# Patient Record
Sex: FEMALE | Race: WHITE | NOT HISPANIC OR LATINO | Employment: OTHER | URBAN - NONMETROPOLITAN AREA
[De-identification: names, ages, dates, MRNs, and addresses within clinical notes are randomized per-mention and may not be internally consistent; named-entity substitution may affect disease eponyms.]

---

## 2017-11-02 ENCOUNTER — HOSPITAL ENCOUNTER (EMERGENCY)
Facility: HOSPITAL | Age: 67
End: 2017-11-02
Attending: EMERGENCY MEDICINE | Admitting: EMERGENCY MEDICINE
Payer: COMMERCIAL

## 2017-11-02 ENCOUNTER — APPOINTMENT (EMERGENCY)
Dept: CT IMAGING | Facility: HOSPITAL | Age: 67
End: 2017-11-02
Payer: COMMERCIAL

## 2017-11-02 ENCOUNTER — APPOINTMENT (EMERGENCY)
Dept: RADIOLOGY | Facility: HOSPITAL | Age: 67
End: 2017-11-02
Payer: COMMERCIAL

## 2017-11-02 ENCOUNTER — HOSPITAL ENCOUNTER (INPATIENT)
Facility: HOSPITAL | Age: 67
LOS: 21 days | DRG: 224 | End: 2017-11-23
Attending: EMERGENCY MEDICINE | Admitting: EMERGENCY MEDICINE
Payer: COMMERCIAL

## 2017-11-02 VITALS
WEIGHT: 281.13 LBS | RESPIRATION RATE: 23 BRPM | HEIGHT: 66 IN | HEART RATE: 96 BPM | OXYGEN SATURATION: 100 % | BODY MASS INDEX: 45.18 KG/M2 | DIASTOLIC BLOOD PRESSURE: 64 MMHG | TEMPERATURE: 98.4 F | SYSTOLIC BLOOD PRESSURE: 113 MMHG

## 2017-11-02 DIAGNOSIS — I49.01 CARDIAC ARREST WITH VENTRICULAR FIBRILLATION (HCC): ICD-10-CM

## 2017-11-02 DIAGNOSIS — J96.90 RESPIRATORY FAILURE (HCC): ICD-10-CM

## 2017-11-02 DIAGNOSIS — J18.9 LEFT LOWER LOBE PNEUMONIA: Primary | ICD-10-CM

## 2017-11-02 DIAGNOSIS — I44.7 LBBB (LEFT BUNDLE BRANCH BLOCK): ICD-10-CM

## 2017-11-02 DIAGNOSIS — G93.40 ENCEPHALOPATHY: ICD-10-CM

## 2017-11-02 DIAGNOSIS — J96.02 ACUTE HYPERCAPNIC RESPIRATORY FAILURE (HCC): ICD-10-CM

## 2017-11-02 DIAGNOSIS — R73.9 HYPERGLYCEMIA: ICD-10-CM

## 2017-11-02 DIAGNOSIS — I46.9 CARDIAC ARREST WITH VENTRICULAR FIBRILLATION (HCC): ICD-10-CM

## 2017-11-02 DIAGNOSIS — I25.5 ISCHEMIC CARDIOMYOPATHY: ICD-10-CM

## 2017-11-02 DIAGNOSIS — I47.2 TORSADES DE POINTES (HCC): ICD-10-CM

## 2017-11-02 DIAGNOSIS — R94.31 PROLONGED QT INTERVAL: ICD-10-CM

## 2017-11-02 DIAGNOSIS — R06.03 RESPIRATORY DISTRESS: ICD-10-CM

## 2017-11-02 DIAGNOSIS — E87.2 LACTIC ACIDOSIS: ICD-10-CM

## 2017-11-02 DIAGNOSIS — I25.10 CAD (CORONARY ARTERY DISEASE): ICD-10-CM

## 2017-11-02 DIAGNOSIS — R41.82 ALTERED MENTAL STATUS, UNSPECIFIED ALTERED MENTAL STATUS TYPE: ICD-10-CM

## 2017-11-02 DIAGNOSIS — R40.4 TRANSIENT ALTERATION OF AWARENESS: ICD-10-CM

## 2017-11-02 DIAGNOSIS — I10 HYPERTENSION: ICD-10-CM

## 2017-11-02 DIAGNOSIS — R41.82 ALTERED MENTAL STATUS, UNSPECIFIED: Primary | ICD-10-CM

## 2017-11-02 DIAGNOSIS — R40.20 COMA, UNSPECIFIED COMA DEPTH, UNSPECIFIED COMA TIMING: ICD-10-CM

## 2017-11-02 DIAGNOSIS — I21.4 NSTEMI (NON-ST ELEVATED MYOCARDIAL INFARCTION) (HCC): ICD-10-CM

## 2017-11-02 DIAGNOSIS — R44.3 HALLUCINATIONS: ICD-10-CM

## 2017-11-02 DIAGNOSIS — R77.8 ELEVATED TROPONIN: ICD-10-CM

## 2017-11-02 DIAGNOSIS — J96.21 ACUTE ON CHRONIC RESPIRATORY FAILURE WITH HYPOXIA AND HYPERCAPNIA (HCC): ICD-10-CM

## 2017-11-02 DIAGNOSIS — J96.22 ACUTE ON CHRONIC RESPIRATORY FAILURE WITH HYPOXIA AND HYPERCAPNIA (HCC): ICD-10-CM

## 2017-11-02 LAB
ALBUMIN SERPL BCP-MCNC: 3.3 G/DL (ref 3.5–5)
ALBUMIN SERPL BCP-MCNC: 3.9 G/DL (ref 3.5–5)
ALP SERPL-CCNC: 37 U/L (ref 46–116)
ALP SERPL-CCNC: 44 U/L (ref 46–116)
ALT SERPL W P-5'-P-CCNC: 61 U/L (ref 12–78)
ALT SERPL W P-5'-P-CCNC: 69 U/L (ref 12–78)
ANION GAP BLD CALC-SCNC: 23 MMOL/L (ref 4–13)
ANION GAP SERPL CALCULATED.3IONS-SCNC: 10 MMOL/L (ref 4–13)
ANION GAP SERPL CALCULATED.3IONS-SCNC: 17 MMOL/L (ref 4–13)
APAP SERPL-MCNC: <2 UG/ML (ref 10–30)
APTT PPP: 35 SECONDS (ref 23–35)
APTT PPP: 36 SECONDS (ref 23–35)
AST SERPL W P-5'-P-CCNC: 140 U/L (ref 5–45)
AST SERPL W P-5'-P-CCNC: 148 U/L (ref 5–45)
BACTERIA UR QL AUTO: ABNORMAL /HPF
BASE EX.OXY STD BLDV CALC-SCNC: 28.2 % (ref 60–80)
BASE EXCESS BLDA CALC-SCNC: 1 MMOL/L (ref -2–3)
BASE EXCESS BLDV CALC-SCNC: 4.8 MMOL/L
BASOPHILS # BLD AUTO: 0.02 THOUSANDS/ΜL (ref 0–0.1)
BASOPHILS # BLD AUTO: 0.02 THOUSANDS/ΜL (ref 0–0.1)
BASOPHILS NFR BLD AUTO: 0 % (ref 0–1)
BASOPHILS NFR BLD AUTO: 0 % (ref 0–1)
BILIRUB SERPL-MCNC: 0.75 MG/DL (ref 0.2–1)
BILIRUB SERPL-MCNC: 0.9 MG/DL (ref 0.2–1)
BILIRUB UR QL STRIP: ABNORMAL
BUN BLD-MCNC: 14 MG/DL (ref 5–25)
BUN SERPL-MCNC: 14 MG/DL (ref 5–25)
BUN SERPL-MCNC: 16 MG/DL (ref 5–25)
CA-I BLD-SCNC: 1.17 MMOL/L (ref 1.12–1.32)
CA-I BLD-SCNC: 1.18 MMOL/L (ref 1.12–1.32)
CALCIUM SERPL-MCNC: 10.2 MG/DL (ref 8.3–10.1)
CALCIUM SERPL-MCNC: 9.6 MG/DL (ref 8.3–10.1)
CHLORIDE BLD-SCNC: 98 MMOL/L (ref 100–108)
CHLORIDE SERPL-SCNC: 100 MMOL/L (ref 100–108)
CHLORIDE SERPL-SCNC: 98 MMOL/L (ref 100–108)
CLARITY UR: CLEAR
CO2 SERPL-SCNC: 29 MMOL/L (ref 21–32)
CO2 SERPL-SCNC: 31 MMOL/L (ref 21–32)
COLOR UR: YELLOW
CREAT BLD-MCNC: 0.7 MG/DL (ref 0.6–1.3)
CREAT SERPL-MCNC: 0.96 MG/DL (ref 0.6–1.3)
CREAT SERPL-MCNC: 1.05 MG/DL (ref 0.6–1.3)
EOSINOPHIL # BLD AUTO: 0 THOUSAND/ΜL (ref 0–0.61)
EOSINOPHIL # BLD AUTO: 0 THOUSAND/ΜL (ref 0–0.61)
EOSINOPHIL NFR BLD AUTO: 0 % (ref 0–6)
EOSINOPHIL NFR BLD AUTO: 0 % (ref 0–6)
ERYTHROCYTE [DISTWIDTH] IN BLOOD BY AUTOMATED COUNT: 14.2 % (ref 11.6–15.1)
ERYTHROCYTE [DISTWIDTH] IN BLOOD BY AUTOMATED COUNT: 14.2 % (ref 11.6–15.1)
ETHANOL SERPL-MCNC: <3 MG/DL (ref 0–3)
GFR SERPL CREATININE-BSD FRML MDRD: 55 ML/MIN/1.73SQ M
GFR SERPL CREATININE-BSD FRML MDRD: 61 ML/MIN/1.73SQ M
GFR SERPL CREATININE-BSD FRML MDRD: 90 ML/MIN/1.73SQ M
GLUCOSE SERPL-MCNC: 359 MG/DL (ref 65–140)
GLUCOSE SERPL-MCNC: 372 MG/DL (ref 65–140)
GLUCOSE SERPL-MCNC: 382 MG/DL (ref 65–140)
GLUCOSE SERPL-MCNC: 383 MG/DL (ref 65–140)
GLUCOSE SERPL-MCNC: 385 MG/DL (ref 65–140)
GLUCOSE UR STRIP-MCNC: ABNORMAL MG/DL
HCO3 BLDA-SCNC: 28.7 MMOL/L (ref 24–30)
HCO3 BLDV-SCNC: 32.8 MMOL/L (ref 24–30)
HCT VFR BLD AUTO: 37.5 % (ref 34.8–46.1)
HCT VFR BLD AUTO: 41.9 % (ref 34.8–46.1)
HCT VFR BLD CALC: 42 % (ref 34.8–46.1)
HCT VFR BLD CALC: 54 % (ref 34.8–46.1)
HGB BLD-MCNC: 11.9 G/DL (ref 11.5–15.4)
HGB BLD-MCNC: 12.8 G/DL (ref 11.5–15.4)
HGB BLDA-MCNC: 14.3 G/DL (ref 11.5–15.4)
HGB BLDA-MCNC: 18.4 G/DL (ref 11.5–15.4)
HGB UR QL STRIP.AUTO: NEGATIVE
HYALINE CASTS #/AREA URNS LPF: ABNORMAL /LPF
INR PPP: 1.26 (ref 0.86–1.16)
INR PPP: 1.36 (ref 0.86–1.16)
KETONES UR STRIP-MCNC: ABNORMAL MG/DL
LACTATE SERPL-SCNC: 2.3 MMOL/L (ref 0.5–2)
LACTATE SERPL-SCNC: 2.3 MMOL/L (ref 0.5–2)
LACTATE SERPL-SCNC: 4.3 MMOL/L (ref 0.5–2)
LEUKOCYTE ESTERASE UR QL STRIP: ABNORMAL
LIPASE SERPL-CCNC: 43 U/L (ref 73–393)
LYMPHOCYTES # BLD AUTO: 1.5 THOUSANDS/ΜL (ref 0.6–4.47)
LYMPHOCYTES # BLD AUTO: 1.91 THOUSANDS/ΜL (ref 0.6–4.47)
LYMPHOCYTES NFR BLD AUTO: 13 % (ref 14–44)
LYMPHOCYTES NFR BLD AUTO: 9 % (ref 14–44)
MAGNESIUM SERPL-MCNC: 2 MG/DL (ref 1.6–2.6)
MCH RBC QN AUTO: 28.3 PG (ref 26.8–34.3)
MCH RBC QN AUTO: 29.1 PG (ref 26.8–34.3)
MCHC RBC AUTO-ENTMCNC: 30.5 G/DL (ref 31.4–37.4)
MCHC RBC AUTO-ENTMCNC: 31.7 G/DL (ref 31.4–37.4)
MCV RBC AUTO: 92 FL (ref 82–98)
MCV RBC AUTO: 93 FL (ref 82–98)
MONOCYTES # BLD AUTO: 1.39 THOUSAND/ΜL (ref 0.17–1.22)
MONOCYTES # BLD AUTO: 1.44 THOUSAND/ΜL (ref 0.17–1.22)
MONOCYTES NFR BLD AUTO: 10 % (ref 4–12)
MONOCYTES NFR BLD AUTO: 8 % (ref 4–12)
NEUTROPHILS # BLD AUTO: 11.7 THOUSANDS/ΜL (ref 1.85–7.62)
NEUTROPHILS # BLD AUTO: 14.34 THOUSANDS/ΜL (ref 1.85–7.62)
NEUTS SEG NFR BLD AUTO: 77 % (ref 43–75)
NEUTS SEG NFR BLD AUTO: 83 % (ref 43–75)
NITRITE UR QL STRIP: NEGATIVE
NON-SQ EPI CELLS URNS QL MICRO: ABNORMAL /HPF
NRBC BLD AUTO-RTO: 0 /100 WBCS
NT-PROBNP SERPL-MCNC: ABNORMAL PG/ML
O2 CT BLDV-SCNC: 4.6 ML/DL
PCO2 BLD: 26 MMOL/L (ref 21–32)
PCO2 BLD: 31 MMOL/L (ref 21–32)
PCO2 BLD: 59.9 MM HG (ref 42–50)
PCO2 BLDV: 67.2 MM HG (ref 42–50)
PH BLD: 7.29 [PH] (ref 7.3–7.4)
PH BLDV: 7.31 [PH] (ref 7.3–7.4)
PH UR STRIP.AUTO: 6 [PH] (ref 4.5–8)
PHOSPHATE SERPL-MCNC: 4 MG/DL (ref 2.3–4.1)
PLATELET # BLD AUTO: 324 THOUSANDS/UL (ref 149–390)
PLATELET # BLD AUTO: 368 THOUSANDS/UL (ref 149–390)
PMV BLD AUTO: 10.4 FL (ref 8.9–12.7)
PMV BLD AUTO: 10.7 FL (ref 8.9–12.7)
PO2 BLD: 24 MM HG (ref 35–45)
PO2 BLDV: 23.8 MM HG (ref 35–45)
POTASSIUM BLD-SCNC: 3.4 MMOL/L (ref 3.5–5.3)
POTASSIUM BLD-SCNC: 3.5 MMOL/L (ref 3.5–5.3)
POTASSIUM SERPL-SCNC: 3.5 MMOL/L (ref 3.5–5.3)
POTASSIUM SERPL-SCNC: 4 MMOL/L (ref 3.5–5.3)
PROT SERPL-MCNC: 7.2 G/DL (ref 6.4–8.2)
PROT SERPL-MCNC: 8.1 G/DL (ref 6.4–8.2)
PROT UR STRIP-MCNC: ABNORMAL MG/DL
PROTHROMBIN TIME: 15.7 SECONDS (ref 12.1–14.4)
PROTHROMBIN TIME: 16.8 SECONDS (ref 12.1–14.4)
RBC # BLD AUTO: 4.09 MILLION/UL (ref 3.81–5.12)
RBC # BLD AUTO: 4.52 MILLION/UL (ref 3.81–5.12)
RBC #/AREA URNS AUTO: ABNORMAL /HPF
SALICYLATES SERPL-MCNC: 3.5 MG/DL (ref 3–20)
SAO2 % BLD FROM PO2: 34 % (ref 95–98)
SODIUM BLD-SCNC: 143 MMOL/L (ref 136–145)
SODIUM BLD-SCNC: 143 MMOL/L (ref 136–145)
SODIUM SERPL-SCNC: 141 MMOL/L (ref 136–145)
SODIUM SERPL-SCNC: 144 MMOL/L (ref 136–145)
SP GR UR STRIP.AUTO: 1.04 (ref 1–1.03)
SPECIMEN SOURCE: ABNORMAL
TROPONIN I BLD-MCNC: 4.93 NG/ML (ref 0–0.08)
TROPONIN I SERPL-MCNC: 7.57 NG/ML
TROPONIN I SERPL-MCNC: 9.82 NG/ML
TSH SERPL DL<=0.05 MIU/L-ACNC: 0.92 UIU/ML (ref 0.36–3.74)
UROBILINOGEN UR QL STRIP.AUTO: 0.2 E.U./DL
WBC # BLD AUTO: 15.12 THOUSAND/UL (ref 4.31–10.16)
WBC # BLD AUTO: 17.25 THOUSAND/UL (ref 4.31–10.16)
WBC #/AREA URNS AUTO: ABNORMAL /HPF

## 2017-11-02 PROCEDURE — 83605 ASSAY OF LACTIC ACID: CPT | Performed by: EMERGENCY MEDICINE

## 2017-11-02 PROCEDURE — 82947 ASSAY GLUCOSE BLOOD QUANT: CPT

## 2017-11-02 PROCEDURE — 85014 HEMATOCRIT: CPT

## 2017-11-02 PROCEDURE — 85730 THROMBOPLASTIN TIME PARTIAL: CPT | Performed by: EMERGENCY MEDICINE

## 2017-11-02 PROCEDURE — 94660 CPAP INITIATION&MGMT: CPT

## 2017-11-02 PROCEDURE — 80320 DRUG SCREEN QUANTALCOHOLS: CPT | Performed by: EMERGENCY MEDICINE

## 2017-11-02 PROCEDURE — 84443 ASSAY THYROID STIM HORMONE: CPT | Performed by: EMERGENCY MEDICINE

## 2017-11-02 PROCEDURE — 84132 ASSAY OF SERUM POTASSIUM: CPT

## 2017-11-02 PROCEDURE — 93005 ELECTROCARDIOGRAM TRACING: CPT | Performed by: EMERGENCY MEDICINE

## 2017-11-02 PROCEDURE — 82805 BLOOD GASES W/O2 SATURATION: CPT | Performed by: EMERGENCY MEDICINE

## 2017-11-02 PROCEDURE — 84484 ASSAY OF TROPONIN QUANT: CPT

## 2017-11-02 PROCEDURE — 85610 PROTHROMBIN TIME: CPT | Performed by: EMERGENCY MEDICINE

## 2017-11-02 PROCEDURE — 84295 ASSAY OF SERUM SODIUM: CPT

## 2017-11-02 PROCEDURE — 71010 HB CHEST X-RAY 1 VIEW FRONTAL (PORTABLE): CPT

## 2017-11-02 PROCEDURE — 85025 COMPLETE CBC W/AUTO DIFF WBC: CPT | Performed by: EMERGENCY MEDICINE

## 2017-11-02 PROCEDURE — 81001 URINALYSIS AUTO W/SCOPE: CPT | Performed by: EMERGENCY MEDICINE

## 2017-11-02 PROCEDURE — 94760 N-INVAS EAR/PLS OXIMETRY 1: CPT

## 2017-11-02 PROCEDURE — 80053 COMPREHEN METABOLIC PANEL: CPT | Performed by: EMERGENCY MEDICINE

## 2017-11-02 PROCEDURE — 83880 ASSAY OF NATRIURETIC PEPTIDE: CPT | Performed by: EMERGENCY MEDICINE

## 2017-11-02 PROCEDURE — 36415 COLL VENOUS BLD VENIPUNCTURE: CPT | Performed by: EMERGENCY MEDICINE

## 2017-11-02 PROCEDURE — 87040 BLOOD CULTURE FOR BACTERIA: CPT | Performed by: EMERGENCY MEDICINE

## 2017-11-02 PROCEDURE — 93005 ELECTROCARDIOGRAM TRACING: CPT

## 2017-11-02 PROCEDURE — 70450 CT HEAD/BRAIN W/O DYE: CPT

## 2017-11-02 PROCEDURE — 72125 CT NECK SPINE W/O DYE: CPT

## 2017-11-02 PROCEDURE — 96361 HYDRATE IV INFUSION ADD-ON: CPT

## 2017-11-02 PROCEDURE — 99285 EMERGENCY DEPT VISIT HI MDM: CPT

## 2017-11-02 PROCEDURE — 82330 ASSAY OF CALCIUM: CPT

## 2017-11-02 PROCEDURE — 82803 BLOOD GASES ANY COMBINATION: CPT

## 2017-11-02 PROCEDURE — 80165 DIPROPYLACETIC ACID FREE: CPT | Performed by: EMERGENCY MEDICINE

## 2017-11-02 PROCEDURE — 96365 THER/PROPH/DIAG IV INF INIT: CPT

## 2017-11-02 PROCEDURE — 84100 ASSAY OF PHOSPHORUS: CPT | Performed by: EMERGENCY MEDICINE

## 2017-11-02 PROCEDURE — 82948 REAGENT STRIP/BLOOD GLUCOSE: CPT

## 2017-11-02 PROCEDURE — 83690 ASSAY OF LIPASE: CPT | Performed by: EMERGENCY MEDICINE

## 2017-11-02 PROCEDURE — 84484 ASSAY OF TROPONIN QUANT: CPT | Performed by: EMERGENCY MEDICINE

## 2017-11-02 PROCEDURE — 83735 ASSAY OF MAGNESIUM: CPT | Performed by: EMERGENCY MEDICINE

## 2017-11-02 PROCEDURE — 94640 AIRWAY INHALATION TREATMENT: CPT

## 2017-11-02 PROCEDURE — 80047 BASIC METABLC PNL IONIZED CA: CPT

## 2017-11-02 PROCEDURE — 80329 ANALGESICS NON-OPIOID 1 OR 2: CPT | Performed by: EMERGENCY MEDICINE

## 2017-11-02 RX ORDER — HEPARIN SODIUM 1000 [USP'U]/ML
2000 INJECTION, SOLUTION INTRAVENOUS; SUBCUTANEOUS AS NEEDED
Status: DISCONTINUED | OUTPATIENT
Start: 2017-11-02 | End: 2017-11-06

## 2017-11-02 RX ORDER — SODIUM CHLORIDE 9 MG/ML
125 INJECTION, SOLUTION INTRAVENOUS CONTINUOUS
Status: DISCONTINUED | OUTPATIENT
Start: 2017-11-02 | End: 2017-11-03

## 2017-11-02 RX ORDER — ALBUTEROL SULFATE 2.5 MG/3ML
10 SOLUTION RESPIRATORY (INHALATION) ONCE
Status: COMPLETED | OUTPATIENT
Start: 2017-11-02 | End: 2017-11-02

## 2017-11-02 RX ORDER — HEPARIN SODIUM 1000 [USP'U]/ML
4000 INJECTION, SOLUTION INTRAVENOUS; SUBCUTANEOUS AS NEEDED
Status: DISCONTINUED | OUTPATIENT
Start: 2017-11-02 | End: 2017-11-06

## 2017-11-02 RX ORDER — ASPIRIN 300 MG/1
300 SUPPOSITORY RECTAL ONCE
Status: COMPLETED | OUTPATIENT
Start: 2017-11-02 | End: 2017-11-02

## 2017-11-02 RX ORDER — ONDANSETRON 2 MG/ML
4 INJECTION INTRAMUSCULAR; INTRAVENOUS EVERY 6 HOURS PRN
Status: DISCONTINUED | OUTPATIENT
Start: 2017-11-02 | End: 2017-11-03

## 2017-11-02 RX ORDER — HEPARIN SODIUM 1000 [USP'U]/ML
4000 INJECTION, SOLUTION INTRAVENOUS; SUBCUTANEOUS ONCE
Status: COMPLETED | OUTPATIENT
Start: 2017-11-02 | End: 2017-11-02

## 2017-11-02 RX ORDER — ACETAMINOPHEN 325 MG/1
650 TABLET ORAL EVERY 6 HOURS PRN
Status: DISCONTINUED | OUTPATIENT
Start: 2017-11-02 | End: 2017-11-03

## 2017-11-02 RX ORDER — CHLORHEXIDINE GLUCONATE 0.12 MG/ML
15 RINSE ORAL EVERY 12 HOURS SCHEDULED
Status: DISCONTINUED | OUTPATIENT
Start: 2017-11-02 | End: 2017-11-03

## 2017-11-02 RX ORDER — SODIUM CHLORIDE 9 MG/ML
125 INJECTION, SOLUTION INTRAVENOUS CONTINUOUS
Status: DISCONTINUED | OUTPATIENT
Start: 2017-11-02 | End: 2017-11-02 | Stop reason: HOSPADM

## 2017-11-02 RX ORDER — HEPARIN SODIUM 10000 [USP'U]/100ML
3-20 INJECTION, SOLUTION INTRAVENOUS
Status: DISCONTINUED | OUTPATIENT
Start: 2017-11-02 | End: 2017-11-06

## 2017-11-02 RX ADMIN — IPRATROPIUM BROMIDE 1 MG: 0.5 SOLUTION RESPIRATORY (INHALATION) at 16:05

## 2017-11-02 RX ADMIN — SODIUM CHLORIDE 500 ML: 0.9 INJECTION, SOLUTION INTRAVENOUS at 16:32

## 2017-11-02 RX ADMIN — HEPARIN SODIUM AND DEXTROSE 11.1 UNITS/KG/HR: 10000; 5 INJECTION INTRAVENOUS at 21:52

## 2017-11-02 RX ADMIN — CHLORHEXIDINE GLUCONATE 15 ML: 1.2 RINSE ORAL at 21:55

## 2017-11-02 RX ADMIN — ASPIRIN 300 MG: 300 SUPPOSITORY RECTAL at 16:59

## 2017-11-02 RX ADMIN — SODIUM CHLORIDE 100 ML/HR: 0.9 INJECTION, SOLUTION INTRAVENOUS at 21:53

## 2017-11-02 RX ADMIN — ALBUTEROL SULFATE 10 MG: 2.5 SOLUTION RESPIRATORY (INHALATION) at 16:05

## 2017-11-02 RX ADMIN — HEPARIN SODIUM 4000 UNITS: 1000 INJECTION, SOLUTION INTRAVENOUS; SUBCUTANEOUS at 21:55

## 2017-11-02 RX ADMIN — SODIUM CHLORIDE 125 ML/HR: 0.9 INJECTION, SOLUTION INTRAVENOUS at 17:18

## 2017-11-02 RX ADMIN — PIPERACILLIN SODIUM,TAZOBACTAM SODIUM 4.5 G: 4; .5 INJECTION, POWDER, FOR SOLUTION INTRAVENOUS at 16:59

## 2017-11-02 NOTE — ED PROCEDURE NOTE
PROCEDURE  ECG 12 Lead Documentation  Date/Time: 11/2/2017 4:45 PM  Performed by: Kaila Major  Authorized by: Kaila Major     Indications / Diagnosis:  Short of breath   ECG reviewed by me, the ED Provider: yes    Patient location:  ED  Previous ECG:     Previous ECG:  Unavailable  Interpretation:     Interpretation: non-specific    Rate:     ECG rate:  100    ECG rate assessment: normal    Rhythm:     Rhythm: sinus tachycardia    ST segments:     ST segments: elevation in V1, V2 and V3   T waves:     T waves: non-specific    Comments:      LBBB

## 2017-11-02 NOTE — ED NOTES
Respiratory called again, made aware patient is flying   States she will be down when she can     BETHANY Almaguer  11/02/17 1708

## 2017-11-02 NOTE — ED PROCEDURE NOTE
PROCEDURE  ECG 12 Lead Documentation  Date/Time: 11/2/2017 3:57 PM  Performed by: Meli Bashir  Authorized by: Meli Bashir     Indications / Diagnosis:  Respiatory distress   ECG reviewed by me, the ED Provider: yes    Patient location:  ED  Previous ECG:     Previous ECG:  Unavailable  Interpretation:     Interpretation: non-specific    Rate:     ECG rate:  113  Rhythm:     Rhythm comment:  Left bundle branch block

## 2017-11-02 NOTE — EMTALA/ACUTE CARE TRANSFER
[address] [facility] [address]  [address] [facility] [address]  Dept: [phone]      TRBRZG TRANSFER CONSENT    NAME Yue Ortiz                                         1950                              MRN [id number]    I have been informed of my rights regarding examination, treatment, and transfer   by Dr Aida Bal DO    Benefits: Specialized equipment and/or services available at the receiving facility (Include comment)________________________    Risks:  Accident       Transfer Request   I acknowledge that my medical condition has been evaluated and explained to me by the emergency department physician or other qualified medical person and/or my attending physician who has recommended and offered to me further medical examination and treatment  I understand the Hospital's obligation with respect to the treatment and stabilization of my emergency medical condition  I nevertheless request to be transferred  I release the Hospital, the doctor, and any other persons caring for me from all responsibility or liability for any injury or ill effects that may result from my transfer and agree to accept all responsibility for the consequences of my choice to transfer, rather than receive stabilizing treatment at the Hospital  I understand that because the transfer is my request, my insurance may not provide reimbursement for the services  The Hospital will assist and direct me and my family in how to make arrangements for transfer, but the hospital is not liable for any fees charged by the transport service  In spite of this understanding, I refuse to consent to further medical examination and treatment which has been offered to me, and request transfer to  [address] Name, Timmy : TESSY   I authorize the performance of emergency medical procedures and treatments upon me in both transit and upon arrival at the receiving facility    Additionally, I authorize the release of any and all medical records to the receiving facility and request they be transported with me, if possible  I authorize the performance of emergency medical procedures and treatments upon me in both transit and upon arrival at the receiving facility  Additionally, I authorize the release of any and all medical records to the receiving facility and request they be transported with me, if possible  I understand that the safest mode of transportation during a medical emergency is an ambulance and that the Hospital advocates the use of this mode of transport  Risks of traveling to the receiving facility by car, including absence of medical control, life sustaining equipment, such as oxygen, and medical personnel has been explained to me and I fully understand them  (BE CORRECT BOX BELOW)  [  ]  I consent to the stated transfer and to be transported by ambulance/helicopter  [  ]  I consent to the stated transfer, but refuse transportation by ambulance and accept full responsibility for my transportation by car  I understand the risks of non-ambulance transfers and I exonerate the Hospital and its staff from any deterioration in my condition that results from this refusal     X___________________________________________    DATE  17  TIME________  Signature of patient or legally responsible individual signing on patient behalf           RELATIONSHIP TO PATIENT_________________________          Provider Certification    NAME Lady Berman                                         1950                              MRN [id number]    A medical screening exam was performed on the above named patient  Based on the examination:    Condition Necessitating Transfer The primary encounter diagnosis was Left lower lobe pneumonia ([address] )   Diagnoses of Respiratory distress, Hyperglycemia, Hypertension, Lactic acidosis, and NSTEMI (non-ST elevated myocardial infarction) [facility]) were also pertinent to this visit  Patient Condition: The patient has been stabilized such that within reasonable medical probability, no material deterioration of the patient condition or the condition of the unborn child(grant) is likely to result from the transfer    Reason for Transfer: Level of Care needed not available at this facility    Transfer Requirements: Facility \[facility] [place] [facility]\""    · Space available and qualified personnel available for treatment as acknowledged by    · Agreed to accept transfer and to provide appropriate medical treatment as acknowledged by       Dr Jose Craig   · Appropriate medical records of the examination and treatment of the patient are provided at the time of transfer   [address] [facility], [address] _______  · Transfer will be performed by qualified personnel from    and appropriate transfer equipment as required, including the use of necessary and appropriate life support measures  Provider Certification: I have examined the patient and explained the following risks and benefits of being transferred/refusing transfer to the patient/family:         Based on these reasonable risks and benefits to the patient and/or the unborn child(grant), and based upon the information available at the time of the patients examination, I certify that the medical benefits reasonably to be expected from the provision of appropriate medical treatments at another medical facility outweigh the increasing risks, if any, to the individuals medical condition, and in the case of labor to the unborn child, from effecting the transfer      X____________________________________________ DATE 11/02/17        TIME_______      ORIGINAL - SEND TO MEDICAL RECORDS   COPY - SEND WITH PATIENT DURING TRANSFER

## 2017-11-02 NOTE — ED PROVIDER NOTES
History  Chief Complaint   Patient presents with    Shortness of Breath     c/o SOB per EMS "called to home for well visit, found patient with altered status on floor in urine"  Unknown down time  Pt alert, confused conversation  Pt denies pain currently  15-year-old female with a history of hypertension and diabetes presents by EMS for change in mental status and respiratory distress  As per EMS they were called to the house 3 days ago for welfare check patient refused to be transported that time  There also called the house last night for another welfare check patient refused to be transferred at that time  Today patient's family members called the police because the patient did not answer her phone  The police arrived on scene and found the patient on the floor in a puddle of urine then EMS was activated  Patient's initial oxygen saturations were in the 70s she was placed on BiPAP and brought to the emerged part for evaluation  Upon presentation to the emerged department patient's oxygen saturation was 100% she was awake alert and following commands  Patient apparently just discharged from another facility after a prolonged stay for similar symptoms  Patient vehemently denies chest pain        Shortness of Breath   Severity:  Severe  Onset quality:  Unable to specify  Progression:  Worsening  Chronicity:  Recurrent  Context: activity    Relieved by:  Oxygen, rest and sitting up  Worsened by:  Exertion  Ineffective treatments:  Oxygen  Associated symptoms: diaphoresis    Associated symptoms: no abdominal pain, no chest pain, no claudication, no cough and no headaches    Risk factors: no recent alcohol use, no family hx of DVT and no hx of cancer        None       Past Medical History:   Diagnosis Date    Anxiety     Diabetes mellitus ([facility] [address] )     Hypertension        History reviewed  No pertinent surgical history  History reviewed  No pertinent family history    I have reviewed and agree with the history as documented  Social History   Substance Use Topics    Smoking status: Current Every Day Smoker     Types: Cigarettes    Smokeless tobacco: Never Used    Alcohol use No        Review of Systems   Constitutional: Positive for diaphoresis  HENT: Negative for congestion, dental problem, drooling and ear discharge  Eyes: Negative for pain, discharge and itching  Respiratory: Positive for shortness of breath  Negative for cough  Cardiovascular: Negative for chest pain and claudication  Gastrointestinal: Negative for abdominal pain  Endocrine: Negative for cold intolerance, heat intolerance and polydipsia  Genitourinary: Negative for difficulty urinating, dyspareunia and dysuria  Musculoskeletal: Negative for arthralgias, back pain and gait problem  Skin: Negative for color change and pallor  Allergic/Immunologic: Negative for environmental allergies and food allergies  Neurological: Negative for dizziness, facial asymmetry and headaches  Hematological: Negative for adenopathy  Psychiatric/Behavioral: Negative for agitation and confusion  Physical Exam  ED Triage Vitals   Temperature Pulse Respirations Blood Pressure SpO2   11/02/17 1548 11/02/17 1548 11/02/17 1548 11/02/17 1630 11/02/17 1548   98 4 °F (36 9 °C) (!) 112 (!) 26 100/58 100 %      Temp Source Heart Rate Source Patient Position - Orthostatic VS BP Location FiO2 (%)   11/02/17 1548 11/02/17 1548 11/02/17 1548 11/02/17 1630 --   Temporal Monitor Lying Left arm       Pain Score       11/02/17 1548       No Pain           Orthostatic Vital Signs  Vitals:    11/02/17 1703 11/02/17 1717 11/02/17 1745 11/02/17 1746   BP: 110/63 110/63 113/64 113/64   Pulse: 97 90 95 96   Patient Position - Orthostatic VS: Sitting Sitting         Physical Exam   Constitutional: She is oriented to person, place, and time  She appears well-developed and well-nourished  HENT:   Head: Normocephalic and atraumatic     Eyes: EOM are normal  Pupils are equal, round, and reactive to light  Neck: Normal range of motion  Neck supple  No tracheal deviation present  No thyromegaly present  Cardiovascular: Normal rate and regular rhythm  Pulmonary/Chest: Accessory muscle usage present  Tachypnea noted  She is in respiratory distress  She has decreased breath sounds  She has no wheezes  She has no rhonchi  Abdominal: She exhibits no mass  There is no rebound and no guarding  Musculoskeletal: She exhibits no edema or deformity  Neurological: She is alert and oriented to person, place, and time  She displays normal reflexes  No cranial nerve deficit  Coordination normal    Skin: Skin is warm  Capillary refill takes less than 2 seconds  No rash noted  No erythema  Psychiatric: She has a normal mood and affect  Her behavior is normal  Thought content normal    Vitals reviewed        ED Medications  Medications   sodium chloride 0 9 % infusion (125 mL/hr Intravenous New Bag 11/2/17 1718)   vancomycin (VANCOCIN) 2,000 mg in sodium chloride 0 9 % 500 mL IVPB (not administered)   albuterol inhalation solution 10 mg (10 mg Nebulization Given 11/2/17 1605)   ipratropium (ATROVENT) 0 02 % inhalation solution 1 mg (1 mg Nebulization Given 11/2/17 1605)   piperacillin-tazobactam (ZOSYN) 4 5 g in dextrose 5 % 100 mL IVPB (0 g Intravenous Stopped 11/2/17 1751)   sodium chloride 0 9 % bolus 500 mL (0 mL Intravenous Stopped 11/2/17 1718)   aspirin rectal suppository 300 mg (300 mg Rectal Given 11/2/17 1659)       Diagnostic Studies  Results Reviewed     Procedure Component Value Units Date/Time    Troponin I [[id number]]  (Abnormal) Collected:  11/02/17 1646    Lab Status:  Final result Specimen:  Blood from Arm, Right Updated:  11/02/17 1727     Troponin I 7 57 (HH) ng/mL     Narrative:         [employer] Chemistry analyzer 99% cutoff is > 0 04 ng/mL in network labs    o cTnI 99% cutoff is useful only when applied to patients in the clinical setting of myocardial ischemia  o cTnI 99% cutoff should be interpreted in the context of clinical history, ECG findings and possibly cardiac imaging to establish correct diagnosis  o cTnI 99% cutoff may be suggestive but clearly not indicative of a coronary event without the clinical setting of myocardial ischemia  Acetaminophen level [[id number]]  (Abnormal) Collected:  11/02/17 1618    Lab Status:  Final result Specimen:  Blood Updated:  11/02/17 1644     Acetaminophen Level <2 (L) ug/mL     Ethanol [[id number]]  (Normal) Collected:  11/02/17 1618    Lab Status:  Final result Specimen:  Blood Updated:  11/02/17 1643     Ethanol Lvl <3 mg/dL     Salicylate level [[id number]]  (Normal) Collected:  11/02/17 1618    Lab Status:  Final result Specimen:  Blood Updated:  02/71/74 7412     Salicylate Lvl 3 5 mg/dL     Toxicology screen, urine [[id number]]     Lab Status:  No result Specimen:  Urine     POCT troponin [[id number]]  (Abnormal) Collected:  11/02/17 1615    Lab Status:  Final result Updated:  11/02/17 1629     POC Troponin I 4 93 (H) ng/ml      Specimen Type VENOUS    Narrative:         [employer] i-Stat handheld analyzer 99% cutoff is > 0 08ng/mL in [facility] Emergency Departments    o cTnI 99% cutoff is useful only when applied to patients in the clinical setting of myocardial ischemia  o cTnI 99% cutoff should be interpreted in the context of clinical history, ECG findings and possibly cardiac imaging to establish correct diagnosis  o cTnI 99% cutoff may be suggestive but clearly not indicative of a coronary event without the clinical setting of myocardial ischemia      POCT Chem 8+ [[id number]]  (Abnormal) Collected:  11/02/17 1610    Lab Status:  Final result Updated:  11/02/17 1629     SODIUM, I-STAT 143 mmol/l      Potassium, i-STAT 3 4 (L) mmol/L      Chloride, istat 98 (L) mmol/L      CO2, i-STAT 26 mmol/L      Anion Gap, Istat 23 (H) mmol/L      Calcium, Ionized i-STAT 1 17 mmol/L      BUN, I-STAT 14 mg/dl      Creatinine, i-STAT 0 7 mg/dl      eGFR 90 ml/min/1 73sq m      Glucose, i-STAT 382 (H) mg/dl      Hct, i-STAT 54 (H) %      Hgb, i-STAT 18 4 (H) g/dl      Specimen Type VENOUS    Comprehensive metabolic panel [[id number]]  (Abnormal) Collected:  11/02/17 1554    Lab Status:  Final result Specimen:  Blood from Arm, Right Updated:  11/02/17 1623     Sodium 144 mmol/L      Potassium 3 5 mmol/L      Chloride 98 (L) mmol/L      CO2 29 mmol/L      Anion Gap 17 (H) mmol/L      BUN 14 mg/dL      Creatinine 1 05 mg/dL      Glucose 385 (H) mg/dL      Calcium 10 2 (H) mg/dL       (H) U/L      ALT 69 U/L      Alkaline Phosphatase 44 (L) U/L      Total Protein 8 1 g/dL      Albumin 3 9 g/dL      Total Bilirubin 0 90 mg/dL      eGFR 55 ml/min/1 73sq m     Narrative:         National Kidney Disease Education Program recommendations are as follows:  GFR calculation is accurate only with a steady state creatinine  Chronic Kidney disease less than 60 ml/min/1 73 sq  meters  Kidney failure less than 15 ml/min/1 73 sq  meters  Lipase [[id number]]  (Abnormal) Collected:  11/02/17 1554    Lab Status:  Final result Specimen:  Blood from Arm, Right Updated:  11/02/17 1623     Lipase 43 (L) u/L     B-type natriuretic peptide [[id number]]  (Abnormal) Collected:  11/02/17 1554    Lab Status:  Final result Specimen:  Blood from Arm, Right Updated:  11/02/17 1623     NT-proBNP 25,197 (H) pg/mL     Lactic acid, plasma [[id number]]  (Abnormal) Collected:  11/02/17 1554    Lab Status:  Final result Specimen:  Blood from Arm, Right Updated:  11/02/17 1621     LACTIC ACID 4 3 (HH) mmol/L     Narrative:         Result may be elevated if tourniquet was used during collection      Protime-INR [[id number]]  (Abnormal) Collected:  11/02/17 1554    Lab Status:  Final result Specimen:  Blood from Arm, Right Updated:  11/02/17 1615     Protime 15 7 (H) seconds      INR 1 26 (H)    APTT [[id number]]  (Normal) Collected:  11/02/17 1554    Lab Status:  Final result Specimen: Blood from Arm, Right Updated:  11/02/17 1615     PTT 35 seconds     Narrative: Therapeutic Heparin Range = 60-90 seconds    CBC and differential [[id number]]  (Abnormal) Collected:  11/02/17 1554    Lab Status:  Final result Specimen:  Blood from Arm, Right Updated:  11/02/17 1601     WBC 17 25 (H) Thousand/uL      RBC 4 52 Million/uL      Hemoglobin 12 8 g/dL      Hematocrit 41 9 %      MCV 93 fL      MCH 28 3 pg      MCHC 30 5 (L) g/dL      RDW 14 2 %      MPV 10 7 fL      Platelets 831 Thousands/uL      Neutrophils Relative 83 (H) %      Lymphocytes Relative 9 (L) %      Monocytes Relative 8 %      Eosinophils Relative 0 %      Basophils Relative 0 %      Neutrophils Absolute 14 34 (H) Thousands/µL      Lymphocytes Absolute 1 50 Thousands/µL      Monocytes Absolute 1 39 (H) Thousand/µL      Eosinophils Absolute 0 00 Thousand/µL      Basophils Absolute 0 02 Thousands/µL     Blood culture #2 [[id number]] Collected:  11/02/17 1554    Lab Status:   In process Specimen:  Blood from Arm, Right Updated:  11/02/17 1558    POCT Blood Gas (CG8+) [[id number]]  (Abnormal) Collected:  11/02/17 1552    Lab Status:  Final result Updated:  11/02/17 1557     ph, Ayaz ISTAT 7 289 (L)     pCO2, Ayaz i-STAT 59 9 (H) mm HG      pO2, Ayaz i-STAT 24 0 (L) mm HG      BE, i-STAT 1 mmol/L      HCO3, Ayaz i-STAT 28 7 mmol/L      CO2, i-STAT 31 mmol/L      O2 Sat, i-STAT 34 (L) %      SODIUM, I-STAT 143 mmol/l      Potassium, i-STAT 3 5 mmol/L      Calcium, Ionized i-STAT 1 18 mmol/L      Hct, i-STAT 42 %      Hgb, i-STAT 14 3 g/dl      Glucose, i-STAT 383 (H) mg/dl      Specimen Type VENOUS    Fingerstick Glucose (POCT) [[id number]]  (Abnormal) Collected:  11/02/17 1550    Lab Status:  Final result Updated:  11/02/17 1552     POC Glucose 359 (H) mg/dl     Blood gas, arterial [[id number]]     Lab Status:  No result Specimen:  Blood, Arterial     UA w Reflex to Microscopic w Reflex to Culture [[id number]]     Lab Status:  No result Specimen:  Urine Rapid drug screen, urine [[id number]]     Lab Status:  No result Specimen:  Urine     Blood culture #1 [[id number]]     Lab Status:  No result Specimen:  Blood                  CT head without contrast   Final Result by Jerrica Soares MD (11/02 1754)      No acute intracranial abnormality  Workstation performed: [id number]         XR chest 1 view portable   ED Interpretation by Chantel Main DO (11/02 1610)   LLL infiltrate       Final Result by CRYSTAL Chang MD (11/02 1613)      Bilateral pleural effusions, left greater than right  Underlying process of the left lung base not excludable            Workstation performed: [id number]         CT cervical spine without contrast    (Results Pending)              Procedures  Procedures       Phone Contacts  ED Phone Contact    ED Course  ED Course          HEART Risk Score    Flowsheet Row Most Recent Value   History  1 Filed at: 11/02/2017 1556   ECG  1 Filed at: 11/02/2017 1556   Age  2 Filed at: 11/02/2017 1556   Risk Factors  1 Filed at: 11/02/2017 1556   Troponin  0 Filed at: 11/02/2017 1556   Heart Score Risk Calculator   History  1 Filed at: 11/02/2017 1556   ECG  1 Filed at: 11/02/2017 1556   Age  2 Filed at: 11/02/2017 1556   Risk Factors  1 Filed at: 11/02/2017 1556   Troponin  0 Filed at: 11/02/2017 1556   HEART Score  5 Filed at: 11/02/2017 1556   HEART Score  5 Filed at: 11/02/2017 1556                            MDM  Number of Diagnoses or Management Options  Hyperglycemia:   Hypertension:   Lactic acidosis:   Left lower lobe pneumonia [facility]):   NSTEMI (non-ST elevated myocardial infarction) [facility]):   Respiratory distress:   Diagnosis management comments: Differential diagnosis 1  Acute coronary syndrome 2  Pneumonia 3  Bronchitis  We will continue the patient on BiPAP as her oxygen saturations 100% respiratory rate is down to the low 20s    She is in she is no longer using accessory muscles and she is able to speak in full sentences  A called respiratory therapist multiple times explain that the patient needed to have a a CT scan of the head and C-spine  Respiratory therapist was upstairs in the ICU taking care of another critical patient    Troponin is elevated repeat EKG is concerning for ST segment elevation I have called to speak to Dr Jason mathias of critical care at [facility] -  Would  No heparinize at this time  Patient denies chest pain     We will activate Jarrod Star              Amount and/or Complexity of Data Reviewed  Clinical lab tests: reviewed  Tests in the radiology section of CPT®: reviewed  Tests in the medicine section of CPT®: reviewed    Risk of Complications, Morbidity, and/or Mortality  Presenting problems: high  Diagnostic procedures: high  Management options: high      Total time providing critical care: BIPAP , repiratory distress         Disposition  Final diagnoses:   Left lower lobe pneumonia (HCC)   Respiratory distress   Hyperglycemia   Hypertension   Lactic acidosis   NSTEMI (non-ST elevated myocardial infarction) ([facility] [address] )     Time reflects when diagnosis was documented in both MDM as applicable and the Disposition within this note     Time User Action Codes Description Comment    11/2/2017  4:11 PM Bartholome Petrin Add [J18 1] Left lower lobe pneumonia ([facility] [address] )     11/2/2017  4:12 PM Bartholome Petrin Add [R06 00] Respiratory distress     11/2/2017  4:12 PM Bartholome Petrin Add [R73 9] Hyperglycemia     11/2/2017  4:12 PM Bartholome Petrin Add [I10] Hypertension     11/2/2017  4:22 PM Bartholome Petrin Add [E87 2] Lactic acidosis     11/2/2017  4:36 PM Bartholome Petrin Add [I21 4] NSTEMI (non-ST elevated myocardial infarction) [facility])       ED Disposition     ED Disposition Condition Comment    Transfer to Another Facility  Case was discussed with Dr Ernst Reddy MD Documentation    Akua Blazing Most Recent Value   Patient Condition  The patient has been stabilized such that within reasonable medical probability, no material deterioration of the patient condition or the condition of the unborn child(grant) is likely to result from the transfer   Reason for Transfer  Level of Care needed not available at this facility   Benefits of Transfer  Specialized equipment and/or services available at the receiving facility (Include comment)________________________   Accepting Physician  Dr Lizbet Murillo Name, Radha Go       RN Documentation    [address] Name, [address]   B    Medications Reviewed with Next Provider of Service  Yes   Transport Mode  Helicopter   Level of Care  Advanced life support      Follow-up Information    None       Patient's Medications    No medications on file     No discharge procedures on file      ED Provider  Electronically Signed by           Heather Castanon DO  11/02/17 224 SARAI Brown DO  11/02/17 7941

## 2017-11-02 NOTE — ED NOTES
Waiting for respiratory to take patient to CT  Dr Hussein Peraza and respiratory aware        Viji Dixon, BETHANY  11/02/17 6388

## 2017-11-03 ENCOUNTER — APPOINTMENT (INPATIENT)
Dept: RADIOLOGY | Facility: HOSPITAL | Age: 67
DRG: 224 | End: 2017-11-03
Payer: COMMERCIAL

## 2017-11-03 ENCOUNTER — APPOINTMENT (INPATIENT)
Dept: NON INVASIVE DIAGNOSTICS | Facility: HOSPITAL | Age: 67
DRG: 224 | End: 2017-11-03
Payer: COMMERCIAL

## 2017-11-03 ENCOUNTER — GENERIC CONVERSION - ENCOUNTER (OUTPATIENT)
Dept: OTHER | Facility: OTHER | Age: 67
End: 2017-11-03

## 2017-11-03 PROBLEM — E78.5 HYPERLIPEMIA: Status: ACTIVE | Noted: 2017-11-03

## 2017-11-03 PROBLEM — I47.2 TORSADES DE POINTES (HCC): Status: ACTIVE | Noted: 2017-11-03

## 2017-11-03 PROBLEM — I25.10 CAD (CORONARY ARTERY DISEASE): Status: ACTIVE | Noted: 2017-11-03

## 2017-11-03 PROBLEM — I10 HTN (HYPERTENSION): Status: ACTIVE | Noted: 2017-11-03

## 2017-11-03 PROBLEM — I46.9 CARDIAC ARREST WITH VENTRICULAR FIBRILLATION (HCC): Status: ACTIVE | Noted: 2017-11-03

## 2017-11-03 PROBLEM — R94.31 PROLONGED QT INTERVAL: Status: ACTIVE | Noted: 2017-11-03

## 2017-11-03 PROBLEM — J38.00 VOCAL CORD PARALYSIS: Status: ACTIVE | Noted: 2017-11-03

## 2017-11-03 PROBLEM — I21.4 NSTEMI (NON-ST ELEVATED MYOCARDIAL INFARCTION) (HCC): Status: ACTIVE | Noted: 2017-11-03

## 2017-11-03 PROBLEM — I25.5 ISCHEMIC CARDIOMYOPATHY: Status: ACTIVE | Noted: 2017-11-03

## 2017-11-03 PROBLEM — I49.01 CARDIAC ARREST WITH VENTRICULAR FIBRILLATION (HCC): Status: ACTIVE | Noted: 2017-11-03

## 2017-11-03 PROBLEM — J96.02 ACUTE HYPERCAPNIC RESPIRATORY FAILURE (HCC): Status: ACTIVE | Noted: 2017-11-03

## 2017-11-03 PROBLEM — I44.7 LBBB (LEFT BUNDLE BRANCH BLOCK): Status: ACTIVE | Noted: 2017-11-03

## 2017-11-03 LAB
ALBUMIN SERPL BCP-MCNC: 2.9 G/DL (ref 3.5–5)
ALP SERPL-CCNC: 31 U/L (ref 46–116)
ALT SERPL W P-5'-P-CCNC: 73 U/L (ref 12–78)
AMMONIA PLAS-SCNC: 31 UMOL/L (ref 11–35)
ANION GAP SERPL CALCULATED.3IONS-SCNC: 5 MMOL/L (ref 4–13)
ANION GAP SERPL CALCULATED.3IONS-SCNC: 6 MMOL/L (ref 4–13)
ANION GAP SERPL CALCULATED.3IONS-SCNC: 8 MMOL/L (ref 4–13)
APTT PPP: 46 SECONDS (ref 23–35)
APTT PPP: 60 SECONDS (ref 23–35)
APTT PPP: 61 SECONDS (ref 23–35)
ARTERIAL PATENCY WRIST A: YES
AST SERPL W P-5'-P-CCNC: 119 U/L (ref 5–45)
ATRIAL RATE: 100 BPM
ATRIAL RATE: 104 BPM
ATRIAL RATE: 105 BPM
ATRIAL RATE: 113 BPM
ATRIAL RATE: 114 BPM
ATRIAL RATE: 115 BPM
ATRIAL RATE: 122 BPM
ATRIAL RATE: 95 BPM
ATRIAL RATE: 98 BPM
BASE EX.OXY STD BLDV CALC-SCNC: 71 % (ref 60–80)
BASE EXCESS BLDA CALC-SCNC: 3.4 MMOL/L
BASE EXCESS BLDA CALC-SCNC: 5 MMOL/L
BASE EXCESS BLDA CALC-SCNC: 6.7 MMOL/L
BASE EXCESS BLDV CALC-SCNC: 9.7 MMOL/L
BASOPHILS # BLD AUTO: 0.01 THOUSANDS/ΜL (ref 0–0.1)
BASOPHILS NFR BLD AUTO: 0 % (ref 0–1)
BILIRUB SERPL-MCNC: 0.6 MG/DL (ref 0.2–1)
BUN SERPL-MCNC: 20 MG/DL (ref 5–25)
BUN SERPL-MCNC: 25 MG/DL (ref 5–25)
BUN SERPL-MCNC: 26 MG/DL (ref 5–25)
CA-I BLD-SCNC: 1.19 MMOL/L (ref 1.12–1.32)
CALCIUM SERPL-MCNC: 9.2 MG/DL (ref 8.3–10.1)
CALCIUM SERPL-MCNC: 9.3 MG/DL (ref 8.3–10.1)
CALCIUM SERPL-MCNC: 9.5 MG/DL (ref 8.3–10.1)
CHLORIDE SERPL-SCNC: 101 MMOL/L (ref 100–108)
CHLORIDE SERPL-SCNC: 104 MMOL/L (ref 100–108)
CHLORIDE SERPL-SCNC: 106 MMOL/L (ref 100–108)
CK MB SERPL-MCNC: 11.8 NG/ML (ref 0–5)
CK MB SERPL-MCNC: 4.3 % (ref 0–2.5)
CK SERPL-CCNC: 277 U/L (ref 26–192)
CO2 SERPL-SCNC: 33 MMOL/L (ref 21–32)
CO2 SERPL-SCNC: 33 MMOL/L (ref 21–32)
CO2 SERPL-SCNC: 34 MMOL/L (ref 21–32)
CREAT SERPL-MCNC: 0.76 MG/DL (ref 0.6–1.3)
CREAT SERPL-MCNC: 0.98 MG/DL (ref 0.6–1.3)
CREAT SERPL-MCNC: 1 MG/DL (ref 0.6–1.3)
EOSINOPHIL # BLD AUTO: 0 THOUSAND/ΜL (ref 0–0.61)
EOSINOPHIL NFR BLD AUTO: 0 % (ref 0–6)
ERYTHROCYTE [DISTWIDTH] IN BLOOD BY AUTOMATED COUNT: 14.5 % (ref 11.6–15.1)
ERYTHROCYTE [DISTWIDTH] IN BLOOD BY AUTOMATED COUNT: 14.5 % (ref 11.6–15.1)
GFR SERPL CREATININE-BSD FRML MDRD: 58 ML/MIN/1.73SQ M
GFR SERPL CREATININE-BSD FRML MDRD: 60 ML/MIN/1.73SQ M
GFR SERPL CREATININE-BSD FRML MDRD: 81 ML/MIN/1.73SQ M
GLUCOSE SERPL-MCNC: 114 MG/DL (ref 65–140)
GLUCOSE SERPL-MCNC: 150 MG/DL (ref 65–140)
GLUCOSE SERPL-MCNC: 158 MG/DL (ref 65–140)
GLUCOSE SERPL-MCNC: 268 MG/DL (ref 65–140)
GLUCOSE SERPL-MCNC: 270 MG/DL (ref 65–140)
GLUCOSE SERPL-MCNC: 272 MG/DL (ref 65–140)
GLUCOSE SERPL-MCNC: 280 MG/DL (ref 65–140)
GLUCOSE SERPL-MCNC: 313 MG/DL (ref 65–140)
GLUCOSE SERPL-MCNC: 315 MG/DL (ref 65–140)
GLUCOSE SERPL-MCNC: 343 MG/DL (ref 65–140)
GLUCOSE SERPL-MCNC: 352 MG/DL (ref 65–140)
GLUCOSE SERPL-MCNC: 354 MG/DL (ref 65–140)
GLUCOSE SERPL-MCNC: 360 MG/DL (ref 65–140)
GLUCOSE SERPL-MCNC: 374 MG/DL (ref 65–140)
HCO3 BLDA-SCNC: 29.1 MMOL/L (ref 22–28)
HCO3 BLDA-SCNC: 31.1 MMOL/L (ref 22–28)
HCO3 BLDA-SCNC: 32.5 MMOL/L (ref 22–28)
HCO3 BLDV-SCNC: 36.9 MMOL/L (ref 24–30)
HCT VFR BLD AUTO: 34.6 % (ref 34.8–46.1)
HCT VFR BLD AUTO: 36.1 % (ref 34.8–46.1)
HGB BLD-MCNC: 11 G/DL (ref 11.5–15.4)
HGB BLD-MCNC: 11.3 G/DL (ref 11.5–15.4)
HOROWITZ INDEX BLDA+IHG-RTO: 50 MM[HG]
HOROWITZ INDEX BLDA+IHG-RTO: 50 MM[HG]
INR PPP: 1.32 (ref 0.86–1.16)
IPAP: 18
IPAP: 20
L PNEUMO1 AG UR QL IA.RAPID: NEGATIVE
LACTATE SERPL-SCNC: 1.7 MMOL/L (ref 0.5–2)
LACTATE SERPL-SCNC: 2 MMOL/L (ref 0.5–2)
LACTATE SERPL-SCNC: 2.2 MMOL/L (ref 0.5–2)
LYMPHOCYTES # BLD AUTO: 1.76 THOUSANDS/ΜL (ref 0.6–4.47)
LYMPHOCYTES NFR BLD AUTO: 10 % (ref 14–44)
MAGNESIUM SERPL-MCNC: 2 MG/DL (ref 1.6–2.6)
MAGNESIUM SERPL-MCNC: 2.5 MG/DL (ref 1.6–2.6)
MAGNESIUM SERPL-MCNC: 2.7 MG/DL (ref 1.6–2.6)
MCH RBC QN AUTO: 28.6 PG (ref 26.8–34.3)
MCH RBC QN AUTO: 29.1 PG (ref 26.8–34.3)
MCHC RBC AUTO-ENTMCNC: 31.3 G/DL (ref 31.4–37.4)
MCHC RBC AUTO-ENTMCNC: 31.8 G/DL (ref 31.4–37.4)
MCV RBC AUTO: 91 FL (ref 82–98)
MCV RBC AUTO: 92 FL (ref 82–98)
MONOCYTES # BLD AUTO: 1.34 THOUSAND/ΜL (ref 0.17–1.22)
MONOCYTES NFR BLD AUTO: 8 % (ref 4–12)
NEUTROPHILS # BLD AUTO: 13.67 THOUSANDS/ΜL (ref 1.85–7.62)
NEUTS SEG NFR BLD AUTO: 82 % (ref 43–75)
NON VENT- BIPAP: ABNORMAL
NON VENT- BIPAP: ABNORMAL
NRBC BLD AUTO-RTO: 0 /100 WBCS
O2 CT BLDA-SCNC: 15.7 ML/DL (ref 16–23)
O2 CT BLDA-SCNC: 16.2 ML/DL (ref 16–23)
O2 CT BLDA-SCNC: 16.9 ML/DL (ref 16–23)
O2 CT BLDV-SCNC: 12.2 ML/DL
OXYHGB MFR BLDA: 94.9 % (ref 94–97)
OXYHGB MFR BLDA: 95.4 % (ref 94–97)
OXYHGB MFR BLDA: 97 % (ref 94–97)
P AXIS: 88 DEGREES
PCO2 BLDA: 48.9 MM HG (ref 36–44)
PCO2 BLDA: 52.4 MM HG (ref 36–44)
PCO2 BLDA: 52.5 MM HG (ref 36–44)
PCO2 BLDV: 63.1 MM HG (ref 42–50)
PEEP MAX SETTING VENT: 5 CM[H2O]
PEEP MAX SETTING VENT: 5 CM[H2O]
PEEP RESPIRATORY: 5 CM[H2O]
PEEP RESPIRATORY: 5 CM[H2O]
PH BLDA: 7.39 [PH] (ref 7.35–7.45)
PH BLDA: 7.39 [PH] (ref 7.35–7.45)
PH BLDA: 7.41 [PH] (ref 7.35–7.45)
PH BLDV: 7.38 [PH] (ref 7.3–7.4)
PHOSPHATE SERPL-MCNC: 2.7 MG/DL (ref 2.3–4.1)
PLATELET # BLD AUTO: 320 THOUSANDS/UL (ref 149–390)
PLATELET # BLD AUTO: 324 THOUSANDS/UL (ref 149–390)
PMV BLD AUTO: 10.3 FL (ref 8.9–12.7)
PMV BLD AUTO: 10.8 FL (ref 8.9–12.7)
PO2 BLDA: 115.4 MM HG (ref 75–129)
PO2 BLDA: 87.5 MM HG (ref 75–129)
PO2 BLDA: 95.4 MM HG (ref 75–129)
PO2 BLDV: 41 MM HG (ref 35–45)
POTASSIUM SERPL-SCNC: 2.9 MMOL/L (ref 3.5–5.3)
POTASSIUM SERPL-SCNC: 3.1 MMOL/L (ref 3.5–5.3)
POTASSIUM SERPL-SCNC: 3.2 MMOL/L (ref 3.5–5.3)
PR INTERVAL: 180 MS
PR INTERVAL: 32 MS
PR INTERVAL: 649 MS
PROT SERPL-MCNC: 6.1 G/DL (ref 6.4–8.2)
PROTHROMBIN TIME: 16.5 SECONDS (ref 12.1–14.4)
QRS AXIS: -12 DEGREES
QRS AXIS: -9 DEGREES
QRS AXIS: 20 DEGREES
QRS AXIS: 23 DEGREES
QRS AXIS: 30 DEGREES
QRS AXIS: 33 DEGREES
QRS AXIS: 34 DEGREES
QRS AXIS: 41 DEGREES
QRS AXIS: 5 DEGREES
QRSD INTERVAL: 150 MS
QRSD INTERVAL: 152 MS
QRSD INTERVAL: 154 MS
QRSD INTERVAL: 158 MS
QRSD INTERVAL: 158 MS
QRSD INTERVAL: 171 MS
QT INTERVAL: 313 MS
QT INTERVAL: 338 MS
QT INTERVAL: 358 MS
QT INTERVAL: 363 MS
QT INTERVAL: 364 MS
QT INTERVAL: 375 MS
QT INTERVAL: 390 MS
QTC INTERVAL: 446 MS
QTC INTERVAL: 465 MS
QTC INTERVAL: 471 MS
QTC INTERVAL: 473 MS
QTC INTERVAL: 477 MS
QTC INTERVAL: 479 MS
QTC INTERVAL: 499 MS
QTC INTERVAL: 503 MS
QTC INTERVAL: 519 MS
RBC # BLD AUTO: 3.78 MILLION/UL (ref 3.81–5.12)
RBC # BLD AUTO: 3.95 MILLION/UL (ref 3.81–5.12)
S PNEUM AG UR QL: NEGATIVE
SODIUM SERPL-SCNC: 142 MMOL/L (ref 136–145)
SODIUM SERPL-SCNC: 143 MMOL/L (ref 136–145)
SODIUM SERPL-SCNC: 145 MMOL/L (ref 136–145)
SPECIMEN SOURCE: ABNORMAL
T WAVE AXIS: 126 DEGREES
T WAVE AXIS: 148 DEGREES
T WAVE AXIS: 157 DEGREES
T WAVE AXIS: 183 DEGREES
T WAVE AXIS: 189 DEGREES
T WAVE AXIS: 192 DEGREES
T WAVE AXIS: 194 DEGREES
T WAVE AXIS: 206 DEGREES
T WAVE AXIS: 213 DEGREES
TROPONIN I SERPL-MCNC: 6.27 NG/ML
TROPONIN I SERPL-MCNC: 6.4 NG/ML
TROPONIN I SERPL-MCNC: 7.41 NG/ML
TROPONIN I SERPL-MCNC: 7.63 NG/ML
VENT AC: 16
VENT AC: 16
VENT BIPAP FIO2: 30 %
VENT BIPAP FIO2: 35 %
VENT- AC: AC
VENT- AC: AC
VENTRICULAR RATE: 100 BPM
VENTRICULAR RATE: 104 BPM
VENTRICULAR RATE: 105 BPM
VENTRICULAR RATE: 113 BPM
VENTRICULAR RATE: 114 BPM
VENTRICULAR RATE: 115 BPM
VENTRICULAR RATE: 122 BPM
VENTRICULAR RATE: 95 BPM
VENTRICULAR RATE: 98 BPM
VT SETTING VENT: 450 ML
VT SETTING VENT: 450 ML
WBC # BLD AUTO: 16.77 THOUSAND/UL (ref 4.31–10.16)
WBC # BLD AUTO: 16.85 THOUSAND/UL (ref 4.31–10.16)

## 2017-11-03 PROCEDURE — 82330 ASSAY OF CALCIUM: CPT | Performed by: PHYSICIAN ASSISTANT

## 2017-11-03 PROCEDURE — 87205 SMEAR GRAM STAIN: CPT | Performed by: PHYSICIAN ASSISTANT

## 2017-11-03 PROCEDURE — 80048 BASIC METABOLIC PNL TOTAL CA: CPT | Performed by: PHYSICIAN ASSISTANT

## 2017-11-03 PROCEDURE — 5A12012 PERFORMANCE OF CARDIAC OUTPUT, SINGLE, MANUAL: ICD-10-PCS | Performed by: INTERNAL MEDICINE

## 2017-11-03 PROCEDURE — 87147 CULTURE TYPE IMMUNOLOGIC: CPT | Performed by: PHYSICIAN ASSISTANT

## 2017-11-03 PROCEDURE — 83605 ASSAY OF LACTIC ACID: CPT | Performed by: EMERGENCY MEDICINE

## 2017-11-03 PROCEDURE — 0BH17EZ INSERTION OF ENDOTRACHEAL AIRWAY INTO TRACHEA, VIA NATURAL OR ARTIFICIAL OPENING: ICD-10-PCS | Performed by: INTERNAL MEDICINE

## 2017-11-03 PROCEDURE — 92950 HEART/LUNG RESUSCITATION CPR: CPT

## 2017-11-03 PROCEDURE — 85730 THROMBOPLASTIN TIME PARTIAL: CPT | Performed by: EMERGENCY MEDICINE

## 2017-11-03 PROCEDURE — 71010 HB CHEST X-RAY 1 VIEW FRONTAL (PORTABLE): CPT

## 2017-11-03 PROCEDURE — 93306 TTE W/DOPPLER COMPLETE: CPT

## 2017-11-03 PROCEDURE — 02HV33Z INSERTION OF INFUSION DEVICE INTO SUPERIOR VENA CAVA, PERCUTANEOUS APPROACH: ICD-10-PCS | Performed by: INTERNAL MEDICINE

## 2017-11-03 PROCEDURE — 82140 ASSAY OF AMMONIA: CPT | Performed by: PHYSICIAN ASSISTANT

## 2017-11-03 PROCEDURE — 94002 VENT MGMT INPAT INIT DAY: CPT

## 2017-11-03 PROCEDURE — 83735 ASSAY OF MAGNESIUM: CPT | Performed by: PHYSICIAN ASSISTANT

## 2017-11-03 PROCEDURE — 84484 ASSAY OF TROPONIN QUANT: CPT | Performed by: PHYSICIAN ASSISTANT

## 2017-11-03 PROCEDURE — 82553 CREATINE MB FRACTION: CPT | Performed by: PHYSICIAN ASSISTANT

## 2017-11-03 PROCEDURE — 84484 ASSAY OF TROPONIN QUANT: CPT | Performed by: EMERGENCY MEDICINE

## 2017-11-03 PROCEDURE — 84100 ASSAY OF PHOSPHORUS: CPT | Performed by: PHYSICIAN ASSISTANT

## 2017-11-03 PROCEDURE — 87186 SC STD MICRODIL/AGAR DIL: CPT | Performed by: PHYSICIAN ASSISTANT

## 2017-11-03 PROCEDURE — 80048 BASIC METABOLIC PNL TOTAL CA: CPT | Performed by: EMERGENCY MEDICINE

## 2017-11-03 PROCEDURE — 85730 THROMBOPLASTIN TIME PARTIAL: CPT | Performed by: INTERNAL MEDICINE

## 2017-11-03 PROCEDURE — 82805 BLOOD GASES W/O2 SATURATION: CPT | Performed by: EMERGENCY MEDICINE

## 2017-11-03 PROCEDURE — 94660 CPAP INITIATION&MGMT: CPT

## 2017-11-03 PROCEDURE — 36620 INSERTION CATHETER ARTERY: CPT

## 2017-11-03 PROCEDURE — 93005 ELECTROCARDIOGRAM TRACING: CPT

## 2017-11-03 PROCEDURE — 82805 BLOOD GASES W/O2 SATURATION: CPT | Performed by: PHYSICIAN ASSISTANT

## 2017-11-03 PROCEDURE — 36600 WITHDRAWAL OF ARTERIAL BLOOD: CPT

## 2017-11-03 PROCEDURE — 94760 N-INVAS EAR/PLS OXIMETRY 1: CPT

## 2017-11-03 PROCEDURE — 85610 PROTHROMBIN TIME: CPT | Performed by: EMERGENCY MEDICINE

## 2017-11-03 PROCEDURE — 85025 COMPLETE CBC W/AUTO DIFF WBC: CPT | Performed by: PHYSICIAN ASSISTANT

## 2017-11-03 PROCEDURE — 93005 ELECTROCARDIOGRAM TRACING: CPT | Performed by: PHYSICIAN ASSISTANT

## 2017-11-03 PROCEDURE — 5A1955Z RESPIRATORY VENTILATION, GREATER THAN 96 CONSECUTIVE HOURS: ICD-10-PCS | Performed by: INTERNAL MEDICINE

## 2017-11-03 PROCEDURE — 83605 ASSAY OF LACTIC ACID: CPT | Performed by: PHYSICIAN ASSISTANT

## 2017-11-03 PROCEDURE — 84484 ASSAY OF TROPONIN QUANT: CPT | Performed by: INTERNAL MEDICINE

## 2017-11-03 PROCEDURE — 87449 NOS EACH ORGANISM AG IA: CPT | Performed by: PHYSICIAN ASSISTANT

## 2017-11-03 PROCEDURE — 82948 REAGENT STRIP/BLOOD GLUCOSE: CPT

## 2017-11-03 PROCEDURE — 87070 CULTURE OTHR SPECIMN AEROBIC: CPT | Performed by: PHYSICIAN ASSISTANT

## 2017-11-03 PROCEDURE — 82550 ASSAY OF CK (CPK): CPT | Performed by: PHYSICIAN ASSISTANT

## 2017-11-03 PROCEDURE — 85027 COMPLETE CBC AUTOMATED: CPT | Performed by: EMERGENCY MEDICINE

## 2017-11-03 PROCEDURE — 80053 COMPREHEN METABOLIC PANEL: CPT | Performed by: PHYSICIAN ASSISTANT

## 2017-11-03 PROCEDURE — 83735 ASSAY OF MAGNESIUM: CPT | Performed by: EMERGENCY MEDICINE

## 2017-11-03 RX ORDER — LORAZEPAM 2 MG/ML
INJECTION INTRAMUSCULAR
Status: COMPLETED
Start: 2017-11-03 | End: 2017-11-03

## 2017-11-03 RX ORDER — ASPIRIN 81 MG/1
324 TABLET, CHEWABLE ORAL DAILY
Status: DISCONTINUED | OUTPATIENT
Start: 2017-11-03 | End: 2017-11-08

## 2017-11-03 RX ORDER — ASPIRIN 300 MG/1
300 SUPPOSITORY RECTAL ONCE
Status: COMPLETED | OUTPATIENT
Start: 2017-11-03 | End: 2017-11-03

## 2017-11-03 RX ORDER — ATORVASTATIN CALCIUM 40 MG/1
40 TABLET, FILM COATED ORAL DAILY
COMMUNITY

## 2017-11-03 RX ORDER — NOREPINEPHRINE BITARTRATE 1 MG/ML
INJECTION, SOLUTION INTRAVENOUS
Status: DISPENSED
Start: 2017-11-03 | End: 2017-11-03

## 2017-11-03 RX ORDER — POTASSIUM CHLORIDE 20MEQ/15ML
40 LIQUID (ML) ORAL ONCE
Status: COMPLETED | OUTPATIENT
Start: 2017-11-03 | End: 2017-11-03

## 2017-11-03 RX ORDER — METFORMIN HYDROCHLORIDE 1000 MG/1
1000 TABLET, FILM COATED, EXTENDED RELEASE ORAL 2 TIMES DAILY WITH MEALS
COMMUNITY

## 2017-11-03 RX ORDER — CHLORHEXIDINE GLUCONATE 0.12 MG/ML
15 RINSE ORAL EVERY 12 HOURS SCHEDULED
Status: DISCONTINUED | OUTPATIENT
Start: 2017-11-03 | End: 2017-11-08

## 2017-11-03 RX ORDER — CETIRIZINE HYDROCHLORIDE 10 MG/1
10 TABLET ORAL DAILY
COMMUNITY

## 2017-11-03 RX ORDER — POTASSIUM CHLORIDE 20 MEQ/1
40 TABLET, EXTENDED RELEASE ORAL ONCE
Status: DISCONTINUED | OUTPATIENT
Start: 2017-11-03 | End: 2017-11-03

## 2017-11-03 RX ORDER — INSULIN GLARGINE 100 [IU]/ML
42 INJECTION, SOLUTION SUBCUTANEOUS
COMMUNITY

## 2017-11-03 RX ORDER — POTASSIUM CHLORIDE 29.8 MG/ML
40 INJECTION INTRAVENOUS ONCE
Status: COMPLETED | OUTPATIENT
Start: 2017-11-03 | End: 2017-11-04

## 2017-11-03 RX ORDER — MIDAZOLAM HYDROCHLORIDE 1 MG/ML
INJECTION INTRAMUSCULAR; INTRAVENOUS
Status: COMPLETED
Start: 2017-11-03 | End: 2017-11-03

## 2017-11-03 RX ORDER — FENTANYL CITRATE 50 UG/ML
INJECTION, SOLUTION INTRAMUSCULAR; INTRAVENOUS
Status: COMPLETED
Start: 2017-11-03 | End: 2017-11-03

## 2017-11-03 RX ORDER — ALBUTEROL SULFATE 90 UG/1
2 AEROSOL, METERED RESPIRATORY (INHALATION) EVERY 4 HOURS PRN
COMMUNITY

## 2017-11-03 RX ORDER — EPINEPHRINE 0.1 MG/ML
SYRINGE (ML) INJECTION CODE/TRAUMA/SEDATION MEDICATION
Status: COMPLETED | OUTPATIENT
Start: 2017-11-03 | End: 2017-11-03

## 2017-11-03 RX ORDER — POTASSIUM CHLORIDE 14.9 MG/ML
20 INJECTION INTRAVENOUS ONCE
Status: COMPLETED | OUTPATIENT
Start: 2017-11-03 | End: 2017-11-03

## 2017-11-03 RX ORDER — DOPAMINE HYDROCHLORIDE 160 MG/100ML
1-20 INJECTION, SOLUTION INTRAVENOUS
Status: DISCONTINUED | OUTPATIENT
Start: 2017-11-03 | End: 2017-11-07

## 2017-11-03 RX ORDER — FENTANYL CITRATE 50 UG/ML
50 INJECTION, SOLUTION INTRAMUSCULAR; INTRAVENOUS ONCE
Status: COMPLETED | OUTPATIENT
Start: 2017-11-03 | End: 2017-11-03

## 2017-11-03 RX ORDER — GABAPENTIN 600 MG/1
600 TABLET ORAL 3 TIMES DAILY
Status: ON HOLD | COMMUNITY
End: 2017-11-23

## 2017-11-03 RX ORDER — ESCITALOPRAM OXALATE 10 MG/1
10 TABLET ORAL DAILY
COMMUNITY

## 2017-11-03 RX ORDER — NYSTATIN 100000 [USP'U]/G
POWDER TOPICAL 2 TIMES DAILY
Status: DISCONTINUED | OUTPATIENT
Start: 2017-11-03 | End: 2017-11-23 | Stop reason: HOSPADM

## 2017-11-03 RX ORDER — ACETAMINOPHEN 160 MG/5ML
650 SUSPENSION, ORAL (FINAL DOSE FORM) ORAL EVERY 4 HOURS PRN
Status: DISCONTINUED | OUTPATIENT
Start: 2017-11-03 | End: 2017-11-08

## 2017-11-03 RX ORDER — FENTANYL CITRATE 50 UG/ML
50 INJECTION, SOLUTION INTRAMUSCULAR; INTRAVENOUS EVERY 2 HOUR PRN
Status: DISCONTINUED | OUTPATIENT
Start: 2017-11-03 | End: 2017-11-09

## 2017-11-03 RX ORDER — NOREPINEPHRINE BITARTRATE 1 MG/ML
INJECTION, SOLUTION INTRAVENOUS
Status: DISPENSED
Start: 2017-11-03 | End: 2017-11-04

## 2017-11-03 RX ORDER — LORAZEPAM 2 MG/ML
2 INJECTION INTRAMUSCULAR ONCE
Status: COMPLETED | OUTPATIENT
Start: 2017-11-03 | End: 2017-11-03

## 2017-11-03 RX ORDER — MIDAZOLAM HYDROCHLORIDE 1 MG/ML
1 INJECTION INTRAMUSCULAR; INTRAVENOUS ONCE
Status: COMPLETED | OUTPATIENT
Start: 2017-11-03 | End: 2017-11-03

## 2017-11-03 RX ORDER — FUROSEMIDE 10 MG/ML
20 INJECTION INTRAMUSCULAR; INTRAVENOUS ONCE
Status: COMPLETED | OUTPATIENT
Start: 2017-11-03 | End: 2017-11-03

## 2017-11-03 RX ORDER — MAGNESIUM SULFATE HEPTAHYDRATE 40 MG/ML
2 INJECTION, SOLUTION INTRAVENOUS ONCE
Status: COMPLETED | OUTPATIENT
Start: 2017-11-03 | End: 2017-11-04

## 2017-11-03 RX ORDER — MELATONIN
1000 DAILY
COMMUNITY

## 2017-11-03 RX ORDER — OXYCODONE HYDROCHLORIDE AND ACETAMINOPHEN 5; 325 MG/1; MG/1
1 TABLET ORAL EVERY 6 HOURS PRN
COMMUNITY
End: 2017-11-23 | Stop reason: HOSPADM

## 2017-11-03 RX ORDER — LISINOPRIL 5 MG/1
5 TABLET ORAL DAILY
COMMUNITY

## 2017-11-03 RX ORDER — POTASSIUM CHLORIDE 14.9 MG/ML
20 INJECTION INTRAVENOUS
Status: COMPLETED | OUTPATIENT
Start: 2017-11-03 | End: 2017-11-03

## 2017-11-03 RX ORDER — SODIUM BICARBONATE 84 MG/ML
INJECTION, SOLUTION INTRAVENOUS CODE/TRAUMA/SEDATION MEDICATION
Status: COMPLETED | OUTPATIENT
Start: 2017-11-03 | End: 2017-11-03

## 2017-11-03 RX ORDER — LEVOTHYROXINE SODIUM 0.12 MG/1
125 TABLET ORAL DAILY
COMMUNITY

## 2017-11-03 RX ORDER — ALBUMIN, HUMAN INJ 5% 5 %
12.5 SOLUTION INTRAVENOUS ONCE
Status: COMPLETED | OUTPATIENT
Start: 2017-11-03 | End: 2017-11-05

## 2017-11-03 RX ORDER — POTASSIUM CHLORIDE 20MEQ/15ML
40 LIQUID (ML) ORAL ONCE
Status: COMPLETED | OUTPATIENT
Start: 2017-11-03 | End: 2017-11-04

## 2017-11-03 RX ORDER — FUROSEMIDE 40 MG/1
40 TABLET ORAL DAILY PRN
COMMUNITY

## 2017-11-03 RX ORDER — LIDOCAINE HYDROCHLORIDE 20 MG/ML
INJECTION, SOLUTION EPIDURAL; INFILTRATION; INTRACAUDAL; PERINEURAL CODE/TRAUMA/SEDATION MEDICATION
Status: COMPLETED | OUTPATIENT
Start: 2017-11-03 | End: 2017-11-03

## 2017-11-03 RX ORDER — LIDOCAINE HYDROCHLORIDE ANHYDROUS AND DEXTROSE MONOHYDRATE .8; 5 G/100ML; G/100ML
1 INJECTION, SOLUTION INTRAVENOUS CONTINUOUS
Status: DISCONTINUED | OUTPATIENT
Start: 2017-11-03 | End: 2017-11-07

## 2017-11-03 RX ADMIN — CHLORHEXIDINE GLUCONATE 15 ML: 1.2 RINSE ORAL at 10:31

## 2017-11-03 RX ADMIN — SODIUM CHLORIDE 10 UNITS/HR: 9 INJECTION, SOLUTION INTRAVENOUS at 19:28

## 2017-11-03 RX ADMIN — PIPERACILLIN SODIUM,TAZOBACTAM SODIUM 4.5 G: 4; .5 INJECTION, POWDER, FOR SOLUTION INTRAVENOUS at 22:26

## 2017-11-03 RX ADMIN — NOREPINEPHRINE BITARTRATE 10 MCG/MIN: 1 INJECTION, SOLUTION, CONCENTRATE INTRAVENOUS at 12:24

## 2017-11-03 RX ADMIN — ASPIRIN 81 MG 324 MG: 81 TABLET ORAL at 10:30

## 2017-11-03 RX ADMIN — INSULIN LISPRO 3 UNITS: 100 INJECTION, SOLUTION INTRAVENOUS; SUBCUTANEOUS at 00:59

## 2017-11-03 RX ADMIN — DEXTROSE 150 MG: 50 INJECTION, SOLUTION INTRAVENOUS at 00:46

## 2017-11-03 RX ADMIN — VANCOMYCIN HYDROCHLORIDE 2000 MG: 10 INJECTION, POWDER, LYOPHILIZED, FOR SOLUTION INTRAVENOUS at 00:30

## 2017-11-03 RX ADMIN — FENTANYL CITRATE 75 MCG/HR: 50 INJECTION, SOLUTION INTRAMUSCULAR; INTRAVENOUS at 08:50

## 2017-11-03 RX ADMIN — FENTANYL CITRATE 50 MCG: 50 INJECTION INTRAMUSCULAR; INTRAVENOUS at 14:29

## 2017-11-03 RX ADMIN — POTASSIUM CHLORIDE 20 MEQ: 200 INJECTION, SOLUTION INTRAVENOUS at 10:32

## 2017-11-03 RX ADMIN — MIDAZOLAM 1 MG: 1 INJECTION INTRAMUSCULAR; INTRAVENOUS at 14:29

## 2017-11-03 RX ADMIN — POTASSIUM CHLORIDE 20 MEQ: 200 INJECTION, SOLUTION INTRAVENOUS at 07:52

## 2017-11-03 RX ADMIN — HEPARIN SODIUM 2000 UNITS: 1000 INJECTION, SOLUTION INTRAVENOUS; SUBCUTANEOUS at 21:44

## 2017-11-03 RX ADMIN — AMIODARONE HYDROCHLORIDE 1 MG/MIN: 50 INJECTION, SOLUTION INTRAVENOUS at 00:54

## 2017-11-03 RX ADMIN — FENTANYL CITRATE 50 MCG: 50 INJECTION, SOLUTION INTRAMUSCULAR; INTRAVENOUS at 14:29

## 2017-11-03 RX ADMIN — ALBUMIN HUMAN 12.5 G: 0.05 INJECTION, SOLUTION INTRAVENOUS at 18:20

## 2017-11-03 RX ADMIN — FUROSEMIDE 20 MG: 10 INJECTION, SOLUTION INTRAMUSCULAR; INTRAVENOUS at 18:20

## 2017-11-03 RX ADMIN — POTASSIUM CHLORIDE 20 MEQ: 200 INJECTION, SOLUTION INTRAVENOUS at 16:43

## 2017-11-03 RX ADMIN — SODIUM CHLORIDE 5 UNITS/HR: 9 INJECTION, SOLUTION INTRAVENOUS at 09:34

## 2017-11-03 RX ADMIN — LIDOCAINE HYDROCHLORIDE 100 MG: 20 INJECTION, SOLUTION EPIDURAL; INFILTRATION; INTRACAUDAL; PERINEURAL at 12:18

## 2017-11-03 RX ADMIN — SODIUM CHLORIDE 125 ML/HR: 0.9 INJECTION, SOLUTION INTRAVENOUS at 06:39

## 2017-11-03 RX ADMIN — LIDOCAINE HYDROCHLORIDE 1 MG/MIN: 8 INJECTION, SOLUTION INTRAVENOUS at 15:04

## 2017-11-03 RX ADMIN — POTASSIUM CHLORIDE 20 MEQ: 200 INJECTION, SOLUTION INTRAVENOUS at 18:20

## 2017-11-03 RX ADMIN — POTASSIUM CHLORIDE 40 MEQ: 20 SOLUTION ORAL at 21:44

## 2017-11-03 RX ADMIN — DOPAMINE HYDROCHLORIDE IN DEXTROSE 5 MCG/KG/MIN: 1.6 INJECTION, SOLUTION INTRAVENOUS at 15:03

## 2017-11-03 RX ADMIN — EPINEPHRINE 1 MG: 0.1 INJECTION, SOLUTION ENDOTRACHEAL; INTRACARDIAC; INTRAVENOUS at 12:20

## 2017-11-03 RX ADMIN — POTASSIUM CHLORIDE 40 MEQ: 400 INJECTION, SOLUTION INTRAVENOUS at 21:44

## 2017-11-03 RX ADMIN — VANCOMYCIN HYDROCHLORIDE 2000 MG: 10 INJECTION, POWDER, LYOPHILIZED, FOR SOLUTION INTRAVENOUS at 11:19

## 2017-11-03 RX ADMIN — EPINEPHRINE 1 MG: 0.1 INJECTION, SOLUTION ENDOTRACHEAL; INTRACARDIAC; INTRAVENOUS at 12:17

## 2017-11-03 RX ADMIN — NYSTATIN: 100000 POWDER TOPICAL at 12:05

## 2017-11-03 RX ADMIN — SODIUM CHLORIDE 1000 ML: 0.9 INJECTION, SOLUTION INTRAVENOUS at 12:21

## 2017-11-03 RX ADMIN — FENTANYL CITRATE 50 MCG: 50 INJECTION INTRAMUSCULAR; INTRAVENOUS at 08:42

## 2017-11-03 RX ADMIN — ASPIRIN 300 MG: 300 SUPPOSITORY RECTAL at 01:33

## 2017-11-03 RX ADMIN — CHLORHEXIDINE GLUCONATE 15 ML: 1.2 RINSE ORAL at 22:26

## 2017-11-03 RX ADMIN — DOPAMINE HYDROCHLORIDE IN DEXTROSE 10 MCG/KG/MIN: 1.6 INJECTION, SOLUTION INTRAVENOUS at 19:19

## 2017-11-03 RX ADMIN — MAGNESIUM SULFATE HEPTAHYDRATE 2 G: 40 INJECTION, SOLUTION INTRAVENOUS at 07:52

## 2017-11-03 RX ADMIN — SODIUM BICARBONATE 100 MEQ: 84 INJECTION, SOLUTION INTRAVENOUS at 12:25

## 2017-11-03 RX ADMIN — LORAZEPAM 2 MG: 2 INJECTION INTRAMUSCULAR; INTRAVENOUS at 08:42

## 2017-11-03 RX ADMIN — POTASSIUM CHLORIDE 40 MEQ: 20 SOLUTION ORAL at 16:43

## 2017-11-03 RX ADMIN — PIPERACILLIN SODIUM,TAZOBACTAM SODIUM 4.5 G: 4; .5 INJECTION, POWDER, FOR SOLUTION INTRAVENOUS at 10:31

## 2017-11-03 RX ADMIN — DEXMEDETOMIDINE HYDROCHLORIDE 0.3 MCG/KG/HR: 100 INJECTION, SOLUTION INTRAVENOUS at 08:43

## 2017-11-03 RX ADMIN — LORAZEPAM 2 MG: 2 INJECTION INTRAMUSCULAR at 08:42

## 2017-11-03 RX ADMIN — MIDAZOLAM HYDROCHLORIDE 1 MG: 1 INJECTION INTRAMUSCULAR; INTRAVENOUS at 14:29

## 2017-11-03 RX ADMIN — PIPERACILLIN SODIUM,TAZOBACTAM SODIUM 4.5 G: 4; .5 INJECTION, POWDER, FOR SOLUTION INTRAVENOUS at 16:44

## 2017-11-03 NOTE — PLAN OF CARE

## 2017-11-03 NOTE — PROGRESS NOTES
Patient rhythm on monitor v-tach at approximately 12:15  RNs, Dr Pawel Hickman and medical team at bedside  CPR initiated  Sedation placed on hold  Patient with ROSC  Peripheral levophed initated under direction of Dr Pawel Hickman

## 2017-11-03 NOTE — H&P
History and Physical - Critical Care  Delaney Pressley 79 y o  female MRN: [id number]  Unit/Bed#: MICU 14 Encounter: [id number]     Reason for Admission / Chief Complaint: AMS     History of Present Illness:  Delaney Pressley is a 79 y o  female who presents as transfer from [facility] ED  Patient was being checked on for wellness check the past 2 days, however she answered her door and did not want help  Today, when a wellness check was performed she did not answer and was found to be lying on the ground  Patient was brought to the [facility] ED  In the ED, patient was confused and having respiratory difficulty with reports initial O2 saturation in the 70s, therefore pt was placed on BiPAP  She was noted to have an elevated i-STAT troponin of 4 93 and lab troponin of 7 57  Noted to have elevated lactate of 4 3  Chest x-ray showing bilateral pleural effusions, left greater than right  CT head and C-spine were negative  Patient was transported by air to the [place] ICU  Per review of records on Care everywhere, patient was hospitalized at [facility] from June through July for possible STEMI  She had elevated troponins and underwent catheterization, however no intervention was performed  Last echo ws 6/12 showing EF of 35%  On arrival to ICU, pt was on Bipap 20/5  Pt sleepy, but able to follow commands and answer simple questions  Pt offers no complaints and denies any areas of pain  History obtained from chart review and the patient  Past Medical History:  Past Medical History:   Diagnosis Date    Anxiety     Diabetes mellitus ([address] )     Hypertension         Past Surgical History:  No past surgical history on file  Past Family History:  No family history on file       Social History:  History   Smoking Status    Current Every Day Smoker    Types: Cigarettes   Smokeless Tobacco    Never Used     History   Alcohol Use No     History   Drug Use No     Marital Status: Unknown  Exercise History: Unknown     Medications:  Current Facility-Administered Medications   Medication Dose Route Frequency    acetaminophen (TYLENOL) tablet 650 mg  650 mg Oral Q6H PRN    chlorhexidine (PERIDEX) 0 12 % oral rinse 15 mL  15 mL Swish & Spit Q12H [address]    heparin (porcine) 25,000 units in 250 mL infusion (premix)  3-20 Units/kg/hr (Order-Specific) Intravenous Titrated    heparin (porcine) injection 2,000 Units  2,000 Units Intravenous PRN    heparin (porcine) injection 4,000 Units  4,000 Units Intravenous PRN    [START ON 11/3/2017] insulin lispro (HumaLOG) 100 units/mL subcutaneous injection 1-5 Units  1-5 Units Subcutaneous Q6H [address]    ondansetron (ZOFRAN) injection 4 mg  4 mg Intravenous Q6H PRN    [START ON 11/3/2017] piperacillin-tazobactam (ZOSYN) 4 5 g in dextrose 5 % 100 mL IVPB  4 5 g Intravenous Q6H    sodium chloride 0 9 % infusion  100 mL/hr Intravenous Continuous    [START ON 11/3/2017] vancomycin (VANCOCIN) 2,000 mg in sodium chloride 0 9 % 500 mL IVPB  15 mg/kg Intravenous Q12H     Home medications:  Prior to Admission medications    Not on File     Allergies:  Not on File     ROS:   Review of Systems   Unable to perform ROS: Mental status change   Constitutional: Negative for fever  Respiratory: Positive for shortness of breath  Cardiovascular: Negative for chest pain  Gastrointestinal: Negative for abdominal pain and nausea  Neurological: Negative for headaches  Vitals:  Vitals:    17 1900 17   BP: 147/68 (!) 111/46   Pulse: 80 90   Resp: (!) 51 13   Temp: 99 4 °F (37 4 °C)    TempSrc: Rectal    SpO2: 100% 100%   Weight: 126 kg (276 lb 10 8 oz)    Height: 5' 11" (1 803 m)      Temperature:   Temp (24hrs), Av 9 °F (37 2 °C), Min:98 4 °F (36 9 °C), Max:99 4 °F (37 4 °C)    Current: Temperature: 99 4 °F (37 4 °C)     Weights:   IBW: 70 8 kg  Body mass index is 38 59 kg/m²       Hemodynamic Monitoring:     Non-Invasive/Invasive Ventilation Settings:  Respiratory    Lab Data (Last 4 hours)    None         O2/Vent Data (Last 4 hours)      11/02 1900          Non-Invasive Ventilation Mode BiPAP                 No results found for: PHART, KWJ9TYN, PO2ART, IIK5ODP, Y9SZWMXZ, BEART, SOURCE    Physical Exam:  Physical Exam   Constitutional: She is oriented to person, place, and time  She appears well-developed  Obese   HENT:   Head: Normocephalic and atraumatic  Bipap mask on   Eyes: Conjunctivae are normal  Pupils are equal, round, and reactive to light  Neck: No tracheal deviation present  Cardiovascular: Normal rate and regular rhythm  Pulmonary/Chest: Effort normal  No stridor  No respiratory distress  She exhibits no tenderness  Abdominal: She exhibits no distension  There is no tenderness  Musculoskeletal: She exhibits no edema or tenderness  Neurological: She is alert and oriented to person, place, and time  Following commands, No focal neuro deficits  Skin: Skin is warm and dry  She is not diaphoretic  Psychiatric: She has a normal mood and affect   Her behavior is normal         Labs:    Results from last 7 days  Lab Units 11/02/17 2004 11/02/17  1610 11/02/17  1554   WBC Thousand/uL 15 12*  --  17 25*   HEMOGLOBIN g/dL 11 9  --  12 8   I STAT HEMOGLOBIN g/dl  --  18 4*  --    HEMATOCRIT % 37 5  --  41 9   PLATELETS Thousands/uL 324  --  368   NEUTROS PCT % 77*  --  83*   MONOS PCT % 10  --  8        Results from last 7 days  Lab Units 11/02/17 2004 11/02/17 1610 11/02/17  1554   SODIUM mmol/L 141  --  144   POTASSIUM mmol/L 4 0  --  3 5   CHLORIDE mmol/L 100  --  98*   CO2 mmol/L 31  --  29   BUN mg/dL 16  --  14   CREATININE mg/dL 0 96  --  1 05   CALCIUM mg/dL 9 6  --  10 2*   TOTAL PROTEIN g/dL 7 2  --  8 1   BILIRUBIN TOTAL mg/dL 0 75  --  0 90   ALK PHOS U/L 37*  --  44*   ALT U/L 61  --  69   AST U/L 148*  --  140*   GLUCOSE RANDOM mg/dL 372*  --  385*   GLUCOSE, ISTAT mg/dl  --  382*  --        Results from last 7 days  Lab Units 17   MAGNESIUM mg/dL 2 0       Results from last 7 days  Lab Units 17   PHOSPHORUS mg/dL 4 0        Results from last 7 days  Lab Units 17  1554   INR  1 36* 1 26*   PTT seconds 36* 35       Results from last 7 days  Lab Units 17   LACTIC ACID mmol/L 2 3*       0  Lab Value Date/Time   TROPONINI 9 82 (H) 2017   TROPONINI 7 57 ([place]) 2017 1646        Imagin/2 CTH: negative   CT C-spine: negative   CXR: Bilateral pleural effusions, left greater than right  Underlying process of the left lung base not excludable  EKG:   NSR @ 98, Normal axis, LBBB, T wave inversions I, II, III, avF, v5, v6    Micro:  No results found for: Dania Homes, SPUTUMCULTUR    Assessment: 80 yo F found on ground at home  Pt noted to be confused, respiratory distress and with elevated troponin     Plan:                  Neuro:    - continue to monitor mental status, currently awake and protecting  airway                 CV:    - NSTEMI: EKG with old LBBB  F/u ECHO  Cardiology consult  Continue to trend troponin  Heparin gtt                  Lung:    - Respiratory distress with b/l pleural effusions/possible PNA:  continue Bipap, follow up am CXR, continue abx                 GI: NPO while on Bipap, then can start diet                 FEN:    - Maintenance fluids while NPO                 :    - Continue lackey catheter for accurate I/O                  ID:    - Elevated lactic acid, trend until normalizes, possible PNA  Continue Vanc/Zosyn   Follow up blood cultures (1 sent at [facility], 1 sent at  [facility])                 Heme:    - Leukocytosis, possibly reactive/stress rxn vs  infectious                  Endo:    - Elevated glucose: SSI with q6h accuchecks while NPO                 Msk/Skin: N/A                 Disposition: Admit to ICU for management     VTE Pharmacologic Prophylaxis: heparin gtt for ACS  VTE Mechanical Prophylaxis: sequential compression device     Invasive lines and devices: Invasive Devices     Peripheral Intravenous Line            Peripheral IV 11/02/17 Left Antecubital less than 1 day    Peripheral IV 11/02/17 Left Hand less than 1 day    Peripheral IV 11/02/17 Right Antecubital less than 1 day          Drain            Urethral Catheter Temperature probe less than 1 day                 Code Status: Level 1 - Full Code  POA:    POLST:       Given critical illness, patient length of stay will require greater than two midnights  Counseling / Coordination of Care       Portions of the record may have been created with voice recognition software  Occasional wrong word or "sound a like" substitutions may have occurred due to the inherent limitations of voice recognition software  Read the chart carefully and recognize, using context, where substitutions have occurred          Najma Prasad DO

## 2017-11-03 NOTE — RESPIRATORY THERAPY NOTE
RT Ventilator Management Note  Jessie Jimenez 79 y o  female MRN: [id number]  Unit/Bed#: [facility] 14 Encounter: [id number]      Daily Screen       11/3/2017 0830             Patient safety screen outcome[de-identified] Failed    Not Ready for Weaning due to[de-identified] Poor inspiratory effort            Physical Exam:   Assessment Type: Assess only  General Appearance: Lethargic  Respiratory Pattern: Tachypneic, Spontaneous  Chest Assessment: Chest expansion symmetrical  Bilateral Breath Sounds: Coarse  Cough: None  O2 Device: vent      Resp Comments: Intubated pt and placed on vent

## 2017-11-03 NOTE — PROGRESS NOTES
Patient repeatedly pulling bipap mask off, mitts applied  States she feels like she cannot breathe  Dr Jovanni Jimenez at bedside, patient intubated

## 2017-11-03 NOTE — CONSULTS
Consultation - Cardiology   Sade Ruby 79 y o  female MRN: [id number]  Unit/Bed#: MICU 14 Encounter: [id number]  11/03/17  1:08 AM    Assessment:  1  Polymorphic VT/torsades   2  NSTEMI type 1 vs 2  3  Chronic left bundle branch block  4  Ischemic Cardiomyopathy with EF 35%-LV DAYANA 5 4 cm  5  Multi-Vessel CAD  6  Acute hypoxic respiratory failure  7  Insulin dependent diabetes  8  Hypertension  9  Hyperlipidemia  10  History of vocal cord paresis status post tracheostomy and PEG placement on July 5th 2017  11  Depression/anxiety    Plan:  · Yaritza Abrams has had a recent cath at [facility] in June 2017 with reported MVD  A decision was made to manage her CAD medically  No cath report is available and data is obtained from discharge summaries  · In the MICU, she had an episode of torsades with a troponin of 9 which makes arrhyhtmia a possible etiology of this presentation  · Would prefer to obtain cath report from [facility]  If unable to obtain may consider angiography to define coronary anatomy  Will need to discuss goals of care with family/POA prior to repeat catheterization  · She was also found to have ICM with EF: 35% with inferior akinesis  Will repeat echocardiography to assess LV function and new RWMA  · Cont IV heparin and Amiodarone  · Rectal ASA  · Currently being volume resuscitated for lactic acidosis  Once hemodynamically stable can begin to diurese as tolerated  · Possible eventual EP evaluation if family desires secondary prevention of SCD  History of Present Illness   Physician Requesting Consult: Salud Carter MD  Reason for Consult / Principal Problem:  Encephalopathy, shortness of breath  HPI: Sade Ruby is a 79y o  year old female with a history of cardiomyopathy with EF 35%, chronic left bundle, and diabetes who was transferred from [address] [place] [address] to the [facility] emergency department for altered mental status, hypoxia and elevated troponin    The patient was found down at her home and was last seen well 3 days prior  Family members called police as the patient was not reachable by phone  The police arrived on scene found the patient in of puddle of urine and EMS was activated  In the emergency department she was hypoxic with O2 sat in the 70s and was placed on BiPAP  Her lactic acid was 4 3, troponin was elevated to 4 93, an EKG revealed chronic left bundle-branch block  Troponins trended up to 9 82, proBNP was 05398  Lactic acid 2 with volume resuscitation  On the MICU patient had an episode of polymorphic VT that appears to have been PVC induced and resolved spontaneously  Electrolytes were reviewed with potassium 4 9 magnesium of 2 0  Amiodarone infusion bolus was initiated  Beta blocker has been held due to transient hypotension  The patient is alert however not verbally coherent and unable to provide any further history  Based on review of outpatient records she was admitted to [facility] in June 2017 with shortness of breath/ volume overload and was intubated for respiratory failure  EKG revealed possible new left bundle branch block and she underwent cardiac catheterization which revealed multivessel coronary disease  Reportedly no intervention was performed and she was treated medically  She has extubated on June 14th  She had dysphagia and hoarseness post extubation  ENT evaluated her on June 24th and she was noted to have vocal cord paresis requiring tracheostomy and PEG placement on July 5th  Inpatient consult to Cardiology  Performed by: Tata Martel  Authorized by: Bj BENZ           EKG: nsr/sinus arrhythma with LBBB; episode of Torsades      Review of Systems:    Review of Systems   Unable to perform ROS: Mental status change       Historical Information   Past Medical History:   Diagnosis Date    Anxiety     [address] mellitus [address] [address] )     Hypertension      No past surgical history on file    History   Alcohol Use No History   Drug Use No     History   Smoking Status    Current Every Day Smoker    Types: Cigarettes   Smokeless Tobacco    Never Used       Family History: No family history on file  Meds/Allergies   all current active meds have been reviewed  Not on File    Objective   Vitals: Blood pressure 114/60, pulse 92, temperature 98 6 °F (37 °C), temperature source Probe, resp  rate 12, height 5' 11" (1 803 m), weight 126 kg (276 lb 10 8 oz), SpO2 100 %  , Body mass index is 38 59 kg/m² , Orthostatic Blood Pressures    Flowsheet Row Most Recent Value   Blood Pressure  114/60 filed at 11/03/2017 0021   Patient Position - Orthostatic VS  Lying filed at 11/03/2017 6491          Systolic (85RTZ), NFQ:490 , Min:87 , CLA:937     Diastolic (79EMO), DFE:21, Min:40, Max:68        Intake/Output Summary (Last 24 hours) at 11/03/17 0108  Last data filed at 11/02/17 2200   Gross per 24 hour   Intake            11 67 ml   Output              250 ml   Net          -238 33 ml       Invasive Devices     Peripheral Intravenous Line            Peripheral IV 11/02/17 Left Antecubital less than 1 day    Peripheral IV 11/02/17 Left Hand less than 1 day    Peripheral IV 11/02/17 Right Antecubital less than 1 day          Drain            Urethral Catheter Temperature probe less than 1 day                    Physical Exam:  GEN: Alert and oriented x 0, in mild respiratory distress  HEENT: Sclera anicteric, conjunctivae pink, mucous membranes moist   NECK: Supple, no carotid bruits, no significant JVD  HEART: distant heart sounds, no audible murmurs, clicks, gallops or rubs  LUNGS: coarse bilateral breath sounds  ABDOMEN: Soft, nontender, nondistended, normoactive bowel sounds  EXTREMITIES: 1+ edema        Lab Results:     Troponins:   Results from last 7 days  Lab Units 11/02/17 2004 11/02/17  1646   TROPONIN I ng/mL 9 82* 7 57*       CBC with diff:   Results from last 7 days  Lab Units 11/02/17 2004 11/02/17  1610 11/02/17  1554 11/02/17  1552   WBC Thousand/uL 15 12*  --  17 25*  --    HEMOGLOBIN g/dL 11 9  --  12 8  --    I STAT HEMOGLOBIN g/dl  --  18 4*  --  14 3   HEMATOCRIT % 37 5  --  41 9  --    MCV fL 92  --  93  --    PLATELETS Thousands/uL 324  --  368  --    MCH pg 29 1  --  28 3  --    MCHC g/dL 31 7  --  30 5*  --    RDW % 14 2  --  14 2  --    MPV fL 10 4  --  10 7  --    NRBC AUTO /100 WBCs 0  --   --   --          CMP:   Results from last 7 days  Lab Units 11/02/17 2004 11/02/17  1610 11/02/17  1554 11/02/17  1552   SODIUM mmol/L 141  --  144  --    POTASSIUM mmol/L 4 0  --  3 5  --    CHLORIDE mmol/L 100  --  98*  --    CO2 mmol/L 31  --  29  --    ANION GAP mmol/L 10  --  17*  --    BUN mg/dL 16  --  14  --    CREATININE mg/dL 0 96  --  1 05  --    GLUCOSE RANDOM mg/dL 372*  --  385*  --    GLUCOSE, ISTAT mg/dl  --  382*  --  383*   CALCIUM mg/dL 9 6  --  10 2*  --    AST U/L 148*  --  140*  --    ALT U/L 61  --  69  --    ALK PHOS U/L 37*  --  44*  --    TOTAL PROTEIN g/dL 7 2  --  8 1  --    ALBUMIN g/dL 3 3*  --  3 9  --    BILIRUBIN TOTAL mg/dL 0 75  --  0 90  --    EGFR ml/min/1 73sq m 61 90 55  --            Counseling / Coordination of Care  Total floor / unit time spent today 60 minutes  Greater than 50% of total time was spent with the patient and / or family counseling and / or coordination of care  A description of the counseling / coordination of care: 30

## 2017-11-03 NOTE — NUTRITION
Rec start Jevity 1 0 @ 10 ml/hr and incr by 10 ml q 4 hrs as sydnee to goal rate of 80 ml/hr to provide qd: 1920 ml,2035 Kcal,85 gm PRO,297 gm CHO,67 gm Fat,1603 ml Free H2O,1 6 mg CHO/kg/min

## 2017-11-03 NOTE — PROCEDURES
Central Line Insertion  Date/Time: 11/3/2017 3:26 PM  Performed by: Belen Aguilera by: Justina Yepez     Patient location:  Bedside (MICU)  Other Assisting Provider: Yes (comment) Barb Landry MD)    Consent:     Consent obtained:  Verbal and emergent situation    Consent given by: son  Risks discussed:  Nerve damage, incorrect placement, arterial puncture, bleeding, infection and pneumothorax    Alternatives discussed:  Alternative treatment and delayed treatment  Universal protocol:     Procedure explained and questions answered to patient or proxy's satisfaction: yes      Relevant documents present and verified: yes      Test results available and properly labeled: yes      Imaging studies available: yes      Required blood products, implants, devices, and special equipment available: yes      Site/side marked: yes      Immediately prior to procedure, a time out was called: yes      Patient identity confirmed:  Arm band, provided demographic data and anonymous protocol, patient vented/unresponsive  Pre-procedure details:     Hand hygiene: Hand hygiene performed prior to insertion      Sterile barrier technique: All elements of maximal sterile technique followed      Skin preparation:  2% chlorhexidine    Skin preparation agent: Skin preparation agent completely dried prior to procedure    Indications:     Central line indications: medications requiring central line    Sedation:     Sedation type: Moderate (conscious) sedation (1g Versed and 50mcg Fentanyl )  Anesthesia (see MAR for exact dosages):      Anesthesia method:  Local infiltration    Local anesthetic:  Lidocaine 1% w/o epi  Procedure details:     Location:  Right internal jugular    Vessel type: vein      Laterality:  Right    Approach: percutaneous technique used      Patient position:  Flat    Catheter type:  Triple lumen 16cm    Catheter size:  7 Fr    Landmarks identified: yes      Ultrasound guidance: yes      Sterile ultrasound techniques: Sterile gel and sterile probe covers were used      Number of attempts:  1    Successful placement: yes      Vessel of catheter tip end:  Superior vena cava  Post-procedure details:     Post-procedure:  Dressing applied and line sutured    Assessment:  Blood return through all ports, no pneumothorax on x-ray, placement verified by x-ray and free fluid flow    Post-procedure complications: none      Patient tolerance of procedure:   Tolerated well, no immediate complications    Observer: Yes (medical student and nurse)

## 2017-11-03 NOTE — PROGRESS NOTES
Patient was switched to dopamine from norepinephrine per cardiology recommendation to maintain HR >100 due to suspected QTc prolongation eliciting the vfib arrest  The amiodarone infusion was switched to a lidocaine infusion at cardiology recommendation  Patient's dopamine dose is currently at 15mcg/kg/min wheras patient was previously on only 4mcg/min of norepinephrine  Will obtain a VBG for ScVO2 and restart norepinephrine in addition to dopamine

## 2017-11-03 NOTE — PROCEDURES
Intubation  Date/Time: 11/3/2017 8:28 AM  Performed by: Suraj Grullon  Authorized by: Suraj Grullon     Patient location:  Bedside  Consent:     Consent obtained:  Emergent situation  Pre-procedure details:     Mallampati score:  4    Pretreatment medications:  Etomidate    Paralytics:  None  Indications:     Indications for intubation: respiratory distress    Procedure details:     Preoxygenation:  BiPAP    Intubation method:  Oral    Laryngoscope blade: Mac 4    Tube size (mm):  8 0    Tube type:  Cuffed    Number of attempts:  1    Ventilation between attempts: no      Cricoid pressure: no      Tube visualized through cords: yes    Placement assessment:     ETT to lip:  21cm    Tube secured with:  ETT renee    Breath sounds:  Equal    Placement verification: chest rise, condensation, direct visualization, equal breath sounds and tube exhalation    Post-procedure details:     Patient tolerance of procedure:   Tolerated well, no immediate complications

## 2017-11-03 NOTE — PROGRESS NOTES
Progress Note - Critical Care   Nannette Lorenzana 79 y o  female MRN: [id number]  Unit/Bed#: MICU 14 Encounter: [id number]    Attending Physician: Neville Prado MD      ______________________________________________________________________  Assessment and Plan:   1  Acute hypercapnic/hypoxic respiratory failure   2  Polymorphic VT/torasades  3  NSTEMI type 1 vs type 2  4  ICM w/ EF 35% as per [facility] records  5  Known LBBB  6  Multi-vessel CAD  7  IDDM w/ current hyperglycemia  8  H/o vocal cord paralysis/p tracheostomy/PEG, with removal placement 7/5/2017  9  HTN  10  Hyperlipidemia  11  Depression/anxiety          Neuro: Continue neuro checks  Head CT negative  Continue fentanyl/precedex infusion  Ammonia nl      CV: Continue amiodarone gtts and replete lytes for K>4, Mg >2  D/w cardiology plan for possible cath? Continue to trend troponin  Continue heparin infusion  Pulm: Continue AC mechanical ventilation, VAP bundle  Diuresis if permitted  Continue zosyn/vanco  Check urine strep/legionella/sputum culture  Start ipratropium/xopenex tid  Review of records, She subsequently "underwent cardiac catheterization which showed multivessel disease with an ejection fraction of 35-40%  She was treated with medication  She was extubated on June 14  She had dysphagia and hoarseness postextubation  ENT saw her on 6/24 noted her to have a poor vocal cord mobility  During that time the PEG was recommended but was considered high risk to be done endoscopically due to vocal cord paresis  On repeat examination on 6/29 was noted she had continued vocal cord paresis  She underwent a tracheostomy and PEG placement on 7/5  "      GI: NPO for now  Start tube feeds after d/w cardiology for plan  Once extubated, will need a formal speech swallow study given prior h/o dysphagia  Nutrition consult for tube feed recommendations  : Monitor UO and monitor creatinine  F/E/N: Stop IVFs   Replete electrolytes for Mg >2, K>4, nutrition eval pending  ID: f/u cultures  Obtain strep/legionella/sputum culture  UDS (-) nitrate/wbc/bacterial  F/u blood cultures  Monitor WBC/temps/cultures  Heme: Monitor Hgb/counts  Endo: Start insulin infusion w/ q2h insulin checks  Msk/Skin: frequent repositioning  Assess daily for skin breakdown  Nystatin for groin candidiasis    Disposition:Continue ICU care  Code Status: Level 1 - Full Code    Counseling / Coordination of Care  Total Critical Care time spent 40 minutes excluding procedures, teaching and family updates  ______________________________________________________________________    Chief Complaint: currently unresponsive    24 Hour Events:   · Transferred from [facility] after being found down at home with a welfare check  Found encephalopathic, hypoxic  In ED, had trop elevation, 25,000 BNP, and a lactic acidosis  Placed on BIPAP overnight  BB/diuresis held 2/2 labile blood pressures  Cardiology awaiting cardiac cath records from [facility], considering a repeat cath  · This am, developed more difficulty breathing with some ectopy  Was emergently intubated this morning  I/O: 1 4/610 +803     Infusions:  Amiodarone 1mg/min  0 9 Saline 125ml  Heparin 11U/kg/hr  ______________________________________________________________________    Physical Exam:   Physical Exam   Constitutional: She appears well-developed  She has a sickly appearance  She is sedated and intubated  HENT:   Head: Normocephalic and atraumatic  Prior trach scar in place   Eyes: Conjunctivae, EOM and lids are normal  Pupils are equal, round, and reactive to light  Neck: Trachea normal and normal range of motion  No JVD present  Cardiovascular: Intact distal pulses  An irregular rhythm present  Tachycardia present  Pulmonary/Chest: Accessory muscle usage present  Tachypnea noted  She is intubated  She is in respiratory distress  She has decreased breath sounds  She has rales  Abdominal: Bowel sounds are decreased  There is no tenderness  Genitourinary:   Genitourinary Comments: Groin rash consistent with candidiasis   Musculoskeletal:   Limited exam 2/2 encephalopathy   Neurological: She is alert  GCS eye subscore is 4  GCS verbal subscore is 5  GCS motor subscore is 6  Skin: Skin is warm, dry and intact  Psychiatric:   Currently sedated and unable to assess  Nursing note and vitals reviewed  ______________________________________________________________________  Vitals:    17 0700 17 0718 17 0722 17 0731   BP:   92/53    Pulse: (!) 118   100   Resp: (!) 27   (!) 25   Temp: 98 2 °F (36 8 °C)   98 2 °F (36 8 °C)   TempSrc:    Probe   SpO2: 99% 97%     Weight:       Height:           Temperature:   Temp (24hrs), Av 6 °F (37 °C), Min:98 2 °F (36 8 °C), Max:99 4 °F (37 4 °C)    Current Temperature: 98 2 °F (36 8 °C)  Weights:   IBW: 70 8 kg    Body mass index is 38 99 kg/m²    Weight (last 2 days)     Date/Time   Weight    17 0600  127 (279 54)    17 1900  126 (276 68)            Hemodynamic Monitoring:  N/A     Non-Invasive/Invasive Ventilation Settings:  Respiratory    Lab Data (Last 4 hours)       0715            pH, Arterial       7 391           pCO2, Arterial       (!)52 5           pO2, Arterial       87 5           HCO3, Arterial       (!)31 1           Base Excess, Arterial       5 0                O2/Vent Data       718   Most Recent        Non-Invasive Ventilation Mode BiPAP  BiPAP                Lab Results   Component Value Date    PHART 7 391 2017    DPG1FAS 52 5 (H) 2017    PO2ART 87 5 2017    LUZ2YHH 31 1 (H) 2017    BEART 5 0 2017    SOURCE Radial, Right 2017     SpO2: SpO2: 100 %  Intake and Outputs:  I/O       07 -  07 07 -  07 07 -  07    I V  (mL/kg)  913 8 (7 2)     IV Piggyback  500     Total Intake(mL/kg)  1413 8 (11 1) Urine (mL/kg/hr)  610     Total Output   610      Net   +803 8                   Nutrition:        Diet Orders            Start     Ordered    11/02/17 1905  Diet NPO  Diet effective now     Question Answer Comment   Diet Type NPO    RD to adjust diet per protocol?  Yes        11/02/17 1906          Labs:     Results from last 7 days  Lab Units 11/03/17  0522 11/02/17 2004 11/02/17  1610 11/02/17  1554   WBC Thousand/uL 16 77* 15 12*  --  17 25*   HEMOGLOBIN g/dL 11 3* 11 9  --  12 8   I STAT HEMOGLOBIN g/dl  --   --  18 4*  --    HEMATOCRIT % 36 1 37 5  --  41 9   PLATELETS Thousands/uL 320 324  --  368   NEUTROS PCT %  --  77*  --  83*   MONOS PCT %  --  10  --  8       Results from last 7 days  Lab Units 11/03/17  0546 11/02/17 2004 11/02/17  1610 11/02/17  1554   SODIUM mmol/L 142 141  --  144   POTASSIUM mmol/L 3 2* 4 0  --  3 5   CHLORIDE mmol/L 101 100  --  98*   CO2 mmol/L 33* 31  --  29   BUN mg/dL 20 16  --  14   CREATININE mg/dL 0 76 0 96  --  1 05   CALCIUM mg/dL 9 3 9 6  --  10 2*   TOTAL PROTEIN g/dL  --  7 2  --  8 1   BILIRUBIN TOTAL mg/dL  --  0 75  --  0 90   ALK PHOS U/L  --  37*  --  44*   ALT U/L  --  61  --  69   AST U/L  --  148*  --  140*   GLUCOSE RANDOM mg/dL 343* 372*  --  385*   GLUCOSE, ISTAT mg/dl  --   --  382*  --        Results from last 7 days  Lab Units 11/03/17  0546 11/02/17 2004   MAGNESIUM mg/dL 2 0 2 0     Lab Results   Component Value Date    PHOS 4 0 11/02/2017        Results from last 7 days  Lab Units 11/03/17  0511 11/02/17 2004 11/02/17  1554   INR  1 32* 1 36* 1 26*   PTT seconds 61* 36* 35       0  Lab Value Date/Time   TROPONINI 6 40 (H) 11/03/2017 0730   TROPONINI 7 63 (H) 11/03/2017 0511   TROPONINI 7 41 (H) 11/03/2017 0132   TROPONINI 9 82 (H) 11/02/2017 2004   TROPONINI 7 57 ([place]) 11/02/2017 1646       Results from last 7 days  Lab Units 11/03/17  0548 11/03/17  0037 11/02/17  2235   LACTIC ACID mmol/L 1 7 2 0 2 3*     ABG:  Lab Results   Component Value Date PHART 7 391 11/03/2017    ARF1QGG 52 5 (H) 11/03/2017    PO2ART 87 5 11/03/2017    IET6PHN 31 1 (H) 11/03/2017    BEART 5 0 11/03/2017    SOURCE Radial, Right 11/03/2017     Imaging: CXR:ET tube projects above the diony    Pulmonary edema with bilateral pleural effusions and left basilar volume loss  CT Head: (-)   I have personally reviewed pertinent reports  EKG: Afib w/ RVR  Micro:  No results found for: Rolena Prudent, WOUNDCULT, SPUTUMCULTUR  Allergies: Not on File  Medications:   Scheduled Meds:  chlorhexidine 15 mL Swish [employer] Spit Q12H [address]   norepinephrine      piperacillin-tazobactam 4 5 g Intravenous Q6H   potassium chloride 20 mEq Intravenous Once   potassium chloride 20 mEq Intravenous Once   vancomycin 15 mg/kg Intravenous Q12H     Continuous Infusions:  amiodarone 0 5 mg/min Last Rate: 0 5 mg/min (11/03/17 0727)   dexmedetomidine 0 1-0 7 mcg/kg/hr Last Rate: 0 3 mcg/kg/hr (11/03/17 0843)   fentaNYL 75 mcg/hr    heparin (porcine) 3-20 Units/kg/hr (Order-Specific) Last Rate: 11 1 Units/kg/hr (11/02/17 2152)   insulin regular (HumuLIN R,NovoLIN R) infusion 0 3-21 Units/hr    sodium chloride 125 mL/hr Last Rate: 125 mL/hr (11/03/17 0639)     PRN Meds:    acetaminophen 650 mg Q6H PRN   heparin (porcine) 2,000 Units PRN   heparin (porcine) 4,000 Units PRN   ondansetron 4 mg Q6H PRN     VTE Pharmacologic Prophylaxis: Heparin Drip  VTE Mechanical Prophylaxis: sequential compression device  Invasive lines and devices: Invasive Devices     Peripheral Intravenous Line            Peripheral IV 11/02/17 Left Antecubital less than 1 day    Peripheral IV 11/02/17 Left Hand less than 1 day    Peripheral IV 11/02/17 Right Antecubital less than 1 day          Drain            Urethral Catheter Temperature probe less than 1 day          Airway            ETT  Cuffed 8 mm less than 1 day                     Portions of the record may have been created with voice recognition software    Occasional wrong word or "sound a like" substitutions may have occurred due to the inherent limitations of voice recognition software  Read the chart carefully and recognize, using context, where substitutions have occurred      [facility], [place]

## 2017-11-03 NOTE — RESPIRATORY THERAPY NOTE
RT Ventilator Management Note  Lorraine Caceres 79 y o  female MRN: [id number]  Unit/Bed#: [facility] 14 Encounter: [id number]      Daily Screen       11/3/2017 0830             Patient safety screen outcome[de-identified] Failed    Not Ready for Weaning due to[de-identified] Poor inspiratory effort            Physical Exam:   Assessment Type: Assess only  General Appearance: Sedated  Respiratory Pattern: Assisted  Chest Assessment: Chest expansion symmetrical  Bilateral Breath Sounds: Coarse  Cough: None  Suction: ET Tube  O2 Device: vent      Resp Comments: Pt remains on AC mode  No weaning performed today  Will continue to monitor pt's progress

## 2017-11-03 NOTE — CASE MANAGEMENT
[address] [place] [address] in the [facility] by Sukh Belcher for 2017  Network Utilization Review Department  Phone: [phone]; Fax [phone]  ATTENTION: The Network Utilization Review Department is now centralized for our 7 Facilities  Make a note that we have a new phone and fax numbers for our Department  Please call with any questions or concerns to [phone] and carefully follow the prompts so that you are directed to the right person  All voicemails are confidential  Fax any determinations, approvals, denials, and requests for initial or continue stay review clinical to [phone]  Due to HIGH CALL volume, it would be easier if you could please send faxed requests to expedite your requests and in part, help us provide discharge notifications faster  Initial Clinical Review    Admission: Date/Time/Statement: 11/2/17 @ 1847     Orders Placed This Encounter   Procedures    Inpatient Admission     Standing Status:   Standing     Number of Occurrences:   1     Order Specific Question:   Admitting Physician     Answer:   Bc Chaves     Order Specific Question:   Level of Care     Answer:   Critical Care [15]     Order Specific Question:   Estimated length of stay     Answer:   More than 2 Midnights     Order Specific Question:   Certification     Answer:   I certify that inpatient services are medically necessary for this patient for a duration of greater than two midnights  See H&P and MD Progress Notes for additional information about the patient's course of treatment  ED: Date/Time/Mode of Arrival:   ED Arrival Information     Patient not seen in ED                     History of Illness: Salas Brantley is a 79 y o  female who presents as transfer from [facility] ED  Patient was being checked on for wellness check the past 2 days, however she answered her door and did not want help    Today, when a wellness check was performed she did not answer and was found to be lying on the ground  Patient was brought to the [facility] ED  In the ED, patient was confused and having respiratory difficulty with reports initial O2 saturation in the 70s, therefore pt was placed on BiPAP  She was noted to have an elevated i-STAT troponin of 4 93 and lab troponin of 7 57  Noted to have elevated lactate of 4 3  Chest x-ray showing bilateral pleural effusions, left greater than right  CT head and C-spine were negative  Patient was transported by air to the [facility]      Per review of records on Care everywhere, patient was hospitalized at [facility] from June through July for possible STEMI  She had elevated troponins and underwent catheterization, however no intervention was performed  Last echo ws 6/12 showing EF of 35%  On arrival to ICU, pt was on Bipap 20/5  Pt sleepy, but able to follow commands and answer simple questions   Pt offers no complaints and denies any areas of pain       ED Vital Signs:   ED Triage Vitals   Temperature Pulse Respirations Blood Pressure SpO2   11/02/17 1900 11/02/17 1900 11/02/17 1900 11/02/17 1900 11/02/17 1900   99 4 °F (37 4 °C) 80 (!) 51 147/68 100 %      Temp Source Heart Rate Source Patient Position - Orthostatic VS BP Location FiO2 (%)   11/02/17 1900 11/02/17 1900 11/03/17 0021 11/02/17 1900 --   Rectal Monitor Lying Right arm       Pain Score       11/02/17 1900       No Pain        Wt Readings from Last 1 Encounters:   11/03/17 127 kg (279 lb 8 7 oz)     Vital Signs (abnormal):   11/03/17 1000  98 6 °F (37 °C)  80  16   106/48  68  100 %  --   11/03/17 0731  98 2 °F (36 8 °C)  100   25  --  --  --  --   11/03/17 0700  98 2 °F (36 8 °C)   118   27  --  --  99 %  --   11/03/17 0421  98 6 °F (37 °C)  88  13   93/44  60  100 %  Lying   11/03/17 0321  98 6 °F (37 °C)   106  18  104/65  77  99 %  --   11/03/17 0221  98 6 °F (37 °C)  82  12   84/43  54  99 %  --   11/03/17 0121  98 6 °F (37 °C)  88  12   99/45  61  100 %  -- 11/02/17 2321  98 6 °F (37 °C)  90  13   97/40  58  100 %  --   11/02/17 2221  98 6 °F (37 °C)  92   11   119/48  67  100 %  --   11/02/17 2121  99 °F (37 2 °C)  90  13   87/41  54  100 %  --   11/02/17 2007  --  90  13   111/46  61  100 %  --   11/02/17 1900  99 4 °F (37 4 °C)  80   51  147/68  85  100 %  --     Abnormal Labs:  pH, Arterial  7 411  7 391  7 392   pH, Arterial      pCO2, Arterial   52 4  52 5  48 9  pCO2, Arterial     pO2, Arterial   95 4  87 5  115 4  pO2, Arterial     HCO3, Arterial   32 5  31 1  29 1  HCO3, Arterial     Base Excess, Arterial   6 7  5 0  3 4  Base Excess, Arterial     O2 Content, Arterial   16 2  16 9  15 7  O2 Content, Arterial     O2 HGB,Arterial   95 4  94 9  97 0  O2 HGB,Arterial      Potassium 3 5 - 5 3 mmol/L 3 2     Comments: Slightly Hemolyzed; Results May be Affected   Chloride 100 - 108 mmol/L 101    CO2 21 - 32 mmol/L 33     Anion Gap 4 - 13 mmol/L 8    BUN 5 - 25 mg/dL 20    Creatinine 0 60 - 1 30 mg/dL 0 76    Comments: Standardized to IDMS reference method   Glucose 65 - 140 mg/dL 343       WBC 4 31 - 10 16 Thousand/uL 16 77     RBC 3 81 - 5 12 Million/uL 3 95    Hemoglobin 11 5 - 15 4 g/dL 11 3       Urine culture pending  Trop - 7 41, 7 63, 6 40  PT/INR - 16 5/1 32, PTT 61  POC glucose 360, 354, 272, 374,    Diagnostic Test Results:     11/2 CXR - Bilateral pleural effusions, left greater than right  Underlying process of the left lung base not excludable  11/2 CT cervical spine - Nonspecific foci of air are identified in the region of the right vertebral artery and right internal carotid artery which is nonspecific finding  CTA neck can be considered for further evaluation  No acute fracture or dislocation identified  If there is focal neurologic deficit, cervical spine MRI can be considered  Moderate partially visualized bilateral pleural effusions   Please proceed with CT chest with contrast   11/3 CXR - Mild progression of central pulmonary vascular congestion  Left basilar and lingular airspace consolidation and bilateral pleural effusions left greater than right unchanged  11/3 CXR - Pulmonary edema with bilateral pleural effusions and left basilar volume loss  11/3 EKG - Atrial fibrillation  Left bundle branch block  Abnormal ECG  11/3 Cardiac Echo pending    ED Treatment:   Medication Administration - No Administrations Displayed (No Start Event Found)     None  [facility]        Past Medical/Surgical History: Active Ambulatory Problems     Diagnosis Date Noted    No Active Ambulatory Problems     Resolved Ambulatory Problems     Diagnosis Date Noted    No Resolved Ambulatory Problems     Past Medical History:   Diagnosis Date    Anxiety     Diabetes mellitus ([facility] )     Hypertension      Admitting Diagnosis: NSTEMI (non-ST elevated myocardial infarction) ([facility] [address] ) [I21 4]    Age/Sex: 79 y o  female    Assessment/Plan:   78 yo F found on ground at home  Pt noted to be confused, respiratory distress and with elevated troponin     Plan:                  Neuro:               - continue to monitor mental status, currently awake and protecting            airway                 CV:               - NSTEMI: EKG with old LBBB  F/u ECHO  Cardiology consult  Continue to trend troponin  Heparin gtt                  Lung:               - Respiratory distress with b/l pleural effusions/possible PNA:         continue Bipap, follow up am CXR, continue abx                 GI: NPO while on Bipap, then can start diet                 FEN:               - Maintenance fluids while NPO                 :               - Continue lackey catheter for accurate I/O                  ID:               - Elevated lactic acid, trend until normalizes, possible PNA  Continue         Vanc/Zosyn   Follow up blood cultures (1 sent at [facility], 1 sent at  [facility])                 Heme:               - Leukocytosis, possibly reactive/stress rxn vs  infectious                Endo:               - Elevated glucose: SSI with q6h accuchecks while NPO                 Msk/Skin: N/A                 Disposition: Admit to ICU for management     VTE Pharmacologic Prophylaxis: heparin gtt for ACS  VTE Mechanical Prophylaxis: sequential compression device     Invasive lines and devices: Invasive Devices            Peripheral Intravenous Line                     Peripheral IV 11/02/17 Left Antecubital less than 1 day      Peripheral IV 11/02/17 Left Hand less than 1 day      Peripheral IV 11/02/17 Right Antecubital less than 1 day                    Drain                     Urethral Catheter Temperature probe less than 1 day                Code Status: Level 1 - Full Code  Given critical illness, patient length of stay will require greater than two midnights  Admission Orders:  Scheduled Meds:   aspirin 324 mg Per NG Tube Daily   chlorhexidine 15 mL Swish & Spit Q12H [facility]   norepinephrine      nystatin  Topical BID   piperacillin-tazobactam 4 5 g Intravenous Q6H   potassium chloride 20 mEq Intravenous Once   vancomycin 15 mg/kg Intravenous Q12H     Continuous Infusions:   amiodarone 0 5 mg/min Last Rate: 0 5 mg/min (11/03/17 0727)   dexmedetomidine 0 1-0 7 mcg/kg/hr Last Rate: 0 3 mcg/kg/hr (11/03/17 0843)   fentaNYL 75 mcg/hr Last Rate: 75 mcg/hr (11/03/17 0850)   heparin (porcine) 3-20 Units/kg/hr (Order-Specific) Last Rate: 11 1 Units/kg/hr (11/02/17 2152)   insulin regular (HumuLIN R,NovoLIN R) infusion 0 3-21 Units/hr Last Rate: 8 Units/hr (11/03/17 1000)     PRN Meds:     acetaminophen    heparin (porcine) IV x 1    ondansetron    Cons Cardiology  Cons Nutrition   Cons CM   Diet NPO   Mechanical Ventilation with respiratory, weaning and extubation protocols   POC glucose q 2 hr  Hepar ACS protocol drip  SCDs  Strict bedrest  Restraints nonviolent   ___________________________________________  11/3 Cardiology Consult   1  Polymorphic VT/torsades   2   NSTEMI type 1 vs 2  3  Chronic left bundle branch block  4  Ischemic Cardiomyopathy with EF 35%-LV DAYANA 5 4 cm  5  Multi-Vessel CAD  6  Acute hypoxic respiratory failure  7  Insulin dependent diabetes  8  Hypertension  9  Hyperlipidemia  10  History of vocal cord paresis status post tracheostomy and PEG placement on July 5th 2017  11  Depression/anxiety   Plan:  · Quang Jurado has had a recent cath at [facility] in June 2017 with reported MVD  A decision was made to manage her CAD medically  No cath report is available and data is obtained from discharge summaries  · In the MICU, she had an episode of torsades with a troponin of 9 which makes arrhyhtmia a possible etiology of this presentation  · Would prefer to obtain cath report from [facility]  If unable to obtain may consider angiography to define coronary anatomy  Will need to discuss goals of care with family/POA prior to repeat catheterization  · She was also found to have ICM with EF: 35% with inferior akinesis  Will repeat echocardiography to assess LV function and new RWMA  · Cont IV heparin and Amiodarone  · Rectal ASA  · Currently being volume resuscitated for lactic acidosis  Once hemodynamically stable can begin to diurese as tolerated  · Possible eventual EP evaluation if family desires secondary prevention of SCD    ______________________________________________  11/3 Critical Care Progress Note  12  Acute hypercapnic/hypoxic respiratory failure   13  Polymorphic VT/torasades  14  NSTEMI type 1 vs type 2  15  ICM w/ EF 35% as per [facility] records  16  Known LBBB  17  Multi-vessel CAD  18  IDDM w/ current hyperglycemia  19  H/o vocal cord paralysis/p tracheostomy/PEG, with removal placement 7/5/2017  20  HTN  21  Hyperlipidemia  22  Depression/anxiety   Neuro: Continue neuro checks  Head CT negative  Continue fentanyl/precedex infusion  Ammonia nl       CV: Continue amiodarone gtts and replete lytes for K>4, Mg >2  D/w cardiology plan for possible cath?  Continue to trend troponin  Continue heparin infusion       Pulm: Continue AC mechanical ventilation, VAP bundle  Diuresis if permitted  Continue zosyn/vanco  Check urine strep/legionella/sputum culture  Start ipratropium/xopenex tid       Review of records, She subsequently "underwent cardiac catheterization which showed multivessel disease with an ejection fraction of 35-40%  She was treated with medication  She was extubated on June 14  She had dysphagia and hoarseness postextubation  ENT saw her on 6/24 noted her to have a poor vocal cord mobility  During that time the PEG was recommended but was considered high risk to be done endoscopically due to vocal cord paresis  On repeat examination on 6/29 was noted she had continued vocal cord paresis  She underwent a tracheostomy and PEG placement on 7/5  "        GI: NPO for now  Start tube feeds after d/w cardiology for plan  Once extubated, will need a formal speech swallow study given prior h/o dysphagia  Nutrition consult for tube feed recommendations       : Monitor UO and monitor creatinine       F/E/N: Stop IVFs  Replete electrolytes for Mg >2, K>4, nutrition eval pending       ID: f/u cultures  Obtain strep/legionella/sputum culture  UDS (-) nitrate/wbc/bacterial  F/u blood cultures  Monitor WBC/temps/cultures       Heme: Monitor Hgb/counts       Endo: Start insulin infusion w/ q2h insulin checks  Msk/Skin: frequent repositioning  Assess daily for skin breakdown  Nystatin for groin candidiasis     Disposition:Continue ICU care       Code Status: Level 1 - Full Code     Counseling / Coordination of Care  Total Critical Care time spent 40 minutes excluding procedures, teaching and family updates  Chief Complaint: currently unresponsive  24 Hour Events:   · Transferred from [facility] after being found down at home with a welfare check  Found encephalopathic, hypoxic  In ED, had trop elevation, 25,000 BNP, and a lactic acidosis  Placed on BIPAP overnight  BB/diuresis held 2/2 labile blood pressures  Cardiology awaiting cardiac cath records from [facility], considering a repeat cath  · This am, developed more difficulty breathing with some ectopy   Was emergently intubated this morning

## 2017-11-03 NOTE — PROGRESS NOTES
The patient had a second episodes of Torsades  Her heart rate dropped to the 60s and had a PVC on T wave  At this point she is tachycardic, however if her heart rate drops below 100 bpm would start Isoprotenerol to keep heart rate above 100 bpm  She is on Lexapro as an outpatient contributing to her prolonged QT interval  Will also replace IV amiodarone with Lidocaine infusion  Discussed with primary service

## 2017-11-03 NOTE — SOCIAL WORK
CM spoke [facility] Rosanna Willson PA-C with CC team  Family contact needed for pt as she is intubated  CM reviewed chart - documentation noted that police were called to pt home for a well check  CM phone Mellisa Kim with [place] police department [phone]  As per Mellisa Kim pt son - Yenni An [phone] called police department for well check  Mellisa Kim believes other son is in long term and does not know of additional family members  CM phone Mr Carmen White to request additional family information  He confirmed that he called well check for pt  He reports he is the POA but documentation is at her home  CM requested he bring to the hospital  He reports pt has a daughter Ankur Latham who she has had minimal contact with and reports pt other son - Kanwal Pollard is in long term  CM provided Richardson's contact information to Rosanna White with [facility] team  Aretha Krabbe aware medical team will be contacting him shortly

## 2017-11-04 ENCOUNTER — APPOINTMENT (INPATIENT)
Dept: RADIOLOGY | Facility: HOSPITAL | Age: 67
DRG: 224 | End: 2017-11-04
Payer: COMMERCIAL

## 2017-11-04 LAB
ALBUMIN SERPL BCP-MCNC: 3.1 G/DL (ref 3.5–5)
ALP SERPL-CCNC: 28 U/L (ref 46–116)
ALT SERPL W P-5'-P-CCNC: 75 U/L (ref 12–78)
ANION GAP SERPL CALCULATED.3IONS-SCNC: 4 MMOL/L (ref 4–13)
ANION GAP SERPL CALCULATED.3IONS-SCNC: 5 MMOL/L (ref 4–13)
ANION GAP SERPL CALCULATED.3IONS-SCNC: 5 MMOL/L (ref 4–13)
APTT PPP: 68 SECONDS (ref 23–35)
APTT PPP: 76 SECONDS (ref 23–35)
AST SERPL W P-5'-P-CCNC: 97 U/L (ref 5–45)
BASE EXCESS BLDA CALC-SCNC: 6.8 MMOL/L
BASOPHILS # BLD AUTO: 0.02 THOUSANDS/ΜL (ref 0–0.1)
BASOPHILS NFR BLD AUTO: 0 % (ref 0–1)
BILIRUB SERPL-MCNC: 0.92 MG/DL (ref 0.2–1)
BUN SERPL-MCNC: 21 MG/DL (ref 5–25)
BUN SERPL-MCNC: 23 MG/DL (ref 5–25)
BUN SERPL-MCNC: 27 MG/DL (ref 5–25)
CALCIUM SERPL-MCNC: 8.5 MG/DL (ref 8.3–10.1)
CALCIUM SERPL-MCNC: 8.7 MG/DL (ref 8.3–10.1)
CALCIUM SERPL-MCNC: 9.1 MG/DL (ref 8.3–10.1)
CHLORIDE SERPL-SCNC: 105 MMOL/L (ref 100–108)
CHLORIDE SERPL-SCNC: 108 MMOL/L (ref 100–108)
CHLORIDE SERPL-SCNC: 108 MMOL/L (ref 100–108)
CO2 SERPL-SCNC: 32 MMOL/L (ref 21–32)
CO2 SERPL-SCNC: 33 MMOL/L (ref 21–32)
CO2 SERPL-SCNC: 33 MMOL/L (ref 21–32)
CREAT SERPL-MCNC: 0.92 MG/DL (ref 0.6–1.3)
CREAT SERPL-MCNC: 0.96 MG/DL (ref 0.6–1.3)
CREAT SERPL-MCNC: 1.01 MG/DL (ref 0.6–1.3)
EOSINOPHIL # BLD AUTO: 0.18 THOUSAND/ΜL (ref 0–0.61)
EOSINOPHIL NFR BLD AUTO: 1 % (ref 0–6)
ERYTHROCYTE [DISTWIDTH] IN BLOOD BY AUTOMATED COUNT: 14.4 % (ref 11.6–15.1)
GFR SERPL CREATININE-BSD FRML MDRD: 58 ML/MIN/1.73SQ M
GFR SERPL CREATININE-BSD FRML MDRD: 61 ML/MIN/1.73SQ M
GFR SERPL CREATININE-BSD FRML MDRD: 65 ML/MIN/1.73SQ M
GLUCOSE SERPL-MCNC: 138 MG/DL (ref 65–140)
GLUCOSE SERPL-MCNC: 161 MG/DL (ref 65–140)
GLUCOSE SERPL-MCNC: 164 MG/DL (ref 65–140)
GLUCOSE SERPL-MCNC: 167 MG/DL (ref 65–140)
GLUCOSE SERPL-MCNC: 168 MG/DL (ref 65–140)
GLUCOSE SERPL-MCNC: 168 MG/DL (ref 65–140)
GLUCOSE SERPL-MCNC: 169 MG/DL (ref 65–140)
GLUCOSE SERPL-MCNC: 169 MG/DL (ref 65–140)
GLUCOSE SERPL-MCNC: 174 MG/DL (ref 65–140)
GLUCOSE SERPL-MCNC: 175 MG/DL (ref 65–140)
GLUCOSE SERPL-MCNC: 175 MG/DL (ref 65–140)
GLUCOSE SERPL-MCNC: 176 MG/DL (ref 65–140)
GLUCOSE SERPL-MCNC: 181 MG/DL (ref 65–140)
GLUCOSE SERPL-MCNC: 186 MG/DL (ref 65–140)
GLUCOSE SERPL-MCNC: 204 MG/DL (ref 65–140)
GLUCOSE SERPL-MCNC: 230 MG/DL (ref 65–140)
HCO3 BLDA-SCNC: 31.1 MMOL/L (ref 22–28)
HCT VFR BLD AUTO: 32.3 % (ref 34.8–46.1)
HGB BLD-MCNC: 10.3 G/DL (ref 11.5–15.4)
HOROWITZ INDEX BLDA+IHG-RTO: 50 MM[HG]
INR PPP: 1.36 (ref 0.86–1.16)
LYMPHOCYTES # BLD AUTO: 3.86 THOUSANDS/ΜL (ref 0.6–4.47)
LYMPHOCYTES NFR BLD AUTO: 24 % (ref 14–44)
MAGNESIUM SERPL-MCNC: 2.1 MG/DL (ref 1.6–2.6)
MAGNESIUM SERPL-MCNC: 2.3 MG/DL (ref 1.6–2.6)
MAGNESIUM SERPL-MCNC: 2.4 MG/DL (ref 1.6–2.6)
MCH RBC QN AUTO: 28.9 PG (ref 26.8–34.3)
MCHC RBC AUTO-ENTMCNC: 31.9 G/DL (ref 31.4–37.4)
MCV RBC AUTO: 91 FL (ref 82–98)
MONOCYTES # BLD AUTO: 1.55 THOUSAND/ΜL (ref 0.17–1.22)
MONOCYTES NFR BLD AUTO: 10 % (ref 4–12)
NEUTROPHILS # BLD AUTO: 10.44 THOUSANDS/ΜL (ref 1.85–7.62)
NEUTS SEG NFR BLD AUTO: 65 % (ref 43–75)
NRBC BLD AUTO-RTO: 0 /100 WBCS
O2 CT BLDA-SCNC: 15 ML/DL (ref 16–23)
OXYHGB MFR BLDA: 95.6 % (ref 94–97)
PCO2 BLDA: 43 MM HG (ref 36–44)
PEEP RESPIRATORY: 5 CM[H2O]
PH BLDA: 7.48 [PH] (ref 7.35–7.45)
PHOSPHATE SERPL-MCNC: 1.3 MG/DL (ref 2.3–4.1)
PLATELET # BLD AUTO: 279 THOUSANDS/UL (ref 149–390)
PMV BLD AUTO: 10.4 FL (ref 8.9–12.7)
PO2 BLDA: 87.9 MM HG (ref 75–129)
POTASSIUM SERPL-SCNC: 3.6 MMOL/L (ref 3.5–5.3)
POTASSIUM SERPL-SCNC: 3.9 MMOL/L (ref 3.5–5.3)
POTASSIUM SERPL-SCNC: 3.9 MMOL/L (ref 3.5–5.3)
PROT SERPL-MCNC: 6.1 G/DL (ref 6.4–8.2)
PROTHROMBIN TIME: 16.8 SECONDS (ref 12.1–14.4)
RBC # BLD AUTO: 3.57 MILLION/UL (ref 3.81–5.12)
SODIUM SERPL-SCNC: 143 MMOL/L (ref 136–145)
SODIUM SERPL-SCNC: 145 MMOL/L (ref 136–145)
SODIUM SERPL-SCNC: 145 MMOL/L (ref 136–145)
SPECIMEN SOURCE: ABNORMAL
TROPONIN I SERPL-MCNC: 6.1 NG/ML
VANCOMYCIN TROUGH SERPL-MCNC: 31.1 UG/ML (ref 10–20)
VENT AC: 16
VENT- AC: AC
VT SETTING VENT: 450 ML
WBC # BLD AUTO: 16.1 THOUSAND/UL (ref 4.31–10.16)

## 2017-11-04 PROCEDURE — 83735 ASSAY OF MAGNESIUM: CPT | Performed by: PHYSICIAN ASSISTANT

## 2017-11-04 PROCEDURE — 85730 THROMBOPLASTIN TIME PARTIAL: CPT | Performed by: INTERNAL MEDICINE

## 2017-11-04 PROCEDURE — 82805 BLOOD GASES W/O2 SATURATION: CPT | Performed by: PHYSICIAN ASSISTANT

## 2017-11-04 PROCEDURE — 85610 PROTHROMBIN TIME: CPT | Performed by: PHYSICIAN ASSISTANT

## 2017-11-04 PROCEDURE — 94003 VENT MGMT INPAT SUBQ DAY: CPT

## 2017-11-04 PROCEDURE — 83735 ASSAY OF MAGNESIUM: CPT | Performed by: NURSE PRACTITIONER

## 2017-11-04 PROCEDURE — 80048 BASIC METABOLIC PNL TOTAL CA: CPT | Performed by: NURSE PRACTITIONER

## 2017-11-04 PROCEDURE — 85025 COMPLETE CBC W/AUTO DIFF WBC: CPT | Performed by: PHYSICIAN ASSISTANT

## 2017-11-04 PROCEDURE — 80053 COMPREHEN METABOLIC PANEL: CPT | Performed by: PHYSICIAN ASSISTANT

## 2017-11-04 PROCEDURE — 71010 HB CHEST X-RAY 1 VIEW FRONTAL (PORTABLE): CPT

## 2017-11-04 PROCEDURE — 84100 ASSAY OF PHOSPHORUS: CPT | Performed by: PHYSICIAN ASSISTANT

## 2017-11-04 PROCEDURE — 94760 N-INVAS EAR/PLS OXIMETRY 1: CPT

## 2017-11-04 PROCEDURE — 84484 ASSAY OF TROPONIN QUANT: CPT | Performed by: PHYSICIAN ASSISTANT

## 2017-11-04 PROCEDURE — 82948 REAGENT STRIP/BLOOD GLUCOSE: CPT

## 2017-11-04 PROCEDURE — 80202 ASSAY OF VANCOMYCIN: CPT | Performed by: NURSE PRACTITIONER

## 2017-11-04 RX ORDER — POTASSIUM CHLORIDE 14.9 MG/ML
20 INJECTION INTRAVENOUS
Status: COMPLETED | OUTPATIENT
Start: 2017-11-04 | End: 2017-11-04

## 2017-11-04 RX ORDER — MAGNESIUM SULFATE HEPTAHYDRATE 40 MG/ML
2 INJECTION, SOLUTION INTRAVENOUS ONCE
Status: COMPLETED | OUTPATIENT
Start: 2017-11-04 | End: 2017-11-04

## 2017-11-04 RX ORDER — FUROSEMIDE 10 MG/ML
40 INJECTION INTRAMUSCULAR; INTRAVENOUS ONCE
Status: COMPLETED | OUTPATIENT
Start: 2017-11-04 | End: 2017-11-04

## 2017-11-04 RX ORDER — POTASSIUM CHLORIDE 20MEQ/15ML
40 LIQUID (ML) ORAL ONCE
Status: COMPLETED | OUTPATIENT
Start: 2017-11-04 | End: 2017-11-04

## 2017-11-04 RX ORDER — LEVOTHYROXINE SODIUM 0.12 MG/1
125 TABLET ORAL
Status: DISCONTINUED | OUTPATIENT
Start: 2017-11-04 | End: 2017-11-04 | Stop reason: ALTCHOICE

## 2017-11-04 RX ORDER — FUROSEMIDE 10 MG/ML
10 INJECTION INTRAMUSCULAR; INTRAVENOUS ONCE
Status: COMPLETED | OUTPATIENT
Start: 2017-11-04 | End: 2017-11-04

## 2017-11-04 RX ORDER — ALBUMIN, HUMAN INJ 5% 5 %
12.5 SOLUTION INTRAVENOUS ONCE
Status: COMPLETED | OUTPATIENT
Start: 2017-11-04 | End: 2017-11-05

## 2017-11-04 RX ADMIN — SODIUM CHLORIDE 6 UNITS/HR: 9 INJECTION, SOLUTION INTRAVENOUS at 18:36

## 2017-11-04 RX ADMIN — NYSTATIN: 100000 POWDER TOPICAL at 10:07

## 2017-11-04 RX ADMIN — PIPERACILLIN SODIUM,TAZOBACTAM SODIUM 4.5 G: 4; .5 INJECTION, POWDER, FOR SOLUTION INTRAVENOUS at 04:32

## 2017-11-04 RX ADMIN — FENTANYL CITRATE 75 MCG/HR: 50 INJECTION, SOLUTION INTRAMUSCULAR; INTRAVENOUS at 02:34

## 2017-11-04 RX ADMIN — FUROSEMIDE 10 MG: 10 INJECTION, SOLUTION INTRAMUSCULAR; INTRAVENOUS at 04:57

## 2017-11-04 RX ADMIN — FAMOTIDINE 20 MG: 10 INJECTION, SOLUTION INTRAVENOUS at 18:00

## 2017-11-04 RX ADMIN — DOPAMINE HYDROCHLORIDE IN DEXTROSE 16.5 MCG/KG/MIN: 1.6 INJECTION, SOLUTION INTRAVENOUS at 18:41

## 2017-11-04 RX ADMIN — PIPERACILLIN SODIUM,TAZOBACTAM SODIUM 4.5 G: 4; .5 INJECTION, POWDER, FOR SOLUTION INTRAVENOUS at 14:59

## 2017-11-04 RX ADMIN — ACETAMINOPHEN 650 MG: 160 SUSPENSION ORAL at 19:57

## 2017-11-04 RX ADMIN — DOPAMINE HYDROCHLORIDE IN DEXTROSE 10 MCG/KG/MIN: 1.6 INJECTION, SOLUTION INTRAVENOUS at 00:54

## 2017-11-04 RX ADMIN — CHLORHEXIDINE GLUCONATE 15 ML: 1.2 RINSE ORAL at 21:04

## 2017-11-04 RX ADMIN — DOPAMINE HYDROCHLORIDE IN DEXTROSE 14 MCG/KG/MIN: 1.6 INJECTION, SOLUTION INTRAVENOUS at 11:37

## 2017-11-04 RX ADMIN — DOPAMINE HYDROCHLORIDE IN DEXTROSE 10 MCG/KG/MIN: 1.6 INJECTION, SOLUTION INTRAVENOUS at 06:37

## 2017-11-04 RX ADMIN — CHLORHEXIDINE GLUCONATE 15 ML: 1.2 RINSE ORAL at 09:09

## 2017-11-04 RX ADMIN — FENTANYL CITRATE 75 MCG/HR: 50 INJECTION, SOLUTION INTRAMUSCULAR; INTRAVENOUS at 20:00

## 2017-11-04 RX ADMIN — FUROSEMIDE 40 MG: 10 INJECTION, SOLUTION INTRAMUSCULAR; INTRAVENOUS at 09:47

## 2017-11-04 RX ADMIN — DOPAMINE HYDROCHLORIDE IN DEXTROSE 16.5 MCG/KG/MIN: 1.6 INJECTION, SOLUTION INTRAVENOUS at 15:22

## 2017-11-04 RX ADMIN — ALBUMIN HUMAN 12.5 G: 0.05 INJECTION, SOLUTION INTRAVENOUS at 02:30

## 2017-11-04 RX ADMIN — VANCOMYCIN HYDROCHLORIDE 2000 MG: 10 INJECTION, POWDER, LYOPHILIZED, FOR SOLUTION INTRAVENOUS at 00:14

## 2017-11-04 RX ADMIN — PIPERACILLIN SODIUM,TAZOBACTAM SODIUM 4.5 G: 4; .5 INJECTION, POWDER, FOR SOLUTION INTRAVENOUS at 09:48

## 2017-11-04 RX ADMIN — POTASSIUM PHOSPHATE, MONOBASIC AND POTASSIUM PHOSPHATE, DIBASIC 21 MMOL: 224; 236 INJECTION, SOLUTION INTRAVENOUS at 08:29

## 2017-11-04 RX ADMIN — POTASSIUM CHLORIDE 20 MEQ: 200 INJECTION, SOLUTION INTRAVENOUS at 14:53

## 2017-11-04 RX ADMIN — Medication 125 MCG: at 11:50

## 2017-11-04 RX ADMIN — POTASSIUM CHLORIDE 40 MEQ: 20 SOLUTION ORAL at 14:53

## 2017-11-04 RX ADMIN — DOPAMINE HYDROCHLORIDE IN DEXTROSE 19 MCG/KG/MIN: 1.6 INJECTION, SOLUTION INTRAVENOUS at 22:00

## 2017-11-04 RX ADMIN — MAGNESIUM SULFATE HEPTAHYDRATE 2 G: 40 INJECTION, SOLUTION INTRAVENOUS at 14:54

## 2017-11-04 RX ADMIN — LIDOCAINE HYDROCHLORIDE 1 MG/MIN: 8 INJECTION, SOLUTION INTRAVENOUS at 18:45

## 2017-11-04 RX ADMIN — ASPIRIN 81 MG 324 MG: 81 TABLET ORAL at 09:08

## 2017-11-04 RX ADMIN — VANCOMYCIN HYDROCHLORIDE 2000 MG: 10 INJECTION, POWDER, LYOPHILIZED, FOR SOLUTION INTRAVENOUS at 11:50

## 2017-11-04 RX ADMIN — PIPERACILLIN SODIUM,TAZOBACTAM SODIUM 4.5 G: 4; .5 INJECTION, POWDER, FOR SOLUTION INTRAVENOUS at 21:04

## 2017-11-04 RX ADMIN — FUROSEMIDE 40 MG: 10 INJECTION, SOLUTION INTRAMUSCULAR; INTRAVENOUS at 18:00

## 2017-11-04 RX ADMIN — HEPARIN SODIUM AND DEXTROSE 13.1 UNITS/KG/HR: 10000; 5 INJECTION INTRAVENOUS at 20:00

## 2017-11-04 RX ADMIN — POTASSIUM CHLORIDE 40 MEQ: 20 SOLUTION ORAL at 00:14

## 2017-11-04 RX ADMIN — NYSTATIN: 100000 POWDER TOPICAL at 21:06

## 2017-11-04 NOTE — PROGRESS NOTES
Progress Note - Critical Care   Watson Estes 79 y o  female MRN: [id number]  Unit/Bed#: MICU 14 Encounter: [id number]    Attending Physician: Hetal Hinkle MD  ______________________________________________________________________  Assessment and Plan:   Principal Problem:    Respiratory failure [facility])  Active Problems:    Elevated troponin    Respiratory distress    Cardiac arrest ([facility] [address] )    Acute hypercapnic/hypoxic respiratory failure (HCC)    Torsades de pointes ([facility] )    Prolonged QT interval    NSTEMI (non-ST elevated myocardial infarction) ([facility] )    Ischemic cardiomyopathy    LBBB (left bundle branch block)    CAD (coronary artery disease)    h/o Vocal cord paralysis    HTN (hypertension)    Hyperlipemia  Resolved Problems:    * No resolved hospital problems  *        Neuro: Continue Fentanyl gtt for pain/sedation with Fentanyl PRN  Continue Neuro exams and delirium precautions  CV: S/P Vt ARREST yesterday and episodes of Torsades de pointes  She continues on lidocaine and dopamine infusions  Appreciate cardiology input  Continue to hold QT prolonging meds  Pulm: No plan to extubate due to cardiac instability but will try weaning parameters today  Continue VAP precautions  Wean FiO2 for SpO2> 93%  GI: Increase TFs to goal as tolerated  Will start pepcid for pud prophylaxis    : Replacing potassium and phos today  Decreased U/O and pt is on 40 mg lasix daily at home  Will give OT dose of 40 mg Lasix IV now and recheck electrolytes this afternoon  ID: Low grade Tmax of 100 8F, stable leukocytosis at 16 1  Continue on zosyn and vanco, BCs from 11/2 pending negative x 24hrs, Strep negative, sputum cx pending Rare GPC in pairs  Obtain vanco trough prior to next dose  Heme: Continue on Heparin gtt ACS low protocol  Endo: Continue insulin gtt for glycemic control as we are advancing TFs to goal  Restarting home levothyroxine        Msk/Skin: Frequent reposition to off load pressure points, will need PT/OT whence extubated    Disposition: Keep in MICU statu    Code Status: Level 1 - Full Code    ______________________________________________________________________    Chief Complaint: Unable to assess    24 Hour Events: Decreased u/o overnight and given albumin with 10 mg of lasix, no improvement in U/O    ______________________________________________________________________    Physical Exam:   Physical Exam   Constitutional: She appears well-developed and well-nourished  No distress  Chronically ill appearing female, intubated and sedated on exam   HENT:   Head: Normocephalic and atraumatic  Eyes: Conjunctivae are normal  Pupils are equal, round, and reactive to light  No scleral icterus  Neck: Normal range of motion  Neck supple  No tracheal deviation present  Cardiovascular: Normal rate, regular rhythm, normal heart sounds and intact distal pulses  Exam reveals no gallop and no friction rub  No murmur heard  Pulmonary/Chest: No respiratory distress  Abdominal: Soft  Bowel sounds are normal  She exhibits no distension  There is no tenderness  Genitourinary:   Genitourinary Comments: (+) lackey   Musculoskeletal: She exhibits edema  BLLE edema   Neurological:   Sedated, not following commands or responding to questions, withdraws in all extremities to physical stimuli     ______________________________________________________________________  Vitals:    17 0400 17 0500 17 0557 17 0600   BP:       Pulse: 94 98  96   Resp: 16 16  16   Temp: (!) 100 8 °F (38 2 °C) (!) 100 8 °F (38 2 °C)  100 4 °F (38 °C)   TempSrc:       SpO2: 100% 100%  100%   Weight:   116 kg (256 lb 2 8 oz)    Height:           Temperature:   Temp (24hrs), Av 4 °F (37 4 °C), Min:98 2 °F (36 8 °C), Max:100 8 °F (38 2 °C)    Current Temperature: 100 4 °F (38 °C)  Weights:   IBW: 70 8 kg    Body mass index is 35 73 kg/m²    Weight (last 2 days)     Date/Time   Weight    17 0557  116 (256 17)    11/03/17 0600  127 (279 54)    11/02/17 1900  126 (276 68)               Non-Invasive/Invasive Ventilation Settings:  Respiratory    Lab Data (Last 4 hours)      11/04 0438            pH, Arterial       (!)7 477           pCO2, Arterial       43 0           pO2, Arterial       87 9           HCO3, Arterial       (!)31 1           Base Excess, Arterial       6 8                O2/Vent Data       11/04 0335   Most Recent         Vent Mode AC/VC  AC/VC      Resp Rate (BPM) (BPM) 16  16      Vt (mL) (mL) 450  450      FIO2 (%) (%) 50  50      PEEP (cmH2O) (cmH2O) 5  5      MV 8 72  8 72                Lab Results   Component Value Date    PHART 7 477 (H) 11/04/2017    HCB0VZE 43 0 11/04/2017    PO2ART 87 9 11/04/2017    NTB2XFI 31 1 (H) 11/04/2017    BEART 6 8 11/04/2017    SOURCE Line, Arterial 11/04/2017     SpO2: SpO2: 100 %  Intake and Outputs:  I/O       11/02 0701 - 11/03 0700 11/03 0701 - 11/04 0700    P  O   3    I V  (mL/kg) 913 8 (7 2) 2513 4 (21 7)    IV Piggyback 500 2700    Total Intake(mL/kg) 1413 8 (11 1) 5216 4 (45)    Urine (mL/kg/hr) 610 755 (0 3)    Total Output 610 755    Net +803 8 +4461 4              UOP: 15-30 ml/hour   Nutrition:        Diet Orders            Start     Ordered    11/03/17 2202  Diet Enteral/Parenteral; Tube Feeding No Oral Diet; Jevity 1 2 Mateo  Diet effective now     Question Answer Comment   Diet Type Enteral/Parenteral    Enteral/Parenteral Tube Feeding No Oral Diet    Tube Feeding Formula: Jevity 1 2 Mateo    RD to adjust diet per protocol? Yes        11/03/17 2201        TF currently running at 10 ml/hour with a goal of 80    Formula:Jevity 1 0   Labs:     Results from last 7 days  Lab Units 11/04/17  0438 11/03/17  1430 11/03/17  0522 11/02/17 2004   WBC Thousand/uL 16 10* 16 85* 16 77* 15 12*   HEMOGLOBIN g/dL 10 3* 11 0* 11 3* 11 9   HEMATOCRIT % 32 3* 34 6* 36 1 37 5   PLATELETS Thousands/uL 279 324 320 324   NEUTROS PCT % 65 82*  --  77*   MONOS PCT % 10 8 --  10       Results from last 7 days  Lab Units 11/04/17  0438 11/03/17  2032 11/03/17  1430  11/02/17 2004   SODIUM mmol/L 145 145 143  < > 141   POTASSIUM mmol/L 3 9 3 1* 2 9*  < > 4 0   CHLORIDE mmol/L 108 106 104  < > 100   CO2 mmol/L 33* 33* 34*  < > 31   BUN mg/dL 27* 25 26*  < > 16   CREATININE mg/dL 1 01 0 98 1 00  < > 0 96   CALCIUM mg/dL 9 1 9 5 9 2  < > 9 6   TOTAL PROTEIN g/dL 6 1*  --  6 1*  --  7 2   BILIRUBIN TOTAL mg/dL 0 92  --  0 60  --  0 75   ALK PHOS U/L 28*  --  31*  --  37*   ALT U/L 75  --  73  --  61   AST U/L 97*  --  119*  --  148*   GLUCOSE RANDOM mg/dL 168* 150* 270*  < > 372*   < > = values in this interval not displayed  Results from last 7 days  Lab Units 11/04/17 0438 11/03/17 2032 11/03/17  1430   MAGNESIUM mg/dL 2 4 2 5 2 7*     Lab Results   Component Value Date    PHOS 1 3 (L) 11/04/2017    PHOS 2 7 11/03/2017    PHOS 4 0 11/02/2017        Results from last 7 days  Lab Units 11/04/17  0438 11/03/17 2032 11/03/17  1111 11/03/17  0511 11/02/17 2004   INR  1 36*  --   --  1 32* 1 36*   PTT seconds 76* 46* 60* 61* 36*       0  Lab Value Date/Time   TROPONINI 6 10 (H) 11/04/2017 0557   TROPONINI 6 27 (H) 11/03/2017 1430   TROPONINI 6 40 (H) 11/03/2017 0730   TROPONINI 7 63 (H) 11/03/2017 0511   TROPONINI 7 41 (H) 11/03/2017 0132   TROPONINI 9 82 (H) 11/02/2017 2004   TROPONINI 7 57 (HH) 11/02/2017 1646       Results from last 7 days  Lab Units 11/03/17  1430 11/03/17  0548 11/03/17  0037   LACTIC ACID mmol/L 2 2* 1 7 2 0     ABG:  Lab Results   Component Value Date    PHART 7 477 (H) 11/04/2017    IKI0VQB 43 0 11/04/2017    PO2ART 87 9 11/04/2017    PFO7PGO 31 1 (H) 11/04/2017    BEART 6 8 11/04/2017    SOURCE Line, Arterial 11/04/2017     Imaging:  I have personally reviewed pertinent reports        Micro:  Lab Results   Component Value Date    BLOODCX No Growth at 24 hrs  11/02/2017    BLOODCX No Growth at 24 hrs  11/02/2017     Allergies: No Known Allergies  Medications: Scheduled Meds:  aspirin 324 mg Per NG Tube Daily   chlorhexidine 15 mL Swish & Spit Q12H [facility]   nystatin  Topical BID   piperacillin-tazobactam 4 5 g Intravenous Q6H   vancomycin 15 mg/kg Intravenous Q12H     Continuous Infusions:  DOPamine in dextrose 5% 1-20 mcg/kg/min Last Rate: 10 mcg/kg/min (11/04/17 5389)   fentaNYL 75 mcg/hr Last Rate: 75 mcg/hr (11/04/17 0234)   heparin (porcine) 3-20 Units/kg/hr (Order-Specific) Last Rate: 13 1 Units/kg/hr (11/04/17 0216)   insulin regular (HumuLIN R,NovoLIN R) infusion 0 3-21 Units/hr Last Rate: 4 Units/hr (11/04/17 0014)   lidocaine in dextrose 5% 1 mg/min Last Rate: 1 mg/min (11/03/17 1504)   norepinephrine 1-30 mcg/min Last Rate: Stopped (11/03/17 2110)     PRN Meds:    acetaminophen 650 mg Q4H PRN   fentanyl citrate (PF) 50 mcg Q2H PRN   heparin (porcine) 2,000 Units PRN   heparin (porcine) 4,000 Units PRN   lidocaine (PF)  Code/Trauma/Sedation Med   sodium bicarbonate  Code/Trauma/Sedation Med   sodium chloride  Code/Trauma/Sedation Med     VTE Pharmacologic Prophylaxis: Heparin Drip ACS low  VTE Mechanical Prophylaxis: sequential compression device  Invasive lines and devices: Invasive Devices     Central Venous Catheter Line            CVC Central Lines 11/03/17 Triple 16cm less than 1 day          Peripheral Intravenous Line            Peripheral IV 11/02/17 Left Antecubital 1 day    Peripheral IV 11/02/17 Right Antecubital 1 day          Arterial Line            Arterial Line 11/03/17 Right Radial less than 1 day          Drain            Urethral Catheter Temperature probe 1 day    NG/OG/Enteral Tube Orogastric 18 Fr Center mouth less than 1 day          Airway            ETT  Cuffed 8 mm less than 1 day                     Portions of the record may have been created with voice recognition software  Occasional wrong word or "sound a like" substitutions may have occurred due to the inherent limitations of voice recognition software    Read the chart carefully and recognize, using context, where substitutions have occurred      Deward Oris

## 2017-11-04 NOTE — PROGRESS NOTES
Cardiology Progress Note - Elias Cordero 79 y o  female MRN: [id number]    [facility]: [id number]        Subjective:    No further torsades  Remains intubated  Review of Systems   Unable to perform ROS: intubated       Objective:   Vitals: Blood pressure 106/57, pulse 98, temperature (!) 100 8 °F (38 2 °C), resp  rate 16, height 5' 11" (1 803 m), weight 116 kg (256 lb 2 8 oz), SpO2 100 %  , Body mass index is 35 73 kg/m² , Orthostatic Blood Pressures    Flowsheet Row Most Recent Value   Blood Pressure  106/57 filed at 11/03/2017 1615   Patient Position - Orthostatic VS  Lying filed at 11/03/2017 7453         Systolic (98PQA), KLO:83 , Min:68 , GNR:482     Diastolic (26EJI), SMC:51, Min:37, Max:83      Intake/Output Summary (Last 24 hours) at 11/04/17 0916  Last data filed at 11/04/17 0601   Gross per 24 hour   Intake          5238 16 ml   Output              755 ml   Net          4483 16 ml     Weight (last 2 days)     Date/Time   Weight    11/04/17 0557  116 (256 17)    11/03/17 0600  127 (279 54)    11/02/17 1900  126 (276 68)            Telemetry Review: Brief NSVT    Physical Exam   Constitutional:   Intubated   Cardiovascular: Regular rhythm and normal heart sounds  Tachycardia present  Exam reveals no gallop and no friction rub  No murmur heard  Pulmonary/Chest: Breath sounds normal  She has no wheezes  She has no rales  Musculoskeletal: She exhibits no edema           Laboratory Results:    Results from last 7 days  Lab Units 11/04/17  0557 11/03/17  1430 11/03/17  0730   CK TOTAL U/L  --  277*  --    TROPONIN I ng/mL 6 10* 6 27* 6 40*   CK MB INDEX %  --  4 3*  --        CBC with diff:   Results from last 7 days  Lab Units 11/04/17  0438 11/03/17  1430 11/03/17  0522 11/02/17  2004 11/02/17  1610 11/02/17  1554 11/02/17  1552   WBC Thousand/uL 16 10* 16 85* 16 77* 15 12*  --  17 25*  --    HEMOGLOBIN g/dL 10 3* 11 0* 11 3* 11 9  --  12 8  --    I STAT HEMOGLOBIN g/dl  --   --   -- --  18 4*  --  14 3   HEMATOCRIT % 32 3* 34 6* 36 1 37 5  --  41 9  --    MCV fL 91 92 91 92  --  93  --    PLATELETS Thousands/uL 279 324 320 324  --  368  --    MCH pg 28 9 29 1 28 6 29 1  --  28 3  --    MCHC g/dL 31 9 31 8 31 3* 31 7  --  30 5*  --    RDW % 14 4 14 5 14 5 14 2  --  14 2  --    MPV fL 10 4 10 3 10 8 10 4  --  10 7  --    NRBC AUTO /100 WBCs 0 0  --  0  --   --   --          CMP:  Results from last 7 days  Lab Units 11/04/17  0438 11/03/17  2032 11/03/17  1430 11/03/17  0546 11/02/17  2004 11/02/17  1610 11/02/17  1554   SODIUM mmol/L 145 145 143 142 141  --  144   POTASSIUM mmol/L 3 9 3 1* 2 9* 3 2* 4 0  --  3 5   CHLORIDE mmol/L 108 106 104 101 100  --  98*   CO2 mmol/L 33* 33* 34* 33* 31  --  29   ANION GAP mmol/L 4 6 5 8 10  --  17*   BUN mg/dL 27* 25 26* 20 16  --  14   CREATININE mg/dL 1 01 0 98 1 00 0 76 0 96  --  1 05   GLUCOSE RANDOM mg/dL 168* 150* 270* 343* 372*  --  385*   GLUCOSE, ISTAT mg/dl  --   --   --   --   --  382*  --    CALCIUM mg/dL 9 1 9 5 9 2 9 3 9 6  --  10 2*   AST U/L 97*  --  119*  --  148*  --  140*   ALT U/L 75  --  73  --  61  --  69   ALK PHOS U/L 28*  --  31*  --  37*  --  44*   TOTAL PROTEIN g/dL 6 1*  --  6 1*  --  7 2  --  8 1   ALBUMIN g/dL 3 1*  --  2 9*  --  3 3*  --  3 9   BILIRUBIN TOTAL mg/dL 0 92  --  0 60  --  0 75  --  0 90   EGFR ml/min/1 73sq m 58 60 58 81 61 90 55         BMP:  Results from last 7 days  Lab Units 11/04/17  0438 11/03/17 2032 11/03/17  1430 11/03/17  0546 11/02/17  2004  11/02/17  1554   SODIUM mmol/L 145 145 143 142 141  --  144   POTASSIUM mmol/L 3 9 3 1* 2 9* 3 2* 4 0  --  3 5   CHLORIDE mmol/L 108 106 104 101 100  --  98*   CO2 mmol/L 33* 33* 34* 33* 31  --  29   BUN mg/dL 27* 25 26* 20 16  --  14   CREATININE mg/dL 1 01 0 98 1 00 0 76 0 96  --  1 05   GLUCOSE RANDOM mg/dL 168* 150* 270* 343* 372*  --  385*   GLUCOSE, ISTAT   --   --   --   --   --   < >  --    CALCIUM mg/dL 9 1 9 5 9 2 9 3 9 6  --  10 2*   < > = values in this interval not displayed  Magnesium:   Results from last 7 days  Lab Units 11/04/17  0438 11/03/17 2032 11/03/17  1430 11/03/17  0546 11/02/17 2004   MAGNESIUM mg/dL 2 4 2 5 2 7* 2 0 2 0       Coags:   Results from last 7 days  Lab Units 11/04/17  0438 11/03/17 2032 11/03/17  1111 11/03/17  0511 11/02/17 2004 11/02/17  1554   PTT seconds 76* 46* 60* 61* 36* 35   INR  1 36*  --   --  1 32* 1 36* 1 26*     Cardiac testing:   Results for orders placed during the hospital encounter of 11/02/17   Echo complete with contrast if indicated       Study date:  03-Nov-2017    SUMMARY    LEFT VENTRICLE:  Systolic function was at the lower limits of normal  Ejection fraction was estimated to be 50 %  There was hypokinesis of the basal inferior wall(s)  There was mild concentric hypertrophy  Features were consistent with a pseudonormal left ventricular filling pattern, with concomitant abnormal relaxation and increased filling pressure (grade 2 diastolic dysfunction)  MITRAL VALVE:  There was mild to moderate regurgitation  AORTIC VALVE:  There was mild regurgitation  PERICARDIUM:  A small pericardial effusion was identified posterior to the heart  The fluid had no internal echoes  There was no evidence of hemodynamic compromise  There was a large left pleural effusion         Meds/Allergies     aspirin 324 mg Per NG Tube Daily   chlorhexidine 15 mL Swish & Spit Q12H [address]   furosemide 40 mg Intravenous Once   levothyroxine 125 mcg Per G Tube Early Morning   nystatin  Topical BID   piperacillin-tazobactam 4 5 g Intravenous Q6H   potassium phosphate 21 mmol Intravenous Once   vancomycin 15 mg/kg Intravenous Q12H       DOPamine in dextrose 5% 1-20 mcg/kg/min Last Rate: 11 5 mcg/kg/min (11/04/17 0835)   fentaNYL 75 mcg/hr Last Rate: 75 mcg/hr (11/04/17 0234)   heparin (porcine) 3-20 Units/kg/hr (Order-Specific) Last Rate: 13 1 Units/kg/hr (11/04/17 0216)   insulin regular (HumuLIN R,NovoLIN R) infusion 0 3-21 Units/hr Last Rate: 4 Units/hr (11/04/17 0014)   lidocaine in dextrose 5% 1 mg/min Last Rate: 1 mg/min (11/03/17 7754)     Prescriptions Prior to Admission   Medication    albuterol (PROVENTIL HFA,VENTOLIN HFA) 90 mcg/act inhaler    atorvastatin (LIPITOR) 40 mg tablet    cetirizine (ZyrTEC) 10 mg tablet    cholecalciferol (VITAMIN D3) 1,000 units tablet    Cyanocobalamin 1000 MCG/ML KIT    escitalopram (LEXAPRO) 10 mg tablet    furosemide (LASIX) 40 mg tablet    gabapentin (NEURONTIN) 600 MG tablet    insulin glargine (LANTUS) 100 units/mL subcutaneous injection    levothyroxine 125 mcg tablet    lisinopril (ZESTRIL) 5 mg tablet    magnesium oxide (MAG-OX) 400 mg    metFORMIN (GLUMETZA) 1000 MG (MOD) 24 hr tablet    metoprolol tartrate (LOPRESSOR) 25 mg tablet    oxyCODONE-acetaminophen (PERCOCET) 5-325 mg per tablet       Assessment:  Principal Problem:    Respiratory failure (HCC)  Active Problems:    Elevated troponin    Respiratory distress    Cardiac arrest (HCC)    Acute hypercapnic/hypoxic respiratory failure (HCC)    Torsades de pointes (HCC)    Prolonged QT interval    NSTEMI (non-ST elevated myocardial infarction) (HCC)    Ischemic cardiomyopathy    LBBB (left bundle branch block)    CAD (coronary artery disease)    h/o Vocal cord paralysis    HTN (hypertension)    Hyperlipemia      Impression:  1  VT arrest - multiple episodes of Torsades  No further episodes since switching to Lidocaine gtt and keeping heart rate elevated with dopamine  2  Respiratory failure - multifactorial from pulmonary edema and pneumonia  3  CAD - patient with reportedly multivessel CAD from cath at [facility] in 6/17, however records not immediately available in EPIC  LV function appears to have improved  Plan:  1  Continue to hold QT prolonging medications  2  Continue lidocaine gtt  3  Recommend weaning down dopamine as tolerated to determine if further episodes of Torsades on lidocaine    4  Continue remainder of supportive care

## 2017-11-04 NOTE — RESPIRATORY THERAPY NOTE
RT Ventilator Management Note  Karena Richardson 79 y o  female MRN: [id number]  Unit/Bed#: [facility] 14 Encounter: [id number]      Daily Screen       11/4/2017 0854 11/4/2017 1551          Patient safety screen outcome[de-identified] Failed Failed      Not Ready for Weaning due to[de-identified] Poor inspiratory effort Poor inspiratory effort              Physical Exam:   Assessment Type: Assess only  General Appearance: Sedated  Respiratory Pattern: Assisted  Chest Assessment: Chest expansion symmetrical  Bilateral Breath Sounds: Coarse  R Breath Sounds: Other (Comment)  L Breath Sounds: Other (Comment)  Cough: None  Suction: ET Tube  O2 Device: vent      Resp Comments: Pt remains syncronous on AC settings, tolerating well  No plan to make changes or wean at this time

## 2017-11-05 LAB
ANION GAP SERPL CALCULATED.3IONS-SCNC: 3 MMOL/L (ref 4–13)
ANION GAP SERPL CALCULATED.3IONS-SCNC: 3 MMOL/L (ref 4–13)
ANION GAP SERPL CALCULATED.3IONS-SCNC: 4 MMOL/L (ref 4–13)
APTT PPP: 77 SECONDS (ref 23–35)
BACTERIA SPT RESP CULT: ABNORMAL
BUN SERPL-MCNC: 19 MG/DL (ref 5–25)
BUN SERPL-MCNC: 19 MG/DL (ref 5–25)
BUN SERPL-MCNC: 20 MG/DL (ref 5–25)
CA-I BLD-SCNC: 1.13 MMOL/L (ref 1.12–1.32)
CALCIUM SERPL-MCNC: 8.5 MG/DL (ref 8.3–10.1)
CALCIUM SERPL-MCNC: 8.6 MG/DL (ref 8.3–10.1)
CALCIUM SERPL-MCNC: 8.7 MG/DL (ref 8.3–10.1)
CHLORIDE SERPL-SCNC: 100 MMOL/L (ref 100–108)
CHLORIDE SERPL-SCNC: 103 MMOL/L (ref 100–108)
CHLORIDE SERPL-SCNC: 104 MMOL/L (ref 100–108)
CO2 SERPL-SCNC: 32 MMOL/L (ref 21–32)
CO2 SERPL-SCNC: 32 MMOL/L (ref 21–32)
CO2 SERPL-SCNC: 36 MMOL/L (ref 21–32)
CREAT SERPL-MCNC: 0.8 MG/DL (ref 0.6–1.3)
CREAT SERPL-MCNC: 0.82 MG/DL (ref 0.6–1.3)
CREAT SERPL-MCNC: 0.85 MG/DL (ref 0.6–1.3)
ERYTHROCYTE [DISTWIDTH] IN BLOOD BY AUTOMATED COUNT: 14.6 % (ref 11.6–15.1)
GFR SERPL CREATININE-BSD FRML MDRD: 71 ML/MIN/1.73SQ M
GFR SERPL CREATININE-BSD FRML MDRD: 74 ML/MIN/1.73SQ M
GFR SERPL CREATININE-BSD FRML MDRD: 77 ML/MIN/1.73SQ M
GLUCOSE SERPL-MCNC: 129 MG/DL (ref 65–140)
GLUCOSE SERPL-MCNC: 133 MG/DL (ref 65–140)
GLUCOSE SERPL-MCNC: 136 MG/DL (ref 65–140)
GLUCOSE SERPL-MCNC: 158 MG/DL (ref 65–140)
GLUCOSE SERPL-MCNC: 161 MG/DL (ref 65–140)
GLUCOSE SERPL-MCNC: 185 MG/DL (ref 65–140)
GLUCOSE SERPL-MCNC: 194 MG/DL (ref 65–140)
GLUCOSE SERPL-MCNC: 199 MG/DL (ref 65–140)
GLUCOSE SERPL-MCNC: 199 MG/DL (ref 65–140)
GLUCOSE SERPL-MCNC: 212 MG/DL (ref 65–140)
GLUCOSE SERPL-MCNC: 233 MG/DL (ref 65–140)
GLUCOSE SERPL-MCNC: 250 MG/DL (ref 65–140)
GRAM STN SPEC: ABNORMAL
GRAM STN SPEC: ABNORMAL
HCT VFR BLD AUTO: 33.8 % (ref 34.8–46.1)
HGB BLD-MCNC: 10.7 G/DL (ref 11.5–15.4)
MAGNESIUM SERPL-MCNC: 2.1 MG/DL (ref 1.6–2.6)
MAGNESIUM SERPL-MCNC: 2.4 MG/DL (ref 1.6–2.6)
MAGNESIUM SERPL-MCNC: 2.4 MG/DL (ref 1.6–2.6)
MCH RBC QN AUTO: 28.8 PG (ref 26.8–34.3)
MCHC RBC AUTO-ENTMCNC: 31.7 G/DL (ref 31.4–37.4)
MCV RBC AUTO: 91 FL (ref 82–98)
PHOSPHATE SERPL-MCNC: 3.2 MG/DL (ref 2.3–4.1)
PLATELET # BLD AUTO: 288 THOUSANDS/UL (ref 149–390)
PMV BLD AUTO: 10.8 FL (ref 8.9–12.7)
POTASSIUM SERPL-SCNC: 3.9 MMOL/L (ref 3.5–5.3)
POTASSIUM SERPL-SCNC: 4 MMOL/L (ref 3.5–5.3)
POTASSIUM SERPL-SCNC: 4.3 MMOL/L (ref 3.5–5.3)
RBC # BLD AUTO: 3.72 MILLION/UL (ref 3.81–5.12)
SODIUM SERPL-SCNC: 138 MMOL/L (ref 136–145)
SODIUM SERPL-SCNC: 139 MMOL/L (ref 136–145)
SODIUM SERPL-SCNC: 140 MMOL/L (ref 136–145)
VANCOMYCIN SERPL-MCNC: 13.1 UG/ML
WBC # BLD AUTO: 14.86 THOUSAND/UL (ref 4.31–10.16)

## 2017-11-05 PROCEDURE — 94003 VENT MGMT INPAT SUBQ DAY: CPT

## 2017-11-05 PROCEDURE — 84100 ASSAY OF PHOSPHORUS: CPT | Performed by: NURSE PRACTITIONER

## 2017-11-05 PROCEDURE — 85027 COMPLETE CBC AUTOMATED: CPT | Performed by: NURSE PRACTITIONER

## 2017-11-05 PROCEDURE — 94760 N-INVAS EAR/PLS OXIMETRY 1: CPT

## 2017-11-05 PROCEDURE — 80048 BASIC METABOLIC PNL TOTAL CA: CPT | Performed by: NURSE PRACTITIONER

## 2017-11-05 PROCEDURE — 83735 ASSAY OF MAGNESIUM: CPT | Performed by: NURSE PRACTITIONER

## 2017-11-05 PROCEDURE — 80202 ASSAY OF VANCOMYCIN: CPT | Performed by: NURSE PRACTITIONER

## 2017-11-05 PROCEDURE — 85730 THROMBOPLASTIN TIME PARTIAL: CPT | Performed by: INTERNAL MEDICINE

## 2017-11-05 PROCEDURE — 82948 REAGENT STRIP/BLOOD GLUCOSE: CPT

## 2017-11-05 PROCEDURE — 82330 ASSAY OF CALCIUM: CPT | Performed by: NURSE PRACTITIONER

## 2017-11-05 RX ORDER — INSULIN GLARGINE 100 [IU]/ML
42 INJECTION, SOLUTION SUBCUTANEOUS EVERY 12 HOURS SCHEDULED
Status: DISCONTINUED | OUTPATIENT
Start: 2017-11-05 | End: 2017-11-06

## 2017-11-05 RX ORDER — POTASSIUM CHLORIDE 20MEQ/15ML
40 LIQUID (ML) ORAL ONCE
Status: COMPLETED | OUTPATIENT
Start: 2017-11-05 | End: 2017-11-05

## 2017-11-05 RX ORDER — VANCOMYCIN HYDROCHLORIDE 1 G/200ML
1000 INJECTION, SOLUTION INTRAVENOUS EVERY 12 HOURS
Status: DISCONTINUED | OUTPATIENT
Start: 2017-11-05 | End: 2017-11-06

## 2017-11-05 RX ORDER — FUROSEMIDE 10 MG/ML
40 INJECTION INTRAMUSCULAR; INTRAVENOUS ONCE
Status: COMPLETED | OUTPATIENT
Start: 2017-11-05 | End: 2017-11-05

## 2017-11-05 RX ORDER — FUROSEMIDE 10 MG/ML
40 INJECTION INTRAMUSCULAR; INTRAVENOUS
Status: COMPLETED | OUTPATIENT
Start: 2017-11-05 | End: 2017-11-05

## 2017-11-05 RX ORDER — SENNOSIDES 8.6 MG
2 TABLET ORAL
Status: DISCONTINUED | OUTPATIENT
Start: 2017-11-05 | End: 2017-11-23 | Stop reason: HOSPADM

## 2017-11-05 RX ORDER — POTASSIUM CHLORIDE 14.9 MG/ML
20 INJECTION INTRAVENOUS ONCE
Status: COMPLETED | OUTPATIENT
Start: 2017-11-05 | End: 2017-11-05

## 2017-11-05 RX ORDER — ATORVASTATIN CALCIUM 40 MG/1
40 TABLET, FILM COATED ORAL DAILY
Status: DISCONTINUED | OUTPATIENT
Start: 2017-11-05 | End: 2017-11-23 | Stop reason: HOSPADM

## 2017-11-05 RX ORDER — POLYETHYLENE GLYCOL 3350 17 G/17G
17 POWDER, FOR SOLUTION ORAL DAILY
Status: DISCONTINUED | OUTPATIENT
Start: 2017-11-05 | End: 2017-11-23 | Stop reason: HOSPADM

## 2017-11-05 RX ORDER — MAGNESIUM SULFATE HEPTAHYDRATE 40 MG/ML
2 INJECTION, SOLUTION INTRAVENOUS ONCE
Status: COMPLETED | OUTPATIENT
Start: 2017-11-05 | End: 2017-11-05

## 2017-11-05 RX ADMIN — NYSTATIN: 100000 POWDER TOPICAL at 21:00

## 2017-11-05 RX ADMIN — CHLORHEXIDINE GLUCONATE 15 ML: 1.2 RINSE ORAL at 20:10

## 2017-11-05 RX ADMIN — SODIUM CHLORIDE 15 UNITS/HR: 9 INJECTION, SOLUTION INTRAVENOUS at 11:20

## 2017-11-05 RX ADMIN — SODIUM CHLORIDE 9 UNITS/HR: 9 INJECTION, SOLUTION INTRAVENOUS at 01:21

## 2017-11-05 RX ADMIN — DOPAMINE HYDROCHLORIDE IN DEXTROSE 20 MCG/KG/MIN: 1.6 INJECTION, SOLUTION INTRAVENOUS at 01:58

## 2017-11-05 RX ADMIN — FENTANYL CITRATE 50 MCG: 50 INJECTION INTRAMUSCULAR; INTRAVENOUS at 00:21

## 2017-11-05 RX ADMIN — POLYETHYLENE GLYCOL 3350 17 G: 17 POWDER, FOR SOLUTION ORAL at 09:55

## 2017-11-05 RX ADMIN — SENNOSIDES 17.2 MG: 8.6 TABLET, FILM COATED ORAL at 10:12

## 2017-11-05 RX ADMIN — FUROSEMIDE 40 MG: 10 INJECTION, SOLUTION INTRAMUSCULAR; INTRAVENOUS at 09:56

## 2017-11-05 RX ADMIN — DOPAMINE HYDROCHLORIDE IN DEXTROSE 12 MCG/KG/MIN: 1.6 INJECTION, SOLUTION INTRAVENOUS at 12:05

## 2017-11-05 RX ADMIN — FAMOTIDINE 20 MG: 10 INJECTION, SOLUTION INTRAVENOUS at 08:58

## 2017-11-05 RX ADMIN — POTASSIUM CHLORIDE 20 MEQ: 200 INJECTION, SOLUTION INTRAVENOUS at 00:29

## 2017-11-05 RX ADMIN — DOPAMINE HYDROCHLORIDE IN DEXTROSE 12.01 MCG/KG/MIN: 1.6 INJECTION, SOLUTION INTRAVENOUS at 16:29

## 2017-11-05 RX ADMIN — DOPAMINE HYDROCHLORIDE IN DEXTROSE 6 MCG/KG/MIN: 1.6 INJECTION, SOLUTION INTRAVENOUS at 22:30

## 2017-11-05 RX ADMIN — HEPARIN SODIUM AND DEXTROSE 13.1 UNITS/KG/HR: 10000; 5 INJECTION INTRAVENOUS at 01:33

## 2017-11-05 RX ADMIN — NYSTATIN: 100000 POWDER TOPICAL at 10:12

## 2017-11-05 RX ADMIN — INSULIN GLARGINE 42 UNITS: 100 INJECTION, SOLUTION SUBCUTANEOUS at 22:00

## 2017-11-05 RX ADMIN — HEPARIN SODIUM AND DEXTROSE 13.1 UNITS/KG/HR: 10000; 5 INJECTION INTRAVENOUS at 18:11

## 2017-11-05 RX ADMIN — PIPERACILLIN SODIUM,TAZOBACTAM SODIUM 4.5 G: 4; .5 INJECTION, POWDER, FOR SOLUTION INTRAVENOUS at 15:44

## 2017-11-05 RX ADMIN — CHLORHEXIDINE GLUCONATE 15 ML: 1.2 RINSE ORAL at 08:58

## 2017-11-05 RX ADMIN — PIPERACILLIN SODIUM,TAZOBACTAM SODIUM 4.5 G: 4; .5 INJECTION, POWDER, FOR SOLUTION INTRAVENOUS at 21:28

## 2017-11-05 RX ADMIN — FAMOTIDINE 20 MG: 10 INJECTION, SOLUTION INTRAVENOUS at 18:10

## 2017-11-05 RX ADMIN — ATORVASTATIN CALCIUM 40 MG: 40 TABLET, FILM COATED ORAL at 10:24

## 2017-11-05 RX ADMIN — PIPERACILLIN SODIUM,TAZOBACTAM SODIUM 4.5 G: 4; .5 INJECTION, POWDER, FOR SOLUTION INTRAVENOUS at 03:04

## 2017-11-05 RX ADMIN — MAGNESIUM SULFATE HEPTAHYDRATE 2 G: 40 INJECTION, SOLUTION INTRAVENOUS at 16:50

## 2017-11-05 RX ADMIN — DOPAMINE HYDROCHLORIDE IN DEXTROSE 20 MCG/KG/MIN: 1.6 INJECTION, SOLUTION INTRAVENOUS at 03:03

## 2017-11-05 RX ADMIN — ACETAMINOPHEN 650 MG: 160 SUSPENSION ORAL at 04:19

## 2017-11-05 RX ADMIN — ASPIRIN 81 MG 324 MG: 81 TABLET ORAL at 08:58

## 2017-11-05 RX ADMIN — FUROSEMIDE 40 MG: 10 INJECTION, SOLUTION INTRAMUSCULAR; INTRAVENOUS at 15:44

## 2017-11-05 RX ADMIN — POTASSIUM CHLORIDE 40 MEQ: 20 SOLUTION ORAL at 16:50

## 2017-11-05 RX ADMIN — DOPAMINE HYDROCHLORIDE IN DEXTROSE 14.5 MCG/KG/MIN: 1.6 INJECTION, SOLUTION INTRAVENOUS at 07:58

## 2017-11-05 RX ADMIN — Medication 125 MCG: at 04:40

## 2017-11-05 RX ADMIN — PIPERACILLIN SODIUM,TAZOBACTAM SODIUM 4.5 G: 4; .5 INJECTION, POWDER, FOR SOLUTION INTRAVENOUS at 08:58

## 2017-11-05 RX ADMIN — SODIUM CHLORIDE 8 UNITS/HR: 9 INJECTION, SOLUTION INTRAVENOUS at 22:05

## 2017-11-05 RX ADMIN — FUROSEMIDE 40 MG: 10 INJECTION, SOLUTION INTRAMUSCULAR; INTRAVENOUS at 20:04

## 2017-11-05 RX ADMIN — SENNOSIDES 17.2 MG: 8.6 TABLET, FILM COATED ORAL at 22:15

## 2017-11-05 RX ADMIN — POTASSIUM CHLORIDE 40 MEQ: 20 SOLUTION ORAL at 00:31

## 2017-11-05 RX ADMIN — FENTANYL CITRATE 50 MCG: 50 INJECTION INTRAMUSCULAR; INTRAVENOUS at 20:45

## 2017-11-05 RX ADMIN — DOPAMINE HYDROCHLORIDE IN DEXTROSE 20 MCG/KG/MIN: 1.6 INJECTION, SOLUTION INTRAVENOUS at 05:12

## 2017-11-05 NOTE — RESPIRATORY THERAPY NOTE
RT Ventilator Management Note  Yue Ortiz 79 y o  female MRN: [id number]  Unit/Bed#: [facility] 14 Encounter: [id number]      Daily Screen       11/4/2017 0854 11/4/2017 3637          Patient safety screen outcome[de-identified] Failed Failed      Not Ready for Weaning due to[de-identified] Poor inspiratory effort Poor inspiratory effort              Physical Exam:   Assessment Type: Assess only  General Appearance: Sedated  Respiratory Pattern: Assisted  Chest Assessment: Chest expansion symmetrical  Bilateral Breath Sounds: Coarse  R Breath Sounds: Other (Comment)  L Breath Sounds: Other (Comment)      Resp Comments: (P) After 30m of 100% TC trial, pt appeared comfortable, RR19, Vt 450's, however still not following directions appropriately   Pt intiated on PSV 10/5

## 2017-11-05 NOTE — PLAN OF CARE
Nutrition/Hydration-ADULT     Nutrient/Hydration intake appropriate for improving, restoring or maintaining nutritional needs Progressing        Potential for Falls     Patient will remain free of falls Progressing        Prexisting or High Potential for Compromised Skin Integrity     Skin integrity is maintained or improved Progressing

## 2017-11-05 NOTE — PROGRESS NOTES
Progress Note - Critical Care   Aniket Porras 79 y o  female MRN: [id number]  Unit/Bed#: MICU 14 Encounter: [id number]    Attending Physician: Mic Rubio MD  ______________________________________________________________________  Assessment and Plan:   Principal Problem:    Respiratory failure [facility])  Active Problems:    Elevated troponin    Respiratory distress    Cardiac arrest ([facility] [address] )    Acute hypercapnic/hypoxic respiratory failure (HCC)    Torsades de pointes ([facility] )    Prolonged QT interval    NSTEMI (non-ST elevated myocardial infarction) ([facility] )    Ischemic cardiomyopathy    LBBB (left bundle branch block)    CAD (coronary artery disease)    h/o Vocal cord paralysis    HTN (hypertension)    Hyperlipemia  Resolved Problems:    * No resolved hospital problems  *      Neuro: Continue neuro checks and delirium precautions  Continues on fentanyl infusion with PRN for sedation and pain control  CV: S/P Vfib arrest and episodes of torsades de pointes  continues on lidocaine and dopamine infusion  Appreciate cardiology input, plan to wean dopamine infusion to assess if she goes back into torsades now that she is on lidocaine  NSTEM type II: continue heparin gtt  Combined s/d HF: plan to continue to diurese today goal net neg 1 liter  Pulm: Assess SBT today with goal to extubate  Continuing to diurese today and continue VAP precautions  GI: Continue to advance TFs to goal  Pepcid for PUD prophylaxis  Will start a bowel regiment with senna and miralax     : Diuresed yesterday but is still net (+) 2 3 L in 24 hrs, did have 2 8 L in u/o yesterday and Cr improved to 0 85  Will continue to diurese today, plan for 40 mg BID lasix and will assess electrolytes this afternoon  Goal to be Net negative 1 liter in the next 24 hrs  ID: Fevers overnight with Tmax of 101 8F  Leukocytosis improved slightly   Vanco trough overnight was 31 1, two doses held post trough and dose adjusted to 1 gram BID which will start at [address] on 11/6/17  BC x 2 pending negative x 48 hrs, sputum cx pending Rare GPC in pairs, strep negative  Continue on zosyn and vanco  Will check a random vanco prior to next dose  Heme: Continue on ACS heparin drip  Hgb stable    Endo: Continue insulin gtt for glycemic control, currently on alg 4  Continue home levothyroxine for hypothyroidism     Msk/Skin: Frequent repositioning to offload pressure points  Will need PT/OT whence extubated  Disposition: Keep in MICU status    Code Status: Level 2 - DNAR: but accepts endotracheal intubation    ______________________________________________________________________    Chief Complaint: Denies pain on exam    24 Hour Events: No issues overnight  Continues on dopamine and lidocaine infusion today    ______________________________________________________________________    Physical Exam:   Physical Exam   Constitutional: She appears well-developed and well-nourished  No distress  HENT:   Head: Normocephalic and atraumatic  Eyes: Conjunctivae are normal  Pupils are equal, round, and reactive to light  No scleral icterus  Neck: Normal range of motion  Neck supple  No tracheal deviation present  Cardiovascular: Normal rate, regular rhythm and intact distal pulses  Exam reveals no gallop and no friction rub  Murmur heard  Pulmonary/Chest: No respiratory distress  She has no wheezes  On AC 16/450/50/5 with SpO2 100%  Ppeak 24 with MV of 6 8L  LS CTA b/l and decreased at bases  Abdominal: Soft  Bowel sounds are normal  She exhibits no distension  There is no tenderness  Genitourinary:   Genitourinary Comments: (+)Beckford   Musculoskeletal: Normal range of motion  She exhibits edema  +2-3 dependent edema   Neurological:   Sedated on exam but nodding appropriately to simple questions and following commands very weakly with extremities  Pupils equal and reactive to b/l   Skin: She is not diaphoretic  ______________________________________________________________________  Vitals:    17 0413 17 0500 17 0600 17 0638   BP:       Pulse:  96 94 96   Resp:  16 16 16   Temp:  (!) 101 5 °F (38 6 °C) (!) 101 5 °F (38 6 °C) (!) 101 5 °F (38 6 °C)   TempSrc:  Probe Probe Probe   SpO2:  100% 100% 100%   Weight: 133 kg (292 lb 8 8 oz)      Height:           Temperature:   Temp (24hrs), Av 3 °F (38 5 °C), Min:100 8 °F (38 2 °C), Max:101 8 °F (38 8 °C)    Current Temperature: (!) 101 5 °F (38 6 °C)  Weights:   IBW: 70 8 kg    Body mass index is 40 8 kg/m²  Weight (last 2 days)     Date/Time   Weight    17 0413  133 (292 55)    17 0557  116 (256 17)    17 0600  127 (279 54)                 Non-Invasive/Invasive Ventilation Settings:  Respiratory    Lab Data (Last 4 hours)    None         O2/Vent Data (Last 4 hours)    None              SpO2: SpO2: 100 %  Intake and Outputs:  I/O       701 -  07 -  07 07 -  07    P  O  3      I V  (mL/kg) 2535 2 (21 9) 2541 1 (19 1)     NG/GT  160     IV Piggyback 2700 1430     Feedings  984     Total Intake(mL/kg) 5238 2 (45 2) 5115 1 (38 5)     Urine (mL/kg/hr) 755 (0 3) 2775 (0 9)     Emesis/NG output  0 (0)     Total Output 755 2775      Net +4483 2 +2340 1                 UOP:  ml/hour   Nutrition:        Diet Orders            Start     Ordered    17 0740  Diet Enteral/Parenteral; Tube Feeding No Oral Diet; Jevity 1 0; 80  Diet effective now     Question Answer Comment   Diet Type Enteral/Parenteral    Enteral/Parenteral Tube Feeding No Oral Diet    Tube Feeding Formula: Jevity 1 0    Tube Feeding Continuous rate (mL/hr): 80    RD to adjust diet per protocol? Yes        17 0739        TF currently running at 70 ml/hour with a goal of 80 ml   Formula:Jevity 1 0  Labs:     Results from last 7 days  Lab Units 17  0439 17  0438 17  1430  17  2004   WBC Thousand/uL 14 86* 16 10* 16 85*  < > 15 12*   HEMOGLOBIN g/dL 10 7* 10 3* 11 0*  < > 11 9   HEMATOCRIT % 33 8* 32 3* 34 6*  < > 37 5   PLATELETS Thousands/uL 288 279 324  < > 324   NEUTROS PCT %  --  65 82*  --  77*   MONOS PCT %  --  10 8  --  10   < > = values in this interval not displayed  Results from last 7 days  Lab Units 11/05/17  0439 11/04/17  2304 11/04/17  1343 11/04/17  0438  11/03/17  1430  11/02/17 2004   SODIUM mmol/L 140 143 145 145  < > 143  < > 141   POTASSIUM mmol/L 4 3 3 9 3 6 3 9  < > 2 9*  < > 4 0   CHLORIDE mmol/L 104 105 108 108  < > 104  < > 100   CO2 mmol/L 32 33* 32 33*  < > 34*  < > 31   BUN mg/dL 20 21 23 27*  < > 26*  < > 16   CREATININE mg/dL 0 85 0 92 0 96 1 01  < > 1 00  < > 0 96   CALCIUM mg/dL 8 6 8 5 8 7 9 1  < > 9 2  < > 9 6   TOTAL PROTEIN g/dL  --   --   --  6 1*  --  6 1*  --  7 2   BILIRUBIN TOTAL mg/dL  --   --   --  0 92  --  0 60  --  0 75   ALK PHOS U/L  --   --   --  28*  --  31*  --  37*   ALT U/L  --   --   --  75  --  73  --  61   AST U/L  --   --   --  97*  --  119*  --  148*   GLUCOSE RANDOM mg/dL 158* 164* 138 168*  < > 270*  < > 372*   < > = values in this interval not displayed  Results from last 7 days  Lab Units 11/05/17 0439 11/04/17  2304 11/04/17  1343   MAGNESIUM mg/dL 2 4 2 3 2 1     Lab Results   Component Value Date    PHOS 3 2 11/05/2017    PHOS 1 3 (L) 11/04/2017    PHOS 2 7 11/03/2017        Results from last 7 days  Lab Units 11/05/17 0439 11/04/17  1026 11/04/17 0438  11/03/17  0511 11/02/17 2004   INR   --   --  1 36*  --  1 32* 1 36*   PTT seconds 77* 68* 76*  < > 61* 36*   < > = values in this interval not displayed      0  Lab Value Date/Time   TROPONINI 6 10 (H) 11/04/2017 0557   TROPONINI 6 27 (H) 11/03/2017 1430   TROPONINI 6 40 (H) 11/03/2017 0730   TROPONINI 7 63 (H) 11/03/2017 0511   TROPONINI 7 41 (H) 11/03/2017 0132   TROPONINI 9 82 (H) 11/02/2017 2004   TROPONINI 7 57 (HH) 11/02/2017 1646       Results from last 7 days  Lab Units 11/03/17  1430 11/03/17  0548 11/03/17  0037   LACTIC ACID mmol/L 2 2* 1 7 2 0     ABG:  Lab Results   Component Value Date    PHART 7 477 (H) 11/04/2017    ZHB4ESY 43 0 11/04/2017    PO2ART 87 9 11/04/2017    IHD0GBA 31 1 (H) 11/04/2017    BEART 6 8 11/04/2017    SOURCE Line, Arterial 11/04/2017     Imaging:  I have personally reviewed pertinent reports  Micro:  Lab Results   Component Value Date    BLOODCX No Growth at 48 hrs  11/02/2017    BLOODCX No Growth at 48 hrs  11/02/2017    SPUTUMCULTUR Culture too young- will reincubate 11/03/2017     Allergies: No Known Allergies  Medications:   Scheduled Meds:  aspirin 324 mg Per NG Tube Daily   chlorhexidine 15 mL Swish & Spit Q12H [facility] & long term   famotidine 20 mg Intravenous BID   levothyroxine 125 mcg Per G Tube Early Morning   nystatin  Topical BID   piperacillin-tazobactam 4 5 g Intravenous Q6H   vancomycin 15 mg/kg Intravenous Q12H     Continuous Infusions:  DOPamine in dextrose 5% 1-20 mcg/kg/min Last Rate: 17 mcg/kg/min (11/05/17 1742)   fentaNYL 75 mcg/hr Last Rate: 75 mcg/hr (11/04/17 2000)   heparin (porcine) 3-20 Units/kg/hr (Order-Specific) Last Rate: 13 1 Units/kg/hr (11/05/17 0133)   insulin regular (HumuLIN R,NovoLIN R) infusion 0 3-21 Units/hr Last Rate: 3 Units/hr (11/05/17 0358)   lidocaine in dextrose 5% 1 mg/min Last Rate: 1 mg/min (11/04/17 2000)     PRN Meds:    acetaminophen 650 mg Q4H PRN   fentanyl citrate (PF) 50 mcg Q2H PRN   heparin (porcine) 2,000 Units PRN   heparin (porcine) 4,000 Units PRN   lidocaine (PF)  Code/Trauma/Sedation Med   sodium bicarbonate  Code/Trauma/Sedation Med   sodium chloride  Code/Trauma/Sedation Med     VTE Pharmacologic Prophylaxis: Heparin Drip ACS protocol  VTE Mechanical Prophylaxis: sequential compression device  Invasive lines and devices:   Invasive Devices     Central Venous Catheter Line            CVC Central Lines 11/03/17 Triple 16cm 1 day          Peripheral Intravenous Line            Peripheral IV 11/02/17 Right Antecubital 2 days          Arterial Line            Arterial Line 11/03/17 Right Radial 1 day          Drain            Urethral Catheter Temperature probe 2 days    NG/OG/Enteral Tube Orogastric 18 Fr Center mouth 1 day          Airway            ETT  Cuffed 8 mm 2 days                     Portions of the record may have been created with voice recognition software  Occasional wrong word or "sound a like" substitutions may have occurred due to the inherent limitations of voice recognition software  Read the chart carefully and recognize, using context, where substitutions have occurred      Zaire Bread

## 2017-11-05 NOTE — RESPIRATORY THERAPY NOTE
RT Ventilator Management Note  Watson Estes 79 y o  female MRN: [id number]  Unit/Bed#: [facility] 14 Encounter: [id number]      Daily Screen       11/5/2017 1202 11/5/2017 1604          Patient safety screen outcome[de-identified] Passed Passed              Physical Exam:   Assessment Type: (P) Assess only  General Appearance: Sedated  Respiratory Pattern: Assisted  Chest Assessment: Chest expansion symmetrical  Bilateral Breath Sounds: Coarse  R Breath Sounds: Other (Comment)  L Breath Sounds: Other (Comment)  Cough: (P) None  Suction: (P) ET Tube      Resp Comments: (P) Pt has been tolerating PSV all day  due to known history of requiring a trach, in the past, and not currently following directions well, plan is to continue PSV until pt presents as a better cndidate for extubation

## 2017-11-05 NOTE — RESPIRATORY THERAPY NOTE
RT Ventilator Management Note  Sonia Bauman 79 y o  female MRN: [id number]  Unit/Bed#: [facility] 14 Encounter: [id number]      Daily Screen       11/4/2017 0854 11/4/2017 1551          Patient safety screen outcome[de-identified] Failed Failed      Not Ready for Weaning due to[de-identified] Poor inspiratory effort Poor inspiratory effort              Physical Exam:   Assessment Type: Assess only  General Appearance: Sedated  Respiratory Pattern: Assisted  Chest Assessment: Chest expansion symmetrical  Bilateral Breath Sounds: Diminished, Clear  R Breath Sounds: Other (Comment)  L Breath Sounds: Other (Comment)  Cough: None  Suction: ET Tube  O2 Device: vent      Resp Comments: (P) no changes made during shift at this time pt is tollerating vent settings, will cont to monitor pt overnight

## 2017-11-05 NOTE — PROGRESS NOTES
Cardiology Progress Note - Carlos Alberto Hem 79 y o  female MRN: [id number]    Unit/Bed#: MICU 14 Encounter: [id number]        Subjective:    No further torsades  Remains intubated  Review of Systems   Unable to perform ROS: intubated       Objective:   Vitals: Blood pressure 106/57, pulse 100, temperature (!) 101 1 °F (38 4 °C), resp  rate 22, height 5' 11" (1 803 m), weight 133 kg (292 lb 8 8 oz), SpO2 100 %  , Body mass index is 40 8 kg/m² ,   Orthostatic Blood Pressures    Flowsheet Row Most Recent Value   Blood Pressure  106/57 filed at 11/03/2017 1615   Patient Position - Orthostatic VS  Lying filed at 11/03/2017 0421           Intake/Output Summary (Last 24 hours) at 11/05/17 1016  Last data filed at 11/05/17 0800   Gross per 24 hour   Intake          4895 82 ml   Output             2765 ml   Net          2130 82 ml     Weight (last 2 days)     Date/Time   Weight    11/05/17 0413  133 (292 55)    11/04/17 0557  116 (256 17)    11/03/17 0600  127 (279 54)            Telemetry Review: NSR    Physical Exam   Constitutional:   Intubated   Cardiovascular: Regular rhythm and normal heart sounds  Tachycardia present  Exam reveals no gallop and no friction rub  No murmur heard  Pulmonary/Chest: Breath sounds normal  She has no wheezes  She has no rales  Musculoskeletal: She exhibits no edema           Laboratory Results:    Results from last 7 days  Lab Units 11/04/17  0557 11/03/17  1430 11/03/17  0730   CK TOTAL U/L  --  277*  --    TROPONIN I ng/mL 6 10* 6 27* 6 40*   CK MB INDEX %  --  4 3*  --        CBC with diff:     Results from last 7 days  Lab Units 11/05/17  0439 11/04/17  0438 11/03/17  1430 11/03/17  0522 11/02/17  2004 11/02/17  1610 11/02/17  1554   WBC Thousand/uL 14 86* 16 10* 16 85* 16 77* 15 12*  --  17 25*   HEMOGLOBIN g/dL 10 7* 10 3* 11 0* 11 3* 11 9  --  12 8   I STAT HEMOGLOBIN g/dl  --   --   --   --   --  18 4*  --    HEMATOCRIT % 33 8* 32 3* 34 6* 36 1 37 5  --  41 9   MCV fL 91 91 92 91 92  --  93   PLATELETS Thousands/uL 288 279 324 320 324  --  368   MCH pg 28 8 28 9 29 1 28 6 29 1  --  28 3   MCHC g/dL 31 7 31 9 31 8 31 3* 31 7  --  30 5*   RDW % 14 6 14 4 14 5 14 5 14 2  --  14 2   MPV fL 10 8 10 4 10 3 10 8 10 4  --  10 7   NRBC AUTO /100 WBCs  --  0 0  --  0  --   --          CMP:  Results from last 7 days  Lab Units 11/05/17  0439 11/04/17  2304 11/04/17  1343 11/04/17  0438 11/03/17  2032 11/03/17  1430 11/03/17  0546 11/02/17 2004 11/02/17  1554   SODIUM mmol/L 140 143 145 145 145 143 142 141  --  144   POTASSIUM mmol/L 4 3 3 9 3 6 3 9 3 1* 2 9* 3 2* 4 0  --  3 5   CHLORIDE mmol/L 104 105 108 108 106 104 101 100  --  98*   CO2 mmol/L 32 33* 32 33* 33* 34* 33* 31  --  29   ANION GAP mmol/L 4 5 5 4 6 5 8 10  --  17*   BUN mg/dL 20 21 23 27* 25 26* 20 16  --  14   CREATININE mg/dL 0 85 0 92 0 96 1 01 0 98 1 00 0 76 0 96  --  1 05   GLUCOSE RANDOM mg/dL 158* 164* 138 168* 150* 270* 343* 372*  --  385*   GLUCOSE, ISTAT   --   --   --   --   --   --   --   --   < >  --    CALCIUM mg/dL 8 6 8 5 8 7 9 1 9 5 9 2 9 3 9 6  --  10 2*   AST U/L  --   --   --  97*  --  119*  --  148*  --  140*   ALT U/L  --   --   --  75  --  73  --  61  --  69   ALK PHOS U/L  --   --   --  28*  --  31*  --  37*  --  44*   TOTAL PROTEIN g/dL  --   --   --  6 1*  --  6 1*  --  7 2  --  8 1   ALBUMIN g/dL  --   --   --  3 1*  --  2 9*  --  3 3*  --  3 9   BILIRUBIN TOTAL mg/dL  --   --   --  0 92  --  0 60  --  0 75  --  0 90   EGFR ml/min/1 73sq m 71 65 61 58 60 58 81 61  < > 55   < > = values in this interval not displayed        BMP:    Results from last 7 days  Lab Units 11/05/17  0439 11/04/17  2304 11/04/17  1343 11/04/17  0438 11/03/17  2032 11/03/17  1430 11/03/17  0546   SODIUM mmol/L 140 143 145 145 145 143 142   POTASSIUM mmol/L 4 3 3 9 3 6 3 9 3 1* 2 9* 3 2*   CHLORIDE mmol/L 104 105 108 108 106 104 101   CO2 mmol/L 32 33* 32 33* 33* 34* 33*   BUN mg/dL 20 21 23 27* 25 26* 20   CREATININE mg/dL 0 85 0 92 0  96 1 01 0 98 1 00 0 76   GLUCOSE RANDOM mg/dL 158* 164* 138 168* 150* 270* 343*   CALCIUM mg/dL 8 6 8 5 8 7 9 1 9 5 9 2 9 3       Magnesium:     Results from last 7 days  Lab Units 11/05/17  0439 11/04/17  2304 11/04/17  1343 11/04/17  0438 11/03/17  2032 11/03/17  1430 11/03/17  0546   MAGNESIUM mg/dL 2 4 2 3 2 1 2 4 2 5 2 7* 2 0       Coags:     Results from last 7 days  Lab Units 11/05/17  0439 11/04/17  1026 11/04/17  0438 11/03/17  2032 11/03/17  1111 11/03/17  0511 11/02/17  2004 11/02/17  1554   PTT seconds 77* 68* 76* 46* 60* 61* 36* 35   INR   --   --  1 36*  --   --  1 32* 1 36* 1 26*     Cardiac testing:   Results for orders placed during the hospital encounter of 11/02/17   Echo complete with contrast if indicated       Study date:  03-Nov-2017    SUMMARY    LEFT VENTRICLE:  Systolic function was at the lower limits of normal  Ejection fraction was estimated to be 50 %  There was hypokinesis of the basal inferior wall(s)  There was mild concentric hypertrophy  Features were consistent with a pseudonormal left ventricular filling pattern, with concomitant abnormal relaxation and increased filling pressure (grade 2 diastolic dysfunction)  MITRAL VALVE:  There was mild to moderate regurgitation  AORTIC VALVE:  There was mild regurgitation  PERICARDIUM:  A small pericardial effusion was identified posterior to the heart  The fluid had no internal echoes  There was no evidence of hemodynamic compromise  There was a large left pleural effusion         Meds/Allergies     aspirin 324 mg Per NG Tube Daily   atorvastatin 40 mg Oral Daily   chlorhexidine 15 mL Swish & Spit Q12H [address]   famotidine 20 mg Intravenous BID   furosemide 40 mg Intravenous BID (diuretic)   levothyroxine 125 mcg Per G Tube Early Morning   nystatin  Topical BID   piperacillin-tazobactam 4 5 g Intravenous Q6H   polyethylene glycol 17 g Oral Daily   senna 2 tablet Oral HS   vancomycin 1,000 mg Intravenous Q12H       DOPamine in dextrose 5% 1-20 mcg/kg/min Last Rate: 12 mcg/kg/min (11/05/17 0954)   fentaNYL 75 mcg/hr Last Rate: 75 mcg/hr (11/04/17 2000)   heparin (porcine) 3-20 Units/kg/hr (Order-Specific) Last Rate: 13 1 Units/kg/hr (11/05/17 0133)   insulin regular (HumuLIN R,NovoLIN R) infusion 0 3-21 Units/hr Last Rate: 9 Units/hr (11/05/17 0800)   lidocaine in dextrose 5% 1 mg/min Last Rate: 1 mg/min (11/04/17 2000)     Prescriptions Prior to Admission   Medication    albuterol (PROVENTIL HFA,VENTOLIN HFA) 90 mcg/act inhaler    atorvastatin (LIPITOR) 40 mg tablet    cetirizine (ZyrTEC) 10 mg tablet    cholecalciferol (VITAMIN D3) 1,000 units tablet    Cyanocobalamin 1000 MCG/ML KIT    escitalopram (LEXAPRO) 10 mg tablet    furosemide (LASIX) 40 mg tablet    gabapentin (NEURONTIN) 600 MG tablet    insulin glargine (LANTUS) 100 units/mL subcutaneous injection    levothyroxine 125 mcg tablet    lisinopril (ZESTRIL) 5 mg tablet    magnesium oxide (MAG-OX) 400 mg    metFORMIN (GLUMETZA) 1000 MG (MOD) 24 hr tablet    metoprolol tartrate (LOPRESSOR) 25 mg tablet    oxyCODONE-acetaminophen (PERCOCET) 5-325 mg per tablet       Assessment:  Principal Problem:    Respiratory failure (HCC)  Active Problems:    Elevated troponin    Respiratory distress    Cardiac arrest (HCC)    Acute hypercapnic/hypoxic respiratory failure (HCC)    Torsades de pointes (HCC)    Prolonged QT interval    NSTEMI (non-ST elevated myocardial infarction) (HCC)    Ischemic cardiomyopathy    LBBB (left bundle branch block)    CAD (coronary artery disease)    h/o Vocal cord paralysis    HTN (hypertension)    Hyperlipemia      Impression:  1  VT arrest - multiple episodes of Torsades  No further episodes since switching to Lidocaine gtt and keeping heart rate elevated with dopamine  2  Respiratory failure - multifactorial from pulmonary edema and pneumonia    3  CAD - patient with reportedly multivessel CAD from cath at [address] [place] [address] [address] in 6/17, however records not immediately available in EPIC  LV function appears to have improved  Plan:  1  Continue to hold QT prolonging medications  2  Continue lidocaine gtt  3  Recommend weaning down dopamine as tolerated to determine if further episodes of Torsades on lidocaine  4  Continue remainder of supportive care  5  Will have EP service see tomorrow

## 2017-11-06 ENCOUNTER — APPOINTMENT (INPATIENT)
Dept: RADIOLOGY | Facility: HOSPITAL | Age: 67
DRG: 224 | End: 2017-11-06
Payer: COMMERCIAL

## 2017-11-06 PROBLEM — G93.40 ENCEPHALOPATHY: Status: ACTIVE | Noted: 2017-11-06

## 2017-11-06 PROBLEM — E66.01 OBESITY, CLASS III, BMI 40-49.9 (MORBID OBESITY) (HCC): Status: ACTIVE | Noted: 2017-11-06

## 2017-11-06 LAB
ANION GAP SERPL CALCULATED.3IONS-SCNC: 4 MMOL/L (ref 4–13)
APTT PPP: 75 SECONDS (ref 23–35)
BUN SERPL-MCNC: 19 MG/DL (ref 5–25)
CA-I BLD-SCNC: 1.15 MMOL/L (ref 1.12–1.32)
CALCIUM SERPL-MCNC: 9 MG/DL (ref 8.3–10.1)
CHLORIDE SERPL-SCNC: 100 MMOL/L (ref 100–108)
CO2 SERPL-SCNC: 34 MMOL/L (ref 21–32)
CREAT SERPL-MCNC: 0.82 MG/DL (ref 0.6–1.3)
ERYTHROCYTE [DISTWIDTH] IN BLOOD BY AUTOMATED COUNT: 14.3 % (ref 11.6–15.1)
GFR SERPL CREATININE-BSD FRML MDRD: 74 ML/MIN/1.73SQ M
GLUCOSE SERPL-MCNC: 104 MG/DL (ref 65–140)
GLUCOSE SERPL-MCNC: 129 MG/DL (ref 65–140)
GLUCOSE SERPL-MCNC: 131 MG/DL (ref 65–140)
GLUCOSE SERPL-MCNC: 134 MG/DL (ref 65–140)
GLUCOSE SERPL-MCNC: 144 MG/DL (ref 65–140)
GLUCOSE SERPL-MCNC: 149 MG/DL (ref 65–140)
GLUCOSE SERPL-MCNC: 162 MG/DL (ref 65–140)
GLUCOSE SERPL-MCNC: 172 MG/DL (ref 65–140)
GLUCOSE SERPL-MCNC: 172 MG/DL (ref 65–140)
GLUCOSE SERPL-MCNC: 173 MG/DL (ref 65–140)
GLUCOSE SERPL-MCNC: 177 MG/DL (ref 65–140)
GLUCOSE SERPL-MCNC: 182 MG/DL (ref 65–140)
GLUCOSE SERPL-MCNC: 183 MG/DL (ref 65–140)
GLUCOSE SERPL-MCNC: 190 MG/DL (ref 65–140)
GLUCOSE SERPL-MCNC: 193 MG/DL (ref 65–140)
GLUCOSE SERPL-MCNC: 201 MG/DL (ref 65–140)
HCT VFR BLD AUTO: 34.1 % (ref 34.8–46.1)
HGB BLD-MCNC: 10.5 G/DL (ref 11.5–15.4)
MAGNESIUM SERPL-MCNC: 2.4 MG/DL (ref 1.6–2.6)
MCH RBC QN AUTO: 28.4 PG (ref 26.8–34.3)
MCHC RBC AUTO-ENTMCNC: 30.8 G/DL (ref 31.4–37.4)
MCV RBC AUTO: 92 FL (ref 82–98)
PHOSPHATE SERPL-MCNC: 3.8 MG/DL (ref 2.3–4.1)
PLATELET # BLD AUTO: 239 THOUSANDS/UL (ref 149–390)
PMV BLD AUTO: 10.5 FL (ref 8.9–12.7)
POTASSIUM SERPL-SCNC: 4.8 MMOL/L (ref 3.5–5.3)
RBC # BLD AUTO: 3.7 MILLION/UL (ref 3.81–5.12)
SODIUM SERPL-SCNC: 138 MMOL/L (ref 136–145)
WBC # BLD AUTO: 14.55 THOUSAND/UL (ref 4.31–10.16)

## 2017-11-06 PROCEDURE — 83735 ASSAY OF MAGNESIUM: CPT | Performed by: NURSE PRACTITIONER

## 2017-11-06 PROCEDURE — 94003 VENT MGMT INPAT SUBQ DAY: CPT

## 2017-11-06 PROCEDURE — 94760 N-INVAS EAR/PLS OXIMETRY 1: CPT

## 2017-11-06 PROCEDURE — 70450 CT HEAD/BRAIN W/O DYE: CPT

## 2017-11-06 PROCEDURE — 85027 COMPLETE CBC AUTOMATED: CPT | Performed by: NURSE PRACTITIONER

## 2017-11-06 PROCEDURE — 84100 ASSAY OF PHOSPHORUS: CPT | Performed by: NURSE PRACTITIONER

## 2017-11-06 PROCEDURE — 93005 ELECTROCARDIOGRAM TRACING: CPT | Performed by: NURSE PRACTITIONER

## 2017-11-06 PROCEDURE — 71010 HB CHEST X-RAY 1 VIEW FRONTAL (PORTABLE): CPT

## 2017-11-06 PROCEDURE — 82948 REAGENT STRIP/BLOOD GLUCOSE: CPT

## 2017-11-06 PROCEDURE — 80176 ASSAY OF LIDOCAINE: CPT | Performed by: INTERNAL MEDICINE

## 2017-11-06 PROCEDURE — 85730 THROMBOPLASTIN TIME PARTIAL: CPT | Performed by: EMERGENCY MEDICINE

## 2017-11-06 PROCEDURE — 80048 BASIC METABOLIC PNL TOTAL CA: CPT | Performed by: NURSE PRACTITIONER

## 2017-11-06 PROCEDURE — 82330 ASSAY OF CALCIUM: CPT | Performed by: NURSE PRACTITIONER

## 2017-11-06 RX ORDER — FUROSEMIDE 10 MG/ML
40 INJECTION INTRAMUSCULAR; INTRAVENOUS EVERY 8 HOURS
Status: DISCONTINUED | OUTPATIENT
Start: 2017-11-06 | End: 2017-11-06

## 2017-11-06 RX ORDER — POTASSIUM CHLORIDE 20MEQ/15ML
40 LIQUID (ML) ORAL ONCE
Status: COMPLETED | OUTPATIENT
Start: 2017-11-06 | End: 2017-11-06

## 2017-11-06 RX ORDER — LANOLIN ALCOHOL/MO/W.PET/CERES
3 CREAM (GRAM) TOPICAL
Status: DISCONTINUED | OUTPATIENT
Start: 2017-11-06 | End: 2017-11-11

## 2017-11-06 RX ORDER — FUROSEMIDE 10 MG/ML
40 INJECTION INTRAMUSCULAR; INTRAVENOUS EVERY 6 HOURS
Status: DISCONTINUED | OUTPATIENT
Start: 2017-11-06 | End: 2017-11-08

## 2017-11-06 RX ORDER — FENTANYL CITRATE 50 UG/ML
50 INJECTION, SOLUTION INTRAMUSCULAR; INTRAVENOUS ONCE
Status: COMPLETED | OUTPATIENT
Start: 2017-11-06 | End: 2017-11-06

## 2017-11-06 RX ORDER — POTASSIUM CHLORIDE 29.8 MG/ML
40 INJECTION INTRAVENOUS ONCE
Status: COMPLETED | OUTPATIENT
Start: 2017-11-06 | End: 2017-11-06

## 2017-11-06 RX ORDER — MIDAZOLAM HYDROCHLORIDE 1 MG/ML
INJECTION INTRAMUSCULAR; INTRAVENOUS
Status: DISPENSED
Start: 2017-11-06 | End: 2017-11-07

## 2017-11-06 RX ORDER — INSULIN GLARGINE 100 [IU]/ML
50 INJECTION, SOLUTION SUBCUTANEOUS EVERY 12 HOURS SCHEDULED
Status: DISCONTINUED | OUTPATIENT
Start: 2017-11-06 | End: 2017-11-08

## 2017-11-06 RX ORDER — HEPARIN SODIUM 5000 [USP'U]/ML
5000 INJECTION, SOLUTION INTRAVENOUS; SUBCUTANEOUS EVERY 8 HOURS SCHEDULED
Status: DISCONTINUED | OUTPATIENT
Start: 2017-11-06 | End: 2017-11-09

## 2017-11-06 RX ADMIN — LIDOCAINE HYDROCHLORIDE 1 MG/MIN: 8 INJECTION, SOLUTION INTRAVENOUS at 06:29

## 2017-11-06 RX ADMIN — NYSTATIN: 100000 POWDER TOPICAL at 17:03

## 2017-11-06 RX ADMIN — NYSTATIN: 100000 POWDER TOPICAL at 08:05

## 2017-11-06 RX ADMIN — HEPARIN SODIUM 5000 UNITS: 5000 INJECTION, SOLUTION INTRAVENOUS; SUBCUTANEOUS at 21:58

## 2017-11-06 RX ADMIN — PIPERACILLIN SODIUM,TAZOBACTAM SODIUM 4.5 G: 4; .5 INJECTION, POWDER, FOR SOLUTION INTRAVENOUS at 03:30

## 2017-11-06 RX ADMIN — MELATONIN TAB 3 MG 3 MG: 3 TAB at 20:17

## 2017-11-06 RX ADMIN — FUROSEMIDE 40 MG: 10 INJECTION, SOLUTION INTRAMUSCULAR; INTRAVENOUS at 17:02

## 2017-11-06 RX ADMIN — NAFCILLIN SODIUM 2000 MG: 2 INJECTION, POWDER, LYOPHILIZED, FOR SOLUTION INTRAMUSCULAR; INTRAVENOUS at 17:57

## 2017-11-06 RX ADMIN — NAFCILLIN SODIUM 2000 MG: 2 INJECTION, POWDER, LYOPHILIZED, FOR SOLUTION INTRAMUSCULAR; INTRAVENOUS at 23:47

## 2017-11-06 RX ADMIN — CHLORHEXIDINE GLUCONATE 15 ML: 1.2 RINSE ORAL at 08:03

## 2017-11-06 RX ADMIN — ATORVASTATIN CALCIUM 40 MG: 40 TABLET, FILM COATED ORAL at 08:03

## 2017-11-06 RX ADMIN — NOREPINEPHRINE BITARTRATE 2 MCG/MIN: 1 INJECTION INTRAVENOUS at 12:10

## 2017-11-06 RX ADMIN — POTASSIUM CHLORIDE 40 MEQ: 400 INJECTION, SOLUTION INTRAVENOUS at 02:00

## 2017-11-06 RX ADMIN — ASPIRIN 81 MG 324 MG: 81 TABLET ORAL at 08:03

## 2017-11-06 RX ADMIN — FAMOTIDINE 20 MG: 10 INJECTION, SOLUTION INTRAVENOUS at 08:12

## 2017-11-06 RX ADMIN — SODIUM CHLORIDE 1.5 UNITS/HR: 9 INJECTION, SOLUTION INTRAVENOUS at 10:32

## 2017-11-06 RX ADMIN — VANCOMYCIN HYDROCHLORIDE 1000 MG: 1 INJECTION, SOLUTION INTRAVENOUS at 00:16

## 2017-11-06 RX ADMIN — POTASSIUM CHLORIDE 40 MEQ: 20 SOLUTION ORAL at 02:09

## 2017-11-06 RX ADMIN — INSULIN GLARGINE 42 UNITS: 100 INJECTION, SOLUTION SUBCUTANEOUS at 08:12

## 2017-11-06 RX ADMIN — DOPAMINE HYDROCHLORIDE IN DEXTROSE 5 MCG/KG/MIN: 1.6 INJECTION, SOLUTION INTRAVENOUS at 08:45

## 2017-11-06 RX ADMIN — FENTANYL CITRATE 50 MCG: 50 INJECTION INTRAMUSCULAR; INTRAVENOUS at 04:25

## 2017-11-06 RX ADMIN — FENTANYL CITRATE 50 MCG: 50 INJECTION INTRAMUSCULAR; INTRAVENOUS at 18:00

## 2017-11-06 RX ADMIN — FENTANYL CITRATE 50 MCG: 50 INJECTION INTRAMUSCULAR; INTRAVENOUS at 16:44

## 2017-11-06 RX ADMIN — NAFCILLIN SODIUM 2000 MG: 2 INJECTION, POWDER, LYOPHILIZED, FOR SOLUTION INTRAMUSCULAR; INTRAVENOUS at 10:30

## 2017-11-06 RX ADMIN — FAMOTIDINE 20 MG: 10 INJECTION, SOLUTION INTRAVENOUS at 17:08

## 2017-11-06 RX ADMIN — CHLORHEXIDINE GLUCONATE 15 ML: 1.2 RINSE ORAL at 20:17

## 2017-11-06 RX ADMIN — HEPARIN SODIUM 5000 UNITS: 5000 INJECTION, SOLUTION INTRAVENOUS; SUBCUTANEOUS at 14:13

## 2017-11-06 RX ADMIN — HEPARIN SODIUM AND DEXTROSE 13.1 UNITS/KG/HR: 10000; 5 INJECTION INTRAVENOUS at 06:35

## 2017-11-06 RX ADMIN — FUROSEMIDE 40 MG: 10 INJECTION, SOLUTION INTRAMUSCULAR; INTRAVENOUS at 10:37

## 2017-11-06 RX ADMIN — POLYETHYLENE GLYCOL 3350 17 G: 17 POWDER, FOR SOLUTION ORAL at 08:03

## 2017-11-06 RX ADMIN — INSULIN GLARGINE 50 UNITS: 100 INJECTION, SOLUTION SUBCUTANEOUS at 21:58

## 2017-11-06 RX ADMIN — PIPERACILLIN SODIUM,TAZOBACTAM SODIUM 4.5 G: 4; .5 INJECTION, POWDER, FOR SOLUTION INTRAVENOUS at 17:06

## 2017-11-06 RX ADMIN — PIPERACILLIN SODIUM,TAZOBACTAM SODIUM 4.5 G: 4; .5 INJECTION, POWDER, FOR SOLUTION INTRAVENOUS at 12:16

## 2017-11-06 RX ADMIN — SENNOSIDES 17.2 MG: 8.6 TABLET, FILM COATED ORAL at 21:58

## 2017-11-06 RX ADMIN — Medication 125 MCG: at 06:10

## 2017-11-06 RX ADMIN — NAFCILLIN SODIUM 2000 MG: 2 INJECTION, POWDER, LYOPHILIZED, FOR SOLUTION INTRAMUSCULAR; INTRAVENOUS at 20:17

## 2017-11-06 RX ADMIN — FUROSEMIDE 40 MG: 10 INJECTION, SOLUTION INTRAMUSCULAR; INTRAVENOUS at 23:11

## 2017-11-06 RX ADMIN — PIPERACILLIN SODIUM,TAZOBACTAM SODIUM 4.5 G: 4; .5 INJECTION, POWDER, FOR SOLUTION INTRAVENOUS at 23:10

## 2017-11-06 NOTE — CONSULTS
Consultation - Electrophysiology-Cardiology (EP)   Watson Estes 79 y o  female MRN: [id number]  [facility]: [id number]    Assessment/Plan     Assessment:  1  Polymorphic VT  2  NSTEMI type 2  · Echo - 11/3/17 - LVEF 50%, hypokinesis of basal inferior, mild LVH, Grade 2 DD, small pericardial effusion  Mild-moderate MR, mild AI  · Echo - 6/12/17 - @[facility] - LVEF 35%, global hypokinesis with inferior akinesis, mild AI, mild-moderate MR, LVIDd 5 4  3  Chronic MV CAD, Ischemic CM  · Cath - 6/2017 at [facility]- chronic multi-vessel disease with LAD and RCA involvement --> no intervention  4  Chronic LBBB  5  Hypertension  6  Hyperlipidemia  7  Diabetes Mellitus Type 2  8  Pneumonia  9  Vocal cord paresis s/p Trach/PEG (7/2017)  10  H/o panic attack  · Was on lexapro at admission, although did not get any doses here  11  Morbid Obesity, Body mass index is 40 8 kg/m²  Plan:  Polymorphic VT  · Had 2 reported episodes on 11/3, only 1 available on telemetry monitor at this time  · Had baseline QTc of 460-500 with a chronic LBBB  · K during the 2nd episode was 2 9  · Originally was placed on amiodarone drip after 1st episode, but had a 2nd episode on that, and as a result, was switched to lidocaine drip  · This 2nd episode was thought to be related to bradycardia associated prolongation of QT    As a result patient was placed on dopamine drip in order to try and prevent bradycardia  · She has not had further episodes on lidocaine drip + dopamine drip  · Okay to titrate off dopamine drip  · Keep K>4, Mg >2  · Avoid all QT prolonging agents  · Given the polymorphic VT in the setting of elevated troponin, she will need a cardiac catheterization to confirm absence of ACS, prior to decision on ICD placement  · If cath negative for new stenosis --> patient will need ICD    History of Present Illness   Physician Requesting Consult: Hetal Hinkle MD  Reason for Consult / Principal Problem: Polymorphic VT  HPI: Ely Gaitan is a 79y o  year old female with history of ischemic cardiomyopathy and chronic left bundle-branch block, originally presented to [address] [place] [address], after being brought by the police/EMS  Patient's family originally contacted police as patient was not reachable by phone  On arrival, police found the patient lying down on the floor in a pool of urine  EMS was activated, and on their arrival, she was noted to be hypoxic in the 70s  She was placed on BiPAP, and transferred to [address] [place] [address] ED  At [address] [place] [address], she was found to have an elevated troponin of 4 93, [place] [address]+  Patient was stabilized and started on and then transferred to [facility] critical care for further management  On arrival to [facility], patient was being treated for respiratory failure and pneumonia with Vancomycin and Zosyn  In the meantime, overnight the patient had an episode of polymorphic VT  She was started on amiodarone drip  Her K few hours later was 3 2  Next morning, despite being on amiodarone drip, she had another episode of polymorphic VT  Her potassium during this episode was 2 9  This was thought to be related to bradycardia associated due to prolongation, and as a result she was switched to lidocaine drip and started on dopamine drip prevent bradycardia  Since then, she has not had any further episodes of PMVT over the weekend  Her troponin peaked at 9, and trended down after that  She had not had any evidence of acute coronary syndrome, and as a result, has not had any repeat cath as yet  She does she presented to to [facility] in June 2017 for shortness of breath  She was noted to have a left bundle branch block, and as a result she was transferred to [facility] for as a STEMI for emergent cardiac catheterization    This cardiac catheterization had apparently show on multivessel coronary artery disease, without any evidence of acute stenosis, and has associated not get any intervention and was medically optimized  This admission was complicated by prolonged intubation and eventually needing tracheostomy  Inpatient consult to Electrophysiology  Date/Time: 2017 8:50 AM  Performed by: Alicia Major  Authorized by: [employer]           Review of Systems   unable to perform, as patient is intubated      Historical Information   Past Medical History:   Diagnosis Date    Anxiety     Diabetes mellitus ([address] )     Hypertension     Pancreatitis      Past Surgical History:   Procedure Laterality Date     SECTION      3    PEG (HISTORICAL)      TRACHEOSTOMY       History   Alcohol Use No     History   Drug Use No     History   Smoking Status    Current Every Day Smoker    Packs/day: 1 00    Years: 40 00    Types: Cigarettes   Smokeless Tobacco    Never Used     Family History: History reviewed  No pertinent family history      Meds/Allergies   current meds:   Current Facility-Administered Medications   Medication Dose Route Frequency    acetaminophen (TYLENOL) oral suspension 650 mg  650 mg Oral Q4H PRN    aspirin chewable tablet 324 mg  324 mg Per NG Tube Daily    atorvastatin (LIPITOR) tablet 40 mg  40 mg Oral Daily    chlorhexidine (PERIDEX) 0 12 % oral rinse 15 mL  15 mL Swish & Spit Q12H [address]    DOPamine (INTROPIN) 400 mg in 250 mL infusion (premix)  1-20 mcg/kg/min Intravenous Titrated    famotidine (PEPCID) injection 20 mg  20 mg Intravenous BID    fentaNYL 1250 mcg in sodium chloride 0 9% 125mL drip  75 mcg/hr Intravenous Continuous    fentanyl citrate (PF) 100 MCG/2ML 50 mcg  50 mcg Intravenous Q2H PRN    heparin (porcine) 25,000 units in 250 mL infusion (premix)  3-20 Units/kg/hr (Order-Specific) Intravenous Titrated    heparin (porcine) injection 2,000 Units  2,000 Units Intravenous PRN    heparin (porcine) injection 4,000 Units  4,000 Units Intravenous PRN    insulin glargine (LANTUS) subcutaneous injection 42 Units  42 Units Subcutaneous Q12H [address]    insulin regular (HumuLIN R,NovoLIN R) 1 Units/mL in sodium chloride 0 9 % 100 mL infusion  0 3-21 Units/hr Intravenous Titrated    levothyroxine 25 mcg/mL suspension 125 mcg 5 mL  125 mcg Per G Tube Early Morning    lidocaine (PF) (XYLOCAINE-MPF) 100 mg/5 mL injection    Code/Trauma/Sedation Med    lidocaine 2000 mg in 250 mL infusion (cardiac premix)- NOT FOR TREATMENT OF PAIN  1 mg/min Intravenous Continuous    nystatin (MYCOSTATIN) powder   Topical BID    piperacillin-tazobactam (ZOSYN) 4 5 g in dextrose 5 % 100 mL IVPB  4 5 g Intravenous Q6H    polyethylene glycol (MIRALAX) packet 17 g  17 g Oral Daily    senna (SENOKOT) tablet 17 2 mg  2 tablet Oral HS    sodium bicarbonate 50 mEq/50 mL injection    Code/Trauma/Sedation Med    sodium chloride 0 9 % bolus    Code/Trauma/Sedation Med    vancomycin (VANCOCIN) IVPB (premix) 1,000 mg  1,000 mg Intravenous Q12H     No Known Allergies    Objective   Vitals: Blood pressure 106/57, pulse 86, temperature 99 °F (37 2 °C), resp  rate 20, height 5' 11" (1 803 m), weight 133 kg (292 lb 8 8 oz), SpO2 100 %    Orthostatic Blood Pressures    Flowsheet Row Most Recent Value   Blood Pressure  106/57 filed at 11/03/2017 1615   Patient Position - Orthostatic VS  Lying filed at 11/03/2017 0421            Intake/Output Summary (Last 24 hours) at 11/06/17 0850  Last data filed at 11/06/17 0800   Gross per 24 hour   Intake          4604 88 ml   Output             5175 ml   Net          -570 12 ml       Invasive Devices     Central Venous Catheter Line            CVC Central Lines 11/03/17 Triple 16cm 2 days          Arterial Line            Arterial Line 11/03/17 Right Radial 2 days          Drain            NG/OG/Enteral Tube Orogastric 18 Fr Center mouth 3 days    Urethral Catheter Temperature probe 3 days          Airway            ETT  Cuffed 8 mm 3 days                Physical Exam   Constitutional:  Sedated and intubated, opens eyes intermittently   HEENT:    Normocephalic, atraumatic   Neck:  Right IJ in place   Lungs:  Clear to auscultation bilaterally, respirations unlabored    Chest Wall:  No tenderness or deformity    Heart::  Regular rate, Regular rhythm, S1 and S2 normal, no murmur, rub or gallop    Abdomen:  Soft, non-tender, non-distended   Neurological:  Awake, alert, oriented x3   Extremities:  B/L 1-2+ pedal edema, No calf tenderness         Lab Results:   I have personally reviewed pertinent lab results  CBC with diff:   Results from last 7 days  Lab Units 11/06/17 0437   WBC Thousand/uL 14 55*   RBC Million/uL 3 70*   HEMOGLOBIN g/dL 10 5*   HEMATOCRIT % 34 1*   MCV fL 92   MCH pg 28 4   MCHC g/dL 30 8*   RDW % 14 3   MPV fL 10 5   PLATELETS Thousands/uL 239     CMP:   Results from last 7 days  Lab Units 11/06/17 0437 11/04/17  0438   SODIUM mmol/L 138  < > 145   POTASSIUM mmol/L 4 8  < > 3 9   CHLORIDE mmol/L 100  < > 108   CO2 mmol/L 34*  < > 33*   ANION GAP mmol/L 4  < > 4   BUN mg/dL 19  < > 27*   CREATININE mg/dL 0 82  < > 1 01   GLUCOSE RANDOM mg/dL 162*  < > 168*   CALCIUM mg/dL 9 0  < > 9 1   AST U/L  --   --  97*   ALT U/L  --   --  75   ALK PHOS U/L  --   --  28*   TOTAL PROTEIN g/dL  --   --  6 1*   ALBUMIN g/dL  --   --  3 1*   BILIRUBIN TOTAL mg/dL  --   --  0 92   EGFR ml/min/1 73sq m 74  < > 58   < > = values in this interval not displayed    Troponin:   0  Lab Value Date/Time   TROPONINI 6 10 (H) 11/04/2017 0557   TROPONINI 6 27 (H) 11/03/2017 1430   TROPONINI 6 40 (H) 11/03/2017 0730   TROPONINI 7 63 (H) 11/03/2017 0511   TROPONINI 7 41 (H) 11/03/2017 0132   TROPONINI 9 82 (H) 11/02/2017 2004   TROPONINI 7 57 (HH) 11/02/2017 1646     BNP:   Results from last 7 days  Lab Units 11/06/17  0437   SODIUM mmol/L 138   POTASSIUM mmol/L 4 8   CHLORIDE mmol/L 100   CO2 mmol/L 34*   ANION GAP mmol/L 4   BUN mg/dL 19   CREATININE mg/dL 0 82   GLUCOSE RANDOM mg/dL 162*   CALCIUM mg/dL 9 0   EGFR ml/min/1 73sq m 74     Coags:   Results from last 7 days  Lab Units 11/06/17 0437 11/04/17 0438   PTT seconds 75*  < > 76*   INR   --   --  1 36*   < > = values in this interval not displayed  TSH:   Results from last 7 days  Lab Units 11/02/17 2004   TSH 3RD GENERATON uIU/mL 0 922     Magnesium:   Results from last 7 days  Lab Units 11/06/17 0437   MAGNESIUM mg/dL 2 4     Lipid Profile:     Imaging: I have personally reviewed pertinent films in PACS  Telmetry: NSR with LBBB, episode of polymorphic VT noted on 11/3 at 12:14 pm  No further episodes of PMVT since then  Odes have occasional PVCs  Initiation of PMVT - episode #2      PMVT:        VTE Prophylaxis: Heparin       Code Status: Level 2 - DNAR: but accepts endotracheal intubation  Advance Directive and Living Will:      Power of :    POLST:      Counseling / Coordination of Care  Total floor / unit time spent today 35 minutes

## 2017-11-06 NOTE — PROGRESS NOTES
Progress Note - Critical Care   Lee Anton 79 y o  female MRN: [id number]  [facility]: [id number]    Assessment:   Principal Problem:    Respiratory failure ([facility] [address] ), acute hypercapnic/hypoxemic  Active Problems:    Cardiac arrest ([facility] [address] ) Polymorphic VR/Torsades with ROSC    Encephalopathy    Prolonged QT interval    NSTEMI (non-ST elevated myocardial infarction) ([facility] [address] )    Ischemic cardiomyopathy    LBBB (left bundle branch block)    HFpEF (EF 50%/ grade II DD)    CAD (coronary artery disease)    Morbid Obesity, BMI 40    HCAP, at risk for gram negative bacteria and MDR    HTN (hypertension)    Hyperlipemia    h/o Vocal cord paralysis with history of prior trach in June 2017        Plan:   Neuro:   · Repeat Head CT now  · Serial neuro exams  · T/c EEG  · Consider checking lidocaine level  · Concern for hypoactive delirium, however most agents risk QTc prolongation  · PRN fentanyl  CV:   · Cardiology following  · Continue ASA/statin/Heparin gtt- can likely d/c today  · Avoid medications that prolong QT  · Continue to wean down Dopamine  · Remains on Lidocaine at 1mg/min  · EP to evaluate  · Lasix 40mg IV BID (got additional 40mg yesterday), dose Q8 x 24 hours  Lung:   · Tolerating PSV  · Concern for extubation secondary to encephalopathy  · Continue VAP prevention  GI:   · Continue bowel Reg: Senna and miralax  · Add Dulcolox suppository  FEN:   · Goal K>4 5 and Mag >2  :   · Goal net negative 1 liter in next 24 hours  · Increase lasix to 40mg IV Q8 hours  ID:   · Sputum cx: MSSA  · Vancomycin D#5 (dose reduced overnight)  · Zosyn D#5  · De-escalate abx today  Repeat blood cultures if febrile, trend wbc and fever curve  Heme:   · Heparin gtt-> when d/c'd changed to SQ heparin  Endo:   · Lanuts 42 units BID and total of 209 units of IV insulin used in past 24 hours; goal -180 mg/dL  · Levothyroxine  Msk/Skin:   · Turn and position Q 2 hours, off load pressure points  Disposition:   · Continue in critical care unit    ______________________________________________________________________  Chief Complaint:   · Pt unable to provide secondary to intubation and encephalopathy    HPI/24hr events:   Extra dose of lasix yesterday  Febrile 0300 101 5  ______________________________________________________________________  Temperature:   Temp (24hrs), Av °F (37 8 °C), Min:98 6 °F (37 °C), Max:101 5 °F (38 6 °C)    Current Temperature: 99 °F (37 2 °C)    Vitals:    17 0400 17 0430 17 0500 17 0600   BP:       Pulse: 82 96 86 86   Resp: 12 (!) 27 (!) 11 12   Temp: 98 6 °F (37 °C) 98 6 °F (37 °C) 99 °F (37 2 °C) 99 °F (37 2 °C)   TempSrc: Probe      SpO2: 100% 100% 100% 100%   Weight:       Height:         Arterial Line BP: 104/46  Arterial Line MAP (mmHg): 62 mmHg     Weights:   IBW: 70 8 kg    Body mass index is 40 8 kg/m²  Weight (last 2 days)     Date/Time   Weight    17 0413  133 (292 55)    17 0557  116 (256 17)            Height: 5' 11" (180 3 cm)    Ventilator Settings:  Respiratory    Lab Data (Last 4 hours)    None         O2/Vent Data (Last 4 hours)       0323           Vent Mode CPAP/PS Spont       FIO2 (%) (%) 50       PEEP (cmH2O) (cmH2O) 5       Pressure Support (cmH2O) (cmH20) 10       MV (Obs) 7 24       RSBI 23               RR 19, 's    SpO2: SpO2: 100 %    ______________________________________________________________________  Physical Exam:  Chow Agitation Sedation Scale (RASS): Drowsy  Physical Exam   Constitutional: She appears lethargic  No distress  She is intubated and restrained  HENT:   Head: Atraumatic  Mouth/Throat: Mucous membranes are normal    Eyes: Pupils are equal, round, and reactive to light  Does not track examiner   Neck: Neck supple  No JVD present  Cardiovascular: Regular rhythm and normal heart sounds  Pulses:       Radial pulses are 1+ on the right side, and 1+ on the left side          Dorsalis pedis pulses are 1+ on the right side, and 1+ on the left side  Pulmonary/Chest: No accessory muscle usage  She is intubated  No respiratory distress  She has no wheezes  She has rhonchi  Abdominal: Soft  Bowel sounds are normal  There is no tenderness  Genitourinary:   Genitourinary Comments: Beckford to gravity   Neurological: She appears lethargic  GCS eye subscore is 4  GCS verbal subscore is 1  GCS motor subscore is 6  LE 1/5, UE 2/5 R>L  Would not follow for [facility] ICU   Skin: Skin is warm and dry  No cyanosis  Nails show no clubbing  Feet erhythematous     ______________________________________________________________________  Intake and Outputs:  I/O       11/04 0701 - 11/05 0700 11/05 0701 - 11/06 0700    I V  (mL/kg) 2541 1 (19 1) 1696 5 (12 8)    NG/ 310    IV Piggyback 1430 770    Feedings 984 1698    Total Intake(mL/kg) 5115 1 (38 5) 4474 5 (33 6)    Urine (mL/kg/hr) 2775 (0 9) 5125 (1 6)    Emesis/NG output 0 (0)     Total Output 2775 5125    Net +2340 1 -650 5              UOP: 100-500/hour   Nutrition:        Diet Orders            Start     Ordered    11/04/17 0740  Diet Enteral/Parenteral; Tube Feeding No Oral Diet; Jevity 1 0; 80  Diet effective now     Question Answer Comment   Diet Type Enteral/Parenteral    Enteral/Parenteral Tube Feeding No Oral Diet    Tube Feeding Formula: Jevity 1 0    Tube Feeding Continuous rate (mL/hr): 80    RD to adjust diet per protocol? Yes        11/04/17 0739          Labs:     Results from last 7 days  Lab Units 11/06/17  0437 11/05/17  0439 11/04/17  0438 11/03/17  1430  11/02/17 2004   WBC Thousand/uL 14 55* 14 86* 16 10* 16 85*  < > 15 12*   HEMOGLOBIN g/dL 10 5* 10 7* 10 3* 11 0*  < > 11 9   HEMATOCRIT % 34 1* 33 8* 32 3* 34 6*  < > 37 5   PLATELETS Thousands/uL 239 288 279 324  < > 324   NEUTROS PCT %  --   --  65 82*  --  77*   MONOS PCT %  --   --  10 8  --  10   < > = values in this interval not displayed     Results from last 7 days  Lab Units 11/06/17  0437 11/05/17 2207 11/05/17  1549  11/04/17  0438  11/03/17  1430  11/02/17 2004   SODIUM mmol/L 138 139 138  < > 145  < > 143  < > 141   POTASSIUM mmol/L 4 8 3 9 4 0  < > 3 9  < > 2 9*  < > 4 0   CHLORIDE mmol/L 100 100 103  < > 108  < > 104  < > 100   CO2 mmol/L 34* 36* 32  < > 33*  < > 34*  < > 31   BUN mg/dL 19 19 19  < > 27*  < > 26*  < > 16   CREATININE mg/dL 0 82 0 80 0 82  < > 1 01  < > 1 00  < > 0 96   CALCIUM mg/dL 9 0 8 7 8 5  < > 9 1  < > 9 2  < > 9 6   ALBUMIN g/dL  --   --   --   --  3 1*  --  2 9*  --  3 3*   TOTAL PROTEIN g/dL  --   --   --   --  6 1*  --  6 1*  --  7 2   BILIRUBIN TOTAL mg/dL  --   --   --   --  0 92  --  0 60  --  0 75   ALK PHOS U/L  --   --   --   --  28*  --  31*  --  37*   ALT U/L  --   --   --   --  75  --  73  --  61   AST U/L  --   --   --   --  97*  --  119*  --  148*   GLUCOSE RANDOM mg/dL 162* 136 212*  < > 168*  < > 270*  < > 372*   < > = values in this interval not displayed  Results from last 7 days  Lab Units 11/06/17 0437 11/05/17 2207 11/05/17  1549   MAGNESIUM mg/dL 2 4 2 4 2 1       Results from last 7 days  Lab Units 11/06/17 0437 11/05/17  0439 11/04/17 0438   PHOSPHORUS mg/dL 3 8 3 2 1 3*        Results from last 7 days  Lab Units 11/06/17 0437 11/05/17 0439 11/04/17  1026 11/04/17 0438  11/03/17  0511 11/02/17 2004   INR   --   --   --  1 36*  --  1 32* 1 36*   PTT seconds 75* 77* 68* 76*  < > 61* 36*   < > = values in this interval not displayed        0  Lab Value Date/Time   TROPONINI 6 10 (H) 11/04/2017 0557   TROPONINI 6 27 (H) 11/03/2017 1430   TROPONINI 6 40 (H) 11/03/2017 0730   TROPONINI 7 63 (H) 11/03/2017 0511   TROPONINI 7 41 (H) 11/03/2017 0132   TROPONINI 9 82 (H) 11/02/2017 2004   TROPONINI 7 57 ([facility]) 11/02/2017 1646     Imaging: pending  EKG:   Micro:  Procedure Component Value - Date/Time   Sputum culture and Gram stain [[id number]] (Abnormal)  Collected: 11/03/17 1443   Lab Status: Final result Specimen: Sputum from Tracheal Aspirate Updated: 11/05/17 1229    Sputum Culture 1+ Growth of Staphylococcus aureus (A)    Gram Stain Result Rare Polys     Rare Gram positive cocci in pairs   Susceptibility      Staphylococcus aureus     BOWEN    Amoxicillin + Clavulanate <=4/2 ug/ml"><=4/2 ug/ml Susceptible    Ampicillin ($$) >8 00 ug/ml Resistant    Ampicillin + Sulbactam ($) <=8/4 ug/ml"><=8/4 ug/ml Susceptible    Cefazolin ($) <=8 00 ug/ml"><=8 00 ug/ml Susceptible    Clindamycin ($) <=0 50 ug/ml"><=0 50 ug/ml Susceptible 1    Erythromycin ($$$$) >4 00 ug/ml Resistant    Gentamicin ($$) <=4 ug/ml"><=4 ug/ml Susceptible    Oxacillin 0 50 ug/ml Susceptible    Tetracycline <=4 ug/ml"><=4 ug/ml Susceptible    Trimethoprim + Sulfamethoxazole ($$$) <=0 5/9 5 ug/ml"><=0 5/9 5 u  Susceptible    Vancomycin ($) 2 00 ug/ml Susceptible          1 The [employer] test is Negative  This isolate is sensitive to Clindamycin  Strep Pneumoniae, Urine [[id number]] (Normal) Collected: 11/03/17 1117   Lab Status: Final result Specimen: Urine from Urine, Catheter Updated: 11/03/17 1459    Strep pneumoniae antigen, urine Negative   Blood culture [[id number]] Collected: 11/02/17 2005   Lab Status: Preliminary result Specimen: Blood from Arm, Left Updated: 11/05/17 2101    Blood Culture No Growth at 72 hrs  Blood culture #2 [[id number]] Collected: 11/02/17 1554   Lab Status: Preliminary result Specimen: Blood from Arm, Right Updated: 11/05/17 2201    Blood Culture No Growth at 72 hrs           Component      Latest Ref Rng & Units 11/5/2017   VANCOMYCIN RANDOM      ug/mL 13 1     Allergies: No Known Allergies  Medications:   Scheduled Meds:  aspirin 324 mg Per NG Tube Daily   atorvastatin 40 mg Oral Daily   chlorhexidine 15 mL Swish & Spit Q12H [address]   famotidine 20 mg Intravenous BID   insulin glargine 42 Units Subcutaneous Q12H [address]   levothyroxine 125 mcg Per G Tube Early Morning   nystatin  Topical BID   piperacillin-tazobactam 4 5 g Intravenous Q6H   polyethylene glycol 17 g Oral Daily   senna 2 tablet Oral HS   vancomycin 1,000 mg Intravenous Q12H     Continuous Infusions:  DOPamine in dextrose 5% 1-20 mcg/kg/min Last Rate: 5 mcg/kg/min (11/06/17 0210)   fentaNYL 75 mcg/hr Last Rate: Stopped (11/05/17 0800)   heparin (porcine) 3-20 Units/kg/hr (Order-Specific) Last Rate: 13 1 Units/kg/hr (11/05/17 1811)   insulin regular (HumuLIN R,NovoLIN R) infusion 0 3-21 Units/hr Last Rate: 8 Units/hr (11/05/17 2205)   lidocaine in dextrose 5% 1 mg/min Last Rate: 1 mg/min (11/04/17 2000)     PRN Meds:    acetaminophen 650 mg Q4H PRN   fentanyl citrate (PF) 50 mcg Q2H PRN   heparin (porcine) 2,000 Units PRN   heparin (porcine) 4,000 Units PRN   lidocaine (PF)  Code/Trauma/Sedation Med   sodium bicarbonate  Code/Trauma/Sedation Med   sodium chloride  Code/Trauma/Sedation Med     VTE Pharmacologic Prophylaxis:   Pharmacologic: Heparin Drip  Mechanical VTE Prophylaxis in Place: Yes    Invasive lines and devices: Invasive Devices     Central Venous Catheter Line            CVC Central Lines 11/03/17 Triple 16cm 2 days          Arterial Line            Arterial Line 11/03/17 Right Radial 2 days          Drain            Urethral Catheter Temperature probe 3 days    NG/OG/Enteral Tube Orogastric 18 Fr Center mouth 2 days          Airway            ETT  Cuffed 8 mm 2 days                   Counseling / Coordination of Care      Code Status: Level 2 - DNAR: but accepts endotracheal intubation    Portions of the record may have been created with voice recognition software  Occasional wrong word or "sound a like" substitutions may have occurred due to the inherent limitations of voice recognition software  Read the chart carefully and recognize, using context, where substitutions have occurred      ARLENE Lopez

## 2017-11-06 NOTE — RESPIRATORY THERAPY NOTE
RT Ventilator Management Note  Delaney Pressley 79 y o  female MRN: [id number]  [id number]      Daily Screen       11/5/2017 1202 11/5/2017 1604          Patient safety screen outcome[de-identified] Passed Passed              Physical Exam:   Assessment Type: Assess only  General Appearance: Sedated  Respiratory Pattern: Assisted, Spontaneous  Chest Assessment: Chest expansion symmetrical  Bilateral Breath Sounds: Clear, Diminished  R Breath Sounds: Diminished, Clear  Cough: None  Suction: ET Tube      Resp Comments: pt has tollerated PS settings all night with no issues and is tollerating vent settings will cont to monitore pt overnight, sats have maintains 100% through the night

## 2017-11-06 NOTE — PROGRESS NOTES
Pt's Son; Judsonia Ynes was called and updated by myself today  He was concerned with patient's living will and that her being on life support was "against her wishes"  I reviewed her living will and we discussed that she currently does not meet the clinical conditions as stated in the will  The patient has 3 children Gary Ribeiro, a son who is currently incarcerated, and a daughter who has remote contact  We discussed that the children would need to make a decision together (since the appointed POA in her living will has passed away); if they would want the patient re-intubated in the event of failed extubation      ARLENE Silva

## 2017-11-06 NOTE — PROGRESS NOTES
Progress Note - Cardiology   Nannette Lorenzana 79 y o  female MRN: [id number]  Unit/Bed#: MICU 14 Encounter: [id number]  11/06/17  8:22 AM        Assessment/Plan:    1  NSTEMI type II  On aspirin, statin, no beta blocker currently due to hypotension  OK to stop IV  heparin at this time as it has been greater than 48 hours  2  Chronic multivessel CAD  On aspirin, statin, no beta blocker currently due to hypotension  Has been medically managed per report, cath done at [facility]  Unable to see actual report of cath from 6/17  3  Chronic LBBB  Still present on telemetry  4  ICM with EF 35%  By echo here, EF improved to 50%  Currently on Lasix 40 IV bid to help with iatrogenic volume overload, still 7 L positive overall during hospitalization  5  HTN  Currently on inotropes  6  HL  On statin  7  DM  On insulin  8  PMVT  On Lidocaine drip  No further episodes since 11/13      9  PNA  On Vanco/Zosyn  Still febrile 11/5  Subjective/Objective   Chief Complaint: No chief complaint on file  Subjective:79 year old woman with a history of multivessel disease managed medically, ICM with EF 35%, chronic LBBB, DM, 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, admitted after being found to at home, found on telemetry here to have an episode of PVC induced PMVT with baseline prolonged QT on 11/3 that resolved spontaneously per report  She was started on IV amiodarone, but had a second episode of torsades on 11/3, and was switched from Amiodarone to Lidocaine drip at that time, and was kept on dopamine to keep HR elevated as well as for inotropic effect  She currently remains on dopamine and lidocaine drips  She remains intubated  Febrile to 101 1 on 11/5         Patient Active Problem List   Diagnosis    [facility] failure [facility]    Cardiac arrest ([facility] )    Acute hypercapnic/hypoxic respiratory failure (HCC)    Torsades de pointes (HCC)    Prolonged QT interval    NSTEMI (non-ST elevated myocardial infarction) ([place] [address] )    Ischemic cardiomyopathy    LBBB (left bundle branch block)    CAD (coronary artery disease)    h/o Vocal cord paralysis    HTN (hypertension)    Hyperlipemia    Encephalopathy    Obesity, Class III, BMI 40-49 9 (morbid obesity) ([place] [address] )     Past Medical History:   Diagnosis Date    Anxiety     Diabetes mellitus ([place] [address] )     Hypertension     Pancreatitis        No Known Allergies    Current Facility-Administered Medications   Medication Dose Route Frequency Provider Last Rate Last Dose    acetaminophen (TYLENOL) oral suspension 650 mg  650 mg Oral Q4H PRN Valerie Caballero PA-C   650 mg at 11/05/17 0419    aspirin chewable tablet 324 mg  324 mg Per NG Tube Daily [facility] PAMEGAN   324 mg at 11/06/17 0803    atorvastatin (LIPITOR) tablet 40 mg  40 mg Oral Daily Lodema Bame, CRNP   40 mg at 11/06/17 0803    chlorhexidine (PERIDEX) 0 12 % oral rinse 15 mL  15 mL [facility] Q12H [facility] [address] [place], [place]   15 mL at 11/06/17 0803    DOPamine (INTROPIN) 400 mg in 250 mL infusion (premix)  1-20 mcg/kg/min Intravenous Titrated Lodema Bame, CRNP 11 9 mL/hr at 11/06/17 0812 2 5 mcg/kg/min at 11/06/17 0812    famotidine (PEPCID) injection 20 mg  20 mg Intravenous BID Lodema Bame, CRNP   20 mg at 11/06/17 6154    fentaNYL 1250 mcg in sodium chloride 0 9% 125mL drip  75 mcg/hr Intravenous Continuous Valerie Caballero PA-C   Stopped at 11/05/17 0800    fentanyl citrate (PF) 100 MCG/2ML 50 mcg  50 mcg Intravenous Q2H PRN Valerie Caballero PA-C   50 mcg at 11/06/17 0425    heparin (porcine) 25,000 units in 250 mL infusion (premix)  3-20 Units/kg/hr (Order-Specific) Intravenous Titrated Jessica Wei, DO 11 8 mL/hr at 11/06/17 0635 13 1 Units/kg/hr at 11/06/17 0635    heparin (porcine) injection 2,000 Units  2,000 Units Intravenous PRN Monica Daily, DO   2,000 Units at 11/03/17 2144    heparin (porcine) injection 4,000 Units  4,000 Units Intravenous PRN Monica Daily, DO        insulin glargine (LANTUS) subcutaneous injection 42 Units  42 Units Subcutaneous Q12H [address] Marcelo Fus, CRNP   42 Units at 11/06/17 7700    insulin regular (HumuLIN R,NovoLIN R) 1 Units/mL in sodium chloride 0 9 % 100 mL infusion  0 3-21 Units/hr Intravenous Titrated Gabriella Ferreira PA-C 4 mL/hr at 11/06/17 0791 4 Units/hr at 11/06/17 2151    levothyroxine 25 mcg/mL suspension 125 mcg 5 mL  125 mcg Per G Tube Early Morning Thierry Neff MD   125 mcg at 11/06/17 0610    lidocaine (PF) (XYLOCAINE-MPF) 100 mg/5 mL injection    Code/Trauma/Sedation Med Chirag [place], DO   100 mg at 11/03/17 1218    lidocaine 2000 mg in 250 mL infusion (cardiac premix)- NOT FOR TREATMENT OF PAIN  1 mg/min Intravenous Continuous Cesar Baez MD 7 5 mL/hr at 11/06/17 0629 1 mg/min at 11/06/17 0629    nystatin (MYCOSTATIN) powder   Topical BID Gabriella Ferreira PA-C        piperacillin-tazobactam (ZOSYN) 4 5 g in dextrose 5 % 100 mL IVPB  4 5 g Intravenous Q6H Jessica Wei DO   Stopped at 11/06/17 0400    polyethylene glycol (MIRALAX) packet 17 g  17 g Oral Daily Marcelo Fus, CRNP   17 g at 11/06/17 0803    senna (SENOKOT) tablet 17 2 mg  2 tablet Oral HS Marcelo Fus, CRNP   17 2 mg at 11/05/17 2215    sodium bicarbonate 50 mEq/50 mL injection    Code/Trauma/Sedation Med Chirag [place], DO   100 mEq at 11/03/17 1225    sodium chloride 0 9 % bolus    Code/Trauma/Sedation Med Chirag [place], DO   1,000 mL at 11/03/17 1221    vancomycin (VANCOCIN) IVPB (premix) 1,000 mg  1,000 mg Intravenous Q12H Marcelo Fus, CRNP   Stopped at 11/06/17 0122       Vitals: /57   Pulse 84   Temp 99 3 °F (37 4 °C)   Resp 12   Ht 5' 11" (1 803 m)   Wt 133 kg (292 lb 8 8 oz)   SpO2 100%   BMI 40 80 kg/m²     No data recorded  No data recorded        Vitals:    11/04/17 0557 11/05/17 0413   Weight: 116 kg (256 lb 2 8 oz) 133 kg (292 lb 8 8 oz)     Orthostatic Blood Pressures    Flowsheet Row Most Recent Value   Blood Pressure  106/57 filed at 11/03/2017 1615   Patient Position - Orthostatic VS  Lying filed at 11/03/2017 0421            Intake/Output Summary (Last 24 hours) at 11/06/17 2582  Last data filed at 11/06/17 0800   Gross per 24 hour   Intake          4414 59 ml   Output             5175 ml   Net          -760 41 ml       Invasive Devices     Central Venous Catheter Line            CVC Central Lines 11/03/17 Triple 16cm 2 days          Arterial Line            Arterial Line 11/03/17 Right Radial 2 days          Drain            NG/OG/Enteral Tube Orogastric 18 Fr Center mouth 3 days    Urethral Catheter Temperature probe 3 days          Airway            ETT  Cuffed 8 mm 3 days                  Physical Exam:     GEN: Eyes open, but intubated  HEENT: Sclera anicteric, conjunctivae pink, mucous membranes moist   NECK: Supple, no carotid bruits, no significant JVD  HEART: Regular rhythm, normal S1 and S2, no murmurs, clicks, gallops or rubs  LUNGS: Roncherous breath sounds bilaterally; no wheezes, rales   ABDOMEN: Obese, soft, nontender, nondistended, normoactive bowel sounds  EXTREMITIES: Skin warm and well perfused, no clubbing, cyanosis, 1+ pedal edema  Skin erythematous over feet  NEURO: No focal findings  SKIN: Normal without suspicious lesions on exposed skin        Lab Results:     Troponins:   Results from last 7 days  Lab Units 11/04/17  0557 11/03/17  1430 11/03/17  0730   CK TOTAL U/L  --  277*  --    TROPONIN I ng/mL 6 10* 6 27* 6 40*   CK MB INDEX %  --  4 3*  --        CBC with diff:   Results from last 7 days  Lab Units 11/06/17  0437 11/05/17  0439 11/04/17  0438 11/03/17  1430 11/03/17  0522 11/02/17  2004 11/02/17  1610 11/02/17  1554   WBC Thousand/uL 14 55* 14 86* 16 10* 16 85* 16 77* 15 12*  --  17 25*   HEMOGLOBIN g/dL 10 5* 10 7* 10 3* 11 0* 11 3* 11 9  --  12 8   I STAT HEMOGLOBIN g/dl  --   --   --   --   --   --  18 4*  --    HEMATOCRIT % 34 1* 33 8* 32 3* 34 6* 36 1 37 5  --  41 9   MCV fL 92 91 91 92 91 92  --  93 PLATELETS Thousands/uL 239 288 279 324 320 324  --  368   MCH pg 28 4 28 8 28 9 29 1 28 6 29 1  --  28 3   MCHC g/dL 30 8* 31 7 31 9 31 8 31 3* 31 7  --  30 5*   RDW % 14 3 14 6 14 4 14 5 14 5 14 2  --  14 2   MPV fL 10 5 10 8 10 4 10 3 10 8 10 4  --  10 7   NRBC AUTO /100 WBCs  --   --  0 0  --  0  --   --          CMP:  Results from last 7 days  Lab Units 11/06/17  0437 11/05/17  2207 11/05/17  1549 11/05/17  0439 11/04/17  2304 11/04/17  1343 11/04/17  0438  11/03/17  1430  11/02/17  2004  11/02/17  1554   SODIUM mmol/L 138 139 138 140 143 145 145  < > 143  < > 141  --  144   POTASSIUM mmol/L 4 8 3 9 4 0 4 3 3 9 3 6 3 9  < > 2 9*  < > 4 0  --  3 5   CHLORIDE mmol/L 100 100 103 104 105 108 108  < > 104  < > 100  --  98*   CO2 mmol/L 34* 36* 32 32 33* 32 33*  < > 34*  < > 31  --  29   ANION GAP mmol/L 4 3* 3* 4 5 5 4  < > 5  < > 10  --  17*   BUN mg/dL 19 19 19 20 21 23 27*  < > 26*  < > 16  --  14   CREATININE mg/dL 0 82 0 80 0 82 0 85 0 92 0 96 1 01  < > 1 00  < > 0 96  --  1 05   GLUCOSE RANDOM mg/dL 162* 136 212* 158* 164* 138 168*  < > 270*  < > 372*  --  385*   GLUCOSE, ISTAT   --   --   --   --   --   --   --   --   --   --   --   < >  --    CALCIUM mg/dL 9 0 8 7 8 5 8 6 8 5 8 7 9 1  < > 9 2  < > 9 6  --  10 2*   AST U/L  --   --   --   --   --   --  97*  --  119*  --  148*  --  140*   ALT U/L  --   --   --   --   --   --  75  --  73  --  61  --  69   ALK PHOS U/L  --   --   --   --   --   --  28*  --  31*  --  37*  --  44*   TOTAL PROTEIN g/dL  --   --   --   --   --   --  6 1*  --  6 1*  --  7 2  --  8 1   ALBUMIN g/dL  --   --   --   --   --   --  3 1*  --  2 9*  --  3 3*  --  3 9   BILIRUBIN TOTAL mg/dL  --   --   --   --   --   --  0 92  --  0 60  --  0 75  --  0 90   EGFR ml/min/1 73sq m 74 77 74 71 65 61 58  < > 58  < > 61  < > 55   < > = values in this interval not displayed      Magnesium:   Results from last 7 days  Lab Units 11/06/17  0437 11/05/17  2207 11/05/17  1549 11/05/17  0439 17  2304 17  1343 17  0438   MAGNESIUM mg/dL 2 4 2 4 2 1 2 4 2 3 2 1 2 4       Coags:   Results from last 7 days  Lab Units 17  0437 17  0439 17  1026 17  0438 17  2032 17  1111 17  0511 17  2004 17  1554   PTT seconds 75* 77* 68* 76* 46* 60* 61* 36* 35   INR   --   --   --  1 36*  --   --  1 32* 1 36* 1 26*       Cardiac testing:   Results for orders placed during the hospital encounter of 17   Echo complete with contrast if indicated    Narrative [address]  [facility] [address] [place] [address]  [phone]    Transthoracic Echocardiogram  2D, M-mode, Doppler, and Color Doppler    Study date:  2017    Patient: Savanna Littlejohn  MR number: [id number]  Account number: [de-identified]  : 1950  Age: 79 years  Gender: Female  Status: Inpatient  Location: Bedside  Height: 71 in  Weight: 279 lb  BP: 92/ 53 mmHg    Indications: Heart failure  Diagnoses: I50 9 - Heart failure, unspecified    Sonographer:  Flor Cardona RDCS  Referring Physician:  [employer], PA-C  Group:  [id number]  Interpreting Physician:  Emilia Fothergill, MD    SUMMARY    LEFT VENTRICLE:  Systolic function was at the lower limits of normal  Ejection fraction was estimated to be 50 %  There was hypokinesis of the basal inferior wall(s)  There was mild concentric hypertrophy  Features were consistent with a pseudonormal left ventricular filling pattern, with concomitant abnormal relaxation and increased filling pressure (grade 2 diastolic dysfunction)  MITRAL VALVE:  There was mild to moderate regurgitation  AORTIC VALVE:  There was mild regurgitation  PERICARDIUM:  A small pericardial effusion was identified posterior to the heart  The fluid had no internal echoes  There was no evidence of hemodynamic compromise  There was a large left pleural effusion      HISTORY: PRIOR HISTORY: NSTEMI, LBBB, Ischemic cardiomyopathy, Coronary artery disease, Diabetes, Hypertension, Hyperlipidemia, Tracheostomy, Smoker  PROCEDURE: The procedure was performed at the bedside  This was a routine study  The transthoracic approach was used  The study included complete 2D imaging, M-mode, complete spectral Doppler, and color Doppler  The heart rate was 73 bpm,  at the start of the study  Images were obtained from the parasternal, apical, subcostal, and suprasternal notch acoustic windows  Echocardiographic views were limited due to decreased penetration and lung interference  This was a  technically difficult study  LEFT VENTRICLE: Size was normal  Systolic function was at the lower limits of normal  Ejection fraction was estimated to be 50 %  There was hypokinesis of the basal inferior wall(s)  Wall thickness was mildly increased  There was mild  concentric hypertrophy  DOPPLER: The transmitral flow pattern was normal  Features were consistent with a pseudonormal left ventricular filling pattern, with concomitant abnormal relaxation and increased filling pressure (grade 2 diastolic  dysfunction)  RIGHT VENTRICLE: The size was normal  Systolic function was normal  Wall thickness was normal     LEFT ATRIUM: Size was normal     RIGHT ATRIUM: Size was normal     MITRAL VALVE: Valve structure was normal  There was normal leaflet separation  DOPPLER: The transmitral velocity was within the normal range  There was no evidence for stenosis  There was mild to moderate regurgitation  AORTIC VALVE: The valve was trileaflet  Leaflets exhibited mild calcification, normal cuspal separation, and sclerosis  DOPPLER: Transaortic velocity was within the normal range  There was no evidence for stenosis  There was mild  regurgitation  TRICUSPID VALVE: The valve structure was normal  There was normal leaflet separation  DOPPLER: The transtricuspid velocity was within the normal range  There was no evidence for stenosis   There was trace regurgitation  The tricuspid jet  envelope definition was inadequate for estimation of RV systolic pressure  PULMONIC VALVE: Leaflets exhibited normal thickness, no calcification, and normal cuspal separation  DOPPLER: The transpulmonic velocity was within the normal range  There was trace regurgitation  PERICARDIUM: A small pericardial effusion was identified posterior to the heart  The fluid had no internal echoes  There was no evidence of hemodynamic compromise  There was a large left pleural effusion  The pericardium was normal in  appearance  AORTA: The root exhibited normal size  SYSTEMIC VEINS: IVC: The inferior vena cava was dilated  Respirophasic changes in dimension were absent  SYSTEM MEASUREMENT TABLES    2D  %FS: 17 89 %  Ao Diam: 2 84 cm  EDV(Teich): 142 7 ml  EF Biplane: 35 65 %  EF(Teich): 36 81 %  ESV(Teich): 90 18 ml  IVSd: 1 3 cm  LA Area: 20 16 cm2  LA Diam: 4 05 cm  LVEDV MOD A2C: 129 88 ml  LVEDV MOD A4C: 112 39 ml  LVEDV MOD BP: 125 34 ml  LVEF MOD A2C: 34 61 %  LVEF MOD A4C: 32 37 %  LVESV MOD A2C: 84 92 ml  LVESV MOD A4C: 76 01 ml  LVESV MOD BP: 80 65 ml  LVIDd: 5 42 cm  LVIDs: 4 45 cm  LVLd A2C: 7 55 cm  LVLd A4C: 8 17 cm  LVLs A2C: 6 77 cm  LVLs A4C: 6 52 cm  LVPWd: 1 28 cm  RA Area: 17 87 cm2  RVIDd: 4 23 cm  SV MOD A2C: 44 96 ml  SV MOD A4C: 36 38 ml  SV(Teich): 52 53 ml    CW  AR Dec [place]: 1 64 m/s2  AR Dec Time: 1614 77 ms  AR PHT: 468 28 ms  AR Vmax: 2 65 m/s  AR maxP 04 mmHg    MM  TAPSE: 2 18 cm    PW  E': 0 03 m/s  E/E': 35 79  MV A Arnel: 0 75 m/s  MV Dec [place]: 4 83 m/s2  MV DecT: 211 74 ms  MV E Arnel: 1 02 m/s  MV E/A Ratio: 1 37  MV PHT: 61 4 ms  MVA By PHT: 3 58 cm2    [employer] Accredited Echocardiography Laboratory    Prepared and electronically signed by    Gloria Rushing MD  Signed [id number]-EEU-1934 12:14:05         Imaging: I have personally reviewed pertinent reports  Telemetry: personally reviewed, SR with LBBB

## 2017-11-06 NOTE — RESPIRATORY THERAPY NOTE
RT Ventilator Management Note  Ann Laurent 79 y o  female MRN: [id number]  Unit/Bed#: [facility] 14 Encounter: [id number]      Daily Screen       11/5/2017 1604 11/6/2017 0808          Patient safety screen outcome[de-identified] Passed Passed      Spont breathing trial % for 30 min: -               Physical Exam:   Assessment Type: Assess only  General Appearance: Awake  Respiratory Pattern: Assisted  Chest Assessment: Chest expansion symmetrical  Bilateral Breath Sounds: Coarse    Suction: ET Tube      Resp Comments: (P) maribel PS wean well; has been on PSV x 24 hours; decreaed to 8 cms  PS; suctioning large amt , thick  secreations from ET tube

## 2017-11-07 ENCOUNTER — APPOINTMENT (INPATIENT)
Dept: RADIOLOGY | Facility: HOSPITAL | Age: 67
DRG: 224 | End: 2017-11-07
Payer: COMMERCIAL

## 2017-11-07 PROBLEM — R41.0 DELIRIUM: Status: ACTIVE | Noted: 2017-11-07

## 2017-11-07 LAB
ALBUMIN SERPL BCP-MCNC: 2.5 G/DL (ref 3.5–5)
ALP SERPL-CCNC: 30 U/L (ref 46–116)
ALT SERPL W P-5'-P-CCNC: 51 U/L (ref 12–78)
ANION GAP SERPL CALCULATED.3IONS-SCNC: 4 MMOL/L (ref 4–13)
ANION GAP SERPL CALCULATED.3IONS-SCNC: 4 MMOL/L (ref 4–13)
ANION GAP SERPL CALCULATED.3IONS-SCNC: 6 MMOL/L (ref 4–13)
APTT PPP: 38 SECONDS (ref 23–35)
AST SERPL W P-5'-P-CCNC: 38 U/L (ref 5–45)
ATRIAL RATE: 122 BPM
BACTERIA BLD CULT: NORMAL
BACTERIA BLD CULT: NORMAL
BILIRUB SERPL-MCNC: 0.83 MG/DL (ref 0.2–1)
BUN SERPL-MCNC: 16 MG/DL (ref 5–25)
BUN SERPL-MCNC: 18 MG/DL (ref 5–25)
BUN SERPL-MCNC: 19 MG/DL (ref 5–25)
CALCIUM SERPL-MCNC: 8.7 MG/DL (ref 8.3–10.1)
CALCIUM SERPL-MCNC: 8.9 MG/DL (ref 8.3–10.1)
CALCIUM SERPL-MCNC: 9 MG/DL (ref 8.3–10.1)
CHLORIDE SERPL-SCNC: 95 MMOL/L (ref 100–108)
CHLORIDE SERPL-SCNC: 96 MMOL/L (ref 100–108)
CHLORIDE SERPL-SCNC: 97 MMOL/L (ref 100–108)
CO2 SERPL-SCNC: 35 MMOL/L (ref 21–32)
CO2 SERPL-SCNC: 35 MMOL/L (ref 21–32)
CO2 SERPL-SCNC: 37 MMOL/L (ref 21–32)
CREAT SERPL-MCNC: 0.73 MG/DL (ref 0.6–1.3)
CREAT SERPL-MCNC: 0.77 MG/DL (ref 0.6–1.3)
CREAT SERPL-MCNC: 0.81 MG/DL (ref 0.6–1.3)
ERYTHROCYTE [DISTWIDTH] IN BLOOD BY AUTOMATED COUNT: 14.6 % (ref 11.6–15.1)
GFR SERPL CREATININE-BSD FRML MDRD: 75 ML/MIN/1.73SQ M
GFR SERPL CREATININE-BSD FRML MDRD: 80 ML/MIN/1.73SQ M
GFR SERPL CREATININE-BSD FRML MDRD: 85 ML/MIN/1.73SQ M
GLUCOSE SERPL-MCNC: 100 MG/DL (ref 65–140)
GLUCOSE SERPL-MCNC: 103 MG/DL (ref 65–140)
GLUCOSE SERPL-MCNC: 114 MG/DL (ref 65–140)
GLUCOSE SERPL-MCNC: 128 MG/DL (ref 65–140)
GLUCOSE SERPL-MCNC: 136 MG/DL (ref 65–140)
GLUCOSE SERPL-MCNC: 137 MG/DL (ref 65–140)
GLUCOSE SERPL-MCNC: 145 MG/DL (ref 65–140)
GLUCOSE SERPL-MCNC: 145 MG/DL (ref 65–140)
GLUCOSE SERPL-MCNC: 148 MG/DL (ref 65–140)
GLUCOSE SERPL-MCNC: 162 MG/DL (ref 65–140)
GLUCOSE SERPL-MCNC: 169 MG/DL (ref 65–140)
GLUCOSE SERPL-MCNC: 81 MG/DL (ref 65–140)
GLUCOSE SERPL-MCNC: 85 MG/DL (ref 65–140)
GLUCOSE SERPL-MCNC: 97 MG/DL (ref 65–140)
GLUCOSE SERPL-MCNC: 97 MG/DL (ref 65–140)
HCT VFR BLD AUTO: 31.4 % (ref 34.8–46.1)
HGB BLD-MCNC: 9.8 G/DL (ref 11.5–15.4)
LIDOCAIN SERPL-MCNC: 2.6 UG/ML (ref 1.5–5)
MAGNESIUM SERPL-MCNC: 2.1 MG/DL (ref 1.6–2.6)
MAGNESIUM SERPL-MCNC: 2.1 MG/DL (ref 1.6–2.6)
MAGNESIUM SERPL-MCNC: 2.2 MG/DL (ref 1.6–2.6)
MCH RBC QN AUTO: 28.4 PG (ref 26.8–34.3)
MCHC RBC AUTO-ENTMCNC: 31.2 G/DL (ref 31.4–37.4)
MCV RBC AUTO: 91 FL (ref 82–98)
PHOSPHATE SERPL-MCNC: 3.5 MG/DL (ref 2.3–4.1)
PHOSPHATE SERPL-MCNC: 3.9 MG/DL (ref 2.3–4.1)
PLATELET # BLD AUTO: 211 THOUSANDS/UL (ref 149–390)
PMV BLD AUTO: 10.4 FL (ref 8.9–12.7)
POTASSIUM SERPL-SCNC: 3.6 MMOL/L (ref 3.5–5.3)
POTASSIUM SERPL-SCNC: 3.7 MMOL/L (ref 3.5–5.3)
POTASSIUM SERPL-SCNC: 4.5 MMOL/L (ref 3.5–5.3)
PR INTERVAL: 524 MS
PROT SERPL-MCNC: 6.1 G/DL (ref 6.4–8.2)
QRS AXIS: -2 DEGREES
QRSD INTERVAL: 146 MS
QT INTERVAL: 321 MS
QTC INTERVAL: 457 MS
RBC # BLD AUTO: 3.45 MILLION/UL (ref 3.81–5.12)
SODIUM SERPL-SCNC: 135 MMOL/L (ref 136–145)
SODIUM SERPL-SCNC: 136 MMOL/L (ref 136–145)
SODIUM SERPL-SCNC: 138 MMOL/L (ref 136–145)
T WAVE AXIS: 133 DEGREES
VALPROATE FREE SERPL-MCNC: NORMAL UG/ML (ref 6–22)
VENTRICULAR RATE: 122 BPM
WBC # BLD AUTO: 10.89 THOUSAND/UL (ref 4.31–10.16)

## 2017-11-07 PROCEDURE — 83735 ASSAY OF MAGNESIUM: CPT | Performed by: EMERGENCY MEDICINE

## 2017-11-07 PROCEDURE — 80048 BASIC METABOLIC PNL TOTAL CA: CPT | Performed by: EMERGENCY MEDICINE

## 2017-11-07 PROCEDURE — 83735 ASSAY OF MAGNESIUM: CPT | Performed by: NURSE PRACTITIONER

## 2017-11-07 PROCEDURE — 84100 ASSAY OF PHOSPHORUS: CPT | Performed by: EMERGENCY MEDICINE

## 2017-11-07 PROCEDURE — 80053 COMPREHEN METABOLIC PANEL: CPT | Performed by: NURSE PRACTITIONER

## 2017-11-07 PROCEDURE — 82948 REAGENT STRIP/BLOOD GLUCOSE: CPT

## 2017-11-07 PROCEDURE — 93005 ELECTROCARDIOGRAM TRACING: CPT

## 2017-11-07 PROCEDURE — 85730 THROMBOPLASTIN TIME PARTIAL: CPT | Performed by: EMERGENCY MEDICINE

## 2017-11-07 PROCEDURE — 94003 VENT MGMT INPAT SUBQ DAY: CPT

## 2017-11-07 PROCEDURE — 94760 N-INVAS EAR/PLS OXIMETRY 1: CPT

## 2017-11-07 PROCEDURE — 85027 COMPLETE CBC AUTOMATED: CPT | Performed by: NURSE PRACTITIONER

## 2017-11-07 PROCEDURE — 84100 ASSAY OF PHOSPHORUS: CPT | Performed by: NURSE PRACTITIONER

## 2017-11-07 PROCEDURE — 71010 HB CHEST X-RAY 1 VIEW FRONTAL (PORTABLE): CPT

## 2017-11-07 RX ORDER — MAGNESIUM SULFATE HEPTAHYDRATE 40 MG/ML
2 INJECTION, SOLUTION INTRAVENOUS ONCE
Status: COMPLETED | OUTPATIENT
Start: 2017-11-07 | End: 2017-11-07

## 2017-11-07 RX ORDER — POTASSIUM CHLORIDE 29.8 MG/ML
40 INJECTION INTRAVENOUS ONCE
Status: COMPLETED | OUTPATIENT
Start: 2017-11-07 | End: 2017-11-09

## 2017-11-07 RX ORDER — POTASSIUM CHLORIDE 29.8 MG/ML
40 INJECTION INTRAVENOUS ONCE
Status: COMPLETED | OUTPATIENT
Start: 2017-11-07 | End: 2017-11-07

## 2017-11-07 RX ORDER — ONDANSETRON 2 MG/ML
4 INJECTION INTRAMUSCULAR; INTRAVENOUS EVERY 4 HOURS PRN
Status: DISCONTINUED | OUTPATIENT
Start: 2017-11-07 | End: 2017-11-07

## 2017-11-07 RX ORDER — MAGNESIUM SULFATE HEPTAHYDRATE 40 MG/ML
2 INJECTION, SOLUTION INTRAVENOUS ONCE
Status: COMPLETED | OUTPATIENT
Start: 2017-11-07 | End: 2017-11-09

## 2017-11-07 RX ORDER — POTASSIUM CHLORIDE 20MEQ/15ML
40 LIQUID (ML) ORAL ONCE
Status: COMPLETED | OUTPATIENT
Start: 2017-11-07 | End: 2017-11-07

## 2017-11-07 RX ADMIN — NAFCILLIN SODIUM 2000 MG: 2 INJECTION, POWDER, LYOPHILIZED, FOR SOLUTION INTRAMUSCULAR; INTRAVENOUS at 16:29

## 2017-11-07 RX ADMIN — NYSTATIN: 100000 POWDER TOPICAL at 17:52

## 2017-11-07 RX ADMIN — FUROSEMIDE 40 MG: 10 INJECTION, SOLUTION INTRAMUSCULAR; INTRAVENOUS at 16:29

## 2017-11-07 RX ADMIN — FUROSEMIDE 40 MG: 10 INJECTION, SOLUTION INTRAMUSCULAR; INTRAVENOUS at 11:08

## 2017-11-07 RX ADMIN — POTASSIUM CHLORIDE 40 MEQ: 400 INJECTION, SOLUTION INTRAVENOUS at 19:19

## 2017-11-07 RX ADMIN — POLYETHYLENE GLYCOL 3350 17 G: 17 POWDER, FOR SOLUTION ORAL at 08:06

## 2017-11-07 RX ADMIN — MAGNESIUM SULFATE HEPTAHYDRATE 2 G: 40 INJECTION, SOLUTION INTRAVENOUS at 19:34

## 2017-11-07 RX ADMIN — HEPARIN SODIUM 5000 UNITS: 5000 INJECTION, SOLUTION INTRAVENOUS; SUBCUTANEOUS at 16:20

## 2017-11-07 RX ADMIN — NAFCILLIN SODIUM 2000 MG: 2 INJECTION, POWDER, LYOPHILIZED, FOR SOLUTION INTRAMUSCULAR; INTRAVENOUS at 08:15

## 2017-11-07 RX ADMIN — MELATONIN TAB 3 MG 3 MG: 3 TAB at 21:04

## 2017-11-07 RX ADMIN — NAFCILLIN SODIUM 2000 MG: 2 INJECTION, POWDER, LYOPHILIZED, FOR SOLUTION INTRAMUSCULAR; INTRAVENOUS at 06:09

## 2017-11-07 RX ADMIN — PIPERACILLIN SODIUM,TAZOBACTAM SODIUM 4.5 G: 4; .5 INJECTION, POWDER, FOR SOLUTION INTRAVENOUS at 11:11

## 2017-11-07 RX ADMIN — Medication 125 MCG: at 06:00

## 2017-11-07 RX ADMIN — FAMOTIDINE 20 MG: 10 INJECTION, SOLUTION INTRAVENOUS at 17:58

## 2017-11-07 RX ADMIN — NAFCILLIN SODIUM 2000 MG: 2 INJECTION, POWDER, LYOPHILIZED, FOR SOLUTION INTRAMUSCULAR; INTRAVENOUS at 23:58

## 2017-11-07 RX ADMIN — CHLORHEXIDINE GLUCONATE 15 ML: 1.2 RINSE ORAL at 08:05

## 2017-11-07 RX ADMIN — ASPIRIN 81 MG 324 MG: 81 TABLET ORAL at 08:05

## 2017-11-07 RX ADMIN — FENTANYL CITRATE 50 MCG: 50 INJECTION INTRAMUSCULAR; INTRAVENOUS at 10:15

## 2017-11-07 RX ADMIN — INSULIN GLARGINE 50 UNITS: 100 INJECTION, SOLUTION SUBCUTANEOUS at 21:01

## 2017-11-07 RX ADMIN — POTASSIUM CHLORIDE 40 MEQ: 20 SOLUTION ORAL at 19:12

## 2017-11-07 RX ADMIN — TRIMETHOBENZAMIDE HYDROCHLORIDE 200 MG: 100 INJECTION INTRAMUSCULAR at 23:50

## 2017-11-07 RX ADMIN — HEPARIN SODIUM 5000 UNITS: 5000 INJECTION, SOLUTION INTRAVENOUS; SUBCUTANEOUS at 06:00

## 2017-11-07 RX ADMIN — POTASSIUM CHLORIDE 40 MEQ: 400 INJECTION, SOLUTION INTRAVENOUS at 01:37

## 2017-11-07 RX ADMIN — FUROSEMIDE 40 MG: 10 INJECTION, SOLUTION INTRAMUSCULAR; INTRAVENOUS at 04:13

## 2017-11-07 RX ADMIN — POTASSIUM CHLORIDE 40 MEQ: 20 SOLUTION ORAL at 01:36

## 2017-11-07 RX ADMIN — FENTANYL CITRATE 50 MCG: 50 INJECTION INTRAMUSCULAR; INTRAVENOUS at 07:57

## 2017-11-07 RX ADMIN — HEPARIN SODIUM 5000 UNITS: 5000 INJECTION, SOLUTION INTRAVENOUS; SUBCUTANEOUS at 21:02

## 2017-11-07 RX ADMIN — ATORVASTATIN CALCIUM 40 MG: 40 TABLET, FILM COATED ORAL at 08:05

## 2017-11-07 RX ADMIN — MAGNESIUM SULFATE HEPTAHYDRATE 2 G: 40 INJECTION, SOLUTION INTRAVENOUS at 03:58

## 2017-11-07 RX ADMIN — FENTANYL CITRATE 50 MCG: 50 INJECTION INTRAMUSCULAR; INTRAVENOUS at 19:17

## 2017-11-07 RX ADMIN — SODIUM CHLORIDE 8 UNITS/HR: 9 INJECTION, SOLUTION INTRAVENOUS at 00:36

## 2017-11-07 RX ADMIN — SENNOSIDES 17.2 MG: 8.6 TABLET, FILM COATED ORAL at 21:04

## 2017-11-07 RX ADMIN — FUROSEMIDE 40 MG: 10 INJECTION, SOLUTION INTRAMUSCULAR; INTRAVENOUS at 22:22

## 2017-11-07 RX ADMIN — PIPERACILLIN SODIUM,TAZOBACTAM SODIUM 4.5 G: 4; .5 INJECTION, POWDER, FOR SOLUTION INTRAVENOUS at 05:30

## 2017-11-07 RX ADMIN — NAFCILLIN SODIUM 2000 MG: 2 INJECTION, POWDER, LYOPHILIZED, FOR SOLUTION INTRAMUSCULAR; INTRAVENOUS at 12:37

## 2017-11-07 RX ADMIN — CHLORHEXIDINE GLUCONATE 15 ML: 1.2 RINSE ORAL at 20:07

## 2017-11-07 RX ADMIN — NYSTATIN: 100000 POWDER TOPICAL at 08:20

## 2017-11-07 RX ADMIN — NOREPINEPHRINE BITARTRATE 4 MCG/MIN: 1 INJECTION INTRAVENOUS at 08:09

## 2017-11-07 RX ADMIN — INSULIN GLARGINE 50 UNITS: 100 INJECTION, SOLUTION SUBCUTANEOUS at 08:16

## 2017-11-07 RX ADMIN — NAFCILLIN SODIUM 2000 MG: 2 INJECTION, POWDER, LYOPHILIZED, FOR SOLUTION INTRAMUSCULAR; INTRAVENOUS at 20:26

## 2017-11-07 RX ADMIN — FAMOTIDINE 20 MG: 10 INJECTION, SOLUTION INTRAVENOUS at 08:16

## 2017-11-07 NOTE — PROGRESS NOTES
Patient's son, Natasha Hameed ([phone]) called and shared that he spoke with his sister, Danna Wallace ([phone]) and that they are in agreement that once the patient is extubated, they would not want June Espinozat re-intubated in the event of respiratory failure  I explained to Benny Chaudhary that if Darien Lewis developed respiratory failure again and we did not re-intubate her, it could result in her death  He verified that he understood this and in that event we would make her comfortable at that time  He requested that we call him tomorrow prior to extubation       ARLENE Dhillon

## 2017-11-07 NOTE — PHYSICAL THERAPY NOTE
PT order received; chart reviewed; noted pt is currently intubated; will follow and initiate PT eval/mobilization when medically cleared;    Perfecto Lara, PT

## 2017-11-07 NOTE — PROGRESS NOTES
Progress Note - Critical Care   Satinder Jackson 79 y o  female MRN: [id number]  Unit/Bed#: MICU 14 Encounter: [id number]    Assessment:   Acute hypoxic respiratory failure  S/P cardiac arrest  MSSA pneumonia  Polymorphic VT with prolonged QTc  Ischemic cardiomyopathy  Morbid obesity    Plan:          Neuro: melatonin, delirium precautions, prn fentanyl                 CV: asa, lipitor,  wean off levophed, turn off lidocaine, start betablocker if able, needs repeat cath and likley CABG +/-defibrillator                 Lung: SBT, secretions, lasix 40 Q6                 GI: TF                 FEN: off fluids, electrolytes replacements as needed                 : lackey to gravity                 ID: nafcillin, stop Zosyn                 Heme: trend CBC                 Endo: insulin gtt and Lantus                            Msk/Skin: none                 Disposition: wean to extubate, wean off pressors needs definitive cardiac plan    Chief Complaint: intubated, increased agitation on Lidocaine on levophed    HPI/24hr events: sputum growing MSSA so on Nafcillin, diuresed    Physical Exam:   General: GCS11T with CAM positive  CV regular  Lungs secretions and rhonchi  Abd soft  Ext no edema, erythema feet      Vitals:    17 1100 17 1200 17 1300 17 1400   BP:       Pulse: 80 82 80 82   Resp:  18   Temp: 97 9 °F (36 6 °C) 97 5 °F (36 4 °C) 97 9 °F (36 6 °C) 97 9 °F (36 6 °C)   TempSrc:       SpO2: 100% 100% 100% 100%   Weight:       Height:         Arterial Line BP: 132/44  Arterial Line MAP (mmHg): 68 mmHg    Temperature:   Temp (24hrs), Av 4 °F (36 9 °C), Min:97 5 °F (36 4 °C), Max:99 3 °F (37 4 °C)    Current: Temperature: 97 9 °F (36 6 °C)    Weights:   IBW: 70 8 kg    Body mass index is 40 1 kg/m²    Weight (last 2 days)     Date/Time   Weight    17 0600  130 (287 48)    17 0413  133 (292 55)              Hemodynamic Monitoring:  N/A     Non-Invasive/Invasive Ventilation Settings:  Respiratory    Lab Data (Last 4 hours)    None         O2/Vent Data (Last 4 hours)      11/07 1100           Vent Mode CPAP/PS Spont       FIO2 (%) (%) 40       PEEP (cmH2O) (cmH2O) 5       Pressure Support (cmH2O) (cmH20) 8       MV (Obs) 7 23       RSBI 30                 No results found for: PHART, FML6TZX, PO2ART, FAH4UTV, Z1NHADFP, BEART, SOURCE  SpO2: SpO2: 100 %    Intake and Outputs:  I/O       11/05 0701 - 11/06 0700 11/06 0701 - 11/07 0700 11/07 0701 - 11/08 0700    I V  (mL/kg) 1803 (13 6) 1127 5 (8 7) 190 1 (1 5)    NG/ 100     IV Piggyback 770 450     Feedings 1844 1529 480    Total Intake(mL/kg) 4767 (35 8) 3206 5 (24 7) 670 1 (5 2)    Urine (mL/kg/hr) 5225 (1 6) 4700 (1 5) 2475 (2 6)    Emesis/NG output       Total Output 5225 4700 2475    Net -458 -0436 5 -1804 9               UOP: 150/hour   Nutrition:        Diet Orders            Start     Ordered    11/04/17 0740  Diet Enteral/Parenteral; Tube Feeding No Oral Diet; Jevity 1 0; 80  Diet effective now     Question Answer Comment   Diet Type Enteral/Parenteral    Enteral/Parenteral Tube Feeding No Oral Diet    Tube Feeding Formula: Jevity 1 0    Tube Feeding Continuous rate (mL/hr): 80    RD to adjust diet per protocol? Yes        11/04/17 0739        TF at goal  Labs:     Results from last 7 days  Lab Units 11/07/17  0438 11/06/17  0437 11/05/17  0439 11/04/17  0438 11/03/17  1430  11/02/17 2004   WBC Thousand/uL 10 89* 14 55* 14 86* 16 10* 16 85*  < > 15 12*   HEMOGLOBIN g/dL 9 8* 10 5* 10 7* 10 3* 11 0*  < > 11 9   HEMATOCRIT % 31 4* 34 1* 33 8* 32 3* 34 6*  < > 37 5   PLATELETS Thousands/uL 211 239 288 279 324  < > 324   NEUTROS PCT %  --   --   --  65 82*  --  77*   MONOS PCT %  --   --   --  10 8  --  10   < > = values in this interval not displayed     Results from last 7 days  Lab Units 11/07/17  0438 11/07/17  0053 11/06/17 0437  11/04/17  0438  11/03/17  1430   SODIUM mmol/L 136 135* 138  < > 145  < > 143 POTASSIUM mmol/L 4 5 3 7 4 8  < > 3 9  < > 2 9*   CHLORIDE mmol/L 97* 96* 100  < > 108  < > 104   CO2 mmol/L 35* 35* 34*  < > 33*  < > 34*   BUN mg/dL 18 19 19  < > 27*  < > 26*   CREATININE mg/dL 0 81 0 77 0 82  < > 1 01  < > 1 00   CALCIUM mg/dL 8 9 8 7 9 0  < > 9 1  < > 9 2   TOTAL PROTEIN g/dL 6 1*  --   --   --  6 1*  --  6 1*   BILIRUBIN TOTAL mg/dL 0 83  --   --   --  0 92  --  0 60   ALK PHOS U/L 30*  --   --   --  28*  --  31*   ALT U/L 51  --   --   --  75  --  73   AST U/L 38  --   --   --  97*  --  119*   GLUCOSE RANDOM mg/dL 162* 145* 162*  < > 168*  < > 270*   < > = values in this interval not displayed  Results from last 7 days  Lab Units 11/07/17  0438 11/07/17  0053 11/06/17  0437   MAGNESIUM mg/dL 2 1 2 1 2 4       Results from last 7 days  Lab Units 11/07/17  0438 11/06/17  0437 11/05/17  0439   PHOSPHORUS mg/dL 3 5 3 8 3 2        Results from last 7 days  Lab Units 11/07/17  0438 11/06/17  0437 11/05/17  0439  11/04/17  0438  11/03/17  0511 11/02/17 2004   INR   --   --   --   --  1 36*  --  1 32* 1 36*   PTT seconds 38* 75* 77*  < > 76*  < > 61* 36*   < > = values in this interval not displayed  Results from last 7 days  Lab Units 11/03/17  1430   LACTIC ACID mmol/L 2 2*       0  Lab Value Date/Time   TROPONINI 6 10 (H) 11/04/2017 0557   TROPONINI 6 27 (H) 11/03/2017 1430   TROPONINI 6 40 (H) 11/03/2017 0730   TROPONINI 7 63 (H) 11/03/2017 0511   TROPONINI 7 41 (H) 11/03/2017 0132   TROPONINI 9 82 (H) 11/02/2017 2004   TROPONINI 7 57 ([facility]) 11/02/2017 1646       Imaging: I have personally reviewed pertinent films in PACS, improved CHF    EKG: LBBB    Micro:  Lab Results   Component Value Date    BLOODCX No Growth After 4 Days  11/02/2017    BLOODCX No Growth After 4 Days   11/02/2017    SPUTUMCULTUR 1+ Growth of Staphylococcus aureus (A) 11/03/2017       Allergies: No Known Allergies    Medications:   Scheduled Meds:  aspirin 324 mg Per NG Tube Daily   atorvastatin 40 mg Oral Daily chlorhexidine 15 mL Swish & Spit Q12H [address]   famotidine 20 mg Intravenous BID   furosemide 40 mg Intravenous Q6H   heparin (porcine) 5,000 Units Subcutaneous Q8H [address]   insulin glargine 50 Units Subcutaneous Q12H [address]   levothyroxine 125 mcg Per G Tube Early Morning   melatonin 3 mg Oral HS   nafcillin 2,000 mg Intravenous Q4H JUAN LUIS   nystatin  Topical BID   piperacillin-tazobactam 4 5 g Intravenous Q6H   polyethylene glycol 17 g Oral Daily   senna 2 tablet Oral HS     Continuous Infusions:  insulin regular (HumuLIN R,NovoLIN R) infusion 0 3-21 Units/hr Last Rate: 4 Units/hr (11/07/17 0801)   lidocaine in dextrose 5% 1 mg/min Last Rate: 1 mg/min (11/06/17 0629)   norepinephrine 1-30 mcg/min Last Rate: 1 mcg/min (11/07/17 1129)     PRN Meds:    acetaminophen 650 mg Q4H PRN   fentanyl citrate (PF) 50 mcg Q2H PRN   lidocaine (PF)  Code/Trauma/Sedation Med   sodium bicarbonate  Code/Trauma/Sedation Med   sodium chloride  Code/Trauma/Sedation Med       VTE Pharmacologic Prophylaxis: Heparin  VTE Mechanical Prophylaxis: sequential compression device    Invasive lines and devices: Invasive Devices     Central Venous Catheter Line            CVC Central Lines 11/03/17 Triple 16cm 3 days          Arterial Line            Arterial Line 11/03/17 Right Radial 3 days          Drain            NG/OG/Enteral Tube Orogastric 18 Fr Center mouth 4 days    Urethral Catheter Temperature probe 4 days          Airway            ETT  Cuffed 8 mm 4 days                   Counseling / Coordination of Care  Total Critical Care time spent 35 minutes excluding procedures, teaching and family updates  Code Status: Level 2 - DNAR: but accepts endotracheal intubation     Portions of the record may have been created with voice recognition software  Occasional wrong word or "sound a like" substitutions may have occurred due to the inherent limitations of voice recognition software    Read the chart carefully and recognize, using context, where substitutions have occurred       Abby Abdullahi DO

## 2017-11-07 NOTE — RESPIRATORY THERAPY NOTE
RT Ventilator Management Note  Nancy Lacey 79 y o  female MRN: [id number]  Unit/Bed#: [facility] 14 Encounter: [id number]      Daily Screen       11/6/2017 0808 11/7/2017 0736          Patient safety screen outcome[de-identified] Passed Passed      Spont breathing trial % for 30 min: Yes Yes      RSBI: - 72              Physical Exam:   Assessment Type: Assess only  General Appearance: Alert, Awake, Eyes open/responds to voice  Respiratory Pattern: Spontaneous  Chest Assessment: Chest expansion symmetrical  Bilateral Breath Sounds: Coarse, Diminished  Cough: Non-productive  Suction: ET Tube  O2 Device: vent      Resp Comments: Pt remains on 100% tube comp at this time pending extubation  Will continue to monitor pt's progress

## 2017-11-07 NOTE — PROGRESS NOTES
Progress Note - Cardiology   Karena Richardson 79 y o  female MRN: [id number]  Unit/Bed#: MICU 14 Encounter: [id number]  11/07/17  8:44 AM        Assessment/Plan:    1  NSTEMI type II  On aspirin, statin, no beta blocker currently due to hypotension  2  Chronic multivessel CAD  On aspirin, statin, no beta blocker currently due to hypotension  Has been medically managed per report, cath done at [facility]  Unable to see actual report of cath from 6/17  Will need repeat cath due to PMVT after patient successfully extubated  3  Chronic LBBB  Still present on telemetry  4  ICM with EF 35%  By echo here, EF improved to 50%  Currently on Lasix 40 IV q6hrs to help with iatrogenic volume overload, still 5 L positive overall during hospitalization, which is improved from previous  5  HTN  Currently on inotropes  6  HL  On statin  7  DM  On insulin  8  PMVT  On Lidocaine drip  No further episodes since 11/13  Keep K>4, Mg>2      9  PNA  On Zosyn/nafcillin  Last fever 11/5  Subjective/Objective   Chief Complaint: No chief complaint on file  Subjective:79 year old woman with a history of multivessel disease managed medically, ICM with EF 35%, chronic LBBB, DM, 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, admitted after being found to at home, found on telemetry here to have an episode of PVC induced PMVT with baseline prolonged QT on 11/3 that resolved spontaneously per report  She was started on IV amiodarone, but had a second episode of torsades on 11/3, and was switched from Amiodarone to Lidocaine drip at that time, and was kept on dopamine to keep HR elevated as well as for inotropic effect  She currently remains on norepinephrine and lidocaine drips  She remains intubated   Febrile to 101 1 on 11/5, last fever 100 4 11/5 PM        Patient Active Problem List   Diagnosis    [facility]    Cardiac arrest ([facility]    Acute hypercapnic/hypoxic respiratory failure ([facility] )    Torsades de pointes (HCC)    Prolonged QT interval    NSTEMI (non-ST elevated myocardial infarction) (HCC)    Ischemic cardiomyopathy    LBBB (left bundle branch block)    CAD (coronary artery disease)    h/o Vocal cord paralysis    HTN (hypertension)    Hyperlipemia    Encephalopathy    Obesity, Class III, BMI 40-49 9 (morbid obesity) ([facility] )    Delirium     Past Medical History:   Diagnosis Date    Anxiety     Diabetes mellitus ([facility] )     Hypertension     Pancreatitis        No Known Allergies    Current Facility-Administered Medications   Medication Dose Route Frequency Provider Last Rate Last Dose    acetaminophen (TYLENOL) oral suspension 650 mg  650 mg Oral Q4H PRN Valerie Caballero PA-C   650 mg at 11/05/17 0419    aspirin chewable tablet 324 mg  324 [facility] NG [facility]ube [facility]   324 mg at 11/07/17 0805    atorvastatin (LIPITOR) tablet 40 mg  40 mg Oral Daily ARLENE Rebollar   40 mg at 11/07/17 0805    chlorhexidine (PERIDEX) 0 12 % oral rinse 15 mL  15 mL Swish & Spit Q12H [address] Valerie Bridges PA-C   15 mL at 11/07/17 0805    famotidine (PEPCID) injection 20 mg  20 mg Intravenous BID ARLENE Rebollar   20 mg at 11/07/17 0816    fentanyl citrate (PF) 100 MCG/2ML 50 mcg  50 mcg Intravenous Q2H PRN [facility]   50 mcg at 11/07/17 0757    furosemide (LASIX) injection 40 mg  40 mg Intravenous Q6H Declan Alberto CRNP   40 mg at 11/07/17 0413    heparin (porcine) subcutaneous injection 5,000 Units  5,000 Units Subcutaneous Q8H DO Mundo   5,000 Units at 11/07/17 0600    insulin glargine (LANTUS) subcutaneous injection 50 Units  50 Units Subcutaneous Q12H [address] Littie Dolly, CRNP   50 Units at 11/07/17 0816    insulin regular (HumuLIN R,NovoLIN R) 1 Units/mL in sodium chloride 0 9 % 100 mL infusion  0 3-21 Units/hr Intravenous Titrated [facility] 4 mL/hr at 11/07/17 0801 4 Units/hr at 11/07/17 0801    levothyroxine 25 mcg/mL suspension 125 mcg 5 mL  125 mcg Per G Tube Early Morning Sofia Fuentes MD   125 mcg at 11/07/17 0600    lidocaine (PF) (XYLOCAINE-MPF) 100 mg/5 mL injection    Code/Trauma/Sedation Med Ivin Teasdale, DO   100 mg at 11/03/17 1218    lidocaine 2000 mg in 250 mL infusion (cardiac premix)- NOT FOR TREATMENT OF PAIN  1 mg/min Intravenous Continuous Nalini Haynes MD 7 5 mL/hr at 11/06/17 0629 1 mg/min at 11/06/17 0629    melatonin tablet 3 mg  3 mg Oral HS Sukhdeep Karl, CRNP   3 mg at 11/06/17 2017    nafcillin (NALLPEN) 2,000 mg in sodium chloride 0 9 % 100 mL IVPB  2,000 mg Intravenous Q4H [address] Immanuel Greenwood,  mL/hr at 11/07/17 0815 2,000 mg at 11/07/17 0815    norepinephrine (LEVOPHED) 4 mg (STANDARD CONCENTRATION) IV in sodium chloride 0 9% 250 mL  1-30 mcg/min Intravenous Titrated Sukhdeep Karl, CRNP 15 mL/hr at 11/07/17 0809 4 mcg/min at 11/07/17 0809    nystatin (MYCOSTATIN) powder   Topical BID Brodie Lopez PA-C        piperacillin-tazobactam (ZOSYN) 4 5 g in dextrose 5 % 100 mL IVPB  4 5 g Intravenous Q6H Sukhdeep Karl, CRNP 200 mL/hr at 11/07/17 0530 4 5 g at 11/07/17 0530    polyethylene glycol (MIRALAX) packet 17 g  17 g Oral Daily Myrla Freddy, CRNP   17 g at 11/07/17 0806    senna (SENOKOT) tablet 17 2 mg  2 tablet Oral HS Myrla Freddy, CRNP   17 2 mg at 11/06/17 2158    sodium bicarbonate 50 mEq/50 mL injection    Code/Trauma/Sedation Med Ivin Teasdale, DO   100 mEq at 11/03/17 1225    sodium chloride 0 9 % bolus    Code/Trauma/Sedation Med Ivin Teasdale, DO   1,000 mL at 11/03/17 1221       Vitals: /57   Pulse 78   Temp 97 9 °F (36 6 °C)   Resp 18   Ht 5' 11" (1 803 m)   Wt 130 kg (287 lb 7 7 oz)   SpO2 100%   BMI 40 10 kg/m²     No data recorded  No data recorded        Vitals:    11/05/17 0413 11/07/17 0600   Weight: 133 kg (292 lb 8 8 oz) 130 kg (287 lb 7 7 oz)     Orthostatic Blood Pressures    Flowsheet Row Most Recent Value   Blood Pressure  106/57 filed at 11/03/2017 1615   Patient Position - Orthostatic VS  Lying filed at 11/03/2017 0421            Intake/Output Summary (Last 24 hours) at 11/07/17 0844  Last data filed at 11/07/17 0801   Gross per 24 hour   Intake          3238 71 ml   Output             5200 ml   Net         -1961 29 ml       Invasive Devices     Central Venous Catheter Line            CVC Central Lines 11/03/17 Triple 16cm 3 days          Arterial Line            Arterial Line 11/03/17 Right Radial 3 days          Drain            NG/OG/Enteral Tube Orogastric 18 Fr Center mouth 4 days    Urethral Catheter Temperature probe 4 days          Airway            ETT  Cuffed 8 mm 4 days                  Physical Exam:     GEN: Eyes open, but intubated  HEENT: Sclera anicteric, conjunctivae pink, mucous membranes moist   NECK: Supple, no carotid bruits, no significant JVD  HEART: Regular rhythm, normal S1 and S2, no murmurs, clicks, gallops or rubs  LUNGS: Roncherous breath sounds bilaterally; no wheezes, rales   ABDOMEN: Obese, soft, nontender, nondistended, normoactive bowel sounds  EXTREMITIES: Skin warm and well perfused, no clubbing, cyanosis, 1+ pedal edema  Skin erythematous over feet  NEURO: No focal findings  SKIN: Normal without suspicious lesions on exposed skin        Lab Results:     Troponins:     Results from last 7 days  Lab Units 11/04/17  0557 11/03/17  1430 11/03/17  0730   CK TOTAL U/L  --  277*  --    TROPONIN I ng/mL 6 10* 6 27* 6 40*   CK MB INDEX %  --  4 3*  --        CBC with diff:     Results from last 7 days  Lab Units 11/07/17  0438 11/06/17  0437 11/05/17  0439 11/04/17  0438 11/03/17  1430 11/03/17  0522 11/02/17 2004   WBC Thousand/uL 10 89* 14 55* 14 86* 16 10* 16 85* 16 77* 15 12*   HEMOGLOBIN g/dL 9 8* 10 5* 10 7* 10 3* 11 0* 11 3* 11 9   HEMATOCRIT % 31 4* 34 1* 33 8* 32 3* 34 6* 36 1 37 5   MCV fL 91 92 91 91 92 91 92   PLATELETS Thousands/uL 211 239 288 279 324 320 324   MCH pg 28 4 28 4 28 8 28 9 29 1 28 6 29 1   MCHC g/dL 31 2* 30 8* 31 7 31 9 31 8 31 3* 31 7   RDW % 14 6 14 3 14 6 14 4 14 5 14 5 14 2   MPV fL 10 4 10 5 10 8 10 4 10 3 10 8 10 4   NRBC AUTO /100 WBCs  --   --   --  0 0  --  0         CMP:  Results from last 7 days  Lab Units 11/07/17  0438 11/07/17  0053 11/06/17  0437 11/05/17  2207 11/05/17  1549 11/05/17 0439 11/04/17  2304  11/04/17 0438  11/03/17  1430  11/02/17 2004 11/02/17  1554   SODIUM mmol/L 136 135* 138 139 138 140 143  < > 145  < > 143  < > 141  --  144   POTASSIUM mmol/L 4 5 3 7 4 8 3 9 4 0 4 3 3 9  < > 3 9  < > 2 9*  < > 4 0  --  3 5   CHLORIDE mmol/L 97* 96* 100 100 103 104 105  < > 108  < > 104  < > 100  --  98*   CO2 mmol/L 35* 35* 34* 36* 32 32 33*  < > 33*  < > 34*  < > 31  --  29   ANION GAP mmol/L 4 4 4 3* 3* 4 5  < > 4  < > 5  < > 10  --  17*   BUN mg/dL 18 19 19 19 19 20 21  < > 27*  < > 26*  < > 16  --  14   CREATININE mg/dL 0 81 0 77 0 82 0 80 0 82 0 85 0 92  < > 1 01  < > 1 00  < > 0 96  --  1 05   GLUCOSE RANDOM mg/dL 162* 145* 162* 136 212* 158* 164*  < > 168*  < > 270*  < > 372*  --  385*   GLUCOSE, ISTAT   --   --   --   --   --   --   --   --   --   --   --   --   --   < >  --    CALCIUM mg/dL 8 9 8 7 9 0 8 7 8 5 8 6 8 5  < > 9 1  < > 9 2  < > 9 6  --  10 2*   AST U/L 38  --   --   --   --   --   --   --  97*  --  119*  --  148*  --  140*   ALT U/L 51  --   --   --   --   --   --   --  75  --  73  --  61  --  69   ALK PHOS U/L 30*  --   --   --   --   --   --   --  28*  --  31*  --  37*  --  44*   TOTAL PROTEIN g/dL 6 1*  --   --   --   --   --   --   --  6 1*  --  6 1*  --  7 2  --  8 1   ALBUMIN g/dL 2 5*  --   --   --   --   --   --   --  3 1*  --  2 9*  --  3 3*  --  3 9   BILIRUBIN TOTAL mg/dL 0 83  --   --   --   --   --   --   --  0 92  --  0 60  --  0 75  --  0 90   EGFR ml/min/1 73sq m 75 80 74 77 74 71 65  < > 58  < > 58  < > 61  < > 55   < > = values in this interval not displayed      Magnesium:     Results from last 7 days  Lab Units 11/07/17  0438 11/07/17  0053 11/06/17  8373 17  2207 17  1549 17  0439 17  2304   MAGNESIUM mg/dL 2 1 2 1 2 4 2 4 2 1 2 4 2 3       Coags:     Results from last 7 days  Lab Units 17  0438 17  0437 17  0439 17  1026 17  0438 17  2032 17  1111 17  0511 17  2004 17  1554   PTT seconds 38* 75* 77* 68* 76* 46* 60* 61* 36* 35   INR   --   --   --   --  1 36*  --   --  1 32* 1 36* 1 26*       Cardiac testing:   Results for orders placed during the hospital encounter of 17   Echo complete with contrast if indicated    [address]  [facility] [place] [address] [facility]  [phone]    Transthoracic Echocardiogram  2D, M-mode, Doppler, and Color Doppler    Study date:  2017    Patient: Brianna Krishnamurthy  MR number: [id number]  Account number: [de-identified]  : 1950  Age: 79 years  Gender: Female  Status: Inpatient  Location: Bedside  Height: 71 in  Weight: 279 lb  BP: 92/ 53 mmHg    Indications: Heart failure  Diagnoses: I50 9 - Heart failure, unspecified    Sonographer:  Raul Rivers RDCS  Referring Physician:  Pepe Boyle PA-C  Group:  [id number]  Interpreting Physician:  Carrol Warren MD    SUMMARY    LEFT VENTRICLE:  Systolic function was at the lower limits of normal  Ejection fraction was estimated to be 50 %  There was hypokinesis of the basal inferior wall(s)  There was mild concentric hypertrophy  Features were consistent with a pseudonormal left ventricular filling pattern, with concomitant abnormal relaxation and increased filling pressure (grade 2 diastolic dysfunction)  MITRAL VALVE:  There was mild to moderate regurgitation  AORTIC VALVE:  There was mild regurgitation  PERICARDIUM:  A small pericardial effusion was identified posterior to the heart  The fluid had no internal echoes  There was no evidence of hemodynamic compromise    There was a large left pleural effusion  HISTORY: PRIOR HISTORY: NSTEMI, LBBB, Ischemic cardiomyopathy, Coronary artery disease, Diabetes, Hypertension, Hyperlipidemia, Tracheostomy, Smoker  PROCEDURE: The procedure was performed at the bedside  This was a routine study  The transthoracic approach was used  The study included complete 2D imaging, M-mode, complete spectral Doppler, and color Doppler  The heart rate was 73 bpm,  at the start of the study  Images were obtained from the parasternal, apical, subcostal, and suprasternal notch acoustic windows  Echocardiographic views were limited due to decreased penetration and lung interference  This was a  technically difficult study  LEFT VENTRICLE: Size was normal  Systolic function was at the lower limits of normal  Ejection fraction was estimated to be 50 %  There was hypokinesis of the basal inferior wall(s)  Wall thickness was mildly increased  There was mild  concentric hypertrophy  DOPPLER: The transmitral flow pattern was normal  Features were consistent with a pseudonormal left ventricular filling pattern, with concomitant abnormal relaxation and increased filling pressure (grade 2 diastolic  dysfunction)  RIGHT VENTRICLE: The size was normal  Systolic function was normal  Wall thickness was normal     LEFT ATRIUM: Size was normal     RIGHT ATRIUM: Size was normal     MITRAL VALVE: Valve structure was normal  There was normal leaflet separation  DOPPLER: The transmitral velocity was within the normal range  There was no evidence for stenosis  There was mild to moderate regurgitation  AORTIC VALVE: The valve was trileaflet  Leaflets exhibited mild calcification, normal cuspal separation, and sclerosis  DOPPLER: Transaortic velocity was within the normal range  There was no evidence for stenosis  There was mild  regurgitation  TRICUSPID VALVE: The valve structure was normal  There was normal leaflet separation   DOPPLER: The transtricuspid velocity was within the normal range  There was no evidence for stenosis  There was trace regurgitation  The tricuspid jet  envelope definition was inadequate for estimation of RV systolic pressure  PULMONIC VALVE: Leaflets exhibited normal thickness, no calcification, and normal cuspal separation  DOPPLER: The transpulmonic velocity was within the normal range  There was trace regurgitation  PERICARDIUM: A small pericardial effusion was identified posterior to the heart  The fluid had no internal echoes  There was no evidence of hemodynamic compromise  There was a large left pleural effusion  The pericardium was normal in  appearance  AORTA: The root exhibited normal size  SYSTEMIC VEINS: IVC: The inferior vena cava was dilated  Respirophasic changes in dimension were absent  SYSTEM MEASUREMENT TABLES    2D  %FS: 17 89 %  Ao Diam: 2 84 cm  EDV(Teich): 142 7 ml  EF Biplane: 35 65 %  EF(Teich): 36 81 %  ESV(Teich): 90 18 ml  IVSd: 1 3 cm  LA Area: 20 16 cm2  LA Diam: 4 05 cm  LVEDV MOD A2C: 129 88 ml  LVEDV MOD A4C: 112 39 ml  LVEDV MOD BP: 125 34 ml  LVEF MOD A2C: 34 61 %  LVEF MOD A4C: 32 37 %  LVESV MOD A2C: 84 92 ml  LVESV MOD A4C: 76 01 ml  LVESV MOD BP: 80 65 ml  LVIDd: 5 42 cm  LVIDs: 4 45 cm  LVLd A2C: 7 55 cm  LVLd A4C: 8 17 cm  LVLs A2C: 6 77 cm  LVLs A4C: 6 52 cm  LVPWd: 1 28 cm  RA Area: 17 87 cm2  RVIDd: 4 23 cm  SV MOD A2C: 44 96 ml  SV MOD A4C: 36 38 ml  SV(Teich): 52 53 ml    CW  AR Dec [place]: 1 64 m/s2  AR Dec Time: 1614 77 ms  AR PHT: 468 28 ms  AR Vmax: 2 65 m/s  AR maxP 04 mmHg    MM  TAPSE: 2 18 cm    PW  E': 0 03 m/s  E/E': 35 79  MV A [place]: 0 75 m/s  MV Dec [place]: 4 83 m/s2  MV DecT: 211 74 ms  MV E [place]: 1 02 m/s  MV E/A Ratio: 1 37  MV PHT: 61 4 ms  MVA By PHT: 3 58 cm2    [facility] Accredited [facility]chocardiography [facility]    Prepared and electronically signed by    Andrae Márquez MD  Signed  12:14:05         Imaging: I have personally reviewed pertinent reports  Telemetry: personally reviewed, SR with LBBB, up to 5 beats NSVT, short bursts of SVT with LBBB

## 2017-11-07 NOTE — PROGRESS NOTES
Progress Note - Critical Care   Susannah Mathews 79 y o  female MRN: [id number]  Unit/Bed#: [facility] 14 Encounter: [id number]    Assessment:   Principal Problem:    Respiratory failure ([address] )  Active Problems:    Cardiac arrest ([facility] [address] ) Polymorphic VT/Torsades with ROSC    Acute hypercapnic/hypoxic respiratory failure (HCC)    Encephalopathy    Delirium    Prolonged QT interval    NSTEMI (non-ST elevated myocardial infarction) ([facility]) Type II    Ischemic cardiomyopathy    LBBB (left bundle branch block)    HFpEF (EF50%, grade IIDD)    CAD (coronary artery disease)    Obesity, Class III, BMI 40-49 9 (morbid obesity) ([facility])    HTN (hypertension)    Hyperlipemia    h/o Vocal cord paralysis; history of prior trach  Resolved Problems:        Plan:   Neuro:   · Serial neuro exams  · Melatonin for sleep hygiene, increase to 6mg HS  · CAM ICU daily  · Avoid QTc prolongation agents (haldol, precedex, olanzapine, seroquel)  · PRN fentanyl  · Hold lexapro  CV:   · Wean NE for MAP >65  · Continue ASA and atorvastatin  · Continue Lidocaine gtt per cardiology  · Plan per cardiology: cath once extubated, EP awaiting cath for AICD  · Lidocaine level pending  Lung:   · Assess for SBT today  · Concern for extubation related to significant secretions  · VAP prevention  GI:   · Continue bowel reg: senna and miralax  FEN:   · Continue enteral support  · Goal K >4 and Mag >2  :   · Continue lasix 40mg IV Q6 hours x 24 hours  · Goal net negative 1 liter in next 24 hours  ID:   · Zosyn D#6 consider d/c today  · Nafcillin D#2  · If febrile, repeat blood cultures  Heme:   · Sq heparin for VTE prophylaxis  Endo:   · Lantus increased to 50 units BID, and insulin gtt 153 units in 24 hours  · Levothyroxine daily  Msk/Skin:   · Turn and Position Q2 hours  Disposition:   · Continue in critical care unit    ______________________________________________________________________  Chief Complaint:   Pt shakes head no to pain    HPI/24hr events:   Dopamine discontinued  NE started  Nafcillin started  Lasix 40 IV Q6 hours  Pt returned to [facility] yesterday after episode of agitation, hyperdynamic and hypoxemia    ______________________________________________________________________  Temperature:   Temp (24hrs), Av 5 °F (36 9 °C), Min:97 9 °F (36 6 °C), Max:99 3 °F (37 4 °C)    Current Temperature: 97 9 °F (36 6 °C)    Vitals:    17 0400 17 0500 17 0530 17 0600   BP:       Pulse: 78 74 74 78   Resp: 17 17 15 18   Temp: 98 6 °F (37 °C) 98 2 °F (36 8 °C) 98 2 °F (36 8 °C) 97 9 °F (36 6 °C)   TempSrc:       SpO2: 96% 100% 100% 100%   Weight:    130 kg (287 lb 7 7 oz)   Height:         Arterial Line BP: 146/48  Arterial Line MAP (mmHg): 76 mmHg     Weights:   IBW: 70 8 kg    Body mass index is 40 1 kg/m²  Weight (last 2 days)     Date/Time   Weight    17 0600  130 (287 48)    17 0413  133 (292 55)            Height: 5' 11" (180 3 cm)    Ventilator Settings:  Respiratory    Lab Data (Last 4 hours)    None         O2/Vent Data (Last 4 hours)    None              SpO2: SpO2: 100 %    ______________________________________________________________________  Physical Exam:  Chow Agitation Sedation Scale (RASS): Drowsy  Physical Exam   Constitutional: She appears well-developed and well-nourished  No distress  She is intubated and restrained  HENT:   Head: Normocephalic and atraumatic  Eyes: Pupils are equal, round, and reactive to light  Neck: Neck supple  No JVD present  Cardiovascular: Normal rate, regular rhythm and normal heart sounds  Pulses:       Dorsalis pedis pulses are 1+ on the right side, and 1+ on the left side  Pulmonary/Chest: No accessory muscle usage  She is intubated  No respiratory distress  She has rhonchi  Significant secretions per nursing staff   Abdominal: Soft  Bowel sounds are normal  There is no tenderness  There is no rigidity and no guarding     Genitourinary:   Genitourinary Comments: Beckford to gravity   Neurological: She is alert  GCS eye subscore is 4  GCS verbal subscore is 1  GCS motor subscore is 6  Awake, follows commands, not oriented to place or time, CAM ICU positive   Skin: Skin is warm and dry  No cyanosis  Nails show no clubbing  B/l feet with erythema     ______________________________________________________________________  Intake and Outputs:  I/O       11/05 0701 - 11/06 0700 11/06 0701 - 11/07 0700 11/07 0701 - 11/08 0700    I V  (mL/kg) 1803 (13 6) 1127 5 (8 7)     NG/ 100     IV Piggyback 770 450     Feedings 1844 1529     Total Intake(mL/kg) 4767 (35 8) 3206 5 (24 7)     Urine (mL/kg/hr) 5225 (1 6) 4700 (1 5)     Emesis/NG output       Total Output 5225 4700      Net -458 -1493 5                 UOP: 150-950/hour   Nutrition:        Diet Orders            Start     Ordered    11/04/17 0740  Diet Enteral/Parenteral; Tube Feeding No Oral Diet; Jevity 1 0; 80  Diet effective now     Question Answer Comment   Diet Type Enteral/Parenteral    Enteral/Parenteral Tube Feeding No Oral Diet    Tube Feeding Formula: Jevity 1 0    Tube Feeding Continuous rate (mL/hr): 80    RD to adjust diet per protocol? Yes        11/04/17 0739          Labs:     Results from last 7 days  Lab Units 11/07/17  0438 11/06/17  0437 11/05/17  0439 11/04/17  0438 11/03/17  1430  11/02/17 2004   WBC Thousand/uL 10 89* 14 55* 14 86* 16 10* 16 85*  < > 15 12*   HEMOGLOBIN g/dL 9 8* 10 5* 10 7* 10 3* 11 0*  < > 11 9   HEMATOCRIT % 31 4* 34 1* 33 8* 32 3* 34 6*  < > 37 5   PLATELETS Thousands/uL 211 239 288 279 324  < > 324   NEUTROS PCT %  --   --   --  65 82*  --  77*   MONOS PCT %  --   --   --  10 8  --  10   < > = values in this interval not displayed     Results from last 7 days  Lab Units 11/07/17  0438 11/07/17  0053 11/06/17 0437 11/04/17 0438  11/03/17  1430   SODIUM mmol/L 136 135* 138  < > 145  < > 143   POTASSIUM mmol/L 4 5 3 7 4 8  < > 3 9  < > 2 9*   CHLORIDE mmol/L 97* 96* 100  < > 108  < > 104 CO2 mmol/L 35* 35* 34*  < > 33*  < > 34*   BUN mg/dL 18 19 19  < > 27*  < > 26*   CREATININE mg/dL 0 81 0 77 0 82  < > 1 01  < > 1 00   CALCIUM mg/dL 8 9 8 7 9 0  < > 9 1  < > 9 2   ALBUMIN g/dL 2 5*  --   --   --  3 1*  --  2 9*   TOTAL PROTEIN g/dL 6 1*  --   --   --  6 1*  --  6 1*   BILIRUBIN TOTAL mg/dL 0 83  --   --   --  0 92  --  0 60   ALK PHOS U/L 30*  --   --   --  28*  --  31*   ALT U/L 51  --   --   --  75  --  73   AST U/L 38  --   --   --  97*  --  119*   GLUCOSE RANDOM mg/dL 162* 145* 162*  < > 168*  < > 270*   < > = values in this interval not displayed  Results from last 7 days  Lab Units 11/07/17  0438 11/07/17  0053 11/06/17  0437   MAGNESIUM mg/dL 2 1 2 1 2 4       Results from last 7 days  Lab Units 11/07/17  0438 11/06/17  0437 11/05/17  0439   PHOSPHORUS mg/dL 3 5 3 8 3 2        Results from last 7 days  Lab Units 11/07/17  0438 11/06/17  0437 11/05/17  0439  11/04/17  0438  11/03/17  0511 11/02/17 2004   INR   --   --   --   --  1 36*  --  1 32* 1 36*   PTT seconds 38* 75* 77*  < > 76*  < > 61* 36*   < > = values in this interval not displayed      0  Lab Value Date/Time   TROPONINI 6 10 (H) 11/04/2017 0557   TROPONINI 6 27 (H) 11/03/2017 1430   TROPONINI 6 40 (H) 11/03/2017 0730   TROPONINI 7 63 (H) 11/03/2017 0511   TROPONINI 7 41 (H) 11/03/2017 0132   TROPONINI 9 82 (H) 11/02/2017 2004   TROPONINI 7 57 ([facility]) 11/02/2017 1646     Imaging:  I have personally reviewed pertinent films in PACS, continues with vascular congestion, b/l blunting of CPA  EKG: none this AM  Micro:  11/3 sputum: MSSA  11/2 Blood cx ngtd    Allergies: No Known Allergies  Medications:   Scheduled Meds:    aspirin 324 mg Per NG Tube Daily   atorvastatin 40 mg Oral Daily   chlorhexidine 15 mL Swish & Spit Q12H [address]   famotidine 20 mg Intravenous BID   furosemide 40 mg Intravenous Q6H   heparin (porcine) 5,000 Units Subcutaneous Q8H [address]   insulin glargine 50 Units Subcutaneous Q12H [address]   levothyroxine 125 mcg Per G Tube Early Morning   melatonin 3 mg Oral HS   nafcillin 2,000 mg Intravenous Q4H JUAN LUIS   nystatin  Topical BID   piperacillin-tazobactam 4 5 g Intravenous Q6H   polyethylene glycol 17 g Oral Daily   senna 2 tablet Oral HS     Continuous Infusions:    insulin regular (HumuLIN R,NovoLIN R) infusion 0 3-21 Units/hr Last Rate: 8 Units/hr (11/07/17 0401)   lidocaine in dextrose 5% 1 mg/min Last Rate: 1 mg/min (11/06/17 0629)   norepinephrine 1-30 mcg/min Last Rate: 4 mcg/min (11/07/17 0531)     PRN Meds:    acetaminophen 650 mg Q4H PRN   fentanyl citrate (PF) 50 mcg Q2H PRN   lidocaine (PF)  Code/Trauma/Sedation Med   sodium bicarbonate  Code/Trauma/Sedation Med   sodium chloride  Code/Trauma/Sedation Med     VTE Pharmacologic Prophylaxis:   Pharmacologic: Heparin  Mechanical VTE Prophylaxis in Place: Yes    Invasive lines and devices: Invasive Devices     Central Venous Catheter Line            CVC Central Lines 11/03/17 Triple 16cm 3 days          Arterial Line            Arterial Line 11/03/17 Right Radial 3 days          Drain            Urethral Catheter Temperature probe 4 days    NG/OG/Enteral Tube Orogastric 18 Fr Center mouth 3 days          Airway            ETT  Cuffed 8 mm 4 days                   Counseling / Coordination of Care      Code Status: Level 2 - DNAR: but accepts endotracheal intubation    Portions of the record may have been created with voice recognition software  Occasional wrong word or "sound a like" substitutions may have occurred due to the inherent limitations of voice recognition software  Read the chart carefully and recognize, using context, where substitutions have occurred      ARLENE Shelton

## 2017-11-07 NOTE — RESPIRATORY THERAPY NOTE
RT Ventilator Management Note  Shelly Vasquez 79 y o  female MRN: [id number]  Unit/Bed#: [facility] 14 Encounter: [id number]      Daily Screen       11/6/2017 0808 11/7/2017 0736          Patient safety screen outcome[de-identified] Passed Passed      Spont breathing trial % for 30 min: Yes Yes      RSBI: - 72              Physical Exam:   Assessment Type: Assess only  General Appearance: Alert, Awake  Respiratory Pattern: Spontaneous  Chest Assessment: Chest expansion symmetrical  Bilateral Breath Sounds: Coarse, Diminished  Cough: Non-productive  Suction: ET Tube  O2 Device: vent      Resp Comments: Pt appears to be alert and awake  Placed pt on 100% Tube comp trial  Pt appears to be tolerating wean well  Will continue to monitor pt's progress

## 2017-11-07 NOTE — PROGRESS NOTES
Progress Note - Electrophysiology-Cardiology (EP)   Aniket Porras 79 y o  female MRN: [id number]  [facility] Encounter: [id number]    Assessment:  1  Polymorphic VT  2  NSTEMI type 2  · Echo - 11/3/17 - LVEF 50%, hypokinesis of basal inferior, mild LVH, Grade 2 DD, small pericardial effusion  Mild-moderate MR, mild AI  · Echo - 6/12/17 - @[facility] - LVEF 35%, global hypokinesis with inferior akinesis, mild AI, mild-moderate MR, LVIDd 5 4  3  Chronic MV CAD, Ischemic CM  · Cath - 6/2017 at [facility]- chronic multi-vessel disease with LAD and RCA involvement --> no intervention  4  Chronic LBBB  5  Hypertension  6  Hyperlipidemia  7  Diabetes Mellitus Type 2  8  Pneumonia  9  Vocal cord paresis s/p Trach/PEG (7/2017)  10  H/o panic attack  · Was on lexapro at admission, although did not get any doses here  11  Morbid Obesity, Body mass index is 40 8 kg/m²  Plan:  · Off Dopamine since yesterday afternoon --> HR remains in  after that  · Has needed vasopressor support with NE - currently @4  · She could not be extubated yesterday as she was AMS and eventually tired out on PS by evening  · Getting SBT trial for extubation assessment today  · Awaiting cardiac cath after extubation    Subjective/Objective     Subjective: Remains intubated  Objective: still needing minimal vasopressor support with norepinephrine  HR have remained in       Vitals: /57   Pulse 78   Temp 97 9 °F (36 6 °C)   Resp 18   Ht 5' 11" (1 803 m)   Wt 130 kg (287 lb 7 7 oz)   SpO2 100%   BMI 40 10 kg/m²   Vitals:    11/05/17 0413 11/07/17 0600   Weight: 133 kg (292 lb 8 8 oz) 130 kg (287 lb 7 7 oz)     Orthostatic Blood Pressures    Flowsheet Row Most Recent Value   Blood Pressure  106/57 filed at 11/03/2017 1615   Patient Position - Orthostatic VS  Lying filed at 11/03/2017 0421            Intake/Output Summary (Last 24 hours) at 11/07/17 0940  Last data filed at 11/07/17 0801   Gross per 24 hour   Intake          9758 71 ml   Output             5200 ml   Net         -1961 29 ml       Invasive Devices     Central Venous Catheter Line            CVC Central Lines 11/03/17 Triple 16cm 3 days          Arterial Line            Arterial Line 11/03/17 Right Radial 3 days          Drain            NG/OG/Enteral Tube Orogastric 18 Fr Center mouth 4 days    Urethral Catheter Temperature probe 4 days          Airway            ETT  Cuffed 8 mm 4 days                Review of Systems: unable to perform, as patient is intubated      Physical Exam:  Constitutional:  Intubated, awake and responds appropriately with gestures   HEENT:    Normocephalic, atraumatic   Neck:  No JVD, No stridor   Lungs:  Clear to auscultation bilaterally, respirations unlabored    Chest Wall:  No tenderness or deformity    Heart::  Regular rate, Regular rhythm, S1 and S2 normal, no murmur, rub or gallop    Abdomen:  Soft, non-tender, non-distended   Neurological:  Awake, alert   Extremities:  B/L 1-2+ pedal edema, No calf tenderness         Lab Results:   I have personally reviewed pertinent lab results      CBC with diff:   Results from last 7 days  Lab Units 11/07/17  0438   WBC Thousand/uL 10 89*   RBC Million/uL 3 45*   HEMOGLOBIN g/dL 9 8*   HEMATOCRIT % 31 4*   MCV fL 91   MCH pg 28 4   MCHC g/dL 31 2*   RDW % 14 6   MPV fL 10 4   PLATELETS Thousands/uL 211     CMP:   Results from last 7 days  Lab Units 11/07/17  0438   SODIUM mmol/L 136   POTASSIUM mmol/L 4 5   CHLORIDE mmol/L 97*   CO2 mmol/L 35*   ANION GAP mmol/L 4   BUN mg/dL 18   CREATININE mg/dL 0 81   GLUCOSE RANDOM mg/dL 162*   CALCIUM mg/dL 8 9   AST U/L 38   ALT U/L 51   ALK PHOS U/L 30*   TOTAL PROTEIN g/dL 6 1*   ALBUMIN g/dL 2 5*   BILIRUBIN TOTAL mg/dL 0 83   EGFR ml/min/1 73sq m 75     Troponin:   0  Lab Value Date/Time   TROPONINI 6 10 (H) 11/04/2017 0557   TROPONINI 6 27 (H) 11/03/2017 1430   TROPONINI 6 40 (H) 11/03/2017 0730   TROPONINI 7 63 (H) 11/03/2017 0511   TROPONINI 7 41 (H) 11/03/2017 0132   TROPONINI 9 82 (H) 11/02/2017 2004   TROPONINI 7 57 (HH) 11/02/2017 1646     BNP:   Results from last 7 days  Lab Units 11/07/17  0438   SODIUM mmol/L 136   POTASSIUM mmol/L 4 5   CHLORIDE mmol/L 97*   CO2 mmol/L 35*   ANION GAP mmol/L 4   BUN mg/dL 18   CREATININE mg/dL 0 81   GLUCOSE RANDOM mg/dL 162*   CALCIUM mg/dL 8 9   EGFR ml/min/1 73sq m 75     Coags:   Results from last 7 days  Lab Units 11/07/17 0438  11/04/17  0438   PTT seconds 38*  < > 76*   INR   --   --  1 36*   < > = values in this interval not displayed  TSH:   Results from last 7 days  Lab Units 11/02/17 2004   TSH 3RD GENERATON uIU/mL 0 922     Magnesium:   Results from last 7 days  Lab Units 11/07/17  0438   MAGNESIUM mg/dL 2 1     Lipid Profile:     Imaging: I have personally reviewed pertinent films in PACS  Telemetry: HR in  range, no further PMVT  1 episode of NSVT 4 beats, PVC  VTE Pharmacologic Prophylaxis: Sequential compression device (Venodyne)     Counseling / Coordination of Care  Total time spent today 20 minutes

## 2017-11-07 NOTE — RESPIRATORY THERAPY NOTE
RT Ventilator Management Note  Sonia Bauman 79 y o  female MRN: [id number]  Unit/Bed#: [facility] 14 Encounter: [id number]      Daily Screen       11/6/2017 0808 11/7/2017 0736          Patient safety screen outcome[de-identified] Passed Passed      Spont breathing trial % for 30 min: Yes Yes      RSBI: - 72              Physical Exam:   Assessment Type: (P) Assess only  General Appearance: (P) Alert, Awake, Eyes open/responds to stimulus  Respiratory Pattern: (P) Spontaneous  Chest Assessment: (P) Chest expansion symmetrical  Bilateral Breath Sounds: (P) Coarse  Cough: Non-productive  Suction: (P) ET Tube  O2 Device: (P) vent      Resp Comments: (P) Pt appears to be resting comfortably on CPAP/PSV

## 2017-11-07 NOTE — CASE MANAGEMENT
Continued Stay Review    [address] [facility] in the [facility] by Sukh Belcher for 2017  Network Utilization Review Department  Phone: [phone]; Fax [phone]  ATTENTION: The Network Utilization Review Department is now centralized for our 7 Facilities  Make a note that we have a new phone and fax numbers for our Department  Please call with any questions or concerns to [phone] and carefully follow the prompts so that you are directed to the right person  All voicemails are confidential  Fax any determinations, approvals, denials, and requests for initial or continue stay review clinical to [phone]  Due to HIGH CALL volume, it would be easier if you could please send faxed requests to expedite your requests and in part, help us provide discharge notifications faster      Date: 11/7/2017    Vital Signs: /57   Pulse 78   Temp 97 9 °F (36 6 °C)   Resp 18   Ht 5' 11" (1 803 m)   Wt 130 kg (287 lb 7 7 oz)   SpO2 100%   BMI 40 10 kg/m²     Medications:   Scheduled Meds:   aspirin 324 mg Per NG Tube Daily   atorvastatin 40 mg Oral Daily   chlorhexidine 15 mL Swish & Spit Q12H [address]   famotidine 20 mg Intravenous BID   furosemide 40 mg Intravenous Q6H   heparin (porcine) 5,000 Units Subcutaneous Q8H [address]   insulin glargine 50 Units Subcutaneous Q12H [address]   levothyroxine 125 mcg Per G Tube Early Morning   melatonin 3 mg Oral HS   nafcillin 2,000 mg Intravenous Q4H JUAN LUIS   nystatin  Topical BID   piperacillin-tazobactam 4 5 g Intravenous Q6H   polyethylene glycol 17 g Oral Daily   senna 2 tablet Oral HS     Continuous Infusions:   insulin regular (HumuLIN R,NovoLIN R) infusion 0 3-21 Units/hr Last Rate: 4 Units/hr (11/07/17 0801)   lidocaine in dextrose 5% 1 mg/min Last Rate: 1 mg/min (11/06/17 0629)   norepinephrine 1-30 mcg/min Last Rate: 4 mcg/min (11/07/17 0809)     PRN Meds:   acetaminophen    fentanyl citrate (PF) iv 11/5 x 2 - 11/6 x 2 - 11/7 x 2    lidocaine (PF)    sodium bicarbonate    sodium chloride     Nursing orders - Telem - VS q 2 0 I & O q shift - Daily weights 0 Sequential compression device to le's- ET suction as needed - Elevate HOB - A line monitoring - Oral care - Diet enteral - parenteral  Tube feeding - Jevity 1 0 - vent weaning protocols - PT/OT     Abnormal Labs/Diagnostic Results:      Ref Range & Units 11/7/17 0438 11/7/17 0053 11/6/17 0437 11/5/17 2207 11/5/17 1549   Sodium 136 - 145 mmol/L 136  135   138  139  138    Potassium 3 5 - 5 3 mmol/L 4 5  3 7  4 8  3 9  4 0    Chloride 100 - 108 mmol/L 97   96   100  100  103    CO2 21 - 32 mmol/L 35   35   34   36   32    Anion Gap 4 - 13 mmol/L 4  4  4  3   3     BUN 5 - 25 mg/dL 18  19  19  19  19    Creatinine 0 60 - 1 30 mg/dL 0 81  0 77CM  0 82CM  0 80CM  0 82CM    Glucose 65 - 140 mg/dL 162   145CM   162CM   136CM  212CM     Calcium 8 3 - 10 1 mg/dL 8 9  8 7  9 0  8 7  8 5    AST 5 - 45 U/L 38        ALT 12 - 78 U/L 51        Alkaline Phosphatase 46 - 116 U/L 30         Total Protein 6 4 - 8 2 g/dL 6 1         Albumin 3 5 - 5 0 g/dL 2 5         Total Bilirubin 0 20 - 1 00 mg/dL 0 83        eGFR ml/min/1 73sq m 75  80  74  77  74             Ref Range & Units 11/7/17 0438 11/6/17 0437 11/5/17 0439 11/4/17 0438 11/3/17 1430   WBC 4 31 - 10 16 Thousand/uL 10 89   14 55   14 86   16 10   16 85     RBC 3 81 - 5 12 Million/uL 3 45   3 70   3 72   3 57   3 78     Hemoglobin 11 5 - 15 4 g/dL 9 8   10 5   10 7   10 3   11 0     Hematocrit 34 8 - 46 1 % 31 4   34 1   33 8   32 3   34 6     MCV 82 - 98 fL 91  92  91  91  92    MCH 26 8 - 34 3 pg 28 4  28 4  28 8  28 9  29 1    MCHC 31 4 - 37 4 g/dL 31 2   30 8   31 7  31 9  31 8    RDW 11 6 - 15 1 % 14 6  14 3  14 6  14 4  14 5    Platelets 192 - 862 Thousands/uL 211  239  288  279  324    MPV 8 9 - 12 7 fL 10 4  10 5  10 8  10 4  10 3            CXR - 11/7 - Slight improvement in central vascular congestion with persistent bibasilar pleural effusions and atelectasis  Age/Sex: 79 y o  female     Assessment/Plan:   Assessment:  1  Polymorphic VT  2  NSTEMI type 2  · Echo - 11/3/17 - LVEF 50%, hypokinesis of basal inferior, mild LVH, Grade 2 DD, small pericardial effusion  Mild-moderate MR, mild AI  · Echo - 6/12/17 - @[facility] - LVEF 35%, global hypokinesis with inferior akinesis, mild AI, mild-moderate MR, LVIDd 5 4  3  Chronic MV CAD, Ischemic CM  · Cath - 6/2017 at [facility]- chronic multi-vessel disease with LAD and RCA involvement --> no intervention  4  Chronic LBBB  5  Hypertension  6  Hyperlipidemia  7  Diabetes Mellitus Type 2  8  Pneumonia  9  Vocal cord paresis s/p Trach/PEG (7/2017)  10  H/o panic attack  · Was on lexapro at admission, although did not get any doses here  11  Morbid Obesity, Body mass index is 40 8 kg/m²       Plan:  · Off Dopamine since yesterday afternoon --> HR remains in  after that  · Has needed vasopressor support with NE - currently @4  · She could not be extubated yesterday as she was AMS and eventually tired out on PS by evening  · Getting SBT trial for extubation assessment today  · Awaiting cardiac cath after extubation    Discharge Plan:  Pt son Marilyn Alvarez is POA  To bring paperwork in - has a Son Conchita Ronquillo - incarcerated -and a daughter  She has minimal contact with

## 2017-11-07 NOTE — RESPIRATORY THERAPY NOTE
RT Ventilator Management Note  Cruzito Mccracken 79 y o  female MRN: [id number]  Unit/Bed#: [facility] 14 Encounter: [id number]      Daily Screen       11/5/2017 1604 11/6/2017 0808          Patient safety screen outcome[de-identified] Passed Passed      Spont breathing trial % for 30 min: - Yes              Physical Exam:   Assessment Type: (P) Assess only  General Appearance: (P) Sleeping  Respiratory Pattern: (P) Assisted  Chest Assessment: (P) Chest expansion symmetrical  Bilateral Breath Sounds: (P) Diminished, Clear  Suction: (P) ET Tube      Resp Comments: (P) Pt resting comfortably on ac settings, will continue to monitor throughout shift

## 2017-11-08 ENCOUNTER — APPOINTMENT (INPATIENT)
Dept: RADIOLOGY | Facility: HOSPITAL | Age: 67
DRG: 224 | End: 2017-11-08
Payer: COMMERCIAL

## 2017-11-08 LAB
ANION GAP SERPL CALCULATED.3IONS-SCNC: 4 MMOL/L (ref 4–13)
ANION GAP SERPL CALCULATED.3IONS-SCNC: 8 MMOL/L (ref 4–13)
ATRIAL RATE: 80 BPM
BUN SERPL-MCNC: 15 MG/DL (ref 5–25)
BUN SERPL-MCNC: 15 MG/DL (ref 5–25)
CALCIUM SERPL-MCNC: 9.1 MG/DL (ref 8.3–10.1)
CALCIUM SERPL-MCNC: 9.1 MG/DL (ref 8.3–10.1)
CHLORIDE SERPL-SCNC: 95 MMOL/L (ref 100–108)
CHLORIDE SERPL-SCNC: 95 MMOL/L (ref 100–108)
CO2 SERPL-SCNC: 36 MMOL/L (ref 21–32)
CO2 SERPL-SCNC: 36 MMOL/L (ref 21–32)
CREAT SERPL-MCNC: 0.75 MG/DL (ref 0.6–1.3)
CREAT SERPL-MCNC: 0.83 MG/DL (ref 0.6–1.3)
ERYTHROCYTE [DISTWIDTH] IN BLOOD BY AUTOMATED COUNT: 14.6 % (ref 11.6–15.1)
GFR SERPL CREATININE-BSD FRML MDRD: 73 ML/MIN/1.73SQ M
GFR SERPL CREATININE-BSD FRML MDRD: 83 ML/MIN/1.73SQ M
GLUCOSE SERPL-MCNC: 123 MG/DL (ref 65–140)
GLUCOSE SERPL-MCNC: 133 MG/DL (ref 65–140)
GLUCOSE SERPL-MCNC: 142 MG/DL (ref 65–140)
GLUCOSE SERPL-MCNC: 67 MG/DL (ref 65–140)
GLUCOSE SERPL-MCNC: 69 MG/DL (ref 65–140)
GLUCOSE SERPL-MCNC: 80 MG/DL (ref 65–140)
GLUCOSE SERPL-MCNC: 80 MG/DL (ref 65–140)
GLUCOSE SERPL-MCNC: 83 MG/DL (ref 65–140)
GLUCOSE SERPL-MCNC: 83 MG/DL (ref 65–140)
GLUCOSE SERPL-MCNC: 84 MG/DL (ref 65–140)
GLUCOSE SERPL-MCNC: 91 MG/DL (ref 65–140)
GLUCOSE SERPL-MCNC: 91 MG/DL (ref 65–140)
GLUCOSE SERPL-MCNC: 98 MG/DL (ref 65–140)
HCT VFR BLD AUTO: 32.5 % (ref 34.8–46.1)
HGB BLD-MCNC: 10.2 G/DL (ref 11.5–15.4)
MAGNESIUM SERPL-MCNC: 2.6 MG/DL (ref 1.6–2.6)
MCH RBC QN AUTO: 28.4 PG (ref 26.8–34.3)
MCHC RBC AUTO-ENTMCNC: 31.4 G/DL (ref 31.4–37.4)
MCV RBC AUTO: 91 FL (ref 82–98)
PLATELET # BLD AUTO: 230 THOUSANDS/UL (ref 149–390)
PMV BLD AUTO: 10.6 FL (ref 8.9–12.7)
POTASSIUM SERPL-SCNC: 3.8 MMOL/L (ref 3.5–5.3)
POTASSIUM SERPL-SCNC: 4.3 MMOL/L (ref 3.5–5.3)
QRS AXIS: 26 DEGREES
QRSD INTERVAL: 150 MS
QT INTERVAL: 404 MS
QTC INTERVAL: 472 MS
RBC # BLD AUTO: 3.59 MILLION/UL (ref 3.81–5.12)
SODIUM SERPL-SCNC: 135 MMOL/L (ref 136–145)
SODIUM SERPL-SCNC: 139 MMOL/L (ref 136–145)
T WAVE AXIS: 163 DEGREES
VENTRICULAR RATE: 82 BPM
WBC # BLD AUTO: 11.08 THOUSAND/UL (ref 4.31–10.16)

## 2017-11-08 PROCEDURE — 94760 N-INVAS EAR/PLS OXIMETRY 1: CPT

## 2017-11-08 PROCEDURE — 85027 COMPLETE CBC AUTOMATED: CPT | Performed by: NURSE PRACTITIONER

## 2017-11-08 PROCEDURE — 80048 BASIC METABOLIC PNL TOTAL CA: CPT | Performed by: INTERNAL MEDICINE

## 2017-11-08 PROCEDURE — 94640 AIRWAY INHALATION TREATMENT: CPT

## 2017-11-08 PROCEDURE — 94003 VENT MGMT INPAT SUBQ DAY: CPT

## 2017-11-08 PROCEDURE — 80048 BASIC METABOLIC PNL TOTAL CA: CPT | Performed by: NURSE PRACTITIONER

## 2017-11-08 PROCEDURE — 82948 REAGENT STRIP/BLOOD GLUCOSE: CPT

## 2017-11-08 PROCEDURE — 71010 HB CHEST X-RAY 1 VIEW FRONTAL (PORTABLE): CPT

## 2017-11-08 PROCEDURE — 94660 CPAP INITIATION&MGMT: CPT

## 2017-11-08 PROCEDURE — 74000 HB X-RAY EXAM OF ABDOMEN (SINGLE ANTEROPOSTERIOR VIEW): CPT

## 2017-11-08 PROCEDURE — 83735 ASSAY OF MAGNESIUM: CPT | Performed by: INTERNAL MEDICINE

## 2017-11-08 RX ORDER — INSULIN GLARGINE 100 [IU]/ML
50 INJECTION, SOLUTION SUBCUTANEOUS
Status: DISCONTINUED | OUTPATIENT
Start: 2017-11-08 | End: 2017-11-08

## 2017-11-08 RX ORDER — POTASSIUM CHLORIDE 29.8 MG/ML
40 INJECTION INTRAVENOUS ONCE
Status: COMPLETED | OUTPATIENT
Start: 2017-11-08 | End: 2017-11-09

## 2017-11-08 RX ORDER — DEXTROSE MONOHYDRATE 25 G/50ML
INJECTION, SOLUTION INTRAVENOUS
Status: COMPLETED
Start: 2017-11-08 | End: 2017-11-09

## 2017-11-08 RX ORDER — NOREPINEPHRINE BITARTRATE 1 MG/ML
INJECTION, SOLUTION INTRAVENOUS
Status: DISPENSED
Start: 2017-11-08 | End: 2017-11-09

## 2017-11-08 RX ORDER — DEXTROSE MONOHYDRATE 25 G/50ML
50 INJECTION, SOLUTION INTRAVENOUS ONCE
Status: COMPLETED | OUTPATIENT
Start: 2017-11-08 | End: 2017-11-08

## 2017-11-08 RX ORDER — METOPROLOL TARTRATE 5 MG/5ML
2.5 INJECTION INTRAVENOUS EVERY 12 HOURS
Status: DISCONTINUED | OUTPATIENT
Start: 2017-11-08 | End: 2017-11-09

## 2017-11-08 RX ORDER — ALBUMIN (HUMAN) 12.5 G/50ML
25 SOLUTION INTRAVENOUS ONCE
Status: COMPLETED | OUTPATIENT
Start: 2017-11-08 | End: 2017-11-09

## 2017-11-08 RX ORDER — FUROSEMIDE 10 MG/ML
40 INJECTION INTRAMUSCULAR; INTRAVENOUS
Status: DISCONTINUED | OUTPATIENT
Start: 2017-11-08 | End: 2017-11-09

## 2017-11-08 RX ORDER — ASPIRIN 300 MG/1
300 SUPPOSITORY RECTAL DAILY
Status: DISCONTINUED | OUTPATIENT
Start: 2017-11-09 | End: 2017-11-09

## 2017-11-08 RX ORDER — SODIUM CHLORIDE FOR INHALATION 0.9 %
3 VIAL, NEBULIZER (ML) INHALATION EVERY 4 HOURS PRN
Status: DISCONTINUED | OUTPATIENT
Start: 2017-11-08 | End: 2017-11-09

## 2017-11-08 RX ORDER — LEVOTHYROXINE SODIUM ANHYDROUS 100 UG/5ML
125 INJECTION, POWDER, LYOPHILIZED, FOR SOLUTION INTRAVENOUS DAILY
Status: DISCONTINUED | OUTPATIENT
Start: 2017-11-09 | End: 2017-11-09

## 2017-11-08 RX ORDER — POTASSIUM CHLORIDE 20 MEQ/1
40 TABLET, EXTENDED RELEASE ORAL ONCE
Status: DISCONTINUED | OUTPATIENT
Start: 2017-11-08 | End: 2017-11-08

## 2017-11-08 RX ORDER — SODIUM CHLORIDE 9 MG/ML
75 INJECTION, SOLUTION INTRAVENOUS CONTINUOUS
Status: DISCONTINUED | OUTPATIENT
Start: 2017-11-08 | End: 2017-11-08

## 2017-11-08 RX ORDER — DEXTROSE MONOHYDRATE 25 G/50ML
INJECTION, SOLUTION INTRAVENOUS
Status: COMPLETED
Start: 2017-11-08 | End: 2017-11-08

## 2017-11-08 RX ORDER — LEVALBUTEROL 1.25 MG/.5ML
1.25 SOLUTION, CONCENTRATE RESPIRATORY (INHALATION) EVERY 4 HOURS PRN
Status: DISCONTINUED | OUTPATIENT
Start: 2017-11-08 | End: 2017-11-09

## 2017-11-08 RX ORDER — ACETAMINOPHEN 650 MG/1
650 SUPPOSITORY RECTAL EVERY 4 HOURS PRN
Status: DISCONTINUED | OUTPATIENT
Start: 2017-11-08 | End: 2017-11-16

## 2017-11-08 RX ORDER — POTASSIUM CHLORIDE 29.8 MG/ML
40 INJECTION INTRAVENOUS ONCE
Status: COMPLETED | OUTPATIENT
Start: 2017-11-08 | End: 2017-11-08

## 2017-11-08 RX ADMIN — DEXTROSE MONOHYDRATE 50 ML: 25 INJECTION, SOLUTION INTRAVENOUS at 04:04

## 2017-11-08 RX ADMIN — METOROPROLOL TARTRATE 2.5 MG: 5 INJECTION, SOLUTION INTRAVENOUS at 23:19

## 2017-11-08 RX ADMIN — FUROSEMIDE 40 MG: 10 INJECTION, SOLUTION INTRAMUSCULAR; INTRAVENOUS at 03:58

## 2017-11-08 RX ADMIN — LEVALBUTEROL HYDROCHLORIDE 1.25 MG: 1.25 SOLUTION, CONCENTRATE RESPIRATORY (INHALATION) at 19:14

## 2017-11-08 RX ADMIN — FENTANYL CITRATE 50 MCG: 50 INJECTION INTRAMUSCULAR; INTRAVENOUS at 04:16

## 2017-11-08 RX ADMIN — POTASSIUM CHLORIDE 40 MEQ: 400 INJECTION, SOLUTION INTRAVENOUS at 01:15

## 2017-11-08 RX ADMIN — NAFCILLIN SODIUM 2000 MG: 2 INJECTION, POWDER, LYOPHILIZED, FOR SOLUTION INTRAMUSCULAR; INTRAVENOUS at 11:21

## 2017-11-08 RX ADMIN — POLYETHYLENE GLYCOL 3350 17 G: 17 POWDER, FOR SOLUTION ORAL at 11:24

## 2017-11-08 RX ADMIN — HEPARIN SODIUM 5000 UNITS: 5000 INJECTION, SOLUTION INTRAVENOUS; SUBCUTANEOUS at 05:21

## 2017-11-08 RX ADMIN — FUROSEMIDE 40 MG: 10 INJECTION, SOLUTION INTRAMUSCULAR; INTRAVENOUS at 11:24

## 2017-11-08 RX ADMIN — SODIUM CHLORIDE 500 ML: 0.9 INJECTION, SOLUTION INTRAVENOUS at 19:17

## 2017-11-08 RX ADMIN — ISODIUM CHLORIDE 3 ML: 0.03 SOLUTION RESPIRATORY (INHALATION) at 19:14

## 2017-11-08 RX ADMIN — FUROSEMIDE 40 MG: 10 INJECTION, SOLUTION INTRAMUSCULAR; INTRAVENOUS at 22:12

## 2017-11-08 RX ADMIN — NYSTATIN: 100000 POWDER TOPICAL at 19:22

## 2017-11-08 RX ADMIN — Medication 125 MCG: at 05:19

## 2017-11-08 RX ADMIN — SODIUM CHLORIDE 0.5 UNITS/HR: 9 INJECTION, SOLUTION INTRAVENOUS at 00:01

## 2017-11-08 RX ADMIN — NYSTATIN: 100000 POWDER TOPICAL at 11:38

## 2017-11-08 RX ADMIN — FAMOTIDINE 20 MG: 10 INJECTION, SOLUTION INTRAVENOUS at 11:33

## 2017-11-08 RX ADMIN — HEPARIN SODIUM 5000 UNITS: 5000 INJECTION, SOLUTION INTRAVENOUS; SUBCUTANEOUS at 23:19

## 2017-11-08 RX ADMIN — ASPIRIN 81 MG 324 MG: 81 TABLET ORAL at 11:23

## 2017-11-08 RX ADMIN — SODIUM CHLORIDE 75 ML/HR: 0.9 INJECTION, SOLUTION INTRAVENOUS at 21:17

## 2017-11-08 RX ADMIN — HEPARIN SODIUM 5000 UNITS: 5000 INJECTION, SOLUTION INTRAVENOUS; SUBCUTANEOUS at 14:22

## 2017-11-08 RX ADMIN — FAMOTIDINE 20 MG: 10 INJECTION, SOLUTION INTRAVENOUS at 19:24

## 2017-11-08 RX ADMIN — NAFCILLIN SODIUM 2000 MG: 2 INJECTION, POWDER, LYOPHILIZED, FOR SOLUTION INTRAMUSCULAR; INTRAVENOUS at 16:24

## 2017-11-08 RX ADMIN — ATORVASTATIN CALCIUM 40 MG: 40 TABLET, FILM COATED ORAL at 11:23

## 2017-11-08 RX ADMIN — FENTANYL CITRATE 50 MCG: 50 INJECTION INTRAMUSCULAR; INTRAVENOUS at 02:38

## 2017-11-08 RX ADMIN — POTASSIUM CHLORIDE 40 MEQ: 400 INJECTION, SOLUTION INTRAVENOUS at 03:53

## 2017-11-08 RX ADMIN — CHLORHEXIDINE GLUCONATE 15 ML: 1.2 RINSE ORAL at 11:23

## 2017-11-08 RX ADMIN — ALBUMIN HUMAN 25 G: 0.25 SOLUTION INTRAVENOUS at 19:17

## 2017-11-08 RX ADMIN — NAFCILLIN SODIUM 2000 MG: 2 INJECTION, POWDER, LYOPHILIZED, FOR SOLUTION INTRAMUSCULAR; INTRAVENOUS at 04:07

## 2017-11-08 RX ADMIN — NAFCILLIN SODIUM 2000 MG: 2 INJECTION, POWDER, LYOPHILIZED, FOR SOLUTION INTRAMUSCULAR; INTRAVENOUS at 07:45

## 2017-11-08 RX ADMIN — SODIUM CHLORIDE 500 ML: 0.9 INJECTION, SOLUTION INTRAVENOUS at 17:13

## 2017-11-08 RX ADMIN — NAFCILLIN SODIUM 2000 MG: 2 INJECTION, POWDER, LYOPHILIZED, FOR SOLUTION INTRAMUSCULAR; INTRAVENOUS at 21:15

## 2017-11-08 NOTE — PROGRESS NOTES
Progress Note - Critical Care   Nannette Lorenzana 79 y o  female MRN: [id number]  Unit/Bed#: MICU 14 Encounter: [id number]    Attending Physician: Neville Prado MD  ______________________________________________________________________  Assessment and Plan:   Principal Problem:    Respiratory failure [facility])  Active Problems:    Cardiac arrest [facility])    Acute hypercapnic/hypoxic respiratory failure ([facility] [address] [facility] )    Torsades de pointes ([facility] )    Prolonged QT interval    NSTEMI (non-ST elevated myocardial infarction) ([facility] )    Ischemic cardiomyopathy    LBBB (left bundle branch block)    CAD (coronary artery disease)    h/o Vocal cord paralysis    Encephalopathy    Delirium    HTN (hypertension)    Hyperlipemia    Obesity, Class III, BMI 40-49 9 (morbid obesity) ([place] [address] )  Resolved Problems:    * No resolved hospital problems  *      Neuro: Serial neuro exams  Melatonin for sleep  CAM-ICU, Delirium precautions  PRN Fentanyl  Avoid QTc prolonging agents  Lexapro on hold  CV: S/P Vfib arrest and episodes of torsades de pointes  Continue to wean off Levofed for MAP >65  Lidocaine gtt discontinued yesterday  Will need AICD and cath following extubation  NSTEMI type 2: ASA, Statin, consider starting betablocker if BP remains stable  Replete electrolytes to keep K> 4 0; Mag > 2 0   Appreciate cardiology input    Pulm: Assess SBT trial again today with the goal to extubate  Diuresis with Lasix; negative 5 5 liters in last 24 hours  Net +113 ml  Decrease Lasix to 40mg IV BID  GI: Emesis x 3 overnight, no residuals, tube feeds remain on hold  Check KUB  Hold tube feeds for now for planned extubation  Pepcid for Prophylaxis  Bowel regimen with Senna and Miralax  PRN Tigan for nausea  : Beckford present  ID: Vanco and Zosyn completed; Nafcillin 2000mg Q4 hours added for MSSA on cultures     Heme: S/P heparin infusion stopped 2 days ago  Heparin SQ for DVT prophylaxis     Endo: Insulin gtt off   Overnight glucose of 67 s/p one amp of D50  Lantus 50 units Q12 hours  Hold am dose of Lantus; will adjust insulin as indicated based on glucose trends  Continue levothyroxine for hypothyroidism  Msk/Skin: Frequent repositioning to offload pressure points  Will need PT/OT once extubated  Disposition: Continue MICU status    Code Status: Level 2 - DNAR: but accepts endotracheal intubation    ______________________________________________________________________    Chief Complaint: Patient shakes head no to pain  24 Hour Events:   Emesis x 3; no residuals, OG tube replaced, Tube feeds on hold  Glucose 67; sp 1 amp D50, BS now 123  Insulin gtt off  Lidocaine gtt discontinued  + ectopy; improved with electrolyte replacement; K 3 6 to 3 8;  KCL 40meq IV x 4 doses    ______________________________________________________________________    Physical Exam:   Physical Exam   Constitutional: She appears well-developed  She is easily aroused  No distress  She is intubated  HENT:   Head: Normocephalic and atraumatic  Mouth/Throat: Mucous membranes are normal    Eyes: EOM are normal  Pupils are equal, round, and reactive to light  Right eye exhibits no discharge  Left eye exhibits no discharge  Neck: Normal range of motion  No JVD present  No tracheal deviation present  Cardiovascular: Regular rhythm and intact distal pulses  Exam reveals no distant heart sounds  HR irregular at times with PVC's    Pulmonary/Chest: Effort normal  She is intubated  No respiratory distress  She has decreased breath sounds  She has no wheezes  Lungs coarse   Abdominal: Soft  Bowel sounds are normal  She exhibits no distension  There is no tenderness  Large, obese   Genitourinary:   Genitourinary Comments: Beckford to gravity drainage, yellow urine   Neurological: She is alert and easily aroused  She has normal strength  GCS eye subscore is 4  GCS motor subscore is 6  GCS 10 T   Skin: Skin is warm and dry  No rash noted  There is pallor     Psychiatric: She has a normal mood and affect          ______________________________________________________________________  Vitals:    17 0300 17 0400 17 0500 17 0527   BP:       Pulse: 86 82 84    Resp: (!) 28 16 16    Temp: 99 °F (37 2 °C) 99 °F (37 2 °C) 98 6 °F (37 °C)    TempSrc: Probe Probe Probe    SpO2: 100% 100% 100%    Weight:    126 kg (278 lb)   Height:           Temperature:   Temp (24hrs), Av 3 °F (36 8 °C), Min:97 5 °F (36 4 °C), Max:99 °F (37 2 °C)    Current Temperature: 98 6 °F (37 °C)  Weights:   IBW: 70 8 kg    Body mass index is 38 77 kg/m²    Weight (last 2 days)     Date/Time   Weight    17 0527  126 (278)    17 0600  130 (287 48)            Non-Invasive/Invasive Ventilation Settings:  Respiratory    Lab Data (Last 4 hours)    None         O2/Vent Data (Last 4 hours)       031           Vent Mode AC/VC       Resp Rate (BPM) (BPM) 16       Vt (mL) (mL) 420       FIO2 (%) (%) 40       PEEP (cmH2O) (cmH2O) 5       MV 14                 No results found for: PHART, UVW9OVZ, PO2ART, GGD3VJL, J5CMQNZH, BEART, SOURCE  SpO2: SpO2: 100 %  Intake and Outputs:  I/O       701 -  07 -  07    I V  (mL/kg) 1127 5 (8 7) 477 9 (3 8)    NG/ 80    IV Piggyback 450 650    Feedings 1529 640    Total Intake(mL/kg) 3206 5 (24 7) 1847 9 (14 7)    Urine (mL/kg/hr) 4700 (1 5) 7410 (2 5)    Emesis/NG output  0 (0)    Stool  0 (0)    Total Output 4700 7410    Net -1493 0 -4678 1          Unmeasured Stool Occurrence  1 x    Unmeasured Emesis Occurrence  3 x        UOP: 130-450ml/hour   Nutrition:        Diet Orders            Start     Ordered    17 0740  Diet Enteral/Parenteral; Tube Feeding No Oral Diet; Jevity 1 0; 80  Diet effective now     Question Answer Comment   Diet Type Enteral/Parenteral    Enteral/Parenteral Tube Feeding No Oral Diet    Tube Feeding Formula: Jevity 1 0    Tube Feeding Continuous rate (mL/hr): 80    RD to adjust diet per protocol? Yes        11/04/17 0739        TF currently running at zero/hour with a goal of 80  Formula: Jevity 1 0  Overnight with 3 episodes of large amounts of emesis; OG tube replaced, Tube feeds remain off  No further episodes of vomiting since tube feeds have been off  Labs:     Results from last 7 days  Lab Units 11/08/17 0448 11/07/17  0438 11/06/17  0437  11/04/17  0438 11/03/17  1430  11/02/17 2004   WBC Thousand/uL 11 08* 10 89* 14 55*  < > 16 10* 16 85*  < > 15 12*   HEMOGLOBIN g/dL 10 2* 9 8* 10 5*  < > 10 3* 11 0*  < > 11 9   HEMATOCRIT % 32 5* 31 4* 34 1*  < > 32 3* 34 6*  < > 37 5   PLATELETS Thousands/uL 230 211 239  < > 279 324  < > 324   NEUTROS PCT %  --   --   --   --  65 82*  --  77*   MONOS PCT %  --   --   --   --  10 8  --  10   < > = values in this interval not displayed  Results from last 7 days  Lab Units 11/08/17 0448 11/08/17  0036 11/07/17  1822 11/07/17  0438  11/04/17  0438  11/03/17  1430   SODIUM mmol/L 135* 139 138 136  < > 145  < > 143   POTASSIUM mmol/L 4 3 3 8 3 6 4 5  < > 3 9  < > 2 9*   CHLORIDE mmol/L 95* 95* 95* 97*  < > 108  < > 104   CO2 mmol/L 36* 36* 37* 35*  < > 33*  < > 34*   BUN mg/dL 15 15 16 18  < > 27*  < > 26*   CREATININE mg/dL 0 83 0 75 0 73 0 81  < > 1 01  < > 1 00   CALCIUM mg/dL 9 1 9 1 9 0 8 9  < > 9 1  < > 9 2   TOTAL PROTEIN g/dL  --   --   --  6 1*  --  6 1*  --  6 1*   BILIRUBIN TOTAL mg/dL  --   --   --  0 83  --  0 92  --  0 60   ALK PHOS U/L  --   --   --  30*  --  28*  --  31*   ALT U/L  --   --   --  51  --  75  --  73   AST U/L  --   --   --  38  --  97*  --  119*   GLUCOSE RANDOM mg/dL 142* 98 100 162*  < > 168*  < > 270*   < > = values in this interval not displayed      Results from last 7 days  Lab Units 11/08/17  0036 11/07/17  1822 11/07/17  0438   MAGNESIUM mg/dL 2 6 2 2 2 1     Lab Results   Component Value Date    PHOS 3 9 11/07/2017    PHOS 3 5 11/07/2017    PHOS 3 8 11/06/2017        Results from last 7 days  Lab Units 11/07/17  0438 11/06/17  0437 11/05/17  0439  11/04/17  0438  11/03/17  0511 11/02/17 2004   INR   --   --   --   --  1 36*  --  1 32* 1 36*   PTT seconds 38* 75* 77*  < > 76*  < > 61* 36*   < > = values in this interval not displayed  0  Lab Value Date/Time   TROPONINI 6 10 (H) 11/04/2017 0557   TROPONINI 6 27 (H) 11/03/2017 1430   TROPONINI 6 40 (H) 11/03/2017 0730   TROPONINI 7 63 (H) 11/03/2017 0511   TROPONINI 7 41 (H) 11/03/2017 0132   TROPONINI 9 82 (H) 11/02/2017 2004   TROPONINI 7 57 (HH) 11/02/2017 1646       Results from last 7 days  Lab Units 11/03/17  1430 11/03/17  0548 11/03/17  0037   LACTIC ACID mmol/L 2 2* 1 7 2 0     ABG:  Lab Results   Component Value Date    PHART 7 477 (H) 11/04/2017    JOR9NSC 43 0 11/04/2017    PO2ART 87 9 11/04/2017    DNL1TCM 31 1 (H) 11/04/2017    BEART 6 8 11/04/2017    SOURCE Line, Arterial 11/04/2017     Imaging: CXR 11/7: Slight improvement in central vascular congestion with persistent bibasilar pleural effusions and atelectasis  I have personally reviewed pertinent reports  and I have personally reviewed pertinent films in PACS    Micro:  Lab Results   Component Value Date    BLOODCX No Growth After 5 Days  11/02/2017    BLOODCX No Growth After 5 Days   11/02/2017    SPUTUMCULTUR 1+ Growth of Staphylococcus aureus (A) 11/03/2017     Allergies: No Known Allergies  Medications:   Scheduled Meds:  aspirin 324 mg Per NG Tube Daily   atorvastatin 40 mg Oral Daily   chlorhexidine 15 mL Swish & Spit Q12H [facility]   famotidine 20 mg Intravenous BID   furosemide 40 mg Intravenous Q6H   heparin (porcine) 5,000 Units Subcutaneous Q8H [facility]   insulin glargine 50 Units Subcutaneous Q12H [facility]   levothyroxine 125 mcg Per G Tube Early Morning   melatonin 3 mg Oral HS   nafcillin 2,000 mg Intravenous Q4H JUAN LUIS   nystatin  Topical BID   polyethylene glycol 17 g Oral Daily   potassium chloride 40 mEq Intravenous Once   senna 2 tablet Oral HS     Continuous Infusions:  insulin regular (HumuLIN R,NovoLIN R) infusion 0 3-21 Units/hr Last Rate: Stopped (11/08/17 0005)   norepinephrine 1-30 mcg/min Last Rate: 1 mcg/min (11/08/17 0044)     PRN Meds:    acetaminophen 650 mg Q4H PRN   fentanyl citrate (PF) 50 mcg Q2H PRN   lidocaine (PF)  Code/Trauma/Sedation Med   sodium bicarbonate  Code/Trauma/Sedation Med   sodium chloride  Code/Trauma/Sedation Med   trimethobenzamide 200 mg Q6H PRN     VTE Pharmacologic Prophylaxis: Heparin  VTE Mechanical Prophylaxis: sequential compression device  Invasive lines and devices: Invasive Devices     Central Venous Catheter Line            CVC Central Lines 11/03/17 Triple 16cm 4 days          Arterial Line            Arterial Line 11/03/17 Right Radial 4 days          Drain            Urethral Catheter Temperature probe 5 days    NG/OG/Enteral Tube Orogastric 16 Fr Center mouth less than 1 day          Airway            ETT  Cuffed 8 mm 4 days                     Portions of the record may have been created with voice recognition software  Occasional wrong word or "sound a like" substitutions may have occurred due to the inherent limitations of voice recognition software  Read the chart carefully and recognize, using context, where substitutions have occurred      Supriya Barefoot

## 2017-11-08 NOTE — SOCIAL WORK
Pt extubated  CM attempted to meet with pt  Pt awake, alert, but confused  CM phoned pt son Maxine King  CM left VM requesting call back

## 2017-11-08 NOTE — RESPIRATORY THERAPY NOTE
RT Protocol Note  Lorraine Caceres 79 y o  female MRN: [id number]  Unit/Bed#: MICU 14 Encounter: [id number]    Assessment    Principal Problem:    Respiratory failure ([place] [address] )  Active Problems:    Cardiac arrest ([place] [address] )    Acute hypercapnic/hypoxic respiratory failure ([place] [address] )    Torsades de pointes ([place] [address] )    Prolonged QT interval    NSTEMI (non-ST elevated myocardial infarction) ([place] [address] )    Ischemic cardiomyopathy    LBBB (left bundle branch block)    CAD (coronary artery disease)    h/o Vocal cord paralysis    HTN (hypertension)    Hyperlipemia    Encephalopathy    Obesity, Class III, BMI 40-49 9 (morbid obesity) ([place] [address] )    Delirium      Home Pulmonary Medications:  None    Past Medical History:   Diagnosis Date    Anxiety     Diabetes mellitus ([place] [address] )     Hypertension     Pancreatitis      Social History     Social History    Marital status: Unknown     Spouse name: N/A    Number of children: N/A    Years of education: N/A     Social History Main Topics    Smoking status: Current Every Day Smoker     Packs/day: 1 00     Years: 40 00     Types: Cigarettes    Smokeless tobacco: Never Used    Alcohol use No    Drug use: No    Sexual activity: Not Asked     Other Topics Concern    None     Social History Narrative    None       Subjective         Objective    Physical Exam:   Assessment Type: Assess only  General Appearance: Alert, Awake  Respiratory Pattern: Normal, Spontaneous  Chest Assessment: Chest expansion symmetrical  Bilateral Breath Sounds: Coarse, Diminished  Cough: Non-productive  Suction: ET Tube, Oral  O2 Device: Nasal cannule    Vitals:  Blood pressure 106/57, pulse 88, temperature 98 6 °F (37 °C), temperature source Probe, resp  rate 18, height 5' 11" (1 803 m), weight 126 kg (278 lb), SpO2 100 %        Results from last 7 days  Lab Units 11/04/17  0438 11/03/17  0930   PH ART  7 477* 7 392   PCO2 ART mm Hg 43 0 48 9*   PO2 ART mm Hg 87 9 115 4   HCO3 ART mmol/L 31 1* 29 1*   BASE EXC ART mmol/L 6 8 3 4 O2 CONTENT ART mL/dL 15 0* 15 7*   O2 HGB, ARTERIAL % 95 6 97 0   ABG SOURCE  Line, Arterial Radial, Right   ROBB TEST   --  Yes       Imaging and other studies: I have personally reviewed pertinent reports        O2 Device: Nasal cannule     Plan    Respiratory Plan: Discontinue Protocol  Airway Clearance Plan: Incentive Spirometer     Resp Comments: Extubated pt and placed on 4L NC

## 2017-11-08 NOTE — RESPIRATORY THERAPY NOTE
RT Ventilator Management Note  Sade Ruby 79 y o  female MRN: [id number]  [facility]: [id number]      Daily Screen       11/6/2017 0808 11/7/2017 0736          Patient safety screen outcome[de-identified] Passed Passed      Spont breathing trial % for 30 min: Yes Yes      RSBI: - 72              Physical Exam:   Assessment Type: Assess only  General Appearance: Awake, Alert  Respiratory Pattern: Assisted  Chest Assessment: Chest expansion symmetrical  Bilateral Breath Sounds: Diminished, Coarse  Cough: Productive  Suction: ET Tube  O2 Device: vent      Resp Comments: no changes made to the vent settings at this time pt is tollerating AC settings for HS , did sux moderate amount of secretions, will cont to monitor pt overnight

## 2017-11-08 NOTE — RESPIRATORY THERAPY NOTE
RT Ventilator Management Note  Elias Cordero 79 y o  female MRN: [id number]  Unit/Bed#: [facility] 14 Encounter: [id number]      Daily Screen       11/8/2017 0740 11/8/2017 1240          Patient safety screen outcome[de-identified] Passed Passed      Spont breathing trial % for 30 min: Yes Yes      Spont breathing trial outcome[de-identified] - Passed      Name of Medical Team Notified[de-identified] - Dr Jadiel Gaitan      Preparing to extubate/ Notify Nurse: - Yes      Extubation order obtained: - Yes      Consider Cuff Test: - Yes      Patient extubated: - Yes              Physical Exam:   Assessment Type: Assess only  General Appearance: Alert, Awake  Respiratory Pattern: Normal, Spontaneous  Chest Assessment: Chest expansion symmetrical  Bilateral Breath Sounds: Coarse, Diminished  Cough: Non-productive  Suction: ET Tube, Oral  O2 Device: Nasal cannule      Resp Comments: Extubated pt and placed on 4L NC

## 2017-11-08 NOTE — PROGRESS NOTES
Progress Note - Electrophysiology-Cardiology (EP)   Ann Laurent 79 y o  female MRN: [id number]  [facility] [id number]    Assessment:  1  Polymorphic VT  2  NSTEMI type 2  · Echo - 11/3/17 - LVEF 50%, hypokinesis of basal inferior, mild LVH, Grade 2 DD, small pericardial effusion  Mild-moderate MR, mild AI  · Echo - 6/12/17 - @[facility] - LVEF 35%, global hypokinesis with inferior akinesis, mild AI, mild-moderate MR, LVIDd 5 4  3  Chronic MV CAD, Ischemic CM  · Cath - [facility]- chronic multi-vessel disease with LAD and RCA involvement --> no intervention  4  Chronic LBBB  5  Hypertension  6  Hyperlipidemia  7  Diabetes Mellitus Type 2  8  Pneumonia  9  Vocal cord paresis s/p Trach/PEG (7/2017)  10  H/o panic attack  · Was on lexapro at admission, although did not get any doses here  11  Morbid Obesity, Body mass index is 40 8 kg/m²       Plan:  · Was taken off lidocaine drip yesterday afternoon  · No further episodes of PMVT, while off lidocaine  · Does have some occasional ectopy - PAC with aberrancy vs PVCs  · HR remains   · Still remains intubated and awaiting cardiac cath  · Eventual decision on ICD after cath  · Is diuresing well on IV lasix 40 q6h --> UO 7 4 L yesterday, Net -5 5 L  · Keep K>4, Mg>2    Subjective/Objective   Subjective: Awake, but intubated  Responds appropriately with gestures to all questions  Objective: Still on minimal vasopressor support - NE @ 1 15, with MAP       Vitals: /57   Pulse 86   Temp 99 °F (37 2 °C) (Probe)   Resp 17   Ht 5' 11" (1 803 m)   Wt 126 kg (278 lb)   SpO2 100%   BMI 38 77 kg/m²   Vitals:    11/07/17 0600 11/08/17 0527   Weight: 130 kg (287 lb 7 7 oz) 126 kg (278 lb)     Orthostatic Blood Pressures    Flowsheet Row Most Recent Value   Blood Pressure  106/57 filed at 11/03/2017 1615   Patient Position - Orthostatic VS  Lying filed at 11/03/2017 0421            Intake/Output Summary (Last 24 hours) at 11/08/17 0857  Last data filed at 11/08/17 0555   Gross per 24 hour   Intake          1625 34 ml   Output             6835 ml   Net         -5209 66 ml       Invasive Devices     Central Venous Catheter Line            CVC Central Lines 11/03/17 Triple 16cm 4 days          Arterial Line            Arterial Line 11/03/17 Right Radial 4 days          Drain            Urethral Catheter Temperature probe 5 days    NG/OG/Enteral Tube Orogastric 16 Fr Center mouth less than 1 day          Airway            ETT  Cuffed 8 mm 5 days                Review of Systems: unable to perform, as patient is intubated    Physical Exam:   Constitutional:  Alert, cooperative, no acute distress   HEENT:    Normocephalic, atraumatic   Neck:  No JVD, No stridor   Lungs:  Clear to auscultation bilaterally, respirations unlabored    Chest Wall:  No tenderness or deformity    Heart::  Regular rate, Regular rhythm, S1 and S2 normal, no murmur, rub or gallop    Abdomen:  Soft, non-tender, non-distended   Neurological:  Awake, alert, oriented x3   Extremities:  B/l 1+ pedal edema, No calf tenderness         Lab Results:   I have personally reviewed pertinent lab results      CBC with diff:   Results from last 7 days  Lab Units 11/08/17  0448   WBC Thousand/uL 11 08*   RBC Million/uL 3 59*   HEMOGLOBIN g/dL 10 2*   HEMATOCRIT % 32 5*   MCV fL 91   MCH pg 28 4   MCHC g/dL 31 4   RDW % 14 6   MPV fL 10 6   PLATELETS Thousands/uL 230     CMP:   Results from last 7 days  Lab Units 11/08/17  0448  11/07/17  0438   SODIUM mmol/L 135*  < > 136   POTASSIUM mmol/L 4 3  < > 4 5   CHLORIDE mmol/L 95*  < > 97*   CO2 mmol/L 36*  < > 35*   ANION GAP mmol/L 4  < > 4   BUN mg/dL 15  < > 18   CREATININE mg/dL 0 83  < > 0 81   GLUCOSE RANDOM mg/dL 142*  < > 162*   CALCIUM mg/dL 9 1  < > 8 9   AST U/L  --   --  38   ALT U/L  --   --  51   ALK PHOS U/L  --   --  30*   TOTAL PROTEIN g/dL  --   --  6 1*   ALBUMIN g/dL  --   --  2 5*   BILIRUBIN TOTAL mg/dL  --   --  0 83   EGFR ml/min/1 73sq m 73  < > 75   < > = values in this interval not displayed  Troponin:   0  Lab Value Date/Time   TROPONINI 6 10 (H) 11/04/2017 0557   TROPONINI 6 27 (H) 11/03/2017 1430   TROPONINI 6 40 (H) 11/03/2017 0730   TROPONINI 7 63 (H) 11/03/2017 0511   TROPONINI 7 41 (H) 11/03/2017 0132   TROPONINI 9 82 (H) 11/02/2017 2004   TROPONINI 7 57 (HH) 11/02/2017 1646     BNP:   Results from last 7 days  Lab Units 11/08/17  0448   SODIUM mmol/L 135*   POTASSIUM mmol/L 4 3   CHLORIDE mmol/L 95*   CO2 mmol/L 36*   ANION GAP mmol/L 4   BUN mg/dL 15   CREATININE mg/dL 0 83   GLUCOSE RANDOM mg/dL 142*   CALCIUM mg/dL 9 1   EGFR ml/min/1 73sq m 73     Coags:   Results from last 7 days  Lab Units 11/07/17  0438  11/04/17  0438   PTT seconds 38*  < > 76*   INR   --   --  1 36*   < > = values in this interval not displayed  TSH:   Results from last 7 days  Lab Units 11/02/17 2004   TSH 3RD GENERATON uIU/mL 0 922     Magnesium:   Results from last 7 days  Lab Units 11/08/17  0036   MAGNESIUM mg/dL 2 6     Lipid Profile:     Imaging: I have personally reviewed pertinent films in PACS  telemetry: occasional ectopic beats - PACs with aberrancy v/s PVCs     VTE Prophylaxis: sequential compression device    Counseling / Coordination of Care  Total time spent today 25 minutes

## 2017-11-08 NOTE — RESPIRATORY THERAPY NOTE
RT Ventilator Management Note  Nannette Lorenzana 79 y o  female MRN: [id number]  Unit/Bed#: [facility] 14 Encounter: [id number]      Daily Screen       11/7/2017 0736 11/8/2017 0740          Patient safety screen outcome[de-identified] Passed Passed      Spont breathing trial % for 30 min: Yes Yes      RSBI: 72 -              Physical Exam:   Assessment Type: Assess only  General Appearance: Alert, Awake, Eyes open/responds to voice  Respiratory Pattern: Assisted  Chest Assessment: Chest expansion symmetrical  Bilateral Breath Sounds: Diminished, Coarse  Cough: Non-productive  Suction: ET Tube  O2 Device: vent      Resp Comments: Pt remains on vent  Placed pt on 100% tube comp trial  Pt tolerating procedure well at this time

## 2017-11-08 NOTE — PROGRESS NOTES
Progress Note - Cardiology   Armani Mathias 79 y o  female MRN: [id number]  Unit/Bed#: MICU 14 Encounter: [id number]  11/08/17  10:51 AM        Assessment/Plan:    1  NSTEMI type II  On aspirin, statin, no beta blocker currently due to hypotension  2  Chronic multivessel CAD  On aspirin, statin, no beta blocker currently due to hypotension  Has been medically managed per report, cath done at [facility]  Unable to see actual report of cath from 6/17  Will need repeat cath due to PMVT after patient successfully extubated  3  Chronic LBBB  Still present on telemetry  4  ICM with EF 35%  By echo here, EF improved to 50%  Currently on Lasix 40 IV q6hrs to help with iatrogenic volume overload, now close to even in terms of I/Os since admission  Hoping to help with extubation  5  HTN  Currently on inotropes  6  HL  On statin  7  DM  On insulin  8  PMVT  Lidocaine drip stopped 11/8  No further episodes since 11/13  Keep K>4, Mg>2      9  PNA  On nafcillin  Last fever 11/5  Subjective/Objective   Chief Complaint: No chief complaint on file  Subjective:79 year old woman with a history of multivessel disease managed medically, ICM with EF 35%, chronic LBBB, DM, 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, admitted after being found to at home, found on telemetry here to have an episode of PVC induced PMVT with baseline prolonged QT on 11/3 that resolved spontaneously per report  She was started on IV amiodarone, but had a second episode of torsades on 11/3, and was switched from Amiodarone to Lidocaine drip at that time, and was kept on dopamine to keep HR elevated as well as for inotropic effect  She currently remains on norepinephrine, lidocaine drip stopped 11/8  She remains intubated, although awake and calm   Febrile to 101 1 on 11/5, last fever 100 4 11/5 PM        Patient Active Problem List   Diagnosis    Respiratory failure ([facility] )    Cardiac arrest ([facility] )    Acute hypercapnic/hypoxic respiratory failure (HCC)    Torsades de pointes (HCC)    Prolonged QT interval    NSTEMI (non-ST elevated myocardial infarction) (HCC)    Ischemic cardiomyopathy    LBBB (left bundle branch block)    CAD (coronary artery disease)    h/o Vocal cord paralysis    HTN (hypertension)    Hyperlipemia    Encephalopathy    Obesity, Class III, BMI 40-49 9 (morbid obesity) ([email or web address]    Delirium     Past Medical History:   Diagnosis Date    Anxiety     Diabetes mellitus ([facility] )     Hypertension     Pancreatitis        No Known Allergies    Current Facility-Administered Medications   Medication Dose Route Frequency Provider Last Rate Last Dose    acetaminophen (TYLENOL) oral suspension 650 mg  650 mg Oral Q4H PRN Valerie Caballero PA-C   650 mg at 11/05/17 0419    aspirin chewable tablet 324 mg  324 mg Per NG Tube Daily [facility] PADemetris   324 mg at 11/07/17 0805    atorvastatin (LIPITOR) tablet 40 mg  40 mg Oral Daily ARLENE Renee   40 mg at 11/07/17 0805    chlorhexidine (PERIDEX) 0 12 % oral rinse 15 mL  15 mL Swish & Spit Q12H [address]   15 mL at 11/07/17 2007    famotidine (PEPCID) injection 20 mg  20 mg Intravenous BID ARLENE Renee   20 mg at 11/07/17 1758    fentanyl citrate (PF) 100 MCG/2ML 50 mcg  50 mcg Intravenous Q2H PRN Valerie Caballero PA-C   50 mcg at 11/08/17 0416    furosemide (LASIX) injection 40 mg  40 mg Intravenous Q6H ARLENE Esposito   40 mg at 11/08/17 0358    heparin (porcine) subcutaneous injection 5,000 Units  5,000 Units Subcutaneous Q8H Mundo DO   5,000 Units at 11/08/17 0521    insulin glargine (LANTUS) subcutaneous injection 50 Units  50 Units Subcutaneous HS SELENA ReneeNP        insulin regular (HumuLIN R,NovoLIN R) 1 Units/mL in sodium chloride 0 9 % 100 mL infusion  0 3-21 Units/hr Intravenous Titrated Valerie Caballero PA-C   Stopped at 11/08/17 0005    levothyroxine 25 mcg/mL suspension 125 mcg 5 mL  125 mcg Per G Tube Early Morning Lisa Clark MD   125 mcg at 11/08/17 0519    lidocaine (PF) (XYLOCAINE-MPF) 100 mg/5 mL injection    Code/Trauma/Sedation Med Yani Diallo, DO   100 mg at 11/03/17 1218    melatonin tablet 3 mg  3 mg Oral HS ARLENE Izquierdo   3 mg at 11/07/17 2104    nafcillin (NALLPEN) 2,000 mg in sodium chloride 0 9 % 100 mL IVPB  2,000 mg Intravenous Q4H [facility] Immanuel Greenwood,  mL/hr at 11/08/17 0745 2,000 mg at 11/08/17 0745    norepinephrine (LEVOPHED) 4 mg (STANDARD CONCENTRATION) IV in sodium chloride 0 9% 250 mL  1-30 mcg/min Intravenous Titrated ARLENE Izquierdo 3 8 mL/hr at 11/08/17 0044 1 mcg/min at 11/08/17 0044    nystatin (MYCOSTATIN) powder   Topical BID [facility]        polyethylene glycol (MIRALAX) packet 17 g  17 g Oral Daily ARLENE Lopez   17 g at 11/07/17 0806    senna (SENOKOT) tablet 17 2 mg  2 tablet Oral HS ARLENE Lopez   17 2 mg at 11/07/17 2104    sodium bicarbonate 50 mEq/50 mL injection    Code/Trauma/Sedation Med Yani Diallo, DO   100 mEq at 11/03/17 1225    sodium chloride 0 9 % bolus    Code/Trauma/Sedation Med Yani Diallo, DO   1,000 mL at 11/03/17 1221    trimethobenzamide (TIGAN) IM injection 200 mg  200 mg Intramuscular Q6H PRN Jaswinder Garcia, DO   200 mg at 11/07/17 2350       Vitals: /57   Pulse 86   Temp 99 °F (37 2 °C) (Probe)   Resp 17   Ht 5' 11" (1 803 m)   Wt 126 kg (278 lb)   SpO2 100%   BMI 38 77 kg/m²     No data recorded  No data recorded        Vitals:    11/07/17 0600 11/08/17 0527   Weight: 130 kg (287 lb 7 7 oz) 126 kg (278 lb)     Orthostatic Blood Pressures    Flowsheet Row Most Recent Value   Blood Pressure  106/57 filed at 11/03/2017 1615   Patient Position - Orthostatic VS  Lying filed at 11/03/2017 0421            Intake/Output Summary (Last 24 hours) at 11/08/17 1051  Last data filed at 11/08/17 0805   Gross per 24 hour   Intake           1430 3 ml   Output             6560 ml   Net          -5129 7 ml       Invasive Devices     Central Venous Catheter Line            CVC Central Lines 11/03/17 Triple 16cm 4 days          Arterial Line            Arterial Line 11/03/17 Right Radial 4 days          Drain            Urethral Catheter Temperature probe 5 days    NG/OG/Enteral Tube Orogastric 16 Fr Center mouth less than 1 day          Airway            ETT  Cuffed 8 mm 5 days                  Physical Exam:     GEN: Eyes open, awake, but intubated  HEENT: Sclera anicteric, conjunctivae pink, mucous membranes moist   NECK: Supple, no carotid bruits, no significant JVD  HEART: Regular rhythm, normal S1 and S2, no murmurs, clicks, gallops or rubs  LUNGS: Roncherous breath sounds bilaterally; no wheezes, rales   ABDOMEN: Obese, soft, nontender, nondistended, normoactive bowel sounds  EXTREMITIES: Skin warm and well perfused, no clubbing, cyanosis, 1+ pedal edema  Skin erythematous over feet  NEURO: No focal findings  SKIN: Normal without suspicious lesions on exposed skin        Lab Results:     Troponins:     Results from last 7 days  Lab Units 11/04/17  0557 11/03/17  1430 11/03/17  0730   CK TOTAL U/L  --  277*  --    TROPONIN I ng/mL 6 10* 6 27* 6 40*   CK MB INDEX %  --  4 3*  --        CBC with diff:     Results from last 7 days  Lab Units 11/08/17  0448 11/07/17  0438 11/06/17  0437 11/05/17  0439 11/04/17  0438 11/03/17  1430 11/03/17  0522 11/02/17  2004   WBC Thousand/uL 11 08* 10 89* 14 55* 14 86* 16 10* 16 85* 16 77* 15 12*   HEMOGLOBIN g/dL 10 2* 9 8* 10 5* 10 7* 10 3* 11 0* 11 3* 11 9   HEMATOCRIT % 32 5* 31 4* 34 1* 33 8* 32 3* 34 6* 36 1 37 5   MCV fL 91 91 92 91 91 92 91 92   PLATELETS Thousands/uL 230 211 239 288 279 324 320 324   MCH pg 28 4 28 4 28 4 28 8 28 9 29 1 28 6 29 1   MCHC g/dL 31 4 31 2* 30 8* 31 7 31 9 31 8 31 3* 31 7   RDW % 14 6 14 6 14 3 14 6 14 4 14 5 14 5 14 2   MPV fL 10 6 10 4 10 5 10 8 10 4 10 3 10 8 10 4   NRBC AUTO /100 WBCs  --   --   --   --  0 0  --  0 CMP:  Results from last 7 days  Lab Units 11/08/17  0448 11/08/17  0036 11/07/17  1822 11/07/17  0438 11/07/17  0053 11/06/17  0437 11/05/17  2207  11/04/17 0438  11/03/17  1430  11/02/17 2004 11/02/17  1554   SODIUM mmol/L 135* 139 138 136 135* 138 139  < > 145  < > 143  < > 141  --  144   POTASSIUM mmol/L 4 3 3 8 3 6 4 5 3 7 4 8 3 9  < > 3 9  < > 2 9*  < > 4 0  --  3 5   CHLORIDE mmol/L 95* 95* 95* 97* 96* 100 100  < > 108  < > 104  < > 100  --  98*   CO2 mmol/L 36* 36* 37* 35* 35* 34* 36*  < > 33*  < > 34*  < > 31  --  29   ANION GAP mmol/L 4 8 6 4 4 4 3*  < > 4  < > 5  < > 10  --  17*   BUN mg/dL 15 15 16 18 19 19 19  < > 27*  < > 26*  < > 16  --  14   CREATININE mg/dL 0 83 0 75 0 73 0 81 0 77 0 82 0 80  < > 1 01  < > 1 00  < > 0 96  --  1 05   GLUCOSE RANDOM mg/dL 142* 98 100 162* 145* 162* 136  < > 168*  < > 270*  < > 372*  --  385*   GLUCOSE, ISTAT   --   --   --   --   --   --   --   --   --   --   --   --   --   < >  --    CALCIUM mg/dL 9 1 9 1 9 0 8 9 8 7 9 0 8 7  < > 9 1  < > 9 2  < > 9 6  --  10 2*   AST U/L  --   --   --  38  --   --   --   --  97*  --  119*  --  148*  --  140*   ALT U/L  --   --   --  51  --   --   --   --  75  --  73  --  61  --  69   ALK PHOS U/L  --   --   --  30*  --   --   --   --  28*  --  31*  --  37*  --  44*   TOTAL PROTEIN g/dL  --   --   --  6 1*  --   --   --   --  6 1*  --  6 1*  --  7 2  --  8 1   ALBUMIN g/dL  --   --   --  2 5*  --   --   --   --  3 1*  --  2 9*  --  3 3*  --  3 9   BILIRUBIN TOTAL mg/dL  --   --   --  0 83  --   --   --   --  0 92  --  0 60  --  0 75  --  0 90   EGFR ml/min/1 73sq m 73 83 85 75 80 74 77  < > 58  < > 58  < > 61  < > 55   < > = values in this interval not displayed      Magnesium:     Results from last 7 days  Lab Units 11/08/17  0036 11/07/17  1822 11/07/17  0438 11/07/17  0053 11/06/17  0437 11/05/17 2207 11/05/17  1549   MAGNESIUM mg/dL 2 6 2 2 2 1 2 1 2 4 2 4 2 1       Coags:     Results from last 7 days  Lab Units 17  0438 17  0437 17  0439 17  1026 17  0438 17  2032 17  1111 17  0511 17  2004 17  1554   PTT seconds 38* 75* 77* 68* 76* 46* 60* 61* 36* 35   INR   --   --   --   --  1 36*  --   --  1 32* 1 36* 1 26*       Cardiac testing:   Results for orders placed during the hospital encounter of 17   Echo complete with contrast if indicated    Narrative [address]  [address], [address] [facility]  [phone]    Transthoracic Echocardiogram  2D, M-mode, Doppler, and Color Doppler    Study date:  2017    Patient: Luanne Lefort  MR number: [id number]  Account number: [de-identified]  : 1950  Age: 79 years  Gender: Female  Status: Inpatient  Location: Bedside  Height: 71 in  Weight: 279 lb  BP: 92/ 53 mmHg    Indications: Heart failure  Diagnoses: I50 9 - Heart failure, unspecified    Sonographer:  Sofi Cifuentes RDCS  Referring Physician:  Lexi Kan PA-C  Group:  [facility]  Interpreting Physician:  Deanne Cedeño MD    SUMMARY    LEFT VENTRICLE:  Systolic function was at the lower limits of normal  Ejection fraction was estimated to be 50 %  There was hypokinesis of the basal inferior wall(s)  There was mild concentric hypertrophy  Features were consistent with a pseudonormal left ventricular filling pattern, with concomitant abnormal relaxation and increased filling pressure (grade 2 diastolic dysfunction)  MITRAL VALVE:  There was mild to moderate regurgitation  AORTIC VALVE:  There was mild regurgitation  PERICARDIUM:  A small pericardial effusion was identified posterior to the heart  The fluid had no internal echoes  There was no evidence of hemodynamic compromise  There was a large left pleural effusion      HISTORY: PRIOR HISTORY: NSTEMI, LBBB, Ischemic cardiomyopathy, Coronary artery disease, Diabetes, Hypertension, Hyperlipidemia, Tracheostomy, Smoker  PROCEDURE: The procedure was performed at the bedside  This was a routine study  The transthoracic approach was used  The study included complete 2D imaging, M-mode, complete spectral Doppler, and color Doppler  The heart rate was 73 bpm,  at the start of the study  Images were obtained from the parasternal, apical, subcostal, and suprasternal notch acoustic windows  Echocardiographic views were limited due to decreased penetration and lung interference  This was a  technically difficult study  LEFT VENTRICLE: Size was normal  Systolic function was at the lower limits of normal  Ejection fraction was estimated to be 50 %  There was hypokinesis of the basal inferior wall(s)  Wall thickness was mildly increased  There was mild  concentric hypertrophy  DOPPLER: The transmitral flow pattern was normal  Features were consistent with a pseudonormal left ventricular filling pattern, with concomitant abnormal relaxation and increased filling pressure (grade 2 diastolic  dysfunction)  RIGHT VENTRICLE: The size was normal  Systolic function was normal  Wall thickness was normal     LEFT ATRIUM: Size was normal     RIGHT ATRIUM: Size was normal     MITRAL VALVE: Valve structure was normal  There was normal leaflet separation  DOPPLER: The transmitral velocity was within the normal range  There was no evidence for stenosis  There was mild to moderate regurgitation  AORTIC VALVE: The valve was trileaflet  Leaflets exhibited mild calcification, normal cuspal separation, and sclerosis  DOPPLER: Transaortic velocity was within the normal range  There was no evidence for stenosis  There was mild  regurgitation  TRICUSPID VALVE: The valve structure was normal  There was normal leaflet separation  DOPPLER: The transtricuspid velocity was within the normal range  There was no evidence for stenosis  There was trace regurgitation   The tricuspid jet  envelope definition was inadequate for estimation of RV systolic pressure  PULMONIC VALVE: Leaflets exhibited normal thickness, no calcification, and normal cuspal separation  DOPPLER: The transpulmonic velocity was within the normal range  There was trace regurgitation  PERICARDIUM: A small pericardial effusion was identified posterior to the heart  The fluid had no internal echoes  There was no evidence of hemodynamic compromise  There was a large left pleural effusion  The pericardium was normal in  appearance  AORTA: The root exhibited normal size  SYSTEMIC VEINS: IVC: The inferior vena cava was dilated  Respirophasic changes in dimension were absent  SYSTEM MEASUREMENT TABLES    2D  %FS: 17 89 %  Ao Diam: 2 84 cm  EDV(Teich): 142 7 ml  EF Biplane: 35 65 %  EF(Teich): 36 81 %  ESV(Teich): 90 18 ml  IVSd: 1 3 cm  LA Area: 20 16 cm2  LA Diam: 4 05 cm  LVEDV MOD A2C: 129 88 ml  LVEDV MOD A4C: 112 39 ml  LVEDV MOD BP: 125 34 ml  LVEF MOD A2C: 34 61 %  LVEF MOD A4C: 32 37 %  LVESV MOD A2C: 84 92 ml  LVESV MOD A4C: 76 01 ml  LVESV MOD BP: 80 65 ml  LVIDd: 5 42 cm  LVIDs: 4 45 cm  LVLd A2C: 7 55 cm  LVLd A4C: 8 17 cm  LVLs A2C: 6 77 cm  LVLs A4C: 6 52 cm  LVPWd: 1 28 cm  RA Area: 17 87 cm2  RVIDd: 4 23 cm  SV MOD A2C: 44 96 ml  SV MOD A4C: 36 38 ml  SV(Teich): 52 53 ml    CW  AR Dec [place]ubon: 1 64 m/s2  AR Dec Time: 1614 77 ms  AR PHT: 468 28 ms  AR Vmax: 2 65 m/s  AR maxP 04 mmHg    MM  TAPSE: 2 18 cm    PW  E': 0 03 m/s  E/E': 35 79  MV A Arnel: 0 75 m/s  MV Dec [place]ub[place]: 4 83 m/s2  MV DecT: 211 74 ms  MV E Arnel: 1 02 m/s  MV E/A Ratio: 1 37  MV PHT: 61 4 ms  MVA By PHT: 3 58 cm2    [employer] Accredited Echocardiography Laboratory    Prepared and electronically signed by    Nely Harding MD  Signed [id number] 12:14:05         Imaging: I have personally reviewed pertinent reports  Telemetry: personally reviewed, SR with LBBB, isolated PACs

## 2017-11-09 LAB
ANION GAP SERPL CALCULATED.3IONS-SCNC: 10 MMOL/L (ref 4–13)
ANION GAP SERPL CALCULATED.3IONS-SCNC: 5 MMOL/L (ref 4–13)
ANION GAP SERPL CALCULATED.3IONS-SCNC: 8 MMOL/L (ref 4–13)
APTT PPP: 37 SECONDS (ref 23–35)
APTT PPP: 66 SECONDS (ref 23–35)
ARTERIAL PATENCY WRIST A: YES
ATRIAL RATE: 105 BPM
BASE EXCESS BLDA CALC-SCNC: 7.9 MMOL/L
BUN SERPL-MCNC: 14 MG/DL (ref 5–25)
BUN SERPL-MCNC: 15 MG/DL (ref 5–25)
BUN SERPL-MCNC: 17 MG/DL (ref 5–25)
CALCIUM SERPL-MCNC: 9 MG/DL (ref 8.3–10.1)
CALCIUM SERPL-MCNC: 9.4 MG/DL (ref 8.3–10.1)
CALCIUM SERPL-MCNC: 9.6 MG/DL (ref 8.3–10.1)
CHLORIDE SERPL-SCNC: 95 MMOL/L (ref 100–108)
CHLORIDE SERPL-SCNC: 96 MMOL/L (ref 100–108)
CHLORIDE SERPL-SCNC: 96 MMOL/L (ref 100–108)
CO2 SERPL-SCNC: 31 MMOL/L (ref 21–32)
CO2 SERPL-SCNC: 32 MMOL/L (ref 21–32)
CO2 SERPL-SCNC: 36 MMOL/L (ref 21–32)
CREAT SERPL-MCNC: 0.75 MG/DL (ref 0.6–1.3)
CREAT SERPL-MCNC: 0.8 MG/DL (ref 0.6–1.3)
CREAT SERPL-MCNC: 0.87 MG/DL (ref 0.6–1.3)
ERYTHROCYTE [DISTWIDTH] IN BLOOD BY AUTOMATED COUNT: 14.8 % (ref 11.6–15.1)
ERYTHROCYTE [DISTWIDTH] IN BLOOD BY AUTOMATED COUNT: 14.9 % (ref 11.6–15.1)
GFR SERPL CREATININE-BSD FRML MDRD: 69 ML/MIN/1.73SQ M
GFR SERPL CREATININE-BSD FRML MDRD: 77 ML/MIN/1.73SQ M
GFR SERPL CREATININE-BSD FRML MDRD: 83 ML/MIN/1.73SQ M
GLUCOSE SERPL-MCNC: 106 MG/DL (ref 65–140)
GLUCOSE SERPL-MCNC: 109 MG/DL (ref 65–140)
GLUCOSE SERPL-MCNC: 114 MG/DL (ref 65–140)
GLUCOSE SERPL-MCNC: 115 MG/DL (ref 65–140)
GLUCOSE SERPL-MCNC: 117 MG/DL (ref 65–140)
GLUCOSE SERPL-MCNC: 132 MG/DL (ref 65–140)
GLUCOSE SERPL-MCNC: 135 MG/DL (ref 65–140)
GLUCOSE SERPL-MCNC: 139 MG/DL (ref 65–140)
GLUCOSE SERPL-MCNC: 183 MG/DL (ref 65–140)
GLUCOSE SERPL-MCNC: 91 MG/DL (ref 65–140)
GLUCOSE SERPL-MCNC: 99 MG/DL (ref 65–140)
HCO3 BLDA-SCNC: 31.9 MMOL/L (ref 22–28)
HCT VFR BLD AUTO: 30.4 % (ref 34.8–46.1)
HCT VFR BLD AUTO: 32.5 % (ref 34.8–46.1)
HGB BLD-MCNC: 10.2 G/DL (ref 11.5–15.4)
HGB BLD-MCNC: 9.5 G/DL (ref 11.5–15.4)
INR PPP: 1.31 (ref 0.86–1.16)
MAGNESIUM SERPL-MCNC: 2.2 MG/DL (ref 1.6–2.6)
MCH RBC QN AUTO: 28.5 PG (ref 26.8–34.3)
MCH RBC QN AUTO: 28.6 PG (ref 26.8–34.3)
MCHC RBC AUTO-ENTMCNC: 31.3 G/DL (ref 31.4–37.4)
MCHC RBC AUTO-ENTMCNC: 31.4 G/DL (ref 31.4–37.4)
MCV RBC AUTO: 91 FL (ref 82–98)
MCV RBC AUTO: 92 FL (ref 82–98)
NASAL CANNULA: 2
O2 CT BLDA-SCNC: 15 ML/DL (ref 16–23)
OXYHGB MFR BLDA: 95 % (ref 94–97)
PCO2 BLDA: 42 MM HG (ref 36–44)
PH BLDA: 7.5 [PH] (ref 7.35–7.45)
PHOSPHATE SERPL-MCNC: 4.4 MG/DL (ref 2.3–4.1)
PLATELET # BLD AUTO: 267 THOUSANDS/UL (ref 149–390)
PLATELET # BLD AUTO: 307 THOUSANDS/UL (ref 149–390)
PMV BLD AUTO: 10.4 FL (ref 8.9–12.7)
PMV BLD AUTO: 10.6 FL (ref 8.9–12.7)
PO2 BLDA: 82.6 MM HG (ref 75–129)
POTASSIUM SERPL-SCNC: 3.4 MMOL/L (ref 3.5–5.3)
POTASSIUM SERPL-SCNC: 3.7 MMOL/L (ref 3.5–5.3)
POTASSIUM SERPL-SCNC: 4.2 MMOL/L (ref 3.5–5.3)
PROTHROMBIN TIME: 16.4 SECONDS (ref 12.1–14.4)
QRS AXIS: 25 DEGREES
QRSD INTERVAL: 150 MS
QT INTERVAL: 375 MS
QTC INTERVAL: 496 MS
RBC # BLD AUTO: 3.32 MILLION/UL (ref 3.81–5.12)
RBC # BLD AUTO: 3.58 MILLION/UL (ref 3.81–5.12)
SODIUM SERPL-SCNC: 136 MMOL/L (ref 136–145)
SODIUM SERPL-SCNC: 136 MMOL/L (ref 136–145)
SODIUM SERPL-SCNC: 137 MMOL/L (ref 136–145)
SPECIMEN SOURCE: ABNORMAL
T WAVE AXIS: 164 DEGREES
TROPONIN I SERPL-MCNC: 3.02 NG/ML
TROPONIN I SERPL-MCNC: 3.19 NG/ML
TROPONIN I SERPL-MCNC: 3.22 NG/ML
VENTRICULAR RATE: 105 BPM
WBC # BLD AUTO: 10.56 THOUSAND/UL (ref 4.31–10.16)
WBC # BLD AUTO: 10.88 THOUSAND/UL (ref 4.31–10.16)

## 2017-11-09 PROCEDURE — 80048 BASIC METABOLIC PNL TOTAL CA: CPT | Performed by: NURSE PRACTITIONER

## 2017-11-09 PROCEDURE — 82948 REAGENT STRIP/BLOOD GLUCOSE: CPT

## 2017-11-09 PROCEDURE — 94760 N-INVAS EAR/PLS OXIMETRY 1: CPT | Performed by: SOCIAL WORKER

## 2017-11-09 PROCEDURE — 80048 BASIC METABOLIC PNL TOTAL CA: CPT | Performed by: PHYSICIAN ASSISTANT

## 2017-11-09 PROCEDURE — 85027 COMPLETE CBC AUTOMATED: CPT | Performed by: NURSE PRACTITIONER

## 2017-11-09 PROCEDURE — 82805 BLOOD GASES W/O2 SATURATION: CPT | Performed by: INTERNAL MEDICINE

## 2017-11-09 PROCEDURE — 94640 AIRWAY INHALATION TREATMENT: CPT

## 2017-11-09 PROCEDURE — 93005 ELECTROCARDIOGRAM TRACING: CPT

## 2017-11-09 PROCEDURE — 85610 PROTHROMBIN TIME: CPT | Performed by: INTERNAL MEDICINE

## 2017-11-09 PROCEDURE — 94640 AIRWAY INHALATION TREATMENT: CPT | Performed by: SOCIAL WORKER

## 2017-11-09 PROCEDURE — 85027 COMPLETE CBC AUTOMATED: CPT | Performed by: INTERNAL MEDICINE

## 2017-11-09 PROCEDURE — 36600 WITHDRAWAL OF ARTERIAL BLOOD: CPT | Performed by: SOCIAL WORKER

## 2017-11-09 PROCEDURE — 94760 N-INVAS EAR/PLS OXIMETRY 1: CPT

## 2017-11-09 PROCEDURE — 85730 THROMBOPLASTIN TIME PARTIAL: CPT | Performed by: INTERNAL MEDICINE

## 2017-11-09 PROCEDURE — 83735 ASSAY OF MAGNESIUM: CPT | Performed by: NURSE PRACTITIONER

## 2017-11-09 PROCEDURE — 85730 THROMBOPLASTIN TIME PARTIAL: CPT | Performed by: EMERGENCY MEDICINE

## 2017-11-09 PROCEDURE — 84484 ASSAY OF TROPONIN QUANT: CPT | Performed by: INTERNAL MEDICINE

## 2017-11-09 PROCEDURE — 84100 ASSAY OF PHOSPHORUS: CPT | Performed by: NURSE PRACTITIONER

## 2017-11-09 RX ORDER — POTASSIUM CHLORIDE 29.8 MG/ML
40 INJECTION INTRAVENOUS ONCE
Status: COMPLETED | OUTPATIENT
Start: 2017-11-09 | End: 2017-11-09

## 2017-11-09 RX ORDER — FUROSEMIDE 10 MG/ML
40 INJECTION INTRAMUSCULAR; INTRAVENOUS
Status: DISCONTINUED | OUTPATIENT
Start: 2017-11-09 | End: 2017-11-10

## 2017-11-09 RX ORDER — HEPARIN SODIUM 1000 [USP'U]/ML
4000 INJECTION, SOLUTION INTRAVENOUS; SUBCUTANEOUS AS NEEDED
Status: DISCONTINUED | OUTPATIENT
Start: 2017-11-09 | End: 2017-11-14

## 2017-11-09 RX ORDER — OXYCODONE HYDROCHLORIDE 5 MG/1
5 TABLET ORAL EVERY 4 HOURS PRN
Status: DISCONTINUED | OUTPATIENT
Start: 2017-11-09 | End: 2017-11-23 | Stop reason: HOSPADM

## 2017-11-09 RX ORDER — GABAPENTIN 300 MG/1
300 CAPSULE ORAL 3 TIMES DAILY
Status: DISCONTINUED | OUTPATIENT
Start: 2017-11-09 | End: 2017-11-09

## 2017-11-09 RX ORDER — LEVOTHYROXINE SODIUM 0.12 MG/1
125 TABLET ORAL
Status: DISCONTINUED | OUTPATIENT
Start: 2017-11-09 | End: 2017-11-23 | Stop reason: HOSPADM

## 2017-11-09 RX ORDER — LEVALBUTEROL INHALATION SOLUTION 0.63 MG/3ML
0.63 SOLUTION RESPIRATORY (INHALATION)
Status: DISCONTINUED | OUTPATIENT
Start: 2017-11-09 | End: 2017-11-09

## 2017-11-09 RX ORDER — MELATONIN
1000 DAILY
Status: DISCONTINUED | OUTPATIENT
Start: 2017-11-09 | End: 2017-11-23 | Stop reason: HOSPADM

## 2017-11-09 RX ORDER — OXYCODONE HYDROCHLORIDE 5 MG/1
2.5 TABLET ORAL EVERY 4 HOURS PRN
Status: DISCONTINUED | OUTPATIENT
Start: 2017-11-09 | End: 2017-11-23 | Stop reason: HOSPADM

## 2017-11-09 RX ORDER — HEPARIN SODIUM 1000 [USP'U]/ML
4000 INJECTION, SOLUTION INTRAVENOUS; SUBCUTANEOUS ONCE
Status: COMPLETED | OUTPATIENT
Start: 2017-11-09 | End: 2017-11-09

## 2017-11-09 RX ORDER — HEPARIN SODIUM 10000 [USP'U]/100ML
3-20 INJECTION, SOLUTION INTRAVENOUS
Status: DISCONTINUED | OUTPATIENT
Start: 2017-11-09 | End: 2017-11-14

## 2017-11-09 RX ORDER — ASPIRIN 81 MG/1
324 TABLET, CHEWABLE ORAL DAILY
Status: DISCONTINUED | OUTPATIENT
Start: 2017-11-09 | End: 2017-11-18

## 2017-11-09 RX ORDER — METOPROLOL TARTRATE 5 MG/5ML
2.5 INJECTION INTRAVENOUS EVERY 6 HOURS PRN
Status: DISCONTINUED | OUTPATIENT
Start: 2017-11-09 | End: 2017-11-23 | Stop reason: HOSPADM

## 2017-11-09 RX ORDER — POTASSIUM CHLORIDE 29.8 MG/ML
40 INJECTION INTRAVENOUS ONCE
Status: DISCONTINUED | OUTPATIENT
Start: 2017-11-09 | End: 2017-11-09

## 2017-11-09 RX ORDER — HEPARIN SODIUM 1000 [USP'U]/ML
2000 INJECTION, SOLUTION INTRAVENOUS; SUBCUTANEOUS AS NEEDED
Status: DISCONTINUED | OUTPATIENT
Start: 2017-11-09 | End: 2017-11-14

## 2017-11-09 RX ORDER — LEVALBUTEROL INHALATION SOLUTION 0.63 MG/3ML
1.25 SOLUTION RESPIRATORY (INHALATION)
Status: DISCONTINUED | OUTPATIENT
Start: 2017-11-09 | End: 2017-11-10

## 2017-11-09 RX ORDER — FENTANYL CITRATE 50 UG/ML
25 INJECTION, SOLUTION INTRAMUSCULAR; INTRAVENOUS EVERY 2 HOUR PRN
Status: DISCONTINUED | OUTPATIENT
Start: 2017-11-09 | End: 2017-11-13

## 2017-11-09 RX ADMIN — POTASSIUM CHLORIDE 40 MEQ: 400 INJECTION, SOLUTION INTRAVENOUS at 05:35

## 2017-11-09 RX ADMIN — NAFCILLIN SODIUM 2000 MG: 2 INJECTION, POWDER, LYOPHILIZED, FOR SOLUTION INTRAMUSCULAR; INTRAVENOUS at 04:28

## 2017-11-09 RX ADMIN — POTASSIUM CHLORIDE 40 MEQ: 400 INJECTION, SOLUTION INTRAVENOUS at 08:13

## 2017-11-09 RX ADMIN — NYSTATIN: 100000 POWDER TOPICAL at 12:11

## 2017-11-09 RX ADMIN — FAMOTIDINE 20 MG: 10 INJECTION, SOLUTION INTRAVENOUS at 08:32

## 2017-11-09 RX ADMIN — DEXTROSE MONOHYDRATE 50 ML: 25 INJECTION, SOLUTION INTRAVENOUS at 00:00

## 2017-11-09 RX ADMIN — FUROSEMIDE 40 MG: 10 INJECTION, SOLUTION INTRAMUSCULAR; INTRAVENOUS at 12:07

## 2017-11-09 RX ADMIN — LEVALBUTEROL HYDROCHLORIDE 1.25 MG: 1.25 SOLUTION, CONCENTRATE RESPIRATORY (INHALATION) at 11:46

## 2017-11-09 RX ADMIN — HEPARIN SODIUM 4000 UNITS: 1000 INJECTION, SOLUTION INTRAVENOUS; SUBCUTANEOUS at 14:23

## 2017-11-09 RX ADMIN — IPRATROPIUM BROMIDE 0.5 MG: 0.5 SOLUTION RESPIRATORY (INHALATION) at 20:05

## 2017-11-09 RX ADMIN — NYSTATIN: 100000 POWDER TOPICAL at 18:24

## 2017-11-09 RX ADMIN — HEPARIN SODIUM 5000 UNITS: 5000 INJECTION, SOLUTION INTRAVENOUS; SUBCUTANEOUS at 06:09

## 2017-11-09 RX ADMIN — METOPROLOL TARTRATE 2.5 MG: 5 INJECTION, SOLUTION INTRAVENOUS at 12:07

## 2017-11-09 RX ADMIN — IPRATROPIUM BROMIDE 0.5 MG: 0.5 SOLUTION RESPIRATORY (INHALATION) at 11:46

## 2017-11-09 RX ADMIN — FUROSEMIDE 40 MG: 10 INJECTION, SOLUTION INTRAMUSCULAR; INTRAVENOUS at 08:17

## 2017-11-09 RX ADMIN — LEVALBUTEROL HYDROCHLORIDE 1.25 MG: 0.63 SOLUTION RESPIRATORY (INHALATION) at 20:06

## 2017-11-09 RX ADMIN — NAFCILLIN SODIUM 2000 MG: 2 INJECTION, POWDER, LYOPHILIZED, FOR SOLUTION INTRAMUSCULAR; INTRAVENOUS at 08:23

## 2017-11-09 RX ADMIN — METOPROLOL TARTRATE 2.5 MG: 5 INJECTION, SOLUTION INTRAVENOUS at 17:30

## 2017-11-09 RX ADMIN — HEPARIN SODIUM AND DEXTROSE 11.1 UNITS/KG/HR: 10000; 5 INJECTION INTRAVENOUS at 14:24

## 2017-11-09 RX ADMIN — FUROSEMIDE 40 MG: 10 INJECTION, SOLUTION INTRAMUSCULAR; INTRAVENOUS at 18:22

## 2017-11-09 RX ADMIN — NAFCILLIN SODIUM 2000 MG: 2 INJECTION, POWDER, LYOPHILIZED, FOR SOLUTION INTRAMUSCULAR; INTRAVENOUS at 00:45

## 2017-11-09 NOTE — SOCIAL WORK
CM received call back from pt son - Chicho Poag  CM reviewed role with care coordination/dcp  Pt resides a lone in a 3 story home with 3-4 RAUL and bedroom/bathroom on 2nd floor  Clallam Poag reports pt has half bath in the basement  He reports they have been working with [employer] from [employer] [employer] to set up [facility] for pt in the home  Chicho Poag reports he was going to work on building a bathroom on her first floor and have pt bedroom moved to first floor  Chicho Poag lives 2 hours away and unable to help pt  Pt independent with ADLs and ambulation PTA  As per Clallam Poag family has noticed a decline in her mental status with more confusion  He reports that she was receiving HHC through [facility] PTA  No hx of STR  He reports pt has anxiety which is treated through her PCP  He reports no hx of drug/alcohol abuse  Pt reports pharmacy is [address] [place] [address]  PT/OT to evaluate  CM reviewed d/c planning process including the following: identifying help at home, patient preference for d/c planning needs, Discharge unge, [facility] Meds to Bed program, availability of treatment team to discuss questions or concerns patient and/or family may have regarding understanding medications and recognizing signs and symptoms once discharged  CM also encouraged patient to follow up with all recommended appointments after discharge  Patient advised of importance for patient and family to participate in managing patients medical well being

## 2017-11-09 NOTE — PROGRESS NOTES
Patient with coughing and wheezing and increased wob  Patient was given ivf and albumin for hypotension today  With pm dose lasix held  Patient received lasix with significant diuresis 48hr earlier  Plan to start bipap and dose lasix  Monitor IO   Reassess in am

## 2017-11-09 NOTE — PROGRESS NOTES
Progress Note - Electrophysiology-Cardiology (EP)   Nannette Lorenzana 79 y o  female MRN: [id number]  Unit/Bed#: MICU 14 Encounter: [id number]    Assessment:  1  Polymorphic VT  2  NSTEMI type 2  · Echo - 11/3/17 - LVEF 50%, hypokinesis of basal inferior, mild LVH, Grade 2 DD, small pericardial effusion  Mild-moderate MR, mild AI  · Echo - 6/12/17 - @[facility] - LVEF 35%, global hypokinesis with inferior akinesis, mild AI, mild-moderate MR, LVIDd 5 4  3  New-onset atrial fibrillation  4  Chronic MV CAD, Ischemic CM  · Cath - 6/2017 at [facility]- chronic multi-vessel disease with LAD and RCA involvement --> no intervention  5  Chronic LBBB  6  Hypertension  7  Hyperlipidemia  8  Diabetes Mellitus Type 2  9  Pneumonia  10  Vocal cord paresis s/p Trach/PEG (7/2017)  11  H/o panic attack  · Was on lexapro at admission, although did not get any doses here  12  Morbid Obesity, Body mass index is 40 8 kg/m²       Plan:  · Remains of lidocaine drip, and no further episodes of PMVT  · Also, seems to be in Afib with RVR with LBBB on EKG this morning  · Was started on metoprolol 12 5 bid and being started on heparin drip, by critical care team  · Cardiac cath postponed till tomorrow as patient unable to lie down flat  · Awaiting cardiac cath --> ICD after that depending on results    Subjective/Objective   Subjective: Still has some shortness of breath, and is unable to lie down flat  Objective: Hemodynamically stable, afebrile  Tachycardic this morning        Vitals: /65   Pulse (!) 106   Temp 98 6 °F (37 °C)   Resp 19   Ht 5' 11" (1 803 m)   Wt 126 kg (278 lb)   SpO2 99%   BMI 38 77 kg/m²   Vitals:    11/07/17 0600 11/08/17 0527   Weight: 130 kg (287 lb 7 7 oz) 126 kg (278 lb)     Orthostatic Blood Pressures    Flowsheet Row Most Recent Value   Blood Pressure  115/65 filed at 11/09/2017 0700   Patient Position - Orthostatic VS  Lying filed at 11/08/2017 8177            Intake/Output Summary (Last 24 hours) at 11/09/17 7872  Last data filed at 11/09/17 0800   Gross per 24 hour   Intake           1788 3 ml   Output             3235 ml   Net          -1446 7 ml       Invasive Devices     Central Venous Catheter Line            CVC Central Lines 11/03/17 Triple 16cm 5 days          Drain            Urethral Catheter Temperature probe 6 days                Review of Systems: No c/o chest pain, No c/o palpitations, c/o shortness of breath+, No c/o dizziness or light-headedness  Orthopnea+      Physical Exam:   Constitutional:  Alert, cooperative, no acute distress   HEENT:    Normocephalic, atraumatic   Neck:  No JVD, No stridor   Lungs:  B/L decreased breath sounds at bases, respirations unlabored    Chest Wall:  No tenderness or deformity    Heart::  Irregularly irregular, no murmur, rub or gallop    Abdomen:  Soft, non-tender, non-distended   Neurological:  Awake, alert, oriented x3   Extremities:  B/L 1+ pedal edema or calf tenderness         Lab Results:   I have personally reviewed pertinent lab results  CBC with diff:   Results from last 7 days  Lab Units 11/09/17  0427   WBC Thousand/uL 10 88*   RBC Million/uL 3 32*   HEMOGLOBIN g/dL 9 5*   HEMATOCRIT % 30 4*   MCV fL 92   MCH pg 28 6   MCHC g/dL 31 3*   RDW % 14 8   MPV fL 10 6   PLATELETS Thousands/uL 267     CMP:   Results from last 7 days  Lab Units 11/09/17  0427  11/07/17  0438   SODIUM mmol/L 136  < > 136   POTASSIUM mmol/L 3 4*  < > 4 5   CHLORIDE mmol/L 95*  < > 97*   CO2 mmol/L 36*  < > 35*   ANION GAP mmol/L 5  < > 4   BUN mg/dL 14  < > 18   CREATININE mg/dL 0 75  < > 0 81   GLUCOSE RANDOM mg/dL 106  < > 162*   CALCIUM mg/dL 9 0  < > 8 9   AST U/L  --   --  38   ALT U/L  --   --  51   ALK PHOS U/L  --   --  30*   TOTAL PROTEIN g/dL  --   --  6 1*   ALBUMIN g/dL  --   --  2 5*   BILIRUBIN TOTAL mg/dL  --   --  0 83   EGFR ml/min/1 73sq m 83  < > 75   < > = values in this interval not displayed    Troponin:   0  Lab Value Date/Time   TROPONINI 6 10 (H) 11/04/2017 0557   TROPONINI 6 27 (H) 11/03/2017 1430   TROPONINI 6 40 (H) 11/03/2017 0730   TROPONINI 7 63 (H) 11/03/2017 0511   TROPONINI 7 41 (H) 11/03/2017 0132   TROPONINI 9 82 (H) 11/02/2017 2004   TROPONINI 7 57 (HH) 11/02/2017 1646     BNP:   Results from last 7 days  Lab Units 11/09/17  0427   SODIUM mmol/L 136   POTASSIUM mmol/L 3 4*   CHLORIDE mmol/L 95*   CO2 mmol/L 36*   ANION GAP mmol/L 5   BUN mg/dL 14   CREATININE mg/dL 0 75   GLUCOSE RANDOM mg/dL 106   CALCIUM mg/dL 9 0   EGFR ml/min/1 73sq m 83     Coags:   Results from last 7 days  Lab Units 11/07/17  0438  11/04/17  0438   PTT seconds 38*  < > 76*   INR   --   --  1 36*   < > = values in this interval not displayed  TSH:   Results from last 7 days  Lab Units 11/02/17 2004   TSH 3RD GENERATON uIU/mL 0 922     Magnesium:   Results from last 7 days  Lab Units 11/09/17  0427   MAGNESIUM mg/dL 2 2     Lipid Profile:     Imaging: I have personally reviewed pertinent films in PACS  EKG: Afib with RVR, LBBB    Telemetry: no further NSVT or PMVT  VTE Prophylaxis: Sequential compression device (Venodyne)  and Heparin    Counseling / Coordination of Care  Total time spent today 25 minutes

## 2017-11-09 NOTE — PROGRESS NOTES
Progress Note - Critical Care   Watson Estes 79 y o  female MRN: [id number]  [facility]: [id number]    Assessment:   Principal Problem:    Respiratory failure ([facility] )  Active Problems:    Cardiac arrest ([facility] )    Acute hypercapnic/hypoxic respiratory failure ([facility] )    Torsades de pointes ([facility] )    Prolonged QT interval    NSTEMI (non-ST elevated myocardial infarction) ([facility])    Ischemic cardiomyopathy    LBBB (left bundle branch block)    CAD (coronary artery disease)    h/o Vocal cord paralysis    HTN (hypertension)    Hyperlipemia    Encephalopathy    Obesity, Class III, BMI 40-49 9 (morbid obesity) ([facility] )    Delirium    Plan:   Neuro:   · Analgesia: transition to oral pain medications, patient takes percocet at home for chronic pain  Will order low dose oxycodone 2 5/5mg for pain  · Delirium precautions, daily CAM-ICU  · Melatonin qhs for sleep  · Holding home lexapro     CV:   · Vfib arrest/torsades de pointes: weaned off Levophed, lidocaine gtt discontinued  Goal MAP>65  Hypotensive episodes yesterday, likely secondary to excessive diuresis yesterday  Follow up cardiology recommendations regarding timing of cath and AICD placement, likely cath today  Avoid QTc prolonging medications  · NSTEMI type 2: ASA, statin, Atorvastatin, metoprolol 2 5mg IV q12 added last evening  Consider transitioning to oral metoprolol today if BP remains stable  Patient takes 25mg BID at home, would start with 12 5mg BID  · Discontinue R IJ CVC if 2 peripherals obtained  Will d/c after potassium repletion is complete  Lung:   · Acute hypoxic respiratory failure: extubated successfully yesterday  Required bipap overnight secondary to increased work of breathing  Wean nasal cannula as tolerated, would diurese with Lasix 40mg BID as BP tolerates       GI:   · Bowel regimen with Miralax, senna  · Prn Tigan for nausea  · Pepcid for GI ppx, can d/c when tolerating diet    FEN:   · No maintenance fluids  · Replete potassium, given 80mEq IV this AM  Electrolyte goals Mag >2 0, Phos >3 0, K >4 0  Will check afternoon BMP w/ continued diuresis and replete electrolytes as needed  · Advance diet as tolerated today  Bedside swallow evaluation with nursing  :   · D/c lackey  Urine output has been adequate  Diuresis with Lasix 40mg BID to help with work of breathing  -1 4L in past 24 hours, -1 2 for admission  ID:   · Completed course of Nafcillin this morning for PNA    Heme:   · SQH and SCDs for ppx    Endo:   · Levothyroxine 125mcg   · BG  w/o coverage overnight  Goal -180  Patient uses 42 units Lantus qhs, will continue to hold at this time  Add SSI when diet is advanced  Reassess need with POC glucose q6  Msk/Skin:   · Frequent turns and repositioning  · Out of bed to chair  · PT/OT    Disposition:   · Continue in ICU    ______________________________________________________________________  Chief Complaint: "I'm okay"    HPI/24hr events: Extubated yesterday, last evening was hypotensive and received 500cc crystalloid x2 and 25g 25% albumin  Subsequently had increased work of breathing and shortness of breath, was placed on bipap for a short amount of time  Received 40mg of Lasix when placed on Bipap  Received 1 amp of D50 overnight for BG of 69   ______________________________________________________________________  Temperature:   Temp (24hrs), Av 7 °F (37 1 °C), Min:98 2 °F (36 8 °C), Max:99 3 °F (37 4 °C)    Current Temperature: 98 2 °F (36 8 °C)    Vitals:    17 0500 17 0600 17 0700 1718   BP: 124/65 141/58 115/65    Pulse: (!) 116 (!) 110 (!) 106    Resp:     Temp: 98 2 °F (36 8 °C) 98 2 °F (36 8 °C) 98 6 °F (37 °C)    TempSrc:       SpO2: 99% 98% 99% 99%   Weight:       Height:          Weights:   IBW: 70 8 kg    Body mass index is 38 77 kg/m²    Weight (last 2 days)     Date/Time   Weight    17 0527  126 (278)    17 0600  130 (287 48) Height: 5' 11" (180 3 cm)    SpO2: SpO2: 99 %    ______________________________________________________________________  Physical Exam:  Chow Agitation Sedation Scale (RASS): Drowsy  Physical Exam   Constitutional: She appears well-developed and well-nourished  Non-toxic appearance  No distress  Nasal cannula in place  HENT:   Head: Normocephalic and atraumatic  Eyes: Pupils are equal, round, and reactive to light  Cardiovascular: Tachycardia present  Pulses:       Radial pulses are 2+ on the right side, and 2+ on the left side  Dorsalis pedis pulses are 2+ on the right side, and 2+ on the left side  HR irregular on telemetry  Tachycardic at 104   Pulmonary/Chest:   Breath sounds decreased at the bases  No crackles or rhonchi heard   Abdominal:   Obese abdomen, soft  Nontender  Genitourinary:   Genitourinary Comments: Beckford in place   Musculoskeletal:   Moving all extremities x4  Neurological: She is alert  GCS eye subscore is 4  GCS verbal subscore is 5  GCS motor subscore is 6  Alert to self and time  Does not know which hospital she is currently at  Skin: Skin is warm, dry and intact  She is not diaphoretic      ______________________________________________________________________  Intake and Outputs:  I/O       11/07 0701 - 11/08 0700 11/08 0701 - 11/09 0700 11/09 0701 - 11/10 0700    I V  (mL/kg) 477 9 (3 8) 28 8 (0 2)     NG/GT 80 160     IV Piggyback 650 1515     Feedings 640 0     Total Intake(mL/kg) 1847 9 (14 7) 1703 8 (13 5)     Urine (mL/kg/hr) 7410 (2 5) 3110 (1)     Emesis/NG output 0 (0)      Stool 0 (0) 0 (0)     Total Output 7410 3110      Net -1177 4 -0466 2             Unmeasured Stool Occurrence 1 x 1 x     Unmeasured Emesis Occurrence 3 x          UOP: ~125cc/hour   Nutrition:        Diet Orders            Start     Ordered    11/08/17 1207  Diet NPO  Diet effective now     Question Answer Comment   Diet Type NPO    RD to adjust diet per protocol?  Yes 11/08/17 1207          Labs:     Results from last 7 days  Lab Units 11/09/17 0427 11/08/17 0448 11/07/17  0438  11/04/17  0438 11/03/17  1430  11/02/17 2004   WBC Thousand/uL 10 88* 11 08* 10 89*  < > 16 10* 16 85*  < > 15 12*   HEMOGLOBIN g/dL 9 5* 10 2* 9 8*  < > 10 3* 11 0*  < > 11 9   HEMATOCRIT % 30 4* 32 5* 31 4*  < > 32 3* 34 6*  < > 37 5   PLATELETS Thousands/uL 267 230 211  < > 279 324  < > 324   NEUTROS PCT %  --   --   --   --  65 82*  --  77*   MONOS PCT %  --   --   --   --  10 8  --  10   < > = values in this interval not displayed  Results from last 7 days  Lab Units 11/09/17 0427 11/08/17 0448 11/08/17  0036  11/07/17  0438  11/04/17  0438  11/03/17  1430   SODIUM mmol/L 136 135* 139  < > 136  < > 145  < > 143   POTASSIUM mmol/L 3 4* 4 3 3 8  < > 4 5  < > 3 9  < > 2 9*   CHLORIDE mmol/L 95* 95* 95*  < > 97*  < > 108  < > 104   CO2 mmol/L 36* 36* 36*  < > 35*  < > 33*  < > 34*   BUN mg/dL 14 15 15  < > 18  < > 27*  < > 26*   CREATININE mg/dL 0 75 0 83 0 75  < > 0 81  < > 1 01  < > 1 00   CALCIUM mg/dL 9 0 9 1 9 1  < > 8 9  < > 9 1  < > 9 2   ALBUMIN g/dL  --   --   --   --  2 5*  --  3 1*  --  2 9*   TOTAL PROTEIN g/dL  --   --   --   --  6 1*  --  6 1*  --  6 1*   BILIRUBIN TOTAL mg/dL  --   --   --   --  0 83  --  0 92  --  0 60   ALK PHOS U/L  --   --   --   --  30*  --  28*  --  31*   ALT U/L  --   --   --   --  51  --  75  --  73   AST U/L  --   --   --   --  38  --  97*  --  119*   GLUCOSE RANDOM mg/dL 106 142* 98  < > 162*  < > 168*  < > 270*   < > = values in this interval not displayed      Results from last 7 days  Lab Units 11/09/17  0427 11/08/17  0036 11/07/17  1822   MAGNESIUM mg/dL 2 2 2 6 2 2       Results from last 7 days  Lab Units 11/09/17  0427 11/07/17  1822 11/07/17  0438   PHOSPHORUS mg/dL 4 4* 3 9 3 5        Results from last 7 days  Lab Units 11/07/17  0438 11/06/17  0437 11/05/17  0439  11/04/17  0438  11/03/17  0511 11/02/17 2004   INR   --   --   --   --  1 36* --  1 32* 1 36*   PTT seconds 38* 75* 77*  < > 76*  < > 61* 36*   < > = values in this interval not displayed  Results from last 7 days  Lab Units 11/03/17  1430   LACTIC ACID mmol/L 2 2*       0  Lab Value Date/Time   TROPONINI 6 10 (H) 11/04/2017 0557   TROPONINI 6 27 (H) 11/03/2017 1430   TROPONINI 6 40 (H) 11/03/2017 0730   TROPONINI 7 63 (H) 11/03/2017 0511   TROPONINI 7 41 (H) 11/03/2017 0132   TROPONINI 9 82 (H) 11/02/2017 2004   TROPONINI 7 57 (HH) 11/02/2017 1646     Imaging: Bibasilar effusions vs pulmonary congestion  I have personally reviewed pertinent reports  Micro:  Blood Culture:   Lab Results   Component Value Date    BLOODCX No Growth After 5 Days  11/02/2017    BLOODCX No Growth After 5 Days  11/02/2017       Lab Results   Component Value Date    BLOODCX No Growth After 5 Days  11/02/2017    BLOODCX No Growth After 5 Days   11/02/2017    SPUTUMCULTUR 1+ Growth of Staphylococcus aureus (A) 11/03/2017     Allergies: No Known Allergies  Medications:   Scheduled Meds:  aspirin 300 mg Rectal Daily   atorvastatin 40 mg Oral Daily   famotidine 20 mg Intravenous BID   furosemide 40 mg Intravenous BID (diuretic)   heparin (porcine) 5,000 Units Subcutaneous Q8H [address]   levothyroxine 125 mcg Intravenous Daily   melatonin 3 mg Oral HS   metoprolol 2 5 mg Intravenous Q12H   nafcillin 2,000 mg Intravenous Q4H [address]   norepinephrine      nystatin  Topical BID   polyethylene glycol 17 g Oral Daily   potassium chloride 40 mEq Intravenous Once   potassium chloride 40 mEq Intravenous Once   senna 2 tablet Oral HS     Continuous Infusions:  insulin regular (HumuLIN R,NovoLIN R) infusion 0 3-21 Units/hr Last Rate: Stopped (11/08/17 0005)     PRN Meds:    acetaminophen 650 mg Q4H PRN   fentanyl citrate (PF) 50 mcg Q2H PRN   levalbuterol 1 25 mg Q4H PRN   lidocaine (PF)  Code/Trauma/Sedation Med   sodium bicarbonate  Code/Trauma/Sedation Med   sodium chloride  Code/Trauma/Sedation Med   sodium chloride 3 mL Q4H PRN trimethobenzamide 200 mg Q6H PRN     VTE Pharmacologic Prophylaxis:   Pharmacologic: Heparin  Mechanical VTE Prophylaxis in Place: Yes    Invasive lines and devices: Invasive Devices     Central Venous Catheter Line            CVC Central Lines 11/03/17 Triple 16cm 5 days          Drain            Urethral Catheter Temperature probe 6 days              Counseling / Coordination of Care  Total Critical Care time spent 35 minutes excluding procedures, teaching and family updates        Code Status: Level 2 - DNAR: but accepts endotracheal intubation      Aubree Choi PA-C

## 2017-11-09 NOTE — RESPIRATORY THERAPY NOTE
RT Protocol Note  Sade Ruby 79 y o  female MRN: [id number]  Unit/Bed#: [facility] 10 Encounter: [id number]    Assessment    Principal Problem:    Respiratory failure ([place] [address] )  Active Problems:    Cardiac arrest ([place] [address] )    Acute hypercapnic/hypoxic respiratory failure ([place] [address] )    Torsades de pointes ([place] [address] )    Prolonged QT interval    NSTEMI (non-ST elevated myocardial infarction) ([place] [address] )    Ischemic cardiomyopathy    LBBB (left bundle branch block)    CAD (coronary artery disease)    h/o Vocal cord paralysis    HTN (hypertension)    Hyperlipemia    Encephalopathy    Obesity, Class III, BMI 40-49 9 (morbid obesity) ([place] [address] )    Delirium      Home Pulmonary Medications:  Albuterol mdi    Past Medical History:   Diagnosis Date    Anxiety     Diabetes mellitus ([place] [address] )     Hypertension     Pancreatitis      Social History     Social History    Marital status: Unknown     Spouse name: N/A    Number of children: N/A    Years of education: N/A     Social History Main Topics    Smoking status: Current Every Day Smoker     Packs/day: 1 00     Years: 40 00     Types: Cigarettes    Smokeless tobacco: Never Used    Alcohol use No    Drug use: No    Sexual activity: Not Asked     Other Topics Concern    None     Social History Narrative    None       Subjective    Subjective Data: pt has no resp c/o    Objective    Physical Exam:   Assessment Type: Pre-treatment  General Appearance: Awake, Alert  Respiratory Pattern: Labored, Tachypneic  Chest Assessment: Chest expansion symmetrical  Bilateral Breath Sounds: Expiratory wheezes  Cough: Non-productive  Subjective Data: pt has no resp c/o    Vitals:  Blood pressure 161/61, pulse (!) 108, temperature 99 3 °F (37 4 °C), temperature source Probe, resp  rate 22, height 5' 11" (1 803 m), weight 126 kg (278 lb), SpO2 95 %        Results from last 7 days  Lab Units 11/09/17  1200   PH ART  7 498*   PCO2 ART mm Hg 42 0   PO2 ART mm Hg 82 6   HCO3 ART mmol/L 31 9*   BASE EXC ART mmol/L 7 9   O2 CONTENT ART mL/dL 15 0*   O2 HGB, ARTERIAL % 95 0   ABG SOURCE  Radial, Left   ROBB TEST  Yes       Imaging and other studies: I have personally reviewed pertinent reports  Moderate pulmonary edema  O2 Device: Nasal cannula     Plan    Respiratory Plan: Moderate/Severe Distress pathway       Resp Comments: Pt appears sob, has smoking hx  required bipap last evening  UDN ordered by pulm  MD for poss  copd h x  Pt less sob after udn tx  Will cont q6  Pt has hx of trach one year ago because of MI and on vent  Has hx of vocal cord dysfunction  Pt states she has no issues with food at this time

## 2017-11-09 NOTE — SPEECH THERAPY NOTE
Speech Language/Pathology                             SLP  Note  Patient Name: Yue Ortiz  NSGTE'I Date: 11/9/2017  Consult rec'd for swallowing evaluation  Pt extubated yesterday  Chart reviewed, spoke w/ katerina, pt scheduled for cath lab today at 1500  Will follow for evaluation tomorrow 11/10

## 2017-11-10 ENCOUNTER — APPOINTMENT (INPATIENT)
Dept: NON INVASIVE DIAGNOSTICS | Facility: HOSPITAL | Age: 67
DRG: 224 | End: 2017-11-10
Payer: COMMERCIAL

## 2017-11-10 ENCOUNTER — GENERIC CONVERSION - ENCOUNTER (OUTPATIENT)
Dept: OTHER | Facility: OTHER | Age: 67
End: 2017-11-10

## 2017-11-10 ENCOUNTER — APPOINTMENT (INPATIENT)
Dept: RADIOLOGY | Facility: HOSPITAL | Age: 67
DRG: 224 | End: 2017-11-10
Attending: INTERNAL MEDICINE
Payer: COMMERCIAL

## 2017-11-10 ENCOUNTER — APPOINTMENT (INPATIENT)
Dept: RADIOLOGY | Facility: HOSPITAL | Age: 67
DRG: 224 | End: 2017-11-10
Payer: COMMERCIAL

## 2017-11-10 LAB
ANION GAP SERPL CALCULATED.3IONS-SCNC: 9 MMOL/L (ref 4–13)
APTT PPP: 79 SECONDS (ref 23–35)
ARTERIAL PATENCY WRIST A: YES
BASE EXCESS BLDA CALC-SCNC: 7.1 MMOL/L
BASOPHILS # BLD AUTO: 0.03 THOUSANDS/ΜL (ref 0–0.1)
BASOPHILS NFR BLD AUTO: 0 % (ref 0–1)
BUN SERPL-MCNC: 21 MG/DL (ref 5–25)
CALCIUM SERPL-MCNC: 9.1 MG/DL (ref 8.3–10.1)
CHLORIDE SERPL-SCNC: 96 MMOL/L (ref 100–108)
CO2 SERPL-SCNC: 33 MMOL/L (ref 21–32)
CREAT SERPL-MCNC: 0.83 MG/DL (ref 0.6–1.3)
EOSINOPHIL # BLD AUTO: 0.16 THOUSAND/ΜL (ref 0–0.61)
EOSINOPHIL NFR BLD AUTO: 1 % (ref 0–6)
EOSINOPHIL NFR FLD MANUAL: 1 %
ERYTHROCYTE [DISTWIDTH] IN BLOOD BY AUTOMATED COUNT: 14.8 % (ref 11.6–15.1)
GFR SERPL CREATININE-BSD FRML MDRD: 73 ML/MIN/1.73SQ M
GLUCOSE FLD-MCNC: 235 MG/DL
GLUCOSE SERPL-MCNC: 169 MG/DL (ref 65–140)
GLUCOSE SERPL-MCNC: 175 MG/DL (ref 65–140)
GLUCOSE SERPL-MCNC: 254 MG/DL (ref 65–140)
GLUCOSE SERPL-MCNC: 284 MG/DL (ref 65–140)
GLUCOSE SERPL-MCNC: 287 MG/DL (ref 65–140)
GRAM STN SPEC: NORMAL
GRAM STN SPEC: NORMAL
HCO3 BLDA-SCNC: 31.1 MMOL/L (ref 22–28)
HCT VFR BLD AUTO: 31.2 % (ref 34.8–46.1)
HGB BLD-MCNC: 9.7 G/DL (ref 11.5–15.4)
HISTIOCYTES NFR FLD: 8 %
LDH FLD L TO P-CCNC: 82 U/L
LYMPHOCYTES # BLD AUTO: 1.9 THOUSANDS/ΜL (ref 0.6–4.47)
LYMPHOCYTES NFR BLD AUTO: 17 % (ref 14–44)
LYMPHOCYTES NFR BLD AUTO: 39 %
MAGNESIUM SERPL-MCNC: 2.2 MG/DL (ref 1.6–2.6)
MCH RBC QN AUTO: 28.4 PG (ref 26.8–34.3)
MCHC RBC AUTO-ENTMCNC: 31.1 G/DL (ref 31.4–37.4)
MCV RBC AUTO: 92 FL (ref 82–98)
MONO+MESO NFR FLD MANUAL: 31 %
MONOCYTES # BLD AUTO: 1.17 THOUSAND/ΜL (ref 0.17–1.22)
MONOCYTES NFR BLD AUTO: 1 %
MONOCYTES NFR BLD AUTO: 10 % (ref 4–12)
NASAL CANNULA: 2
NEUTROPHILS # BLD AUTO: 8.18 THOUSANDS/ΜL (ref 1.85–7.62)
NEUTS SEG NFR BLD AUTO: 20 %
NEUTS SEG NFR BLD AUTO: 72 % (ref 43–75)
NRBC BLD AUTO-RTO: 0 /100 WBCS
O2 CT BLDA-SCNC: 14.7 ML/DL (ref 16–23)
OXYHGB MFR BLDA: 96.9 % (ref 94–97)
PCO2 BLDA: 41.9 MM HG (ref 36–44)
PH BLDA: 7.49 [PH] (ref 7.35–7.45)
PH BODY FLUID: 7.7
PHOSPHATE SERPL-MCNC: 4.2 MG/DL (ref 2.3–4.1)
PLATELET # BLD AUTO: 297 THOUSANDS/UL (ref 149–390)
PMV BLD AUTO: 10 FL (ref 8.9–12.7)
PO2 BLDA: 103 MM HG (ref 75–129)
POTASSIUM SERPL-SCNC: 3.5 MMOL/L (ref 3.5–5.3)
PROT FLD-MCNC: <2 G/DL
RBC # BLD AUTO: 3.41 MILLION/UL (ref 3.81–5.12)
SITE: NORMAL
SODIUM SERPL-SCNC: 138 MMOL/L (ref 136–145)
SPECIMEN SOURCE: ABNORMAL
TOTAL CELLS COUNTED SPEC: 100
WBC # BLD AUTO: 11.47 THOUSAND/UL (ref 4.31–10.16)
WBC # FLD MANUAL: 306 /UL

## 2017-11-10 PROCEDURE — 83735 ASSAY OF MAGNESIUM: CPT | Performed by: PHYSICIAN ASSISTANT

## 2017-11-10 PROCEDURE — 94640 AIRWAY INHALATION TREATMENT: CPT

## 2017-11-10 PROCEDURE — 94760 N-INVAS EAR/PLS OXIMETRY 1: CPT

## 2017-11-10 PROCEDURE — 88341 IMHCHEM/IMCYTCHM EA ADD ANTB: CPT | Performed by: INTERNAL MEDICINE

## 2017-11-10 PROCEDURE — 94760 N-INVAS EAR/PLS OXIMETRY 1: CPT | Performed by: SOCIAL WORKER

## 2017-11-10 PROCEDURE — 80048 BASIC METABOLIC PNL TOTAL CA: CPT | Performed by: PHYSICIAN ASSISTANT

## 2017-11-10 PROCEDURE — G8981 BODY POS CURRENT STATUS: HCPCS

## 2017-11-10 PROCEDURE — 82805 BLOOD GASES W/O2 SATURATION: CPT | Performed by: PHYSICIAN ASSISTANT

## 2017-11-10 PROCEDURE — 92610 EVALUATE SWALLOWING FUNCTION: CPT

## 2017-11-10 PROCEDURE — 89051 BODY FLUID CELL COUNT: CPT | Performed by: INTERNAL MEDICINE

## 2017-11-10 PROCEDURE — 71010 HB CHEST X-RAY 1 VIEW FRONTAL (PORTABLE): CPT

## 2017-11-10 PROCEDURE — 88305 TISSUE EXAM BY PATHOLOGIST: CPT | Performed by: INTERNAL MEDICINE

## 2017-11-10 PROCEDURE — 0W993ZX DRAINAGE OF RIGHT PLEURAL CAVITY, PERCUTANEOUS APPROACH, DIAGNOSTIC: ICD-10-PCS | Performed by: INTERNAL MEDICINE

## 2017-11-10 PROCEDURE — 88112 CYTOPATH CELL ENHANCE TECH: CPT | Performed by: INTERNAL MEDICINE

## 2017-11-10 PROCEDURE — 82945 GLUCOSE OTHER FLUID: CPT | Performed by: INTERNAL MEDICINE

## 2017-11-10 PROCEDURE — 93971 EXTREMITY STUDY: CPT

## 2017-11-10 PROCEDURE — 85730 THROMBOPLASTIN TIME PARTIAL: CPT | Performed by: EMERGENCY MEDICINE

## 2017-11-10 PROCEDURE — 87205 SMEAR GRAM STAIN: CPT | Performed by: INTERNAL MEDICINE

## 2017-11-10 PROCEDURE — 88342 IMHCHEM/IMCYTCHM 1ST ANTB: CPT | Performed by: INTERNAL MEDICINE

## 2017-11-10 PROCEDURE — 83615 LACTATE (LD) (LDH) ENZYME: CPT | Performed by: INTERNAL MEDICINE

## 2017-11-10 PROCEDURE — 85025 COMPLETE CBC W/AUTO DIFF WBC: CPT | Performed by: PHYSICIAN ASSISTANT

## 2017-11-10 PROCEDURE — 83986 ASSAY PH BODY FLUID NOS: CPT | Performed by: INTERNAL MEDICINE

## 2017-11-10 PROCEDURE — G8982 BODY POS GOAL STATUS: HCPCS

## 2017-11-10 PROCEDURE — 82948 REAGENT STRIP/BLOOD GLUCOSE: CPT

## 2017-11-10 PROCEDURE — 87070 CULTURE OTHR SPECIMN AEROBIC: CPT | Performed by: INTERNAL MEDICINE

## 2017-11-10 PROCEDURE — 36600 WITHDRAWAL OF ARTERIAL BLOOD: CPT | Performed by: SOCIAL WORKER

## 2017-11-10 PROCEDURE — 94640 AIRWAY INHALATION TREATMENT: CPT | Performed by: SOCIAL WORKER

## 2017-11-10 PROCEDURE — 84100 ASSAY OF PHOSPHORUS: CPT | Performed by: PHYSICIAN ASSISTANT

## 2017-11-10 PROCEDURE — 97163 PT EVAL HIGH COMPLEX 45 MIN: CPT

## 2017-11-10 PROCEDURE — 84157 ASSAY OF PROTEIN OTHER: CPT | Performed by: INTERNAL MEDICINE

## 2017-11-10 RX ORDER — LEVALBUTEROL 1.25 MG/.5ML
SOLUTION, CONCENTRATE RESPIRATORY (INHALATION)
Status: COMPLETED
Start: 2017-11-10 | End: 2017-11-10

## 2017-11-10 RX ORDER — POTASSIUM CHLORIDE 20 MEQ/1
20 TABLET, EXTENDED RELEASE ORAL 2 TIMES DAILY WITH MEALS
Status: DISCONTINUED | OUTPATIENT
Start: 2017-11-10 | End: 2017-11-23 | Stop reason: HOSPADM

## 2017-11-10 RX ORDER — POTASSIUM CHLORIDE 29.8 MG/ML
40 INJECTION INTRAVENOUS ONCE
Status: COMPLETED | OUTPATIENT
Start: 2017-11-10 | End: 2017-11-11

## 2017-11-10 RX ORDER — LEVALBUTEROL 1.25 MG/.5ML
1.25 SOLUTION, CONCENTRATE RESPIRATORY (INHALATION)
Status: DISCONTINUED | OUTPATIENT
Start: 2017-11-10 | End: 2017-11-15

## 2017-11-10 RX ORDER — POTASSIUM CHLORIDE 14.9 MG/ML
20 INJECTION INTRAVENOUS ONCE
Status: COMPLETED | OUTPATIENT
Start: 2017-11-10 | End: 2017-11-11

## 2017-11-10 RX ORDER — SODIUM CHLORIDE FOR INHALATION 0.9 %
VIAL, NEBULIZER (ML) INHALATION
Status: COMPLETED
Start: 2017-11-10 | End: 2017-11-10

## 2017-11-10 RX ORDER — FUROSEMIDE 10 MG/ML
40 INJECTION INTRAMUSCULAR; INTRAVENOUS EVERY 6 HOURS
Status: DISCONTINUED | OUTPATIENT
Start: 2017-11-10 | End: 2017-11-18

## 2017-11-10 RX ADMIN — METOPROLOL TARTRATE 25 MG: 25 TABLET ORAL at 20:18

## 2017-11-10 RX ADMIN — IPRATROPIUM BROMIDE 0.5 MG: 0.5 SOLUTION RESPIRATORY (INHALATION) at 19:05

## 2017-11-10 RX ADMIN — IPRATROPIUM BROMIDE 0.5 MG: 0.5 SOLUTION RESPIRATORY (INHALATION) at 13:12

## 2017-11-10 RX ADMIN — MELATONIN TAB 3 MG 3 MG: 3 TAB at 20:18

## 2017-11-10 RX ADMIN — NYSTATIN: 100000 POWDER TOPICAL at 10:01

## 2017-11-10 RX ADMIN — ISODIUM CHLORIDE 3 ML: 0.03 SOLUTION RESPIRATORY (INHALATION) at 07:12

## 2017-11-10 RX ADMIN — POTASSIUM CHLORIDE 20 MEQ: 200 INJECTION, SOLUTION INTRAVENOUS at 09:54

## 2017-11-10 RX ADMIN — FUROSEMIDE 40 MG: 10 INJECTION, SOLUTION INTRAMUSCULAR; INTRAVENOUS at 18:25

## 2017-11-10 RX ADMIN — LEVALBUTEROL HYDROCHLORIDE 1.25 MG: 0.63 SOLUTION RESPIRATORY (INHALATION) at 01:38

## 2017-11-10 RX ADMIN — METOPROLOL TARTRATE 12.5 MG: 25 TABLET ORAL at 09:58

## 2017-11-10 RX ADMIN — LEVALBUTEROL HYDROCHLORIDE 1.25 MG: 1.25 SOLUTION, CONCENTRATE RESPIRATORY (INHALATION) at 13:12

## 2017-11-10 RX ADMIN — FUROSEMIDE 40 MG: 10 INJECTION, SOLUTION INTRAMUSCULAR; INTRAVENOUS at 05:45

## 2017-11-10 RX ADMIN — LEVALBUTEROL HYDROCHLORIDE 1.25 MG: 1.25 SOLUTION, CONCENTRATE RESPIRATORY (INHALATION) at 07:12

## 2017-11-10 RX ADMIN — ASPIRIN 81 MG 324 MG: 81 TABLET ORAL at 09:52

## 2017-11-10 RX ADMIN — INSULIN LISPRO 6 UNITS: 100 INJECTION, SOLUTION INTRAVENOUS; SUBCUTANEOUS at 18:43

## 2017-11-10 RX ADMIN — LEVALBUTEROL HYDROCHLORIDE 1.25 MG: 1.25 SOLUTION, CONCENTRATE RESPIRATORY (INHALATION) at 19:05

## 2017-11-10 RX ADMIN — INSULIN LISPRO 6 UNITS: 100 INJECTION, SOLUTION INTRAVENOUS; SUBCUTANEOUS at 13:14

## 2017-11-10 RX ADMIN — ATORVASTATIN CALCIUM 40 MG: 40 TABLET, FILM COATED ORAL at 09:52

## 2017-11-10 RX ADMIN — VITAMIN D, TAB 1000IU (100/BT) 1000 UNITS: 25 TAB at 09:52

## 2017-11-10 RX ADMIN — FUROSEMIDE 40 MG: 10 INJECTION, SOLUTION INTRAMUSCULAR; INTRAVENOUS at 12:45

## 2017-11-10 RX ADMIN — DEXTROSE 150 MG: 50 INJECTION, SOLUTION INTRAVENOUS at 18:15

## 2017-11-10 RX ADMIN — NYSTATIN: 100000 POWDER TOPICAL at 18:43

## 2017-11-10 RX ADMIN — INSULIN LISPRO 2 UNITS: 100 INJECTION, SOLUTION INTRAVENOUS; SUBCUTANEOUS at 06:15

## 2017-11-10 RX ADMIN — HEPARIN SODIUM AND DEXTROSE 11.1 UNITS/KG/HR: 10000; 5 INJECTION INTRAVENOUS at 06:15

## 2017-11-10 RX ADMIN — AMIODARONE HYDROCHLORIDE 1 MG/MIN: 50 INJECTION, SOLUTION INTRAVENOUS at 18:35

## 2017-11-10 RX ADMIN — IPRATROPIUM BROMIDE 0.5 MG: 0.5 SOLUTION RESPIRATORY (INHALATION) at 01:38

## 2017-11-10 RX ADMIN — POTASSIUM CHLORIDE 20 MEQ: 1500 TABLET, EXTENDED RELEASE ORAL at 16:21

## 2017-11-10 RX ADMIN — POTASSIUM CHLORIDE 40 MEQ: 400 INJECTION, SOLUTION INTRAVENOUS at 06:20

## 2017-11-10 NOTE — PROGRESS NOTES
Progress Note - Critical Care   Jessie Jimenez 79 y o  female MRN: [id number]  Unit/Bed#: [facility] Encounter: [id number]    Assessment:   Principal Problem:    Respiratory failure  Active Problems:    Cardiac arrest    Acute hypercapnic respiratory failure    Torsades de pointes    Prolonged QT interval    NSTEMI (non-ST elevated myocardial infarction)    CAD (coronary artery disease)    Ischemic cardiomyopathy    LBBB (left bundle branch block)    Encephalopathy    Delirium    h/o Vocal cord paralysis    HTN (hypertension)    Hyperlipemia    Obesity, Class III, BMI 40-49 9 (morbid obesity) ([facility] )    Plan:   Neuro:   · Encephalopathy:  Worsening, likely multifactorial   Will check ABG this morning  Continue CAM-ICU, delirium precautions  · Low dose IV fentanyl for pain control as patient cannot take PO at this time  · Hold home lexapro in setting of QTc prolongation     CV:   · Atrial fibrillation:  Started on heparin drip yesterday  Unable to take PO metoprolol secondary to history of vocal cord dysfunction and weak cough  Awaiting speech/swallow evaluation  Continue prn IV metoprolol 2 5mg for tachycardia  Did not require any doses overnight  · Torsades/Cardiac arrest: awaiting cardiac cath and ICD placement  Unable to lay flat yesterday secondary to respiratory status, will postpone cath until Monday and attempt continued diuresis  Avoid QTc prolonging medications  Unable to take oral ASA/statin at this time  · Discontinue R IJ CVC  Lung:   · Acute hypoxic respiratory failure: required bipap last evening for increased work of breathing  Continue diuresis with 40mg TID, blood pressure tolerating diuresis  Continue respiratory protocol with q6 nebulizers      GI:   · Bowel regimen with senokot    FEN:   · No maintenance fluids  · Hypokalemia: repleted this morning  · NPO, awaiting speech/swallow evaluation    :   · Remove lackey today  · Continue TID diuresis with Lasix    ID:   · No active issues, completed course of Nafcillin yesterday    Heme:   · Heparin gtt for new onset afib  SCDs    Endo:   · SSI algorithm 4  Would not restart home Lantus until tolerating diet  Monitor q6 glucose  Msk/Skin:   · Frequent turns and repositioning, offloading  Disposition:   · ICU    ______________________________________________________________________  Chief Complaint: "Why do I have to do that"    HPI/24hr events: Placed on bipap overnight for work of breathing  Became more encephalopathic, was attempting to bite at EKG leads and mitts    ______________________________________________________________________  Temperature:   Temp (24hrs), Av 3 °F (37 4 °C), Min:98 6 °F (37 °C), Max:100 °F (37 8 °C)    Current Temperature: 98 6 °F (37 °C)    Vitals:    11/10/17 0300 11/10/17 0344 11/10/17 0400 11/10/17 0500   BP: 133/86  118/61 141/64   Pulse: (!) 106  98 104   Resp: 22  (!) 24 22   Temp: 99 °F (37 2 °C)  98 6 °F (37 °C) 98 6 °F (37 °C)   TempSrc:   Probe    SpO2: 100% 100% 100% 100%   Weight:       Height:          Weights:   IBW: 70 8 kg    Body mass index is 38 77 kg/m²  Weight (last 2 days)     Date/Time   Weight    17 0527  126 (278)            Height: 5' 11" (180 3 cm)    Lab Results   Component Value Date    PHART 7 498 (H) 2017    OCC1HRR 42 0 2017    PO2ART 82 6 2017    LFQ8HKS 31 9 (H) 2017    BEART 7 9 2017    SOURCE Radial, Left 2017     SpO2: SpO2: 100 %    ______________________________________________________________________  Physical Exam:  Chow Agitation Sedation Scale (RASS): Drowsy  Physical Exam   Constitutional: Vital signs are normal  She is uncooperative  She has a sickly appearance  She appears ill  No distress  Bipap in place   HENT:   Head: Normocephalic and atraumatic  Eyes: Pupils are equal, round, and reactive to light  Cardiovascular: Normal heart sounds  An irregularly irregular rhythm present  Tachycardia present    Exam reveals no gallop and no friction rub  No murmur heard  Pulses:       Radial pulses are 2+ on the right side, and 2+ on the left side  Dorsalis pedis pulses are 1+ on the right side, and 1+ on the left side  Pulmonary/Chest: No accessory muscle usage  No respiratory distress  She has no decreased breath sounds  Rhonchi scattered throughout R lung field   Abdominal: Soft  She exhibits no distension  There is no tenderness  Obese abdomen   Genitourinary:   Genitourinary Comments: Beckford in place   Musculoskeletal:   Moving all extremities spontaneously, refuses to follow commands w/ extremities, asking "why do I have to do that'   Neurological:   Alert to time and self  States she is at the hospital    Skin: Skin is warm, dry and intact  She is not diaphoretic      ______________________________________________________________________  Intake and Outputs:  I/O       11/08 0701 - 11/09 0700 11/09 0701 - 11/10 0700    I V  (mL/kg) 28 8 (0 2) 136 (1 1)    NG/     IV Piggyback 1515 400    Feedings 0     Total Intake(mL/kg) 1703 8 (13 5) 536 (4 3)    Urine (mL/kg/hr) 3110 (1) 2685 (0 9)    Stool 0 (0) 0 (0)    Total Output 3110 2685    Net -1406 2 -2149          Unmeasured Stool Occurrence 1 x 2 x        UOP: 120cc/hour   Nutrition:        Diet Orders            Start     Ordered    11/08/17 1207  Diet NPO  Diet effective now     Question Answer Comment   Diet Type NPO    RD to adjust diet per protocol? Yes        11/08/17 1207        Labs:     Results from last 7 days  Lab Units 11/10/17  0335 11/09/17  1422 11/09/17  0427  11/04/17  0438 11/03/17  1430   WBC Thousand/uL 11 47* 10 56* 10 88*  < > 16 10* 16 85*   HEMOGLOBIN g/dL 9 7* 10 2* 9 5*  < > 10 3* 11 0*   HEMATOCRIT % 31 2* 32 5* 30 4*  < > 32 3* 34 6*   PLATELETS Thousands/uL 297 307 267  < > 279 324   NEUTROS PCT % 72  --   --   --  65 82*   MONOS PCT % 10  --   --   --  10 8   < > = values in this interval not displayed     Results from last 7 days  Lab Units 11/10/17  0335 11/09/17  2058 11/09/17  1157  11/07/17  0438  11/04/17  0438  11/03/17  1430   SODIUM mmol/L 138 137 136  < > 136  < > 145  < > 143   POTASSIUM mmol/L 3 5 3 7 4 2  < > 4 5  < > 3 9  < > 2 9*   CHLORIDE mmol/L 96* 96* 96*  < > 97*  < > 108  < > 104   CO2 mmol/L 33* 31 32  < > 35*  < > 33*  < > 34*   BUN mg/dL 21 17 15  < > 18  < > 27*  < > 26*   CREATININE mg/dL 0 83 0 87 0 80  < > 0 81  < > 1 01  < > 1 00   CALCIUM mg/dL 9 1 9 4 9 6  < > 8 9  < > 9 1  < > 9 2   ALBUMIN g/dL  --   --   --   --  2 5*  --  3 1*  --  2 9*   TOTAL PROTEIN g/dL  --   --   --   --  6 1*  --  6 1*  --  6 1*   BILIRUBIN TOTAL mg/dL  --   --   --   --  0 83  --  0 92  --  0 60   ALK PHOS U/L  --   --   --   --  30*  --  28*  --  31*   ALT U/L  --   --   --   --  51  --  75  --  73   AST U/L  --   --   --   --  38  --  97*  --  119*   GLUCOSE RANDOM mg/dL 175* 139 109  < > 162*  < > 168*  < > 270*   < > = values in this interval not displayed  Results from last 7 days  Lab Units 11/10/17  0335 11/09/17  0427 11/08/17  0036   MAGNESIUM mg/dL 2 2 2 2 2 6       Results from last 7 days  Lab Units 11/10/17  0335 11/09/17  0427 11/07/17  1822   PHOSPHORUS mg/dL 4 2* 4 4* 3 9        Results from last 7 days  Lab Units 11/10/17  0330 11/09/17  2058 11/09/17  1422  11/04/17  0438   INR   --   --  1 31*  --  1 36*   PTT seconds 79* 66* 37*  < > 76*   < > = values in this interval not displayed      Results from last 7 days  Lab Units 11/03/17  1430   LACTIC ACID mmol/L 2 2*       0  Lab Value Date/Time   TROPONINI 3 02 (H) 11/09/2017 1817   TROPONINI 3 19 (H) 11/09/2017 1422   TROPONINI 3 22 (H) 11/09/2017 1157   TROPONINI 6 10 (H) 11/04/2017 0557   TROPONINI 6 27 (H) 11/03/2017 1430   TROPONINI 6 40 (H) 11/03/2017 0730   TROPONINI 7 63 (H) 11/03/2017 0511   TROPONINI 7 41 (H) 11/03/2017 0132   TROPONINI 9 82 (H) 11/02/2017 2004   TROPONINI 7 57 ([facility]) 11/02/2017 1646     Imaging: R pleural effusion I have personally reviewed pertinent reports  Micro:  Blood Culture:   Lab Results   Component Value Date    BLOODCX No Growth After 5 Days  11/02/2017    BLOODCX No Growth After 5 Days  11/02/2017     Urine Culture: No results found for: URINECX  Sputum Culture: No components found for: SPUTUMCX  Wound Culure: No results found for: WOUNDCULT    Lab Results   Component Value Date    BLOODCX No Growth After 5 Days  11/02/2017    BLOODCX No Growth After 5 Days  11/02/2017    SPUTUMCULTUR 1+ Growth of Staphylococcus aureus (A) 11/03/2017     Allergies: No Known Allergies  Medications:   Scheduled Meds:  aspirin 324 mg Oral Daily   atorvastatin 40 mg Oral Daily   cholecalciferol 1,000 Units Oral Daily   furosemide 40 mg Intravenous TID (diuretic)   ipratropium 0 5 mg Nebulization Q6H   levalbuterol 1 25 mg Nebulization Q6H   levothyroxine 125 mcg Oral Early Morning   melatonin 3 mg Oral HS   metoprolol tartrate 12 5 mg Oral Q12H JUAN LUIS   nystatin  Topical BID   polyethylene glycol 17 g Oral Daily   senna 2 tablet Oral HS     Continuous Infusions:  heparin (porcine) 3-20 Units/kg/hr (Order-Specific) Last Rate: 11 1 Units/kg/hr (11/09/17 1424)     PRN Meds:    acetaminophen 650 mg Q4H PRN   fentanyl citrate (PF) 25 mcg Q2H PRN   heparin (porcine) 2,000 Units PRN   heparin (porcine) 4,000 Units PRN   influenza vaccine 0 5 mL Prior to discharge   lidocaine (PF)  Code/Trauma/Sedation Med   metoprolol 2 5 mg Q6H PRN   oxyCODONE 2 5 mg Q4H PRN   Or     oxyCODONE 5 mg Q4H PRN   sodium bicarbonate  Code/Trauma/Sedation Med   sodium chloride  Code/Trauma/Sedation Med   trimethobenzamide 200 mg Q6H PRN     VTE Pharmacologic Prophylaxis:   Pharmacologic: Heparin Drip  Mechanical VTE Prophylaxis in Place: Yes    Invasive lines and devices:   Invasive Devices     Central Venous Catheter Line            CVC Central Lines 11/03/17 Triple 16cm 6 days          Drain            Urethral Catheter Temperature probe 7 days              Counseling / Coordination of Care  Total Critical Care time spent 34 minutes excluding procedures, teaching and family updates        Code Status: Level 3 - DNAR and DNI      Julissa Wilson PA-C

## 2017-11-10 NOTE — PROGRESS NOTES
Progress Note - Cardiology   Curzito Mccracken 79 y o  female MRN: [id number]  Unit/Bed#: MICU 10 Encounter: [id number]  11/10/17  10:53 AM        Assessment/Plan:    1  NSTEMI type II  On aspirin, statin, beta blocker  2  Chronic multivessel CAD  On aspirin, statin, beta blocker  Has been medically managed per report, cath done at [facility]  Unable to see actual report of cath from 6/17, but per primary team images from that cath are supposed to be messengered later today to be viewed  At this time will plan to review cath images and decide if any intervenable disease by PCI to avoid issues with VT in future  Will hold off on cath here until can view images from prior cath, as patient unable to lie flat currently, and was on BiPAP overnight  3  Chronic LBBB  Still present on telemetry  4  ICM with EF 35%  By echo here, EF improved to 50%  Currently on Lasix 40 IV tid to help with volume overload/respiratory status  CXR done today reported as worsening pulmonary edema  Increase Lasix to 40 IV q6hrs  Titrate up beta blocker as tolerated to 25 bid  Still tachycardic at hypertensive at times  5  HTN  Started on low dose beta blocker, thus far tolerating  Titrate up beta blocker as tolerated  6  HL  On statin  7  DM  On insulin  8  PMVT  Lidocaine drip stopped 11/8  No further episodes since 11/13  Keep K>4, Mg>2      9  PNA  Completed course of nafcillin  Subjective/Objective   Chief Complaint: No chief complaint on file  Subjective:79 year old woman with a history of multivessel disease managed medically, ICM with EF 35%, chronic LBBB, DM, 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, admitted after being found to at home, found on telemetry here to have an episode of PVC induced PMVT with baseline prolonged QT on 11/3 that resolved spontaneously per report   She was started on IV amiodarone, but had a second episode of torsades on 11/3, and was switched from Amiodarone to Lidocaine drip at that time, and was kept on dopamine to keep HR elevated as well as for inotropic effect  She was extubated 11/8  Being diuresed for pulmonary edema on CXR  Still requiring BiPAP at night  Reports she can lie flat, but when laid flat, she appears a little more anxious  No chest pain  No palpitations       Patient Active Problem List   Diagnosis    Respiratory failure    Cardiac arrest    Acute hypercapnic respiratory failure    Torsades de pointes    Prolonged QT interval    NSTEMI (non-ST elevated myocardial infarction)    Ischemic cardiomyopathy    LBBB (left bundle branch block)    CAD (coronary artery disease)    h/o Vocal cord paralysis    HTN (hypertension)    Hyperlipemia    Encephalopathy    Obesity, Class III, BMI 40-49 9 (morbid obesity) ([facility] [address] )    Delirium     Past Medical History:   Diagnosis Date    Anxiety     [facility]     Hypertension     Pancreatitis        No Known Allergies    Current Facility-Administered Medications   Medication Dose Route Frequency Provider Last Rate Last Dose    acetaminophen (TYLENOL) rectal suppository 650 mg  650 mg Rectal Q4H PRN Kaylin Jay MD        aspirin chewable tablet 324 mg  324 mg Oral Daily Josy Santana PA-C   324 mg at 11/10/17 7924    atorvastatin (LIPITOR) tablet 40 mg  40 mg Oral Daily Bulmaro Quijano CRNP   40 mg at 11/10/17 4936    cholecalciferol (VITAMIN D3) tablet 1,000 Units  1,000 Units Oral Daily Josy Santana PA-C   1,000 Units at 11/10/17 2303    fentanyl citrate (PF) 100 MCG/2ML 25 mcg  25 mcg Intravenous Q2H PRN Josy Santana PA-C        furosemide (LASIX) injection 40 mg  40 mg Intravenous TID (diuretic) Abby Abdullahi, DO   40 mg at 11/10/17 0545    heparin (porcine) 25,000 units in 250 mL infusion (premix)  3-20 Units/kg/hr (Order-Specific) Intravenous Titrated Immanuel Timo, DO 10 mL/hr at 11/10/17 0615 11 1 Units/kg/hr at 11/10/17 0615    heparin (porcine) injection 2,000 Units  2,000 Units Intravenous PRN [place] Isamar, DO        heparin (porcine) injection 4,000 Units  4,000 Units Intravenous PRN [place] Isamar, DO        influenza inactivated quadrivalent vaccine (FLULAVAL) IM injection 0 5 mL  0 5 mL Intramuscular Prior to discharge Gricelda Gaston MD        insulin lispro (HumaLOG) 100 units/mL subcutaneous injection 2-12 Units  2-12 Units Subcutaneous Q6H [address] Caleb [place], PA-C   2 Units at 11/10/17 0615    ipratropium (ATROVENT) 0 02 % inhalation solution 0 5 mg  0 5 mg Nebulization Q6H Caleb Sorrel, PA-C   0 5 mg at 11/10/17 0138    levalbuterol (Matt Landon) inhalation solution 1 25 mg  1 25 mg Nebulization Q6H Gricelda Gaston MD        levothyroxine tablet 125 mcg  125 mcg Oral Early Morning Caleb Sorrel, PA-C        lidocaine (PF) (XYLOCAINE-MPF) 100 mg/5 mL injection    Code/Trauma/Sedation Med Giselle Henry, DO   100 mg at 11/03/17 1218    melatonin tablet 3 mg  3 mg Oral HS ARLENE Garduno   Stopped at 11/09/17 2100    metoprolol (LOPRESSOR) injection 2 5 mg  2 5 mg Intravenous Q6H PRN Abby Abdullahi DO   2 5 mg at 11/09/17 1730    metoprolol tartrate (LOPRESSOR) partial tablet 12 5 mg  12 5 mg Oral Q12H [address] Caleb [place], PA-C   12 5 mg at 11/10/17 5039    nystatin (MYCOSTATIN) powder   Topical BID TERESO LoganC        oxyCODONE (ROXICODONE) IR tablet 2 5 mg  2 5 mg Oral Q4H PRN Caleb Sorrel, PA-C        Or    oxyCODONE (ROXICODONE) IR tablet 5 mg  5 mg Oral Q4H PRN Caleb Sorrel, PA-C        polyethylene glycol (MIRALAX) packet 17 g  17 g Oral Daily ARLENE Monroe   17 g at 11/08/17 1124    potassium chloride 20 mEq IVPB (premix)  20 mEq Intravenous Once Caleb Sorrel, PA-C 50 mL/hr at 11/10/17 0954 20 mEq at 11/10/17 0954    senna (SENOKOT) tablet 17 2 mg  2 tablet Oral HS Rodri Ceja, CRNP   Stopped at 11/09/17 2200    sodium bicarbonate 50 mEq/50 mL injection    Code/Trauma/Sedation Med Giselle Henry, DO   100 mEq at 11/03/17 1225    sodium chloride 0 9 % bolus    Code/Trauma/Sedation Med Giselleandrey Henry, DO   1,000 mL at 11/03/17 1221    trimethobenzamide (TIGAN) IM injection 200 mg  200 mg Intramuscular Q6H PRN Rubens Marquez, DO   200 mg at 11/07/17 2350       Vitals: /66   Pulse 102   Temp 98 6 °F (37 °C) (Probe)   Resp 18   Ht 5' 11" (1 803 m)   Wt 126 kg (278 lb)   SpO2 99%   BMI 07 64 kg/m²     Systolic (78VEM), OAD:430 , Min:82 , FUN:500     Diastolic (29QEV), SQA:70, Min:53, Max:116      Vitals:    11/07/17 0600 11/08/17 0527   Weight: 130 kg (287 lb 7 7 oz) 126 kg (278 lb)     Orthostatic Blood Pressures    Flowsheet Row Most Recent Value   Blood Pressure  157/66 filed at 11/10/2017 0745   Patient Position - Orthostatic VS  Lying filed at 11/10/2017 0745            Intake/Output Summary (Last 24 hours) at 11/10/17 1053  Last data filed at 11/10/17 0745   Gross per 24 hour   Intake            173 5 ml   Output             2435 ml   Net          -2261 5 ml       Invasive Devices     Central Venous Catheter Line            CVC Central Lines 11/03/17 Triple 16cm 6 days          Drain            Urethral Catheter Temperature probe 7 days                  Physical Exam:     GEN: Awake, no acute distress  HEENT: Sclera anicteric, conjunctivae pink, mucous membranes moist   NECK: Supple, no carotid bruits, no significant JVD  HEART: Regular rhythm, normal S1 and S2, no murmurs, clicks, gallops or rubs  LUNGS: Clear anteriorly bilaterally; no wheezes, rales   ABDOMEN: Obese, soft, nontender, nondistended, normoactive bowel sounds  EXTREMITIES: Skin warm and well perfused, no clubbing, cyanosis, 1+ pedal edema  Skin erythematous over feet  NEURO: No focal findings  SKIN: Normal without suspicious lesions on exposed skin        Lab Results:     Troponins:     Results from last 7 days  Lab Units 11/09/17  1817 11/09/17  1422 11/09/17  1157  11/03/17  1430   CK TOTAL U/L  --   --   --   --  277*   TROPONIN I ng/mL 3 02* 3 19* 3 22*  < > 6 27*   CK MB INDEX %  --   --   --   --  4 3*   < > = values in this interval not displayed      CBC with diff:     Results from last 7 days  Lab Units 11/10/17  0335 11/09/17  1422 11/09/17  0427 11/08/17  0448 11/07/17  0438 11/06/17  0437 11/05/17  0439 11/04/17  0438 11/03/17  1430   WBC Thousand/uL 11 47* 10 56* 10 88* 11 08* 10 89* 14 55* 14 86* 16 10* 16 85*   HEMOGLOBIN g/dL 9 7* 10 2* 9 5* 10 2* 9 8* 10 5* 10 7* 10 3* 11 0*   HEMATOCRIT % 31 2* 32 5* 30 4* 32 5* 31 4* 34 1* 33 8* 32 3* 34 6*   MCV fL 92 91 92 91 91 92 91 91 92   PLATELETS Thousands/uL 297 307 267 230 211 239 288 279 324   MCH pg 28 4 28 5 28 6 28 4 28 4 28 4 28 8 28 9 29 1   MCHC g/dL 31 1* 31 4 31 3* 31 4 31 2* 30 8* 31 7 31 9 31 8   RDW % 14 8 14 9 14 8 14 6 14 6 14 3 14 6 14 4 14 5   MPV fL 10 0 10 4 10 6 10 6 10 4 10 5 10 8 10 4 10 3   NRBC AUTO /100 WBCs 0  --   --   --   --   --   --  0 0         CMP:  Results from last 7 days  Lab Units 11/10/17  0335 11/09/17  2058 11/09/17  1157 11/09/17  0427 11/08/17  0448 11/08/17  0036 11/07/17  1822 11/07/17  0438  11/04/17  0438  11/03/17  1430   SODIUM mmol/L 138 137 136 136 135* 139 138 136  < > 145  < > 143   POTASSIUM mmol/L 3 5 3 7 4 2 3 4* 4 3 3 8 3 6 4 5  < > 3 9  < > 2 9*   CHLORIDE mmol/L 96* 96* 96* 95* 95* 95* 95* 97*  < > 108  < > 104   CO2 mmol/L 33* 31 32 36* 36* 36* 37* 35*  < > 33*  < > 34*   ANION GAP mmol/L 9 10 8 5 4 8 6 4  < > 4  < > 5   BUN mg/dL 21 17 15 14 15 15 16 18  < > 27*  < > 26*   CREATININE mg/dL 0 83 0 87 0 80 0 75 0 83 0 75 0 73 0 81  < > 1 01  < > 1 00   GLUCOSE RANDOM mg/dL 175* 139 109 106 142* 98 100 162*  < > 168*  < > 270*   CALCIUM mg/dL 9 1 9 4 9 6 9 0 9 1 9 1 9 0 8 9  < > 9 1  < > 9 2   AST U/L  --   --   --   --   --   --   --  38  --  97*  --  119*   ALT U/L  --   --   --   --   --   --   --  51  --  75  --  73   ALK PHOS U/L  --   --   --   --   --   --   --  30*  --  28*  --  31*   TOTAL PROTEIN g/dL  --   --   --   --   --   -- --  6 1*  --  6 1*  --  6 1*   ALBUMIN g/dL  --   --   --   --   --   --   --  2 5*  --  3 1*  --  2 9*   BILIRUBIN TOTAL mg/dL  --   --   --   --   --   --   --  0 83  --  0 92  --  0 60   EGFR ml/min/1 73sq m 73 69 77 83 73 83 85 75  < > 58  < > 58   < > = values in this interval not displayed  Magnesium:     Results from last 7 days  Lab Units 11/10/17  0335 17  0427 17  0036 17  1822 17  0438 17  0053 17  0437   MAGNESIUM mg/dL 2 2 2 2 2 6 2 2 2 1 2 1 2 4       Coags:     Results from last 7 days  Lab Units 11/10/17  0330 17  2058 17  1422 17  0438 17  0437 17  0439 17  1026 17  0438   PTT seconds 79* 66* 37* 38* 75* 77* 68* 76*   INR   --   --  1 31*  --   --   --   --  1 36*       Cardiac testing:   Results for orders placed during the hospital encounter of 17   Echo complete with contrast if indicated    Ronald Hoskins [address]  [address] [place] [address]  [phone]    Transthoracic Echocardiogram  2D, M-mode, Doppler, and Color Doppler    Study date:  2017    Patient: Alexys Langley  MR number: [id number]  Account number: [de-identified]  : 1950  Age: 79 years  Gender: Female  Status: Inpatient  Location: Bedside  Height: 71 in  Weight: 279 lb  BP: 92/ 53 mmHg    Indications: Heart failure  Diagnoses: I50 9 - Heart failure, unspecified    Sonographer:  Shanice iWlliamson RDCS  Referring Physician:  Gene Areas, PA-C  Group:  [facility]  Interpreting Physician:  Colleen No MD    SUMMARY    LEFT VENTRICLE:  Systolic function was at the lower limits of normal  Ejection fraction was estimated to be 50 %  There was hypokinesis of the basal inferior wall(s)  There was mild concentric hypertrophy    Features were consistent with a pseudonormal left ventricular filling pattern, with concomitant abnormal relaxation and increased filling pressure (grade 2 diastolic dysfunction)  MITRAL VALVE:  There was mild to moderate regurgitation  AORTIC VALVE:  There was mild regurgitation  PERICARDIUM:  A small pericardial effusion was identified posterior to the heart  The fluid had no internal echoes  There was no evidence of hemodynamic compromise  There was a large left pleural effusion  HISTORY: PRIOR HISTORY: NSTEMI, LBBB, Ischemic cardiomyopathy, Coronary artery disease, Diabetes, Hypertension, Hyperlipidemia, Tracheostomy, Smoker  PROCEDURE: The procedure was performed at the bedside  This was a routine study  The transthoracic approach was used  The study included complete 2D imaging, M-mode, complete spectral Doppler, and color Doppler  The heart rate was 73 bpm,  at the start of the study  Images were obtained from the parasternal, apical, subcostal, and suprasternal notch acoustic windows  Echocardiographic views were limited due to decreased penetration and lung interference  This was a  technically difficult study  LEFT VENTRICLE: Size was normal  Systolic function was at the lower limits of normal  Ejection fraction was estimated to be 50 %  There was hypokinesis of the basal inferior wall(s)  Wall thickness was mildly increased  There was mild  concentric hypertrophy  DOPPLER: The transmitral flow pattern was normal  Features were consistent with a pseudonormal left ventricular filling pattern, with concomitant abnormal relaxation and increased filling pressure (grade 2 diastolic  dysfunction)  RIGHT VENTRICLE: The size was normal  Systolic function was normal  Wall thickness was normal     LEFT ATRIUM: Size was normal     RIGHT ATRIUM: Size was normal     MITRAL VALVE: Valve structure was normal  There was normal leaflet separation  DOPPLER: The transmitral velocity was within the normal range  There was no evidence for stenosis  There was mild to moderate regurgitation  AORTIC VALVE: The valve was trileaflet   Leaflets exhibited mild calcification, normal cuspal separation, and sclerosis  DOPPLER: Transaortic velocity was within the normal range  There was no evidence for stenosis  There was mild  regurgitation  TRICUSPID VALVE: The valve structure was normal  There was normal leaflet separation  DOPPLER: The transtricuspid velocity was within the normal range  There was no evidence for stenosis  There was trace regurgitation  The tricuspid jet  envelope definition was inadequate for estimation of RV systolic pressure  PULMONIC VALVE: Leaflets exhibited normal thickness, no calcification, and normal cuspal separation  DOPPLER: The transpulmonic velocity was within the normal range  There was trace regurgitation  PERICARDIUM: A small pericardial effusion was identified posterior to the heart  The fluid had no internal echoes  There was no evidence of hemodynamic compromise  There was a large left pleural effusion  The pericardium was normal in  appearance  AORTA: The root exhibited normal size  SYSTEMIC VEINS: IVC: The inferior vena cava was dilated  Respirophasic changes in dimension were absent      SYSTEM MEASUREMENT TABLES    2D  %FS: 17 89 %  Ao Diam: 2 84 cm  EDV(Teich): 142 7 ml  EF Biplane: 35 65 %  EF(Teich): 36 81 %  ESV(Teich): 90 18 ml  IVSd: 1 3 cm  LA Area: 20 16 cm2  LA Diam: 4 05 cm  LVEDV MOD A2C: 129 88 ml  LVEDV MOD A4C: 112 39 ml  LVEDV MOD BP: 125 34 ml  LVEF MOD A2C: 34 61 %  LVEF MOD A4C: 32 37 %  LVESV MOD A2C: 84 92 ml  LVESV MOD A4C: 76 01 ml  LVESV MOD BP: 80 65 ml  LVIDd: 5 42 cm  LVIDs: 4 45 cm  LVLd A2C: 7 55 cm  LVLd A4C: 8 17 cm  LVLs A2C: 6 77 cm  LVLs A4C: 6 52 cm  LVPWd: 1 28 cm  RA Area: 17 87 cm2  RVIDd: 4 23 cm  SV MOD A2C: 44 96 ml  SV MOD A4C: 36 38 ml  SV(Teich): 52 53 ml    CW  AR Dec [place]: 1 64 m/s2  AR Dec Time: 1614 77 ms  AR PHT: 468 28 ms  AR Vmax: 2 65 m/s  AR maxP 04 mmHg    MM  TAPSE: 2 18 cm    PW  E': 0 03 m/s  E/E': 35 79  MV A [place]: 0 75 m/s  MV Dec [place]: 4 83 m/s2  MV DecT: 211 74 ms  MV E Arnel: 1 02 m/s  MV E/A Ratio: 1 37  MV PHT: 61 4 ms  MVA By PHT: 3 58 cm2    [employer] Accredited Echocardiography Laboratory    Prepared and electronically signed by    John Mesa MD  Signed [id number] 12:14:05         Imaging: I have personally reviewed pertinent reports  Telemetry: personally reviewed, SR with LBBB, isolated PACs

## 2017-11-10 NOTE — PROGRESS NOTES
Progress Note - Electrophysiology-Cardiology (EP)   Mario Arcos 79 y o  female MRN: [id number]  Unit/Bed#: MICU 10 Encounter: [id number]      Assessment:  1  Polymorphic VT, PVC induced-no further episodes, was initially on lidocaine  2  Ischemic cardiomyopathy with EF 50%  3  Chronic left bundle branch block  4  Chronic multivessel coronary artery disease, medically managed  5  Non ST-elevation MI  6  Hypertension  7  Atrial fibrillation with RVR  8  Prolonged QT with QTc 499 upon admission in atrial fibrillation with RVR    Plan:  -patient is encephalopathic currently   -do not believe patient can lay flat for catheterization today  Recommend waiting until Monday for re-evaluation for catheterization  -patient is recommended an ICD prior to discharge although she is a level 3 DNR DNI  Patient's wishes need to be discussed as this is not compatible with placement of an ICD  Patient is not currently mentally stable to have this discussion  Per Dr Sumit Latham, will start Amiodarone drip for better rate control to help with CHF  Was on IV heparin until the 7th  EKGs on 7th and 8th show pt in and out of a fib  Was on SQ heparin and now is on heparin drip again  Bp low so can't uptitrate AVN blockers well  Subjective/Objective     Subjective:  Patient is not aware of surroundings  She tells me that she was placed in a garage in handcuffed by her friend last evening  She currently denies chest pain but states her breathing is difficult      Objective:     Vitals: /66   Pulse 102   Temp 98 6 °F (37 °C) (Probe)   Resp 18   Ht 5' 11" (1 803 m)   Wt 126 kg (278 lb)   SpO2 99%   BMI 38 77 kg/m²   Vitals:    11/07/17 0600 11/08/17 0527   Weight: 130 kg (287 lb 7 7 oz) 126 kg (278 lb)     Orthostatic Blood Pressures    Flowsheet Row Most Recent Value   Blood Pressure  157/66 filed at 11/10/2017 0745   Patient Position - Orthostatic VS  Lying filed at 11/10/2017 0745          Physical Exam:  VS: Blood pressure 124/59, pulse 88, temperature 98 1 °F (36 7 °C), temperature source Oral, resp  rate 20, height 5' 11" (1 803 m), weight 126 kg (278 lb), SpO2 100 %  Gen   Pt in NAD  Skin: warm and dry without rashes, clubbing or edema  CV: RRR, +S1, S2, no murmur or rub  Resp  :  Clear anteriorly bilaterally  Abdomen: soft, NT, ND, +BS, obese  Extr : no LE edema b/l  Psych: normal affect and mood  Neuro:  Alert but not oriented        Scheduled Meds:  aspirin 324 mg Oral Daily   atorvastatin 40 mg Oral Daily   cholecalciferol 1,000 Units Oral Daily   furosemide 40 mg Intravenous Q6H   insulin lispro 2-12 Units Subcutaneous Q6H [facility]   ipratropium 0 5 mg Nebulization Q6H   levalbuterol 1 25 mg Nebulization Q6H   levothyroxine 125 mcg Oral Early Morning   melatonin 3 mg Oral HS   metoprolol tartrate 25 mg Oral Q12H JUAN LUIS   nystatin  Topical BID   polyethylene glycol 17 g Oral Daily   potassium chloride 20 mEq Oral BID With Meals   potassium chloride 20 mEq Intravenous Once   senna 2 tablet Oral HS     Continuous Infusions:  heparin (porcine) 3-20 Units/kg/hr (Order-Specific) Last Rate: 11 1 Units/kg/hr (11/10/17 0615)     PRN Meds:   acetaminophen    fentanyl citrate (PF)    heparin (porcine)    heparin (porcine)    influenza vaccine    lidocaine (PF)    metoprolol    oxyCODONE **OR** oxyCODONE    sodium bicarbonate    sodium chloride    trimethobenzamide      Intake/Output Summary (Last 24 hours) at 11/10/17 1136  Last data filed at 11/10/17 0745   Gross per 24 hour   Intake            173 5 ml   Output             2435 ml   Net          -2261 5 ml       Invasive Devices     Central Venous Catheter Line            CVC Central Lines 11/03/17 Triple 16cm 6 days          Drain            Urethral Catheter Temperature probe 7 days                            Lab Results:   CBC with diff:   Results from last 7 days  Lab Units 11/10/17  0335   WBC Thousand/uL 11 47*   RBC Million/uL 3 41*   HEMOGLOBIN g/dL 9 7*   HEMATOCRIT % 31 2*   MCV fL 92   MCH pg 28 4   MCHC g/dL 31 1*   RDW % 14 8   MPV fL 10 0   PLATELETS Thousands/uL 297     CMP:   Results from last 7 days  Lab Units 11/10/17  0335  11/07/17  0438   SODIUM mmol/L 138  < > 136   POTASSIUM mmol/L 3 5  < > 4 5   CHLORIDE mmol/L 96*  < > 97*   CO2 mmol/L 33*  < > 35*   ANION GAP mmol/L 9  < > 4   BUN mg/dL 21  < > 18   CREATININE mg/dL 0 83  < > 0 81   GLUCOSE RANDOM mg/dL 175*  < > 162*   CALCIUM mg/dL 9 1  < > 8 9   AST U/L  --   --  38   ALT U/L  --   --  51   ALK PHOS U/L  --   --  30*   TOTAL PROTEIN g/dL  --   --  6 1*   ALBUMIN g/dL  --   --  2 5*   BILIRUBIN TOTAL mg/dL  --   --  0 83   EGFR ml/min/1 73sq m 73  < > 75   < > = values in this interval not displayed  TSH:     Magnesium:   Results from last 7 days  Lab Units 11/10/17  0335   MAGNESIUM mg/dL 2 2       Tele: a fib with           EKG:      Counseling / Coordination of Care  Total time spent today 45 minutes minutes  Greater than 50% of total time was spent with the patient and / or family counseling and / or coordination of care

## 2017-11-10 NOTE — PLAN OF CARE
Problem: PHYSICAL THERAPY ADULT  Goal: Performs mobility at highest level of function for planned discharge setting  See evaluation for individualized goals  Treatment/Interventions: Functional transfer training, LE strengthening/ROM, Therapeutic exercise, Endurance training, Bed mobility, Gait training, Spoke to nursing, Equipment eval/education  Equipment Recommended: Other (Comment) (TBD)       See flowsheet documentation for full assessment, interventions and recommendations  Prognosis: Good  Problem List: Decreased strength, Decreased range of motion, Decreased endurance, Decreased mobility, Impaired balance, Decreased coordination, Decreased cognition, Impaired judgement, Decreased safety awareness, Obesity, Decreased skin integrity, Pain  Assessment: Pt is a 79year old female presenting s/p being found down  Pt with a problem list which includes respiratory failure, cardiac arrext, acute hypercapnic respiratory failure, NSTEMI, HTN, CAD, HLD, encephalopathy, delirium, and h/o vocal cord paralysis  PMH include anxiety and pancreatitis  See above for more comprehensive list  PT consulted to assess functional mobility and assist with safe d/c planning  PT eval performed in MICU  PTA, pt living at home alone in a 2 story house with RAUL and a FF to bed and bath  Pt reports ambulating with a RW  On eval, pt presenting with the following deficits: impaired balance, poor activity tolerance, decreased strength and ROM, impaired cognition and decreased overall functional mobility  Pt required maxA x1 to roll and was dependent x 2 for bed mobility  Pt able to sit EOB but was limited by dizziness  PT to continue to follow pt during stay to progress functional mobility (I) and safety  Rehab required at this time as patient is well below baseline and unsafe to d c home alone     Barriers to Discharge: Inaccessible home environment, Decreased caregiver support  Barriers to Discharge Comments: lives alone, mariano  Recommendation: Short-term skilled PT     PT - OK to Discharge: Yes (to rehab when medically appropriate )    See flowsheet documentation for full assessment

## 2017-11-10 NOTE — SPEECH THERAPY NOTE
Speech-Language Pathology Bedside Swallow Evaluation        Patient Name: Ely Current    IEPRZ'E Date: 11/10/2017     Problem List  Patient Active Problem List   Diagnosis    Respiratory failure    Cardiac arrest    Acute hypercapnic respiratory failure    Torsades de pointes    Prolonged QT interval    NSTEMI (non-ST elevated myocardial infarction)    Ischemic cardiomyopathy    LBBB (left bundle branch block)    CAD (coronary artery disease)    h/o Vocal cord paralysis    HTN (hypertension)    Hyperlipemia    Encephalopathy    Obesity, Class III, BMI 40-49 9 (morbid obesity) ([facility] [address] )    Delirium       Past Medical History  Past Medical History:   Diagnosis Date    Anxiety     Diabetes mellitus ([facility] [address] )     Hypertension     Pancreatitis        Past Surgical History  Past Surgical History:   Procedure Laterality Date     SECTION      3    PEG (HISTORICAL)      TRACHEOSTOMY           Current Medical Status  Pt is a 79 y o  female who presented to [facility] from [facility]  17 with resp failure  Patient was found down at her home last seen well 2 days prior  In the emergency department patient was noted to be hypoxic with oxygen saturations of 70%  She was placed on BiPAP  Pt was intubated 11/3, extubated 17  Past medical history:   Please see H&P for details      Special Studies:  CXR: 11/10 worsening pulmonary edema  Social/Education/Vocational Hx:  Pt lives alone      Swallow Information   Current Risks for Dysphagia & Aspiration: recent intubation and AMS; pt w/ hx  Trach and peg placed 2017, pt stated removed aug 2017       Current Symptoms/Concerns: recent extubation, resp distress, hoarse voice    Current Diet: NPO      Baseline Diet: regular diet and thin liquids      Baseline Assessment   Behavior/Cognition: alert    Speech/Language Status: able to participate in basic conversation and able to follow commands    Patient Positioning: upright in bed       Swallow Mechanism Exam   Facial: symmetrical  Labial: WFL  Lingual: WFL  Dentition: upper dentures, edentulous otherwise  Vocal quality:hoarse   Volitional Cough: weak   Respiratory: NC      Consistencies Assessed and Performance   Consistencies Administered: thin liquids, nectar thick, puree and hard solids; pt took pills w/ thin liquid trials    Oral Stage: pt had some difficulty biting cookie  Able to drink from cup and straw, appeared w/ good oral control  Slow mastication, c/o cookie being too hard to chew  Pt also noted to have dry oral mucosa  Pharyngeal Stage: Swallows adequate and appear complete  Fairly consistent throat clearing noted w/ thin liquids  C/o pill getting stuck x1, cleared w/ liquid wash  Esophageal Concerns: none reported        Summary   Swallow Summary: Pt presents with Mild-moderate oral/pharyngeal dysphagia due to c/o difficulty chewing hard solids and consistent throat clearing w/ thin liquids by cup and straw       Risk for Aspiration: with thin liquids    Recommendations: mechanically altered/level 2 diet and nectar thick liquids     Recommended Form of Meds: whole with puree     Aspiration precautions and compensatory swallowing strategies: upright posture, only feed when fully alert and small bites/sips    Results Reviewed with: patient and RN     Treatment Recommendations: will follow up for diet tolerance and potential to upgrade diet

## 2017-11-10 NOTE — PROGRESS NOTES
This is an attestation to the progress note written by Juan Miguel STALLWORTH date of service November 10, 2017  Patient was seen and examined with PA agree with history and physical and medical decision making some except for the following  Patient had some sundowning overnight and some increased respiratory issues with an episode of rapid AFib  Currently patient is stable and more alert  She admits to being confused is oriented x3    Vital signs afebrile heart rate 110 blood pressure 140/70 pulse ox 98% on 2 L  Generally awake alert and oriented x3, lungs decreased breath sounds bilaterally  Labs reviewed  Chest x-ray shows right-sided pleural effusion were improving congestive heart failure  Assessment and plan is acute respiratory failure/systolic and diastolic heart failure/MSSA pneumonia/delirium/dementia/severe CAD/V-tach  Neuro:  Stable delirium precautions  CV:  Continue IV Lasix, change to p o  metoprolol today, heparin drip  Lungs:  Thoracentesis today continue nebs and nocturnal BiPAP as needed  GI:  Advance diet  :  DC [place]  Id:  Off antibiotics DC central line  Heme:  Continue heparin drip for now  Endocrine:  Sliding scale insulin  Goals:  Optimize respiratory status with thoracentesis and IV diuretics for cardiac catheterization and possible ICD early next week

## 2017-11-10 NOTE — PLAN OF CARE
Problem: SLP ADULT - SWALLOWING, IMPAIRED  Goal: Initial SLP swallow eval performed  Outcome: Completed Date Met: 11/10/17

## 2017-11-10 NOTE — PHYSICAL THERAPY NOTE
Physical Therapy Evaluation    Patient's Name: Lee Anton    Admitting Diagnosis  NSTEMI (non-ST elevated myocardial infarction) [facility]) [I21 4]    Problem List  Patient Active Problem List   Diagnosis    Respiratory failure    Cardiac arrest    Acute hypercapnic respiratory failure    Torsades de pointes    Prolonged QT interval    NSTEMI (non-ST elevated myocardial infarction)    Ischemic cardiomyopathy    LBBB (left bundle branch block)    CAD (coronary artery disease)    h/o Vocal cord paralysis    HTN (hypertension)    Hyperlipemia    Encephalopathy    Obesity, Class III, BMI 40-49 9 (morbid obesity) ([facility] [address] )    Delirium       Past Medical History  Past Medical History:   Diagnosis Date    Anxiety     Diabetes mellitus ([facility] [address] )     Hypertension     Pancreatitis        Past Surgical History  Past Surgical History:   Procedure Laterality Date     SECTION      3    PEG (HISTORICAL)      TRACHEOSTOMY  2017      11/10/17 1528   Note Type   Note type Eval/Treat   Pain Assessment   Pain Assessment No/denies pain   Pain Score No Pain   Home Living   Type of [address] Two level;Stairs to enter with rails;Bed/bath upstairs  (3-4 RAUL; FF TO SECOND FLOOR )   Bathroom Shower/Tub Tub/shower unit   Bathroom Toilet Standard   Bathroom Accessibility Accessible   Home Equipment Walker   Additional Comments Pt reports using a RW PTA    Prior Function   Level of [place] Independent with ADLs and functional mobility   Lives With Alone   ADL Assistance Independent   IADLs Independent   Falls in the last 6 months 1 to 4   Vocational Retired   Comments Pt reports living alone    Restrictions/Precautions   [place] Bearing Precautions Per Order No   Other Precautions Cognitive; Chair Alarm; Bed Alarm;Multiple lines;Telemetry;O2;Fall Risk;Pain   General   Family/Caregiver Present No   Cognition   Overall Cognitive Status Impaired   Arousal/Participation Arousable   Orientation Level Oriented to person;Disoriented to situation;Disoriented to time;Disoriented to place  (inconsistently oriented to date and time )   Memory Decreased recall of precautions;Decreased recall of recent events;Decreased short term memory   Following Commands Follows one step commands with increased time or repetition   Comments Pt waxing and waning throughout session in which she demonstrated increased confusion  post activity    RUE Assessment   RUE Assessment WFL   LUE Assessment   LUE Assessment WFL   RLE Assessment   RLE Assessment WFL  (limited by body habitus; 2-/5)   LLE Assessment   LLE Assessment WFL  (limited by body habitus; 2-/5)   Coordination   Movements are Fluid and Coordinated 0   Coordination and Movement Description bradykinetic    Sensation WFL   Light Touch   RLE Light Touch Grossly intact   LLE Light Touch Grossly intact   Proprioception   RLE Proprioception Impaired   LLE Proprioception Impaired   Bed Mobility   Rolling R 2  Maximal assistance   Additional items Assist x 1   Rolling L 2  Maximal assistance   Additional items Assist x 1   Supine to Sit 1  Dependent   Additional items Increased time required;Verbal cues;LE management;Assist x 2   Sit to Supine 1  Dependent   Additional items Assist x 2; Increased time required;LE management;Verbal cues   Additional Comments Pt able to maintian static sitting EOB for appx 30 seconds but was demonstrated by dizziness   Balance   Static Sitting Poor -   Endurance Deficit   Endurance Deficit Yes   Endurance Deficit Description dizziness, ongoing deconditioning    Activity Tolerance   Activity Tolerance Patient limited by fatigue;Treatment limited secondary to medical complications (Comment)   Medical Staff Made Aware Will update CM with d/c dispo   Nurse Made Aware appropriate to see per Brian Egan RN    Assessment   Prognosis Good   Problem List Decreased strength;Decreased range of motion;Decreased endurance;Decreased mobility; Impaired balance;Decreased coordination;Decreased cognition; Impaired judgement;Decreased safety awareness; Obesity; Decreased skin integrity;Pain   Assessment Pt is a 79year old female presenting s/p being found down  Pt with a problem list which includes respiratory failure, cardiac arrext, acute hypercapnic respiratory failure, NSTEMI, HTN, CAD, HLD, encephalopathy, delirium, and h/o vocal cord paralysis  PMH include anxiety and pancreatitis  See above for more comprehensive list  PT consulted to assess functional mobility and assist with safe d/c planning  PT eval performed in MICU  PTA, pt living at home alone in a 2 story house with RAUL and a FF to bed and bath  Pt reports ambulating with a RW  On eval, pt presenting with the following deficits: impaired balance, poor activity tolerance, decreased strength and ROM, impaired cognition and decreased overall functional mobility  Pt required maxA x1 to roll and was dependent x 2 for bed mobility  Pt able to sit EOB but was limited by dizziness  PT to continue to follow pt during stay to progress functional mobility (I) and safety  Rehab required at this time as patient is well below baseline and unsafe to d c home alone  Barriers to Discharge Inaccessible home environment;Decreased caregiver support   Barriers to Discharge Comments lives alone, stairs   Goals   Patient Goals to get better    STG Expiration Date 11/17/17   Short Term Goal #1 Pt will be modA with bed mobility  Pt will be modAx2 to perform supine<->sit  Pt will sit EOB >10 minutes with Nelda  LTG Expiration Date 11/24/17   Long Term Goal #1 Pt will be Nelda with bed mobility  Pt will be minAx2 to perform supine<->sit  Pt will sit EOB >20 minutes with Nelda  Pt's strength will improve by 1/2 MMT grade  Long Term Goal #2 PT to see for further mobility assessment    Treatment Day 0   Plan   Treatment/Interventions Functional transfer training;LE strengthening/ROM; Therapeutic exercise; Endurance training;Bed mobility;Gait training;Spoke to nursing;Equipment eval/education   PT Frequency 5x/wk   Recommendation   Recommendation Short-term skilled PT   Equipment Recommended Other (Comment)  (TBD)   PT - OK to Discharge Yes  (to rehab when medically appropriate )   Additional Comments Returned to supine with alarm and SCDs activated  Imaging present at bedside     Barthel Index   Feeding 5   Bathing 0   Grooming Score 0   Dressing Score 0   Bladder Score 5   Bowels Score 5   Toilet Use Score 0   Transfers (Bed/Chair) Score 5   Mobility (Level Surface) Score 0   Stairs Score 0   Barthel Index Score 20           Martha Darling, PT

## 2017-11-11 ENCOUNTER — GENERIC CONVERSION - ENCOUNTER (OUTPATIENT)
Dept: OTHER | Facility: OTHER | Age: 67
End: 2017-11-11

## 2017-11-11 ENCOUNTER — APPOINTMENT (INPATIENT)
Dept: RADIOLOGY | Facility: HOSPITAL | Age: 67
DRG: 224 | End: 2017-11-11
Payer: COMMERCIAL

## 2017-11-11 LAB
ANION GAP SERPL CALCULATED.3IONS-SCNC: 9 MMOL/L (ref 4–13)
APTT PPP: 66 SECONDS (ref 23–35)
BASOPHILS # BLD AUTO: 0.03 THOUSANDS/ΜL (ref 0–0.1)
BASOPHILS NFR BLD AUTO: 0 % (ref 0–1)
BUN SERPL-MCNC: 21 MG/DL (ref 5–25)
CALCIUM SERPL-MCNC: 9.6 MG/DL (ref 8.3–10.1)
CHLORIDE SERPL-SCNC: 94 MMOL/L (ref 100–108)
CO2 SERPL-SCNC: 34 MMOL/L (ref 21–32)
CREAT SERPL-MCNC: 0.96 MG/DL (ref 0.6–1.3)
EOSINOPHIL # BLD AUTO: 0.33 THOUSAND/ΜL (ref 0–0.61)
EOSINOPHIL NFR BLD AUTO: 3 % (ref 0–6)
ERYTHROCYTE [DISTWIDTH] IN BLOOD BY AUTOMATED COUNT: 15.2 % (ref 11.6–15.1)
GFR SERPL CREATININE-BSD FRML MDRD: 61 ML/MIN/1.73SQ M
GLUCOSE SERPL-MCNC: 244 MG/DL (ref 65–140)
GLUCOSE SERPL-MCNC: 269 MG/DL (ref 65–140)
GLUCOSE SERPL-MCNC: 277 MG/DL (ref 65–140)
GLUCOSE SERPL-MCNC: 291 MG/DL (ref 65–140)
GLUCOSE SERPL-MCNC: 296 MG/DL (ref 65–140)
GLUCOSE SERPL-MCNC: 310 MG/DL (ref 65–140)
HCT VFR BLD AUTO: 31.9 % (ref 34.8–46.1)
HGB BLD-MCNC: 10.1 G/DL (ref 11.5–15.4)
LYMPHOCYTES # BLD AUTO: 2.27 THOUSANDS/ΜL (ref 0.6–4.47)
LYMPHOCYTES NFR BLD AUTO: 18 % (ref 14–44)
MCH RBC QN AUTO: 28.9 PG (ref 26.8–34.3)
MCHC RBC AUTO-ENTMCNC: 31.7 G/DL (ref 31.4–37.4)
MCV RBC AUTO: 91 FL (ref 82–98)
MONOCYTES # BLD AUTO: 1.65 THOUSAND/ΜL (ref 0.17–1.22)
MONOCYTES NFR BLD AUTO: 13 % (ref 4–12)
NEUTROPHILS # BLD AUTO: 8.59 THOUSANDS/ΜL (ref 1.85–7.62)
NEUTS SEG NFR BLD AUTO: 66 % (ref 43–75)
NRBC BLD AUTO-RTO: 0 /100 WBCS
PLATELET # BLD AUTO: 372 THOUSANDS/UL (ref 149–390)
PMV BLD AUTO: 10.6 FL (ref 8.9–12.7)
POTASSIUM SERPL-SCNC: 3.8 MMOL/L (ref 3.5–5.3)
RBC # BLD AUTO: 3.49 MILLION/UL (ref 3.81–5.12)
SODIUM SERPL-SCNC: 137 MMOL/L (ref 136–145)
WBC # BLD AUTO: 12.93 THOUSAND/UL (ref 4.31–10.16)

## 2017-11-11 PROCEDURE — 85025 COMPLETE CBC W/AUTO DIFF WBC: CPT | Performed by: PHYSICIAN ASSISTANT

## 2017-11-11 PROCEDURE — 82948 REAGENT STRIP/BLOOD GLUCOSE: CPT

## 2017-11-11 PROCEDURE — 85730 THROMBOPLASTIN TIME PARTIAL: CPT | Performed by: EMERGENCY MEDICINE

## 2017-11-11 PROCEDURE — 94660 CPAP INITIATION&MGMT: CPT

## 2017-11-11 PROCEDURE — 71010 HB CHEST X-RAY 1 VIEW FRONTAL: CPT

## 2017-11-11 PROCEDURE — 94760 N-INVAS EAR/PLS OXIMETRY 1: CPT

## 2017-11-11 PROCEDURE — 71010 HB CHEST X-RAY 1 VIEW FRONTAL (PORTABLE): CPT

## 2017-11-11 PROCEDURE — C1751 CATH, INF, PER/CENT/MIDLINE: HCPCS

## 2017-11-11 PROCEDURE — 94640 AIRWAY INHALATION TREATMENT: CPT

## 2017-11-11 PROCEDURE — 36569 INSJ PICC 5 YR+ W/O IMAGING: CPT

## 2017-11-11 PROCEDURE — 80048 BASIC METABOLIC PNL TOTAL CA: CPT | Performed by: PHYSICIAN ASSISTANT

## 2017-11-11 RX ORDER — INSULIN GLARGINE 100 [IU]/ML
20 INJECTION, SOLUTION SUBCUTANEOUS EVERY 12 HOURS SCHEDULED
Status: DISCONTINUED | OUTPATIENT
Start: 2017-11-11 | End: 2017-11-12

## 2017-11-11 RX ORDER — POTASSIUM CHLORIDE 29.8 MG/ML
40 INJECTION INTRAVENOUS ONCE
Status: COMPLETED | OUTPATIENT
Start: 2017-11-11 | End: 2017-11-14

## 2017-11-11 RX ORDER — LANOLIN ALCOHOL/MO/W.PET/CERES
6 CREAM (GRAM) TOPICAL
Status: DISCONTINUED | OUTPATIENT
Start: 2017-11-11 | End: 2017-11-23 | Stop reason: HOSPADM

## 2017-11-11 RX ADMIN — HEPARIN SODIUM AND DEXTROSE 11.1 UNITS/KG/HR: 10000; 5 INJECTION INTRAVENOUS at 05:02

## 2017-11-11 RX ADMIN — LEVOTHYROXINE SODIUM 125 MCG: 125 TABLET ORAL at 05:01

## 2017-11-11 RX ADMIN — HEPARIN SODIUM AND DEXTROSE 11.1 UNITS/KG/HR: 10000; 5 INJECTION INTRAVENOUS at 17:23

## 2017-11-11 RX ADMIN — IPRATROPIUM BROMIDE 0.5 MG: 0.5 SOLUTION RESPIRATORY (INHALATION) at 08:10

## 2017-11-11 RX ADMIN — INSULIN LISPRO 12 UNITS: 100 INJECTION, SOLUTION INTRAVENOUS; SUBCUTANEOUS at 18:21

## 2017-11-11 RX ADMIN — LEVALBUTEROL HYDROCHLORIDE 1.25 MG: 1.25 SOLUTION, CONCENTRATE RESPIRATORY (INHALATION) at 08:10

## 2017-11-11 RX ADMIN — INSULIN LISPRO 12 UNITS: 100 INJECTION, SOLUTION INTRAVENOUS; SUBCUTANEOUS at 08:51

## 2017-11-11 RX ADMIN — IPRATROPIUM BROMIDE 0.5 MG: 0.5 SOLUTION RESPIRATORY (INHALATION) at 02:17

## 2017-11-11 RX ADMIN — AMIODARONE HYDROCHLORIDE 0.5 MG/MIN: 50 INJECTION, SOLUTION INTRAVENOUS at 00:39

## 2017-11-11 RX ADMIN — POTASSIUM CHLORIDE 20 MEQ: 1500 TABLET, EXTENDED RELEASE ORAL at 17:23

## 2017-11-11 RX ADMIN — INSULIN LISPRO 6 UNITS: 100 INJECTION, SOLUTION INTRAVENOUS; SUBCUTANEOUS at 00:03

## 2017-11-11 RX ADMIN — POTASSIUM CHLORIDE 40 MEQ: 400 INJECTION, SOLUTION INTRAVENOUS at 07:38

## 2017-11-11 RX ADMIN — POLYETHYLENE GLYCOL 3350 17 G: 17 POWDER, FOR SOLUTION ORAL at 09:13

## 2017-11-11 RX ADMIN — MELATONIN TAB 3 MG 3 MG: 3 TAB at 20:41

## 2017-11-11 RX ADMIN — INSULIN GLARGINE 20 UNITS: 100 INJECTION, SOLUTION SUBCUTANEOUS at 14:20

## 2017-11-11 RX ADMIN — NYSTATIN: 100000 POWDER TOPICAL at 09:13

## 2017-11-11 RX ADMIN — INSULIN LISPRO 8 UNITS: 100 INJECTION, SOLUTION INTRAVENOUS; SUBCUTANEOUS at 12:20

## 2017-11-11 RX ADMIN — METOPROLOL TARTRATE 25 MG: 25 TABLET ORAL at 09:13

## 2017-11-11 RX ADMIN — INSULIN LISPRO 6 UNITS: 100 INJECTION, SOLUTION INTRAVENOUS; SUBCUTANEOUS at 05:53

## 2017-11-11 RX ADMIN — IPRATROPIUM BROMIDE 0.5 MG: 0.5 SOLUTION RESPIRATORY (INHALATION) at 13:19

## 2017-11-11 RX ADMIN — ASPIRIN 81 MG 324 MG: 81 TABLET ORAL at 09:13

## 2017-11-11 RX ADMIN — FUROSEMIDE 40 MG: 10 INJECTION, SOLUTION INTRAMUSCULAR; INTRAVENOUS at 17:22

## 2017-11-11 RX ADMIN — ATORVASTATIN CALCIUM 40 MG: 40 TABLET, FILM COATED ORAL at 09:13

## 2017-11-11 RX ADMIN — POTASSIUM CHLORIDE 20 MEQ: 1500 TABLET, EXTENDED RELEASE ORAL at 09:12

## 2017-11-11 RX ADMIN — POTASSIUM CHLORIDE 20 MEQ: 1500 TABLET, EXTENDED RELEASE ORAL at 07:44

## 2017-11-11 RX ADMIN — FUROSEMIDE 40 MG: 10 INJECTION, SOLUTION INTRAMUSCULAR; INTRAVENOUS at 12:05

## 2017-11-11 RX ADMIN — IPRATROPIUM BROMIDE 0.5 MG: 0.5 SOLUTION RESPIRATORY (INHALATION) at 18:53

## 2017-11-11 RX ADMIN — VITAMIN D, TAB 1000IU (100/BT) 1000 UNITS: 25 TAB at 09:13

## 2017-11-11 RX ADMIN — LEVALBUTEROL HYDROCHLORIDE 1.25 MG: 1.25 SOLUTION, CONCENTRATE RESPIRATORY (INHALATION) at 02:17

## 2017-11-11 RX ADMIN — METOPROLOL TARTRATE 25 MG: 25 TABLET ORAL at 20:40

## 2017-11-11 RX ADMIN — AMIODARONE HYDROCHLORIDE 0.5 MG/MIN: 50 INJECTION, SOLUTION INTRAVENOUS at 17:24

## 2017-11-11 RX ADMIN — FUROSEMIDE 40 MG: 10 INJECTION, SOLUTION INTRAMUSCULAR; INTRAVENOUS at 06:32

## 2017-11-11 RX ADMIN — LEVALBUTEROL HYDROCHLORIDE 1.25 MG: 1.25 SOLUTION, CONCENTRATE RESPIRATORY (INHALATION) at 18:53

## 2017-11-11 RX ADMIN — LEVALBUTEROL HYDROCHLORIDE 1.25 MG: 1.25 SOLUTION, CONCENTRATE RESPIRATORY (INHALATION) at 13:19

## 2017-11-11 RX ADMIN — INSULIN GLARGINE 20 UNITS: 100 INJECTION, SOLUTION SUBCUTANEOUS at 20:40

## 2017-11-11 RX ADMIN — NYSTATIN 1 APPLICATION: 100000 POWDER TOPICAL at 17:23

## 2017-11-11 NOTE — PROGRESS NOTES
Progress Note - Cardiology   Lorraine Caceres 79 y o  female MRN: [id number]  Unit/Bed#: MICU 10 Encounter: [id number]  11/11/17  8:38 AM        Assessment/Plan:    1  NSTEMI type II  On aspirin, statin, beta blocker  2  Chronic multivessel CAD  On aspirin, statin, beta blocker  Has been medically managed per report, cath done at [facility]  Unable to see actual report of cath from 6/17, but per primary team images from that cath are supposed to be messengered to be viewed  At this time will plan to review cath images and decide if any intervenable disease by PCI to avoid issues with VT in future  Will hold off on cath here until can view images from prior cath, as patient still having respiratory issues as well  3  Chronic LBBB  Still present on telemetry  4  ICM with EF 35% with acute/chronic systolic HF  By echo here, EF improved to 50%  Currently on Lasix 40 IV q6hrs to help with volume overload/respiratory status  Underwent thoracentesis yesterday with 1 L removed  CXR this morning, which I personally reviewed, does not appear significantly improved, in fact may be worse with pulmonary edema  Agree with addressing goals of care, as patient has been aggressively treated without significant improvement thus far  On Metoprolol as tolerated 25 bid, got dose last night, has issues with swallowing previously due to vocal cord dysfunction  5  HTN  Started on low dose beta blocker, thus far tolerating  6  HL  On statin  7  DM  On insulin  8  PMVT  Lidocaine drip stopped 11/8  No further episodes since 11/13  Keep K>4, Mg>2      9  PNA  Completed course of nafcillin  10  Paroxysmal afib  Started on IV amiodarone  Currently SR  On IV heparin as well  Subjective/Objective   Chief Complaint: No chief complaint on file        Subjective:79 year old woman with a history of multivessel disease managed medically, ICM with EF 35%, chronic LBBB, DM, 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, admitted after being found to at home, found on telemetry here to have an episode of PVC induced PMVT with baseline prolonged QT on 11/3 that resolved spontaneously per report  She was started on IV amiodarone, but had a second episode of torsades on 11/3, and was switched from Amiodarone to Lidocaine drip at that time, and was kept on dopamine to keep HR elevated as well as for inotropic effect  She was extubated 11/8  Being diuresed for pulmonary edema on CXR  Still requiring BiPAP at night, but per nursing she has difficulty tolerating mask  Had right sided thoracentesis yesterday, was more persistently tachycardic in afib, IV amiodarone started, currently maintaining SR, though HR still borderline tachy  Currently receiving nebulizer  No chest pain  No palpitations  Mental status waxes and wanes per report       Patient Active Problem List   Diagnosis    Respiratory failure    Cardiac arrest    Acute hypercapnic respiratory failure    Torsades de pointes    Prolonged QT interval    NSTEMI (non-ST elevated myocardial infarction)    Ischemic cardiomyopathy    LBBB (left bundle branch block)    CAD (coronary artery disease)    h/o Vocal cord paralysis    HTN (hypertension)    Hyperlipemia    Encephalopathy    Obesity, Class III, BMI 40-49 9 (morbid obesity) ([facility] [address] )    Delirium     Past Medical History:   Diagnosis Date    Anxiety     Diabetes mellitus ([facility] [address] )     Hypertension     Pancreatitis        No Known Allergies    Current Facility-Administered Medications   Medication Dose Route Frequency Provider Last Rate Last Dose    acetaminophen (TYLENOL) rectal suppository 650 mg  650 mg Rectal Q4H PRN Conor Garcia MD        amiodarone (CORDARONE) 900 mg in dextrose 5 % 500 mL infusion  0 5 mg/min Intravenous Continuous Priti Chavez PA-C 16 7 mL/hr at 11/11/17 0039 0 5 mg/min at 11/11/17 0039    aspirin chewable tablet 324 mg  324 mg Oral Daily Luis Muhammad PA-C   324 mg at 11/10/17 4154    atorvastatin (LIPITOR) tablet 40 mg  40 mg Oral Daily ARLENE Frausto   40 mg at 11/10/17 0941    cholecalciferol (VITAMIN D3) tablet 1,000 Units  1,000 Units Oral Daily Allyssa Benitez PA-C   1,000 Units at 11/10/17 4087    fentanyl citrate (PF) 100 MCG/2ML 25 mcg  25 mcg Intravenous Q2H PRN Allyssa Benitez PA-C        furosemide (LASIX) injection 40 mg  40 mg Intravenous Q6H Neal Edwards MD   40 mg at 11/11/17 6799    heparin (porcine) 25,000 units in 250 mL infusion (premix)  3-20 Units/kg/hr (Order-Specific) Intravenous Titrated Reita Estimable, DO 10 mL/hr at 11/11/17 0502 11 1 Units/kg/hr at 11/11/17 0502    heparin (porcine) injection 2,000 Units  2,000 Units Intravenous PRN Reita Estimable, DO        heparin (porcine) injection 4,000 Units  4,000 Units Intravenous PRN Reita Estimable, DO        influenza inactivated quadrivalent vaccine (FLULAVAL) IM injection 0 5 mL  0 5 mL Intramuscular Prior to discharge Karolina Prieto MD        insulin lispro (HumaLOG) 100 units/mL subcutaneous injection 4-20 Units  4-20 Units Subcutaneous TID AC Allyssa Benitez PA-C        ipratropium (ATROVENT) 0 02 % inhalation solution 0 5 mg  0 5 mg Nebulization Q6H Allyssa Benitez PA-C   0 5 mg at 11/11/17 0810    levalbuterol (Jessica Norris) inhalation solution 1 25 mg  1 25 mg Nebulization Q6H Karolina Prieto MD   1 25 mg at 11/11/17 0810    levothyroxine tablet 125 mcg  125 mcg Oral Early Morning Allyssa Benitez PA-C   125 mcg at 11/11/17 0501    lidocaine (PF) (XYLOCAINE-MPF) 100 mg/5 mL injection    Code/Trauma/Sedation Med Roskyla Kee DO   100 mg at 11/03/17 1218    melatonin tablet 3 mg  3 mg Oral HS ARLENE Ward   3 mg at 11/10/17 2018    metoprolol (LOPRESSOR) injection 2 5 mg  2 5 mg Intravenous Q6H PRN Abby Abdullahi DO   2 5 mg at 11/09/17 1730    metoprolol tartrate (LOPRESSOR) tablet 25 mg  25 mg Oral Q12H Foreign Valenzuela MD   25 mg at 11/10/17 2018    nystatin (MYCOSTATIN) powder   Topical BID [place], [place]        oxyCODONE (ROXICODONE) IR tablet 2 5 mg  2 5 mg Oral Q4H PRN Harvinder Enciso PA-C        Or    oxyCODONE (ROXICODONE) IR tablet 5 mg  5 mg Oral Q4H PRN Harvinder Enciso PA-C        polyethylene glycol (MIRALAX) packet 17 g  17 g Oral Daily Kvng Session, CRNP   17 g at 11/08/17 1124    potassium chloride (K-DUR,KLOR-CON) CR tablet 20 mEq  20 mEq Oral BID With Meals Nidia Mallory MD   20 mEq at 11/11/17 0744    potassium chloride 40 mEq IVPB (premix)  40 mEq Intravenous Once Harvinder Enciso PA-C 25 mL/hr at 11/11/17 0738 40 mEq at 11/11/17 0738    senna (SENOKOT) tablet 17 2 mg  2 tablet Oral HS Kvng Session, CRNP   Stopped at 11/09/17 2200    sodium bicarbonate 50 mEq/50 mL injection    Code/Trauma/Sedation Med Romel Tiesha, DO   100 mEq at 11/03/17 1225    sodium chloride 0 9 % bolus    Code/Trauma/Sedation Med Romel Tiesha, DO   1,000 mL at 11/03/17 1221    trimethobenzamide (TIGAN) IM injection 200 mg  200 mg Intramuscular Q6H PRN Henry Boo, DO   200 mg at 11/07/17 2350       Vitals: /63   Pulse 96   Temp 97 7 °F (36 5 °C) (Oral)   Resp 19   Ht 5' 11" (1 803 m)   Wt 121 kg (266 lb 8 6 oz)   SpO2 99%   BMI 25 04 kg/m²     Systolic (64XOT), MVX:286 , Min:90 , JTN:287     Diastolic (64RUK), BVQ:83, Min:54, Max:75      Vitals:    11/08/17 0527 11/11/17 0410   Weight: 126 kg (278 lb) 121 kg (266 lb 8 6 oz)     Orthostatic Blood Pressures    Flowsheet Row Most Recent Value   Blood Pressure  128/63 filed at 11/11/2017 0730   Patient Position - Orthostatic VS  Lying filed at 11/11/2017 0730            Intake/Output Summary (Last 24 hours) at 11/11/17 0838  Last data filed at 11/11/17 0745   Gross per 24 hour   Intake           930 59 ml   Output             3295 ml   Net         -2364 41 ml       Invasive Devices     Central Venous Catheter Line            CVC Central Lines 11/03/17 Triple 16cm 7 days                  Physical Exam:     GEN: Awake, no acute distress, mildly tachypneic  HEENT: Sclera anicteric, conjunctivae pink, mucous membranes moist   NECK: Supple, no carotid bruits, no significant JVD  HEART: Regular rhythm, normal S1 and S2, no murmurs, clicks, gallops or rubs  LUNGS: coarse breath sounds bilaterally, scattered ronchi; no wheezes, rales   ABDOMEN: Obese, soft, nontender, nondistended, normoactive bowel sounds  EXTREMITIES: Skin warm and well perfused, no clubbing, cyanosis, 1+ pedal edema  Skin erythematous over feet  NEURO: No focal findings  SKIN: Normal without suspicious lesions on exposed skin        Lab Results:     Troponins:     Results from last 7 days  Lab Units 11/09/17  1817 11/09/17  1422 11/09/17  1157   TROPONIN I ng/mL 3 02* 3 19* 3 22*       CBC with diff:     Results from last 7 days  Lab Units 11/11/17  0433 11/10/17  0335 11/09/17  1422 11/09/17  0427 11/08/17  0448 11/07/17  0438 11/06/17  0437   WBC Thousand/uL 12 93* 11 47* 10 56* 10 88* 11 08* 10 89* 14 55*   HEMOGLOBIN g/dL 10 1* 9 7* 10 2* 9 5* 10 2* 9 8* 10 5*   HEMATOCRIT % 31 9* 31 2* 32 5* 30 4* 32 5* 31 4* 34 1*   MCV fL 91 92 91 92 91 91 92   PLATELETS Thousands/uL 372 297 307 267 230 211 239   MCH pg 28 9 28 4 28 5 28 6 28 4 28 4 28 4   MCHC g/dL 31 7 31 1* 31 4 31 3* 31 4 31 2* 30 8*   RDW % 15 2* 14 8 14 9 14 8 14 6 14 6 14 3   MPV fL 10 6 10 0 10 4 10 6 10 6 10 4 10 5   NRBC AUTO /100 WBCs 0 0  --   --   --   --   --          CMP:  Results from last 7 days  Lab Units 11/11/17  0433 11/10/17  0335 11/09/17  2058 11/09/17  1157 11/09/17  0427 11/08/17  0448 11/08/17  0036  11/07/17  0438   SODIUM mmol/L 137 138 137 136 136 135* 139  < > 136   POTASSIUM mmol/L 3 8 3 5 3 7 4 2 3 4* 4 3 3 8  < > 4 5   CHLORIDE mmol/L 94* 96* 96* 96* 95* 95* 95*  < > 97*   CO2 mmol/L 34* 33* 31 32 36* 36* 36*  < > 35*   ANION GAP mmol/L 9 9 10 8 5 4 8  < > 4   BUN mg/dL 21 21 17 15 14 15 15  < > 18   CREATININE mg/dL 0 96 0 83 0 87 0 80 0 75 0 83 0 75  < > 0 81   GLUCOSE RANDOM mg/dL 277* 175* 139 109 106 142* 98  < > 162*   CALCIUM mg/dL 9 6 9 1 9 4 9 6 9 0 9 1 9 1  < > 8 9   AST U/L  --   --   --   --   --   --   --   --  38   ALT U/L  --   --   --   --   --   --   --   --  51   ALK PHOS U/L  --   --   --   --   --   --   --   --  30*   TOTAL PROTEIN g/dL  --   --   --   --   --   --   --   --  6 1*   ALBUMIN g/dL  --   --   --   --   --   --   --   --  2 5*   BILIRUBIN TOTAL mg/dL  --   --   --   --   --   --   --   --  0 83   EGFR ml/min/1 73sq m 61 73 69 77 83 73 83  < > 75   < > = values in this interval not displayed  Magnesium:     Results from last 7 days  Lab Units 11/10/17  0335 17  0427 17  0036 17  1822 17  0438 17  0053 17  0437   MAGNESIUM mg/dL 2 2 2 2 2 6 2 2 2 1 2 1 2 4       Coags:     Results from last 7 days  Lab Units 17  0433 11/10/17  0330 17  2058 17  1422 17  0438 17  0437 17  0439   PTT seconds 66* 79* 66* 37* 38* 75* 77*   INR   --   --   --  1 31*  --   --   --        Cardiac testing:   Results for orders placed during the hospital encounter of 17   Echo complete with contrast if indicated    Narrative [address]  [address]  [phone]    Transthoracic Echocardiogram  2D, M-mode, Doppler, and Color Doppler    Study date:  2017    Patient: Shavonne Guerrero  MR number: [id number]  Account number: [de-identified]  : 1950  Age: 79 years  Gender: Female  Status: Inpatient  Location: Bedside  Height: 71 in  Weight: 279 lb  BP: 92/ 53 mmHg    Indications: Heart failure      Diagnoses: I50 9 - Heart failure, unspecified    Sonographer:  Angelito Gonzalez RDCS  Referring Physician:  Colby Dee PA-C  Group:  [id number]  Interpreting Physician:  Jeni Mohan MD    SUMMARY    LEFT VENTRICLE:  Systolic function was at the lower limits of normal  Ejection fraction was estimated to be 50 %   There was hypokinesis of the basal inferior wall(s)  There was mild concentric hypertrophy  Features were consistent with a pseudonormal left ventricular filling pattern, with concomitant abnormal relaxation and increased filling pressure (grade 2 diastolic dysfunction)  MITRAL VALVE:  There was mild to moderate regurgitation  AORTIC VALVE:  There was mild regurgitation  PERICARDIUM:  A small pericardial effusion was identified posterior to the heart  The fluid had no internal echoes  There was no evidence of hemodynamic compromise  There was a large left pleural effusion  HISTORY: PRIOR HISTORY: NSTEMI, LBBB, Ischemic cardiomyopathy, Coronary artery disease, Diabetes, Hypertension, Hyperlipidemia, Tracheostomy, Smoker  PROCEDURE: The procedure was performed at the bedside  This was a routine study  The transthoracic approach was used  The study included complete 2D imaging, M-mode, complete spectral Doppler, and color Doppler  The heart rate was 73 bpm,  at the start of the study  Images were obtained from the parasternal, apical, subcostal, and suprasternal notch acoustic windows  Echocardiographic views were limited due to decreased penetration and lung interference  This was a  technically difficult study  LEFT VENTRICLE: Size was normal  Systolic function was at the lower limits of normal  Ejection fraction was estimated to be 50 %  There was hypokinesis of the basal inferior wall(s)  Wall thickness was mildly increased  There was mild  concentric hypertrophy  DOPPLER: The transmitral flow pattern was normal  Features were consistent with a pseudonormal left ventricular filling pattern, with concomitant abnormal relaxation and increased filling pressure (grade 2 diastolic  dysfunction)      RIGHT VENTRICLE: The size was normal  Systolic function was normal  Wall thickness was normal     LEFT ATRIUM: Size was normal     RIGHT ATRIUM: Size was normal     MITRAL VALVE: Valve structure was normal  There was normal leaflet separation  DOPPLER: The transmitral velocity was within the normal range  There was no evidence for stenosis  There was mild to moderate regurgitation  AORTIC VALVE: The valve was trileaflet  Leaflets exhibited mild calcification, normal cuspal separation, and sclerosis  DOPPLER: Transaortic velocity was within the normal range  There was no evidence for stenosis  There was mild  regurgitation  TRICUSPID VALVE: The valve structure was normal  There was normal leaflet separation  DOPPLER: The transtricuspid velocity was within the normal range  There was no evidence for stenosis  There was trace regurgitation  The tricuspid jet  envelope definition was inadequate for estimation of RV systolic pressure  PULMONIC VALVE: Leaflets exhibited normal thickness, no calcification, and normal cuspal separation  DOPPLER: The transpulmonic velocity was within the normal range  There was trace regurgitation  PERICARDIUM: A small pericardial effusion was identified posterior to the heart  The fluid had no internal echoes  There was no evidence of hemodynamic compromise  There was a large left pleural effusion  The pericardium was normal in  appearance  AORTA: The root exhibited normal size  SYSTEMIC VEINS: IVC: The inferior vena cava was dilated  Respirophasic changes in dimension were absent      SYSTEM MEASUREMENT TABLES    2D  %FS: 17 89 %  Ao Diam: 2 84 cm  EDV(Teich): 142 7 ml  EF Biplane: 35 65 %  EF(Teich): 36 81 %  ESV(Teich): 90 18 ml  IVSd: 1 3 cm  LA Area: 20 16 cm2  LA Diam: 4 05 cm  LVEDV MOD A2C: 129 88 ml  LVEDV MOD A4C: 112 39 ml  LVEDV MOD BP: 125 34 ml  LVEF MOD A2C: 34 61 %  LVEF MOD A4C: 32 37 %  LVESV MOD A2C: 84 92 ml  LVESV MOD A4C: 76 01 ml  LVESV MOD BP: 80 65 ml  LVIDd: 5 42 cm  LVIDs: 4 45 cm  LVLd A2C: 7 55 cm  LVLd A4C: 8 17 cm  LVLs A2C: 6 77 cm  LVLs A4C: 6 52 cm  LVPWd: 1 28 cm  RA Area: 17 87 cm2  RVIDd: 4 23 cm  SV MOD A2C: 44 96 ml  SV MOD A4C: 36 38 ml  SV(Teich): 52 53 ml    CW  AR Dec [place]: 1 64 m/s2  AR Dec Time: 1614 77 ms  AR PHT: 468 28 ms  AR Vmax: 2 65 m/s  AR maxP 04 mmHg    MM  TAPSE: 2 18 cm    PW  E': 0 03 m/s  E/E': 35 79  MV A Arnel: 0 75 m/s  MV Dec [place]: 4 83 m/s2  MV DecT: 211 74 ms  MV E Arnel: 1 02 m/s  MV E/A Ratio: 1 37  MV PHT: 61 4 ms  MVA By PHT: 3 58 cm2    [employer] Accredited Echocardiography [facility]    Prepared and electronically signed by    Shyla Szymanski MD  Signed -[id number] 12:14:05         Imaging: I have personally reviewed pertinent reports  Telemetry: personally reviewed, SR with LBBB, paroxysmal afib with LBBB, isolated PACs

## 2017-11-11 NOTE — PROCEDURES
Insert PICC line  Date/Time: 11/11/2017 11:25 AM  Performed by: Darlin Connelly by: Pamela Hancock     Patient location:  Bedside  Other Assisting Provider: Yes (comment) (Jessica Stewart inf tech)    Consent:     Consent obtained:  Written    Consent given by:  Patient (consent obtained by physician)  Universal protocol:     Procedure explained and questions answered to patient or proxy's satisfaction: yes      Relevant documents present and verified: yes      Test results available and properly labeled: yes      Imaging studies available: yes      Required blood products, implants, devices, and special equipment available: yes      Site/side marked: yes      Immediately prior to procedure, a time out was called: yes      Patient identity confirmed:  Verbally with patient  Pre-procedure details:     Hand hygiene: Hand hygiene performed prior to insertion      Sterile barrier technique: All elements of maximal sterile technique followed      Skin preparation:  ChloraPrep  Indications:     PICC line indications: no peripheral vascular access    Anesthesia (see MAR for exact dosages):      Anesthesia method:  Local infiltration    Local anesthetic:  Lidocaine 1% w/o epi (4 ml)  Procedure details:     Vessel type: vein      Laterality:  Left    Approach: percutaneous technique used      Patient position:  Flat    Procedural supplies:  Triple lumen    Catheter size:  5 Fr    Landmarks identified: yes      Ultrasound guidance: yes      Sterile ultrasound techniques: Sterile gel and sterile probe covers were used      Number of attempts:  1    Successful placement: yes      Vessel of catheter tip end:  Chest Xray needed to confirm placement    Total catheter length (cm):  44    Catheter out on skin (cm):  0    Max flow rate:  999    Arm circumference:  36  Post-procedure details:     Post-procedure:  Dressing applied and securement device placed    Assessment:  Blood return through all ports and free fluid flow    Post-procedure complications: none      Patient tolerance of procedure:   Tolerated well, no immediate complications

## 2017-11-11 NOTE — PROGRESS NOTES
Progress Note - Critical Care   Umberto العلي 79 y o  female MRN: [id number]  Unit/Bed#: MICU 10 Encounter: [id number]    Assessment:   Principal Problem:    Respiratory failure  Active Problems:    Cardiac arrest    Acute hypercapnic respiratory failure    Torsades de pointes    Prolonged QT interval    NSTEMI (non-ST elevated myocardial infarction)    CAD (coronary artery disease)    Ischemic cardiomyopathy    LBBB (left bundle branch block)    Encephalopathy    Delirium    h/o Vocal cord paralysis    HTN (hypertension)    Hyperlipemia    Obesity, Class III, BMI 40-49 9 (morbid obesity) ([facility] [address] )    Plan:   Neuro:   · Acute encephalopathy: appears to be at baseline, likely component of ICU delirium  CAM-ICU, delirium precautions  · Melatonin qhs    CV:   · Atrial fibrillation: oral metoprolol up-titrated per cardiology to 25mg BID  Amiodarone gtt started yesterday, currently at 0 5mg/min  Continue heparin gtt  Scheduled for cardiac catheterization on Monday, possible ICD placement following  Respiratory status continues to improve daily, hopeful patient will be able to lay flat Monday for cath  Continue diuresis with Lasix 40mg TID  · Avoid QTc prolonging medications  · PICC placement today, poor peripheral access    Lung:   · Acute hypoxic respiratory failure: patient continues to improve from a respiratory standpoint  Continue diuresis TID  Respiratory protocol with q6 nebulizers  S/p thoracentesis yesterday  GI:   · Bowel regimen with senna/miralax    FEN:   · No maintenance fluids  · Hypokalemia: repleted this morning  Replete electrolytes prn  · Dysphagia 2-mechanical soft with nectar thick liquid    :   · Urine output adequate with diuresis  · No lackey present    ID:   · Leukocytosis increasing daily, continues to be afebrile  Monitor fever curve and white count  · Discontinue central line today    Heme:   · Heparin gtt and SCDs    Endo:   · SSI algorithm 5, increased this morning   Now that patient is eating, blood sugars are not well controlled  Add Lantus for tonight, may need insulin gtt    Msk/Skin:   · Frequent turns and repositioning, out of bed to chair today    Disposition:   · Step down status    ______________________________________________________________________  Chief Complaint: "I feel better"      HPI/24hr events: Thoracentesis yesterday, removal of ~1L serous fluid  No events overnight  Wore bipap transiently, but remained on 2L nasal cannula throughout most of the night  Continues to  overnight, but mental status is much improved from days prior  States her breathing feels much better after the thoracentesis  ______________________________________________________________________  Temperature:   Temp (24hrs), Av 3 °F (36 8 °C), Min:97 6 °F (36 4 °C), Max:99 2 °F (37 3 °C)    Current Temperature: 97 9 °F (36 6 °C)    Vitals:    17 0217 17 0310 17 0410 17 0510   BP:  106/61 113/59 119/65   Pulse:  78 80 78   Resp:  (!) 24 20 21   Temp:   97 9 °F (36 6 °C)    TempSrc:   Oral    SpO2: 100% 98% 100% 96%   Weight:   121 kg (266 lb 8 6 oz)    Height:         Arterial Line BP: 144/54  Arterial Line MAP (mmHg): 80 mmHg     Weights:   IBW: 70 8 kg    Body mass index is 37 17 kg/m²    Weight (last 2 days)     Date/Time   Weight    17 0410  121 (266 54)            Height: 5' 11" (180 3 cm)    Ventilator Settings:   Vent Mode: Tube Compensation  Resp Rate (BPM): 16 BPM  Vt (mL): 420 mL  FIO2 (%): 40 %  PEEP (cmH2O): 5 cmH2O  SpO2: 96 %    Lab Results   Component Value Date    PHART 7 489 (H) 11/10/2017    THM7RHX 41 9 11/10/2017    PO2ART 103 0 11/10/2017    QYL8VNJ 31 1 (H) 11/10/2017    BEART 7 1 11/10/2017    SOURCE Radial, Left 11/10/2017     SpO2: SpO2: 96 %    ______________________________________________________________________  Physical Exam:  Chow Agitation Sedation Scale (RASS): Restless  Physical Exam   Constitutional: She is oriented to person, place, and time  Vital signs are normal  She is sleeping  She appears ill  No distress  Nasal cannula in place  HENT:   Head: Normocephalic and atraumatic  Eyes: Pupils are equal, round, and reactive to light  Cardiovascular: Normal rate and normal heart sounds  An irregular rhythm present  Pulses:       Radial pulses are 2+ on the right side, and 2+ on the left side  Dorsalis pedis pulses are 1+ on the right side, and 1+ on the left side  Pulmonary/Chest: No respiratory distress  She has decreased breath sounds in the right lower field and the left lower field  She has wheezes in the right middle field  Breathing improved from yesterday, continues to take breaths in the middle of sentences  Appears to tire easily  Abdominal: Soft  She exhibits no distension  There is no tenderness  Obese abdomen   Musculoskeletal:   Moving all extremities x4   Neurological: She is alert and oriented to person, place, and time  Skin: Skin is warm, dry and intact  She is not diaphoretic      ______________________________________________________________________  Intake and Outputs:  I/O       11/09 0701 - 11/10 0700 11/10 0701 - 11/11 0700    P  O   220    I V  (mL/kg) 158 5 (1 3) 529 3 (4 4)    IV Piggyback 400 150    Total Intake(mL/kg) 558 5 (4 4) 899 3 (7 4)    Urine (mL/kg/hr) 2885 (1) 2245 (0 8)    Other  1000 (0 3)    Stool 0 (0)     Total Output 2885 3245    Net -2326 5 -2345 7          Unmeasured Urine Occurrence  2 x    Unmeasured Stool Occurrence 2 x          Nutrition:        Diet Orders            Start     Ordered    11/10/17 1403  Dietary nutrition supplements  Once     Question Answer Comment   Select Supplement: Ensure Pudding-[place]terscotch    Frequency Breakfast, Lunch, Dinner        11/10/17 1402    11/10/17 1053  Diet Dysphagia/Modified Consistency;  Dysphagia 2-Mechanical Soft; Nectar Thick Liquid  Diet effective now     Question Answer Comment   Diet Type Dysphagia/Modified Consistency Dysphagia/Modified Consistency Dysphagia 2-Mechanical Soft    Liquid Modifier Nectar Thick Liquid    RD to adjust diet per protocol? Yes        11/10/17 1052        Labs:     Results from last 7 days  Lab Units 11/11/17  0433 11/10/17  0335 11/09/17  1422   WBC Thousand/uL 12 93* 11 47* 10 56*   HEMOGLOBIN g/dL 10 1* 9 7* 10 2*   HEMATOCRIT % 31 9* 31 2* 32 5*   PLATELETS Thousands/uL 372 297 307   NEUTROS PCT % 66 72  --    MONOS PCT % 13* 10  --       Results from last 7 days  Lab Units 11/11/17  0433 11/10/17  0335 11/09/17  2058  11/07/17  0438   SODIUM mmol/L 137 138 137  < > 136   POTASSIUM mmol/L 3 8 3 5 3 7  < > 4 5   CHLORIDE mmol/L 94* 96* 96*  < > 97*   CO2 mmol/L 34* 33* 31  < > 35*   BUN mg/dL 21 21 17  < > 18   CREATININE mg/dL 0 96 0 83 0 87  < > 0 81   CALCIUM mg/dL 9 6 9 1 9 4  < > 8 9   ALBUMIN g/dL  --   --   --   --  2 5*   TOTAL PROTEIN g/dL  --   --   --   --  6 1*   BILIRUBIN TOTAL mg/dL  --   --   --   --  0 83   ALK PHOS U/L  --   --   --   --  30*   ALT U/L  --   --   --   --  51   AST U/L  --   --   --   --  38   GLUCOSE RANDOM mg/dL 277* 175* 139  < > 162*   < > = values in this interval not displayed      Results from last 7 days  Lab Units 11/10/17  0335 11/09/17  0427 11/08/17  0036   MAGNESIUM mg/dL 2 2 2 2 2 6       Results from last 7 days  Lab Units 11/10/17  0335 11/09/17  0427 11/07/17  1822   PHOSPHORUS mg/dL 4 2* 4 4* 3 9        Results from last 7 days  Lab Units 11/11/17  0433 11/10/17  0330 11/09/17  2058 11/09/17  1422   INR   --   --   --  1 31*   PTT seconds 66* 79* 66* 37*           0  Lab Value Date/Time   TROPONINI 3 02 (H) 11/09/2017 1817   TROPONINI 3 19 (H) 11/09/2017 1422   TROPONINI 3 22 (H) 11/09/2017 1157   TROPONINI 6 10 (H) 11/04/2017 0557   TROPONINI 6 27 (H) 11/03/2017 1430   TROPONINI 6 40 (H) 11/03/2017 0730   TROPONINI 7 63 (H) 11/03/2017 0511   TROPONINI 7 41 (H) 11/03/2017 0132   TROPONINI 9 82 (H) 11/02/2017 2004   TROPONINI 7 57 ([facility]) 11/02/2017 1646 Imaging: CXR poor penetration, RLL improved s/p thoracentesis  I have personally reviewed pertinent reports  Micro:  Blood Culture:   Lab Results   Component Value Date    BLOODCX No Growth After 5 Days  11/02/2017    BLOODCX No Growth After 5 Days  11/02/2017     Urine Culture: No results found for: URINECX  Sputum Culture: No components found for: SPUTUMCX  Wound Culure: No results found for: WOUNDCULT    Lab Results   Component Value Date    BLOODCX No Growth After 5 Days  11/02/2017    BLOODCX No Growth After 5 Days   11/02/2017    SPUTUMCULTUR 1+ Growth of Staphylococcus aureus (A) 11/03/2017     Allergies: No Known Allergies  Medications:   Scheduled Meds:  aspirin 324 mg Oral Daily   atorvastatin 40 mg Oral Daily   cholecalciferol 1,000 Units Oral Daily   furosemide 40 mg Intravenous Q6H   insulin lispro 2-12 Units Subcutaneous Q6H [address]   ipratropium 0 5 mg Nebulization Q6H   levalbuterol 1 25 mg Nebulization Q6H   levothyroxine 125 mcg Oral Early Morning   melatonin 3 mg Oral HS   metoprolol tartrate 25 mg Oral Q12H [address]   nystatin  Topical BID   polyethylene glycol 17 g Oral Daily   potassium chloride 20 mEq Oral BID With Meals   senna 2 tablet Oral HS     Continuous Infusions:  amiodarone 0 5 mg/min Last Rate: 0 5 mg/min (11/11/17 0039)   heparin (porcine) 3-20 Units/kg/hr (Order-Specific) Last Rate: 11 1 Units/kg/hr (11/11/17 0502)     PRN Meds:    acetaminophen 650 mg Q4H PRN   fentanyl citrate (PF) 25 mcg Q2H PRN   heparin (porcine) 2,000 Units PRN   heparin (porcine) 4,000 Units PRN   influenza vaccine 0 5 mL Prior to discharge   lidocaine (PF)  Code/Trauma/Sedation Med   metoprolol 2 5 mg Q6H PRN   oxyCODONE 2 5 mg Q4H PRN   Or     oxyCODONE 5 mg Q4H PRN   sodium bicarbonate  Code/Trauma/Sedation Med   sodium chloride  Code/Trauma/Sedation Med   trimethobenzamide 200 mg Q6H PRN     VTE Pharmacologic Prophylaxis:   Pharmacologic: Heparin Drip  Mechanical VTE Prophylaxis in Place: Yes    Invasive lines and devices: Invasive Devices     Central Venous Catheter Line            CVC Central Lines 11/03/17 Triple 16cm 7 days                   Counseling / Coordination of Care  Total Critical Care time spent 36 minutes excluding procedures, teaching and family updates        Code Status: Level 3 - DNAR and DNI      Evie Beltran PA-C

## 2017-11-12 LAB
ANION GAP SERPL CALCULATED.3IONS-SCNC: 5 MMOL/L (ref 4–13)
APTT PPP: 75 SECONDS (ref 23–35)
BASOPHILS # BLD AUTO: 0.04 THOUSANDS/ΜL (ref 0–0.1)
BASOPHILS NFR BLD AUTO: 0 % (ref 0–1)
BUN SERPL-MCNC: 22 MG/DL (ref 5–25)
CALCIUM SERPL-MCNC: 9.8 MG/DL (ref 8.3–10.1)
CHLORIDE SERPL-SCNC: 93 MMOL/L (ref 100–108)
CO2 SERPL-SCNC: 36 MMOL/L (ref 21–32)
CREAT SERPL-MCNC: 0.9 MG/DL (ref 0.6–1.3)
EOSINOPHIL # BLD AUTO: 0.36 THOUSAND/ΜL (ref 0–0.61)
EOSINOPHIL NFR BLD AUTO: 3 % (ref 0–6)
ERYTHROCYTE [DISTWIDTH] IN BLOOD BY AUTOMATED COUNT: 15.5 % (ref 11.6–15.1)
GFR SERPL CREATININE-BSD FRML MDRD: 66 ML/MIN/1.73SQ M
GLUCOSE SERPL-MCNC: 246 MG/DL (ref 65–140)
GLUCOSE SERPL-MCNC: 262 MG/DL (ref 65–140)
GLUCOSE SERPL-MCNC: 278 MG/DL (ref 65–140)
GLUCOSE SERPL-MCNC: 289 MG/DL (ref 65–140)
GLUCOSE SERPL-MCNC: 306 MG/DL (ref 65–140)
GLUCOSE SERPL-MCNC: 314 MG/DL (ref 65–140)
HCT VFR BLD AUTO: 32.3 % (ref 34.8–46.1)
HGB BLD-MCNC: 10.2 G/DL (ref 11.5–15.4)
LYMPHOCYTES # BLD AUTO: 2.53 THOUSANDS/ΜL (ref 0.6–4.47)
LYMPHOCYTES NFR BLD AUTO: 18 % (ref 14–44)
MCH RBC QN AUTO: 29 PG (ref 26.8–34.3)
MCHC RBC AUTO-ENTMCNC: 31.6 G/DL (ref 31.4–37.4)
MCV RBC AUTO: 92 FL (ref 82–98)
MONOCYTES # BLD AUTO: 1.65 THOUSAND/ΜL (ref 0.17–1.22)
MONOCYTES NFR BLD AUTO: 12 % (ref 4–12)
NEUTROPHILS # BLD AUTO: 9.28 THOUSANDS/ΜL (ref 1.85–7.62)
NEUTS SEG NFR BLD AUTO: 67 % (ref 43–75)
NRBC BLD AUTO-RTO: 0 /100 WBCS
PLATELET # BLD AUTO: 380 THOUSANDS/UL (ref 149–390)
PMV BLD AUTO: 10.1 FL (ref 8.9–12.7)
POTASSIUM SERPL-SCNC: 3.8 MMOL/L (ref 3.5–5.3)
RBC # BLD AUTO: 3.52 MILLION/UL (ref 3.81–5.12)
SODIUM SERPL-SCNC: 134 MMOL/L (ref 136–145)
WBC # BLD AUTO: 13.9 THOUSAND/UL (ref 4.31–10.16)

## 2017-11-12 PROCEDURE — 82948 REAGENT STRIP/BLOOD GLUCOSE: CPT

## 2017-11-12 PROCEDURE — 94640 AIRWAY INHALATION TREATMENT: CPT

## 2017-11-12 PROCEDURE — 94660 CPAP INITIATION&MGMT: CPT

## 2017-11-12 PROCEDURE — 85025 COMPLETE CBC W/AUTO DIFF WBC: CPT | Performed by: PHYSICIAN ASSISTANT

## 2017-11-12 PROCEDURE — 85730 THROMBOPLASTIN TIME PARTIAL: CPT | Performed by: EMERGENCY MEDICINE

## 2017-11-12 PROCEDURE — 94760 N-INVAS EAR/PLS OXIMETRY 1: CPT

## 2017-11-12 PROCEDURE — 80048 BASIC METABOLIC PNL TOTAL CA: CPT | Performed by: PHYSICIAN ASSISTANT

## 2017-11-12 RX ORDER — INSULIN GLARGINE 100 [IU]/ML
30 INJECTION, SOLUTION SUBCUTANEOUS EVERY 12 HOURS SCHEDULED
Status: DISCONTINUED | OUTPATIENT
Start: 2017-11-12 | End: 2017-11-17

## 2017-11-12 RX ADMIN — FUROSEMIDE 40 MG: 10 INJECTION, SOLUTION INTRAMUSCULAR; INTRAVENOUS at 18:37

## 2017-11-12 RX ADMIN — IPRATROPIUM BROMIDE 0.5 MG: 0.5 SOLUTION RESPIRATORY (INHALATION) at 07:50

## 2017-11-12 RX ADMIN — VITAMIN D, TAB 1000IU (100/BT) 1000 UNITS: 25 TAB at 11:05

## 2017-11-12 RX ADMIN — POLYETHYLENE GLYCOL 3350 17 G: 17 POWDER, FOR SOLUTION ORAL at 11:09

## 2017-11-12 RX ADMIN — LEVOTHYROXINE SODIUM 125 MCG: 125 TABLET ORAL at 06:07

## 2017-11-12 RX ADMIN — METOPROLOL TARTRATE 37.5 MG: 25 TABLET ORAL at 11:04

## 2017-11-12 RX ADMIN — LEVALBUTEROL HYDROCHLORIDE 1.25 MG: 1.25 SOLUTION, CONCENTRATE RESPIRATORY (INHALATION) at 07:50

## 2017-11-12 RX ADMIN — NYSTATIN: 100000 POWDER TOPICAL at 18:13

## 2017-11-12 RX ADMIN — INSULIN GLARGINE 30 UNITS: 100 INJECTION, SOLUTION SUBCUTANEOUS at 21:18

## 2017-11-12 RX ADMIN — HEPARIN SODIUM AND DEXTROSE 11.11 UNITS/KG/HR: 10000; 5 INJECTION INTRAVENOUS at 16:55

## 2017-11-12 RX ADMIN — IPRATROPIUM BROMIDE 0.5 MG: 0.5 SOLUTION RESPIRATORY (INHALATION) at 17:14

## 2017-11-12 RX ADMIN — METOPROLOL TARTRATE 37.5 MG: 25 TABLET ORAL at 21:18

## 2017-11-12 RX ADMIN — IPRATROPIUM BROMIDE 0.5 MG: 0.5 SOLUTION RESPIRATORY (INHALATION) at 00:17

## 2017-11-12 RX ADMIN — INSULIN GLARGINE 30 UNITS: 100 INJECTION, SOLUTION SUBCUTANEOUS at 09:36

## 2017-11-12 RX ADMIN — NYSTATIN: 100000 POWDER TOPICAL at 11:09

## 2017-11-12 RX ADMIN — ASPIRIN 81 MG 324 MG: 81 TABLET ORAL at 11:05

## 2017-11-12 RX ADMIN — ATORVASTATIN CALCIUM 40 MG: 40 TABLET, FILM COATED ORAL at 11:05

## 2017-11-12 RX ADMIN — LEVALBUTEROL HYDROCHLORIDE 1.25 MG: 1.25 SOLUTION, CONCENTRATE RESPIRATORY (INHALATION) at 19:41

## 2017-11-12 RX ADMIN — POTASSIUM CHLORIDE 20 MEQ: 1500 TABLET, EXTENDED RELEASE ORAL at 11:09

## 2017-11-12 RX ADMIN — INSULIN LISPRO 12 UNITS: 100 INJECTION, SOLUTION INTRAVENOUS; SUBCUTANEOUS at 13:02

## 2017-11-12 RX ADMIN — INSULIN LISPRO 12 UNITS: 100 INJECTION, SOLUTION INTRAVENOUS; SUBCUTANEOUS at 08:30

## 2017-11-12 RX ADMIN — FUROSEMIDE 40 MG: 10 INJECTION, SOLUTION INTRAMUSCULAR; INTRAVENOUS at 05:07

## 2017-11-12 RX ADMIN — LEVALBUTEROL HYDROCHLORIDE 1.25 MG: 1.25 SOLUTION, CONCENTRATE RESPIRATORY (INHALATION) at 00:17

## 2017-11-12 RX ADMIN — LEVALBUTEROL HYDROCHLORIDE 1.25 MG: 1.25 SOLUTION, CONCENTRATE RESPIRATORY (INHALATION) at 17:14

## 2017-11-12 RX ADMIN — FUROSEMIDE 40 MG: 10 INJECTION, SOLUTION INTRAMUSCULAR; INTRAVENOUS at 12:59

## 2017-11-12 RX ADMIN — INSULIN LISPRO 8 UNITS: 100 INJECTION, SOLUTION INTRAVENOUS; SUBCUTANEOUS at 18:13

## 2017-11-12 RX ADMIN — IPRATROPIUM BROMIDE 0.5 MG: 0.5 SOLUTION RESPIRATORY (INHALATION) at 19:41

## 2017-11-12 RX ADMIN — TRIMETHOBENZAMIDE HYDROCHLORIDE 200 MG: 100 INJECTION INTRAMUSCULAR at 15:14

## 2017-11-12 NOTE — PROGRESS NOTES
Transfer Note - ICU Transfer to SD/MS tele   Shelly Vasquez 79 y o  female MRN: [id number]  [address] [facility]   Unit/Bed#: MICU 90 Encounter: [id number]    Code Status: Level 3 - DNAR and DNI    Reason for ICU adm: Cardiac arrest, acute hypercapnic respiratory failure    Active problems:   Principal Problem:    Respiratory failure  Active Problems:    Cardiac arrest    Acute hypercapnic respiratory failure    Torsades de pointes    Prolonged QT interval    NSTEMI (non-ST elevated myocardial infarction)    CAD (coronary artery disease)    Ischemic cardiomyopathy    LBBB (left bundle branch block)    Encephalopathy    Delirium    h/o Vocal cord paralysis    HTN (hypertension)    Hyperlipemia    Obesity, Class III, BMI 40-49 9 (morbid obesity) ([address] )  Resolved Problems:    * No resolved hospital problems  *    Consultants: Cardiology, Electrophysiology    History of Present Illness: Per Dr Lan Stanley "Shelly Vasquez is a 79 y o  female who presents as transfer from [facility] ED  Patient was being checked on for wellness check the past 2 days, however she answered her door and did not want help  Today, when a wellness check was performed she did not answer and was found to be lying on the ground  Patient was brought to the [facility] ED  In the ED, patient was confused and having respiratory difficulty with reports initial O2 saturation in the 70s, therefore pt was placed on BiPAP  She was noted to have an elevated i-STAT troponin of 4 93 and lab troponin of 7 57  Noted to have elevated lactate of 4 3  Chest x-ray showing bilateral pleural effusions, left greater than right  CT head and C-spine were negative  Patient was transported by air to the [place] ICU      Per review of records on Care everywhere, patient was hospitalized at [facility] from June through July for possible STEMI  She had elevated troponins and underwent catheterization, however no intervention was performed  Last echo ws 6/12 showing EF of 35%      On arrival to ICU, pt was on Bipap 20/5  Pt sleepy, but able to follow commands and answer simple questions  Pt offers no complaints and denies any areas of pain "    Summary of clinical course: Patient was admitted to MICU w/ cardiology consulted  She was started on a heparin and amiodarone drip for NSTEMI  On hospital day 1, patient was emergently intubated for respiratory failure  Patient went into V-tach/VFib arrest later that afternoon and went through 2 rounds of epinephrine, as well as lidocaine and bicarbonate pushes with ROSC  Patient was started on lidocaine and dopamine infusions to prevent R on T phenomenon, as it was presumed this caused the arrest  Patient became febrile and was found to have MSSA pneumonia  She had originally been started on vancomycin and zosyn, which was transitioned to Nafcillin  She completed a 7 day course of antibiotics  Patient was extubated on 11/8 and after discussion with the patient, she was made DNR/DNI  On 11/9, patient went into rapid atrial fibrillation and was started again on heparin and amiodarone as well as oral beta blockers  Originally, cardiac catheterization was scheduled for 11/9, but patient could not tolerate laying flat secondary to respiratory status  Patient is now requiring only 2 liters nasal cannula w/ intermittent nocturnal bipap use  She is tolerating a dysphagia diet  She is awaiting cardiac catheterization and ICD placement prior to leaving the hospital      Recent or scheduled procedures: Cardiac cath (tomorrow?), ICD placement    Outstanding/pending diagnostics: None    Cultures: None       Mobilization Plan: PT/OT    Nutrition Plan: Dysphagia level 2 - NTL     Specific Diagnosis Plan:  · Atrial fibrillation: oral metoprolol 37 5 BID, amiodarone gtt, heparin gtt  Cardiology following    · Torsades/vfib arrest: avoid QTc prolonging medications, pending ICD placement  · DMII: Increased Lantus to 30 units BID today w/ SSI algorithm 5 for intermediate coverage  Spoke with Dr Way Son regarding transfer  Please call MICU with any questions or concerns         Keon Mckenna PA-C

## 2017-11-12 NOTE — PROGRESS NOTES
Progress Note - Cardiology   Uyen Ash 79 y o  female MRN: [id number]  Unit/Bed#: MICU 10 Encounter: [id number]  11/12/17  8:49 AM        Assessment/Plan:    1  NSTEMI type II  On aspirin, statin, beta blocker  2  Chronic multivessel CAD  On aspirin, statin, beta blocker  Has been medically managed since cath done at [facility] in 6/17  Unable to see actual report of cath from 6/17, but per primary team images from that cath are supposed to be messengered to be viewed  At this time will plan to review cath images and decide if any intervenable disease by PCI to avoid issues with VT in future  Will hold off on cath here until can view images from prior cath, as patient still having respiratory issues as well, likely still not able to tolerate lying flat  3  Chronic LBBB  Still present on telemetry  4  ICM with EF 35% with acute/chronic systolic HF  By echo here, EF improved to 50%  Currently on Lasix 40 IV q6hrs to help with volume overload/respiratory status  Underwent thoracentesis 11/10 with 1 L removed  CXR still shows significant volume overload  Agree with addressing goals of care, as patient has been aggressively treated without significant improvement thus far  On Metoprolol as tolerated 25 bid, BP starting to trend up, increase to 37 5 bid  5  HTN  Started on low dose beta blocker, thus far tolerating  6  HL  On statin  7  DM  On insulin  8  PMVT  Lidocaine drip stopped 11/8  No further episodes since 11/13  Keep K>4, Mg>2      9  PNA  Completed course of nafcillin  10  Paroxysmal afib  Started on IV amiodarone  Currently SR  On IV heparin as well  Subjective/Objective   Chief Complaint: No chief complaint on file        Subjective:79 year old woman with a history of multivessel disease managed medically, ICM with EF 35%, chronic LBBB, DM, 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, admitted after being found to at home, found on telemetry here to have an episode of PVC induced PMVT with baseline prolonged QT on 11/3 that resolved spontaneously per report  She was started on IV amiodarone, but had a second episode of torsades on 11/3, and was switched from Amiodarone to Lidocaine drip at that time, and was kept on dopamine to keep HR elevated as well as for inotropic effect  She was extubated 11/8  Being diuresed for pulmonary edema on CXR  Had right sided thoracentesis 11/10, was more persistently tachycardic in afib, IV amiodarone started, currently maintaining SR  No chest pain  No palpitations  Sleeping comfortably with head of bed up, on supplemental O2  No fevers       Patient Active Problem List   Diagnosis    Respiratory failure    Cardiac arrest    Acute hypercapnic respiratory failure    Torsades de pointes    Prolonged QT interval    NSTEMI (non-ST elevated myocardial infarction)    Ischemic cardiomyopathy    LBBB (left bundle branch block)    CAD (coronary artery disease)    h/o Vocal cord paralysis    HTN (hypertension)    Hyperlipemia    Encephalopathy    Obesity, Class III, BMI 40-49 9 (morbid [facility]) [facility]    Delirium     Past Medical History:   Diagnosis Date    Anxiety     Diabetes mellitus ([facility] )     Hypertension     Pancreatitis        No Known Allergies    Current Facility-Administered Medications   Medication Dose Route Frequency Provider Last Rate Last Dose    acetaminophen (TYLENOL) rectal suppository 650 mg  650 mg Rectal Q4H PRN Randi Farias MD        amiodarone (CORDARONE) 900 mg in dextrose 5 % 500 mL infusion  0 5 mg/min Intravenous Continuous Priti Chavez PA-C 16 7 mL/hr at 11/11/17 1724 0 5 mg/min at 11/11/17 1724    aspirin chewable tablet 324 mg  324 mg Oral Daily Arcelia Barraza PA-C   324 mg at 11/11/17 0913    atorvastatin (LIPITOR) tablet 40 mg  40 mg Oral Daily ARLENE Roth   40 mg at 11/11/17 0913    cholecalciferol (VITAMIN D3) tablet 1,000 Units  1,000 Units Oral Daily Arcelia Barraza PA-C   1,000 Units at 11/11/17 0913    fentanyl citrate (PF) 100 MCG/2ML 25 mcg  25 mcg Intravenous Q2H PRN Kezia Lopez PA-C        furosemide (LASIX) injection 40 mg  40 mg Intravenous Q6H Kathryn Santana MD   40 mg at 11/12/17 0507    heparin (porcine) 25,000 units in 250 mL infusion (premix)  3-20 Units/kg/hr (Order-Specific) Intravenous Titrated Hawk Yung DO 10 mL/hr at 11/11/17 1723 11 1 Units/kg/hr at 11/11/17 1723    heparin (porcine) injection 2,000 Units  2,000 Units Intravenous PRN Hawk Yung,         heparin (porcine) injection 4,000 Units  4,000 Units Intravenous PRN Hawk Yung, DO        influenza inactivated quadrivalent vaccine (FLULAVAL) IM injection 0 5 mL  0 5 mL Intramuscular Prior to discharge Salud Carter MD        insulin glargine (LANTUS) subcutaneous injection 30 Units  30 Units Subcutaneous Q12H [address] Kezia Lopez PA-C        insulin lispro (HumaLOG) 100 units/mL subcutaneous injection 4-20 Units  4-20 Units Subcutaneous TID AC Kezia Lopez PA-C   12 Units at 11/11/17 1821    ipratropium (ATROVENT) 0 02 % inhalation solution 0 5 mg  0 5 mg Nebulization Q6H Kezia Lopez PA-C   0 5 mg at 11/12/17 0750    levalbuterol (XOPENEX) inhalation solution 1 25 mg  1 25 mg Nebulization Q6H Salud Carter MD   1 25 mg at 11/12/17 0750    levothyroxine tablet 125 mcg  125 mcg Oral Early Morning TERESO KramerC   125 mcg at 11/12/17 0615    lidocaine (PF) (XYLOCAINE-MPF) 100 mg/5 mL injection    Code/Trauma/Sedation Med Ayla Coleman, DO   100 mg at 11/03/17 1218    melatonin tablet 6 mg  6 mg Oral HS Nesha Mercer, DO        metoprolol (LOPRESSOR) injection 2 5 mg  2 5 mg Intravenous Q6H PRN Abby Abdullahi DO   2 5 mg at 11/09/17 1730    metoprolol tartrate (LOPRESSOR) tablet 25 mg  25 mg Oral Q12H [address], MD   25 mg at 11/11/17 2040    nystatin (MYCOSTATIN) powder   Topical BID Tab Alves PA-C   1 application at 86/10/04 1723    oxyCODONE (ROXICODONE) IR tablet 2 5 mg  2 5 mg Oral Q4H PRN Caleb Leos PA-C        Or    oxyCODONE (ROXICODONE) IR tablet 5 mg  5 mg Oral Q4H PRN Caleb Leos PA-C        polyethylene glycol (MIRALAX) packet 17 g  17 g Oral Daily Rodri Memos, CRNP   17 g at 11/11/17 0913    potassium chloride (K-DUR,KLOR-CON) CR tablet 20 mEq  20 mEq Oral BID With Meals Jimmy Nguyen MD   20 mEq at 11/11/17 1723    senna (SENOKOT) tablet 17 2 mg  2 tablet Oral HS Clio Memos, CRNP   Stopped at 11/09/17 2200    sodium bicarbonate 50 mEq/50 mL injection    Code/Trauma/Sedation Med Giselle China, DO   100 mEq at 11/03/17 1225    sodium chloride 0 9 % bolus    Code/Trauma/Sedation [facility], DO   1,000 mL at 11/03/17 1221    trimethobenzamide (TIGAN) IM injection 200 mg  200 mg Intramuscular Q6H PRN Rubens Marquez, DO   200 mg at 11/07/17 2350       Vitals: /61   Pulse 78   Temp 98 2 °F (36 8 °C) (Oral)   Resp (!) 24   Ht 5' 11" (1 803 m)   Wt 123 kg (270 lb 8 1 oz)   SpO2 100% Comment: Simultaneous filing  User may not have seen previous data  BMI 08 87 kg/m²     Systolic (79EBN), TFE:652 , Min:101 , IMH:502     Diastolic (96IDN), XNC:69, Min:53, Max:70      Vitals:    11/11/17 0410 11/12/17 0600   Weight: 121 kg (266 lb 8 6 oz) 123 kg (270 lb 8 1 oz)     Orthostatic Blood Pressures    Flowsheet Row Most Recent Value   Blood Pressure  140/61 filed at 11/12/2017 0750   Patient Position - Orthostatic VS  Lying filed at 11/12/2017 0750            Intake/Output Summary (Last 24 hours) at 11/12/17 0849  Last data filed at 11/12/17 0750   Gross per 24 hour   Intake          1402 58 ml   Output              899 ml   Net           503 58 ml       Invasive Devices     Peripherally Inserted Central Catheter Line            PICC Line 11/90/59 Left Basilic less than 1 day                  Physical Exam:     GEN: Awake, no acute distress    HEENT: Sclera anicteric, conjunctivae pink, mucous membranes moist   NECK: Supple, no carotid bruits, no significant JVD  HEART: Regular rhythm, normal S1 and S2, no murmurs, clicks, gallops or rubs  LUNGS: coarse breath sounds anteriorly bilaterally, scattered ronchi; no wheezes, rales  ABDOMEN: Obese, soft, nontender, nondistended, normoactive bowel sounds  EXTREMITIES: Skin warm and well perfused, no clubbing, cyanosis, no edema  NEURO: No focal findings  SKIN: Normal without suspicious lesions on exposed skin        Lab Results:     Troponins:     Results from last 7 days  Lab Units 11/09/17  1817 11/09/17  1422 11/09/17  1157   TROPONIN I ng/mL 3 02* 3 19* 3 22*       CBC with diff:     Results from last 7 days  Lab Units 11/12/17  0511 11/11/17  0433 11/10/17  0335 11/09/17  1422 11/09/17  0427 11/08/17  0448 11/07/17  0438   WBC Thousand/uL 13 90* 12 93* 11 47* 10 56* 10 88* 11 08* 10 89*   HEMOGLOBIN g/dL 10 2* 10 1* 9 7* 10 2* 9 5* 10 2* 9 8*   HEMATOCRIT % 32 3* 31 9* 31 2* 32 5* 30 4* 32 5* 31 4*   MCV fL 92 91 92 91 92 91 91   PLATELETS Thousands/uL 380 372 297 307 267 230 211   MCH pg 29 0 28 9 28 4 28 5 28 6 28 4 28 4   MCHC g/dL 31 6 31 7 31 1* 31 4 31 3* 31 4 31 2*   RDW % 15 5* 15 2* 14 8 14 9 14 8 14 6 14 6   MPV fL 10 1 10 6 10 0 10 4 10 6 10 6 10 4   NRBC AUTO /100 WBCs 0 0 0  --   --   --   --          CMP:  Results from last 7 days  Lab Units 11/12/17  0511 11/11/17  0433 11/10/17  0335 11/09/17 2058 11/09/17  1157 11/09/17  0427 11/08/17  0448  11/07/17  0438   SODIUM mmol/L 134* 137 138 137 136 136 135*  < > 136   POTASSIUM mmol/L 3 8 3 8 3 5 3 7 4 2 3 4* 4 3  < > 4 5   CHLORIDE mmol/L 93* 94* 96* 96* 96* 95* 95*  < > 97*   CO2 mmol/L 36* 34* 33* 31 32 36* 36*  < > 35*   ANION GAP mmol/L 5 9 9 10 8 5 4  < > 4   BUN mg/dL 22 21 21 17 15 14 15  < > 18   CREATININE mg/dL 0 90 0 96 0 83 0 87 0 80 0 75 0 83  < > 0 81   GLUCOSE RANDOM mg/dL 314* 277* 175* 139 109 106 142*  < > 162*   CALCIUM mg/dL 9 8 9 6 9 1 9 4 9 6 9 0 9 1  < > 8 9   AST U/L  --   --   -- --   --   --   --   --  38   ALT U/L  --   --   --   --   --   --   --   --  51   ALK PHOS U/L  --   --   --   --   --   --   --   --  30*   TOTAL PROTEIN g/dL  --   --   --   --   --   --   --   --  6 1*   ALBUMIN g/dL  --   --   --   --   --   --   --   --  2 5*   BILIRUBIN TOTAL mg/dL  --   --   --   --   --   --   --   --  0 83   EGFR ml/min/1 73sq m 66 61 73 69 77 83 73  < > 75   < > = values in this interval not displayed  Magnesium:     Results from last 7 days  Lab Units 11/10/17  0335 17  0427 17  0036 17  1822 17  0438 17  0053 17  0437   MAGNESIUM mg/dL 2 2 2 2 2 6 2 2 2 1 2 1 2 4       Coags:     Results from last 7 days  Lab Units 17  0511 17  0433 11/10/17  0330 17  2058 17  1422 17  0438 17  0437   PTT seconds 75* 66* 79* 66* 37* 38* 75*   INR   --   --   --   --  1 31*  --   --        Cardiac testing:   Results for orders placed during the hospital encounter of 17   Echo complete with contrast if indicated    Narrative Pramod [address] [place] [address]  [phone]    Transthoracic Echocardiogram  2D, M-mode, Doppler, and Color Doppler    Study date:  2017    Patient: Bri Mehta  MR number: [id number]  Account number: [de-identified]  : 1950  Age: 79 years  Gender: Female  Status: Inpatient  Location: Bedside  Height: 71 in  Weight: 279 lb  BP: 92/ 53 mmHg    Indications: Heart failure  Diagnoses: I50 9 - Heart failure, unspecified    Sonographer:  Leda Welch RDCS  Referring Physician:  EMRE NORIEGA  Group:  [facility]  Interpreting Physician:  Minoo Perez MD    SUMMARY    LEFT VENTRICLE:  Systolic function was at the lower limits of normal  Ejection fraction was estimated to be 50 %  There was hypokinesis of the basal inferior wall(s)  There was mild concentric hypertrophy    Features were consistent with a pseudonormal left ventricular filling pattern, with concomitant abnormal relaxation and increased filling pressure (grade 2 diastolic dysfunction)  MITRAL VALVE:  There was mild to moderate regurgitation  AORTIC VALVE:  There was mild regurgitation  PERICARDIUM:  A small pericardial effusion was identified posterior to the heart  The fluid had no internal echoes  There was no evidence of hemodynamic compromise  There was a large left pleural effusion  HISTORY: PRIOR HISTORY: NSTEMI, LBBB, Ischemic cardiomyopathy, Coronary artery disease, Diabetes, Hypertension, Hyperlipidemia, Tracheostomy, Smoker  PROCEDURE: The procedure was performed at the bedside  This was a routine study  The transthoracic approach was used  The study included complete 2D imaging, M-mode, complete spectral Doppler, and color Doppler  The heart rate was 73 bpm,  at the start of the study  Images were obtained from the parasternal, apical, subcostal, and suprasternal notch acoustic windows  Echocardiographic views were limited due to decreased penetration and lung interference  This was a  technically difficult study  LEFT VENTRICLE: Size was normal  Systolic function was at the lower limits of normal  Ejection fraction was estimated to be 50 %  There was hypokinesis of the basal inferior wall(s)  Wall thickness was mildly increased  There was mild  concentric hypertrophy  DOPPLER: The transmitral flow pattern was normal  Features were consistent with a pseudonormal left ventricular filling pattern, with concomitant abnormal relaxation and increased filling pressure (grade 2 diastolic  dysfunction)  RIGHT VENTRICLE: The size was normal  Systolic function was normal  Wall thickness was normal     LEFT ATRIUM: Size was normal     RIGHT ATRIUM: Size was normal     MITRAL VALVE: Valve structure was normal  There was normal leaflet separation  DOPPLER: The transmitral velocity was within the normal range   There was no evidence for stenosis  There was mild to moderate regurgitation  AORTIC VALVE: The valve was trileaflet  Leaflets exhibited mild calcification, normal cuspal separation, and sclerosis  DOPPLER: Transaortic velocity was within the normal range  There was no evidence for stenosis  There was mild  regurgitation  TRICUSPID VALVE: The valve structure was normal  There was normal leaflet separation  DOPPLER: The transtricuspid velocity was within the normal range  There was no evidence for stenosis  There was trace regurgitation  The tricuspid jet  envelope definition was inadequate for estimation of RV systolic pressure  PULMONIC VALVE: Leaflets exhibited normal thickness, no calcification, and normal cuspal separation  DOPPLER: The transpulmonic velocity was within the normal range  There was trace regurgitation  PERICARDIUM: A small pericardial effusion was identified posterior to the heart  The fluid had no internal echoes  There was no evidence of hemodynamic compromise  There was a large left pleural effusion  The pericardium was normal in  appearance  AORTA: The root exhibited normal size  SYSTEMIC VEINS: IVC: The inferior vena cava was dilated  Respirophasic changes in dimension were absent      SYSTEM MEASUREMENT TABLES    2D  %FS: 17 89 %  Ao Diam: 2 84 cm  EDV(Teich): 142 7 ml  EF Biplane: 35 65 %  EF(Teich): 36 81 %  ESV(Teich): 90 18 ml  IVSd: 1 3 cm  LA Area: 20 16 cm2  LA Diam: 4 05 cm  LVEDV MOD A2C: 129 88 ml  LVEDV MOD A4C: 112 39 ml  LVEDV MOD BP: 125 34 ml  LVEF MOD A2C: 34 61 %  LVEF MOD A4C: 32 37 %  LVESV MOD A2C: 84 92 ml  LVESV MOD A4C: 76 01 ml  LVESV MOD BP: 80 65 ml  LVIDd: 5 42 cm  LVIDs: 4 45 cm  LVLd A2C: 7 55 cm  LVLd A4C: 8 17 cm  LVLs A2C: 6 77 cm  LVLs A4C: 6 52 cm  LVPWd: 1 28 cm  RA Area: 17 87 cm2  RVIDd: 4 23 cm  SV MOD A2C: 44 96 ml  SV MOD A4C: 36 38 ml  SV(Teich): 52 53 ml    CW  AR Dec McLean: 1 64 m/s2  AR Dec Time: 1614 77 ms  AR PHT: 468 28 ms  AR Vmax: 2 65 m/s  AR maxP 04 mmHg    MM  TAPSE: 2 18 cm    PW  E': 0 03 m/s  E/E': 35 79  MV A Arnel: 0 75 m/s  MV Dec Pope: 4 83 m/s2  MV DecT: 211 74 ms  MV E Arnel: 1 02 m/s  MV E/A Ratio: 1 37  MV PHT: 61 4 ms  MVA By PHT: 3 58 cm2    [facility]    Prepared and electronically signed by    Dieudonne Brady MD  Signed [id number] 12:14:05         Imaging: I have personally reviewed pertinent reports  Telemetry: personally reviewed, SR with LBBB, isolated PACs

## 2017-11-12 NOTE — PROGRESS NOTES
Progress Note - Critical Care   Watson Estes 79 y o  female MRN: [id number]  Unit/Bed#: MICU 10 Encounter: [id number]    Assessment:   Principal Problem:    Respiratory failure  Active Problems:    Cardiac arrest    Acute hypercapnic respiratory failure    Torsades de pointes    Prolonged QT interval    NSTEMI (non-ST elevated myocardial infarction)    CAD (coronary artery disease)    Ischemic cardiomyopathy    LBBB (left bundle branch block)    Encephalopathy    Delirium    h/o Vocal cord paralysis    HTN (hypertension)    Hyperlipemia    Obesity, Class III, BMI 40-49 9 (morbid obesity) ([facility] [address] )    Plan:   Neuro:   · Acute encephalopathy:  Appears to be at baseline, continue delirium precautions, daily CAM-ICU  Avoid any QTC prolonging medications  · Melatonin q h s  CV:   · Atrial fibrillation:  Oral metoprolol 25mg, amiodarone gtt, heparin gtt  Continue diuresis with Lasix 40mg TID  · Avoid QTc prolonging medications  · Cardiac catheterization tomorrow, possible ICD placement  Lung:   · Acute hypoxic respiratory failure: improved, continue diuresis  Q6 nebulizers  GI:   · Bowel regimen with senna/miralax    FEN:   · No maintenance fluids  · Replete electrolytes prn  · Dysphagia 2 mechanical soft with nectar thick liquid    :   · No lackey, continue diuresis    ID:   · Leukocytosis continues to trend up, remains afebrile  Completed course of Nafcillin for MSSA PNA  Continue to monitor off of antibiotics    Heme:   · Heparin gtt, SCDs    Endo:   · Blood glucose ranged from 244-310 over past 24 hours with addition of 20 units Lantus BID  Increase to 30 units BID today, continue meal time coverage with SSI algorithm 5  Msk/Skin:   · Frequent turns and repositioning   Out of bed to chair  · PT/OT    Disposition:   · SD1    ______________________________________________________________________  Chief Complaint: "I didn't sleep"      HPI/24hr events: No events overnight  ______________________________________________________________________  Temperature:   Temp (24hrs), Av °F (36 7 °C), Min:97 7 °F (36 5 °C), Max:98 6 °F (37 °C)    Current Temperature: 97 9 °F (36 6 °C)    Vitals:    17 0020 17 0041 17 0241 17 0441   BP:  112/53 117/70 138/67   Pulse:  70 74 76   Resp:  19 20 (!) 23   Temp:  97 9 °F (36 6 °C)     TempSrc:  Oral     SpO2: 97% 97% 93% 92%   Weight:       Height:         Arterial Line BP: 144/54  Arterial Line MAP (mmHg): 80 mmHg     Weights:   IBW: 70 8 kg    Body mass index is 37 17 kg/m²  Weight (last 2 days)     Date/Time   Weight    17 0410  121 (266 54)            Height: 5' 11" (180 3 cm)    No results found for: PHART, FBJ9NXW, PO2ART, BWR8PKL, B2SWMOZU, BEART, SOURCE  SpO2: SpO2: 92 %    ______________________________________________________________________  Physical Exam:  Chow Agitation Sedation Scale (RASS): Alert and calm  Physical Exam   Constitutional: Vital signs are normal  She is cooperative  Non-toxic appearance  No distress  Nasal cannula in place  HENT:   Head: Normocephalic and atraumatic  Eyes: Pupils are equal, round, and reactive to light  Cardiovascular: Normal rate  An irregularly irregular rhythm present  Pulses:       Radial pulses are 2+ on the right side, and 2+ on the left side  Dorsalis pedis pulses are 2+ on the right side, and 2+ on the left side  Pulmonary/Chest: Accessory muscle usage present  She has decreased breath sounds in the right lower field and the left lower field  Breathing appears stable from yesterday   Abdominal: Soft  Normal appearance  She exhibits no distension  There is no tenderness  Musculoskeletal:   Moving all extremities x4   Neurological: GCS eye subscore is 4  GCS verbal subscore is 5  GCS motor subscore is 6  Skin: Skin is warm, dry and intact  She is not diaphoretic  ______________________________________________________________________  Intake and Outputs:  I/O       11/10 0701 - 11/11 0700 11/11 0701 - 11/12 0700    P  O  220 660    I V  (mL/kg) 529 3 (4 4) 613 2 (5 1)    IV Piggyback 150 100    Total Intake(mL/kg) 899 3 (7 4) 1373 2 (11 3)    Urine (mL/kg/hr) 2245 (0 8) 838 (0 3)    Other 1000 (0 3)     Total Output 3245 838    Net -2345 7 +535 2          Unmeasured Urine Occurrence 2 x 1 x        Nutrition:        Diet Orders            Start     Ordered    11/11/17 0705  Diet Dysphagia/Modified Consistency; Dysphagia 2-Mechanical Soft; Nectar Thick Liquid; Consistent Carbohydrate Diet Level 2 (5 carb servings/75 grams CHO/meal)  Diet effective now     Question Answer Comment   Diet Type Dysphagia/Modified Consistency    Dysphagia/Modified Consistency Dysphagia 2-Mechanical Soft    Liquid Modifier Nectar Thick Liquid    Other Restriction(s): Consistent Carbohydrate Diet Level 2 (5 carb servings/75 grams CHO/meal)    RD to adjust diet per protocol?  Yes        11/11/17 0705    11/10/17 1403  Dietary nutrition supplements  Once     Question Answer Comment   Select Supplement: Ensure Pudding-Butterscotch    Frequency Breakfast, Lunch, Dinner        11/10/17 1402        Labs:     Results from last 7 days  Lab Units 11/12/17  0511 11/11/17  0433 11/10/17  0335   WBC Thousand/uL 13 90* 12 93* 11 47*   HEMOGLOBIN g/dL 10 2* 10 1* 9 7*   HEMATOCRIT % 32 3* 31 9* 31 2*   PLATELETS Thousands/uL 380 372 297   NEUTROS PCT % 67 66 72   MONOS PCT % 12 13* 10      Results from last 7 days  Lab Units 11/12/17  0511 11/11/17  0433 11/10/17  0335  11/07/17  0438   SODIUM mmol/L 134* 137 138  < > 136   POTASSIUM mmol/L 3 8 3 8 3 5  < > 4 5   CHLORIDE mmol/L 93* 94* 96*  < > 97*   CO2 mmol/L 36* 34* 33*  < > 35*   BUN mg/dL 22 21 21  < > 18   CREATININE mg/dL 0 90 0 96 0 83  < > 0 81   CALCIUM mg/dL 9 8 9 6 9 1  < > 8 9   ALBUMIN g/dL  --   --   --   --  2 5*   TOTAL PROTEIN g/dL  --   -- --   --  6 1*   BILIRUBIN TOTAL mg/dL  --   --   --   --  0 83   ALK PHOS U/L  --   --   --   --  30*   ALT U/L  --   --   --   --  51   AST U/L  --   --   --   --  38   GLUCOSE RANDOM mg/dL 314* 277* 175*  < > 162*   < > = values in this interval not displayed  Results from last 7 days  Lab Units 11/10/17  0335 11/09/17 0427 11/08/17  0036   MAGNESIUM mg/dL 2 2 2 2 2 6       Results from last 7 days  Lab Units 11/10/17  0335 11/09/17  0427 11/07/17  1822   PHOSPHORUS mg/dL 4 2* 4 4* 3 9        Results from last 7 days  Lab Units 11/12/17  0511 11/11/17  0433 11/10/17  0330  11/09/17  1422   INR   --   --   --   --  1 31*   PTT seconds 75* 66* 79*  < > 37*   < > = values in this interval not displayed  0  Lab Value Date/Time   TROPONINI 3 02 (H) 11/09/2017 1817   TROPONINI 3 19 (H) 11/09/2017 1422   TROPONINI 3 22 (H) 11/09/2017 1157   TROPONINI 6 10 (H) 11/04/2017 0557   TROPONINI 6 27 (H) 11/03/2017 1430   TROPONINI 6 40 (H) 11/03/2017 0730   TROPONINI 7 63 (H) 11/03/2017 0511   TROPONINI 7 41 (H) 11/03/2017 0132   TROPONINI 9 82 (H) 11/02/2017 2004   TROPONINI 7 57 ([facility]) 11/02/2017 1646       Micro:  Blood Culture:   Lab Results   Component Value Date    BLOODCX No Growth After 5 Days  11/02/2017    BLOODCX No Growth After 5 Days  11/02/2017     Urine Culture: No results found for: URINECX  Sputum Culture: No components found for: SPUTUMCX  Wound Culure: No results found for: WOUNDCULT    Lab Results   Component Value Date    BLOODCX No Growth After 5 Days  11/02/2017    BLOODCX No Growth After 5 Days   11/02/2017    SPUTUMCULTUR 1+ Growth of Staphylococcus aureus (A) 11/03/2017     Allergies: No Known Allergies  Medications:   Scheduled Meds:  aspirin 324 mg Oral Daily   atorvastatin 40 mg Oral Daily   cholecalciferol 1,000 Units Oral Daily   furosemide 40 mg Intravenous Q6H   insulin glargine 20 Units Subcutaneous Q12H [address]   insulin lispro 4-20 Units Subcutaneous TID AC   ipratropium 0 5 mg Nebulization Q6H   levalbuterol 1 25 mg Nebulization Q6H   levothyroxine 125 mcg Oral Early Morning   melatonin 6 mg Oral HS   metoprolol tartrate 25 mg Oral Q12H JUAN LUIS   nystatin  Topical BID   polyethylene glycol 17 g Oral Daily   potassium chloride 20 mEq Oral BID With Meals   senna 2 tablet Oral HS     Continuous Infusions:  amiodarone 0 5 mg/min Last Rate: 0 5 mg/min (11/11/17 1724)   heparin (porcine) 3-20 Units/kg/hr (Order-Specific) Last Rate: 11 1 Units/kg/hr (11/11/17 1723)     PRN Meds:    acetaminophen 650 mg Q4H PRN   fentanyl citrate (PF) 25 mcg Q2H PRN   heparin (porcine) 2,000 Units PRN   heparin (porcine) 4,000 Units PRN   influenza vaccine 0 5 mL Prior to discharge   lidocaine (PF)  Code/Trauma/Sedation Med   metoprolol 2 5 mg Q6H PRN   oxyCODONE 2 5 mg Q4H PRN   Or     oxyCODONE 5 mg Q4H PRN   sodium bicarbonate  Code/Trauma/Sedation Med   sodium chloride  Code/Trauma/Sedation Med   trimethobenzamide 200 mg Q6H PRN     VTE Pharmacologic Prophylaxis:   Pharmacologic: Heparin Drip  Mechanical VTE Prophylaxis in Place: Yes    Invasive lines and devices: Invasive Devices     Peripherally Inserted Central Catheter Line            PICC Line 55/25/05 Left Basilic less than 1 day                   Counseling / Coordination of Care  Total Critical Care time spent 22 minutes excluding procedures, teaching and family updates        Code Status: Level 3 - DNAR and DNI      Baldomero Damon PA-C

## 2017-11-13 LAB
ANION GAP SERPL CALCULATED.3IONS-SCNC: 7 MMOL/L (ref 4–13)
APTT PPP: 65 SECONDS (ref 23–35)
BACTERIA SPEC BFLD CULT: NO GROWTH
BASOPHILS # BLD AUTO: 0.02 THOUSANDS/ΜL (ref 0–0.1)
BASOPHILS NFR BLD AUTO: 0 % (ref 0–1)
BUN SERPL-MCNC: 22 MG/DL (ref 5–25)
CALCIUM SERPL-MCNC: 9.5 MG/DL (ref 8.3–10.1)
CHLORIDE SERPL-SCNC: 93 MMOL/L (ref 100–108)
CO2 SERPL-SCNC: 34 MMOL/L (ref 21–32)
CREAT SERPL-MCNC: 0.87 MG/DL (ref 0.6–1.3)
EOSINOPHIL # BLD AUTO: 0.23 THOUSAND/ΜL (ref 0–0.61)
EOSINOPHIL NFR BLD AUTO: 1 % (ref 0–6)
ERYTHROCYTE [DISTWIDTH] IN BLOOD BY AUTOMATED COUNT: 15.7 % (ref 11.6–15.1)
GFR SERPL CREATININE-BSD FRML MDRD: 69 ML/MIN/1.73SQ M
GLUCOSE SERPL-MCNC: 193 MG/DL (ref 65–140)
GLUCOSE SERPL-MCNC: 204 MG/DL (ref 65–140)
GLUCOSE SERPL-MCNC: 271 MG/DL (ref 65–140)
GLUCOSE SERPL-MCNC: 273 MG/DL (ref 65–140)
GLUCOSE SERPL-MCNC: 283 MG/DL (ref 65–140)
GLUCOSE SERPL-MCNC: 340 MG/DL (ref 65–140)
GRAM STN SPEC: NORMAL
GRAM STN SPEC: NORMAL
HCT VFR BLD AUTO: 32.5 % (ref 34.8–46.1)
HGB BLD-MCNC: 10.3 G/DL (ref 11.5–15.4)
LYMPHOCYTES # BLD AUTO: 2.26 THOUSANDS/ΜL (ref 0.6–4.47)
LYMPHOCYTES NFR BLD AUTO: 14 % (ref 14–44)
MCH RBC QN AUTO: 29.3 PG (ref 26.8–34.3)
MCHC RBC AUTO-ENTMCNC: 31.7 G/DL (ref 31.4–37.4)
MCV RBC AUTO: 92 FL (ref 82–98)
MONOCYTES # BLD AUTO: 1.69 THOUSAND/ΜL (ref 0.17–1.22)
MONOCYTES NFR BLD AUTO: 10 % (ref 4–12)
NEUTROPHILS # BLD AUTO: 12.27 THOUSANDS/ΜL (ref 1.85–7.62)
NEUTS SEG NFR BLD AUTO: 75 % (ref 43–75)
NRBC BLD AUTO-RTO: 0 /100 WBCS
PLATELET # BLD AUTO: 451 THOUSANDS/UL (ref 149–390)
PMV BLD AUTO: 10.4 FL (ref 8.9–12.7)
POTASSIUM SERPL-SCNC: 3.9 MMOL/L (ref 3.5–5.3)
RBC # BLD AUTO: 3.52 MILLION/UL (ref 3.81–5.12)
SODIUM SERPL-SCNC: 134 MMOL/L (ref 136–145)
WBC # BLD AUTO: 16.56 THOUSAND/UL (ref 4.31–10.16)

## 2017-11-13 PROCEDURE — 80048 BASIC METABOLIC PNL TOTAL CA: CPT | Performed by: PHYSICIAN ASSISTANT

## 2017-11-13 PROCEDURE — 82948 REAGENT STRIP/BLOOD GLUCOSE: CPT

## 2017-11-13 PROCEDURE — 92526 ORAL FUNCTION THERAPY: CPT

## 2017-11-13 PROCEDURE — 94640 AIRWAY INHALATION TREATMENT: CPT

## 2017-11-13 PROCEDURE — 94760 N-INVAS EAR/PLS OXIMETRY 1: CPT

## 2017-11-13 PROCEDURE — 85730 THROMBOPLASTIN TIME PARTIAL: CPT | Performed by: INTERNAL MEDICINE

## 2017-11-13 PROCEDURE — 85025 COMPLETE CBC W/AUTO DIFF WBC: CPT | Performed by: PHYSICIAN ASSISTANT

## 2017-11-13 RX ADMIN — AMIODARONE HYDROCHLORIDE 0.5 MG/MIN: 50 INJECTION, SOLUTION INTRAVENOUS at 01:10

## 2017-11-13 RX ADMIN — IPRATROPIUM BROMIDE 0.5 MG: 0.5 SOLUTION RESPIRATORY (INHALATION) at 07:17

## 2017-11-13 RX ADMIN — NYSTATIN: 100000 POWDER TOPICAL at 09:36

## 2017-11-13 RX ADMIN — INSULIN LISPRO 4 UNITS: 100 INJECTION, SOLUTION INTRAVENOUS; SUBCUTANEOUS at 21:26

## 2017-11-13 RX ADMIN — POLYETHYLENE GLYCOL 3350 17 G: 17 POWDER, FOR SOLUTION ORAL at 09:42

## 2017-11-13 RX ADMIN — POTASSIUM CHLORIDE 20 MEQ: 1500 TABLET, EXTENDED RELEASE ORAL at 09:41

## 2017-11-13 RX ADMIN — LEVOTHYROXINE SODIUM 125 MCG: 125 TABLET ORAL at 06:02

## 2017-11-13 RX ADMIN — ATORVASTATIN CALCIUM 40 MG: 40 TABLET, FILM COATED ORAL at 09:41

## 2017-11-13 RX ADMIN — INSULIN LISPRO 12 UNITS: 100 INJECTION, SOLUTION INTRAVENOUS; SUBCUTANEOUS at 09:37

## 2017-11-13 RX ADMIN — FUROSEMIDE 40 MG: 10 INJECTION, SOLUTION INTRAMUSCULAR; INTRAVENOUS at 00:07

## 2017-11-13 RX ADMIN — FUROSEMIDE 40 MG: 10 INJECTION, SOLUTION INTRAMUSCULAR; INTRAVENOUS at 12:25

## 2017-11-13 RX ADMIN — IPRATROPIUM BROMIDE 0.5 MG: 0.5 SOLUTION RESPIRATORY (INHALATION) at 13:35

## 2017-11-13 RX ADMIN — INSULIN GLARGINE 30 UNITS: 100 INJECTION, SOLUTION SUBCUTANEOUS at 09:49

## 2017-11-13 RX ADMIN — INSULIN GLARGINE 30 UNITS: 100 INJECTION, SOLUTION SUBCUTANEOUS at 21:26

## 2017-11-13 RX ADMIN — VITAMIN D, TAB 1000IU (100/BT) 1000 UNITS: 25 TAB at 09:41

## 2017-11-13 RX ADMIN — MELATONIN TAB 3 MG 6 MG: 3 TAB at 21:27

## 2017-11-13 RX ADMIN — FUROSEMIDE 40 MG: 10 INJECTION, SOLUTION INTRAMUSCULAR; INTRAVENOUS at 23:56

## 2017-11-13 RX ADMIN — LEVALBUTEROL HYDROCHLORIDE 1.25 MG: 1.25 SOLUTION, CONCENTRATE RESPIRATORY (INHALATION) at 07:17

## 2017-11-13 RX ADMIN — ASPIRIN 81 MG 324 MG: 81 TABLET ORAL at 09:40

## 2017-11-13 RX ADMIN — INSULIN LISPRO 12 UNITS: 100 INJECTION, SOLUTION INTRAVENOUS; SUBCUTANEOUS at 18:00

## 2017-11-13 RX ADMIN — LEVALBUTEROL HYDROCHLORIDE 1.25 MG: 1.25 SOLUTION, CONCENTRATE RESPIRATORY (INHALATION) at 19:48

## 2017-11-13 RX ADMIN — INSULIN LISPRO 16 UNITS: 100 INJECTION, SOLUTION INTRAVENOUS; SUBCUTANEOUS at 13:00

## 2017-11-13 RX ADMIN — SENNOSIDES 17.2 MG: 8.6 TABLET, FILM COATED ORAL at 21:27

## 2017-11-13 RX ADMIN — MELATONIN TAB 3 MG 6 MG: 3 TAB at 00:07

## 2017-11-13 RX ADMIN — TRIMETHOBENZAMIDE HYDROCHLORIDE 200 MG: 100 INJECTION INTRAMUSCULAR at 01:56

## 2017-11-13 RX ADMIN — METOPROLOL TARTRATE 37.5 MG: 25 TABLET ORAL at 21:27

## 2017-11-13 RX ADMIN — POTASSIUM CHLORIDE 20 MEQ: 1500 TABLET, EXTENDED RELEASE ORAL at 16:38

## 2017-11-13 RX ADMIN — LEVALBUTEROL HYDROCHLORIDE 1.25 MG: 1.25 SOLUTION, CONCENTRATE RESPIRATORY (INHALATION) at 00:44

## 2017-11-13 RX ADMIN — METOPROLOL TARTRATE 37.5 MG: 25 TABLET ORAL at 09:41

## 2017-11-13 RX ADMIN — FUROSEMIDE 40 MG: 10 INJECTION, SOLUTION INTRAMUSCULAR; INTRAVENOUS at 16:45

## 2017-11-13 RX ADMIN — HEPARIN SODIUM AND DEXTROSE 11.1 UNITS/KG/HR: 10000; 5 INJECTION INTRAVENOUS at 20:59

## 2017-11-13 RX ADMIN — LEVALBUTEROL HYDROCHLORIDE 1.25 MG: 1.25 SOLUTION, CONCENTRATE RESPIRATORY (INHALATION) at 13:35

## 2017-11-13 RX ADMIN — SENNOSIDES 17.2 MG: 8.6 TABLET, FILM COATED ORAL at 00:07

## 2017-11-13 RX ADMIN — FUROSEMIDE 40 MG: 10 INJECTION, SOLUTION INTRAMUSCULAR; INTRAVENOUS at 06:02

## 2017-11-13 RX ADMIN — IPRATROPIUM BROMIDE 0.5 MG: 0.5 SOLUTION RESPIRATORY (INHALATION) at 19:48

## 2017-11-13 RX ADMIN — IPRATROPIUM BROMIDE 0.5 MG: 0.5 SOLUTION RESPIRATORY (INHALATION) at 00:44

## 2017-11-13 NOTE — PROGRESS NOTES
Progress Note - Critical Care   Watson Estes 79 y o  female MRN: [id number]  Unit/Bed#: [facility] 10 Encounter: [id number]    ______________________________________________________________________  Assessment and Plan:     Patient Active Problem List    Diagnosis Date Noted    Delirium 11/07/2017    Encephalopathy 11/06/2017    Obesity, Class III, BMI 40-49 9 (morbid obesity) ([address] ) 11/06/2017    Cardiac arrest 11/03/2017    Acute hypercapnic respiratory failure 11/03/2017    Torsades de pointes 11/03/2017    Prolonged QT interval 11/03/2017    NSTEMI (non-ST elevated myocardial infarction) 11/03/2017    Ischemic cardiomyopathy 11/03/2017    LBBB (left bundle branch block) 11/03/2017    CAD (coronary artery disease) 11/03/2017    h/o Vocal cord paralysis 11/03/2017    HTN (hypertension) 11/03/2017    Hyperlipemia 11/03/2017    Respiratory failure 11/02/2017     Assessment:  78 yo female with PMhx of HTN, CAD Transfer from [facility] for acute on chronic systolic and diastolic heart failure  Found down at her home hypoxic and taken to [facility] ED  Patient palced on BiPAP, dound to have NSTEMI; pklaced on heparin and stable to transfer to [facility] for further evaluation  In MICU  Day one, patient went into Vtach/ vfib arrest   ROSC with epi/bicarb  Currently extubated, O2 sat 199 on 4 L nasal cannula,  on heparin drip and amiodarone gtt and awaiting possible cath and ICD placement  Plan:       Neuro:   Acute encephalopathy:    Appears to be at baseline, continue delirium precautions, daily CAM-ICU  Avoid any QTC prolonging medications  Insomnia:   -Melatonin HS     CV:   NSTEMI type II with chronic MVCAD and LBBB  -s/p Vtach ROSC and A fib on 11/9   -Continue PO metoprolol 25mg,ASA 325mg,  amiodarone gtt, heparin gtt       Paroxysmal Afib:    -continue Amiodarone gtt    -Continue diuresis with Lasix 40mg TID  -Avoid QTc prolonging medications   -Cardiac catheterization, possible ICD placement   -Followed by Cardiology    HLD:  -Continue Lipitor 40mg HS    Labs/Imaging  Troponin: 4 93--> 7 57--> 7 63--> 6/10--> 3 02  ProBNP: 25,187  Echo (6/27/17): [facility] -Diastolic dysfunction with elevated filling pressures  Visually Estimated LV Ejection Fraction is:35% Normal left ventricular chamber size  Moderately reduced left ventricular systolic function  Global hypokinesis with inferior akinesis        Lung: Acute hypoxic respiratory failure: improved  -extubated 11/8/17: currently sat O2 99 on 4 L nasal cannula   -Sputum Cx/stain: MSSA PNA: van zosyn--> transition to nafacillin: 7 day course completed  -Continue diuresis  Q6 nebulizers      GI:   Diet: Dysphagia :2 mechanical soft with nectar thick liquid  Bowel regimen :  senna/miralax       FEN:   NO IVF  -Replete electrolytes prn  Maintain: K >4, Mg>2  I/Os: 910ml/24hrs  Net since admission: -546  2mL     :   No lackey  -Continue diuresis: Lasix 40mg TID     ID:   Leukocytosis trending up: Afebrile   -WBC: 10 88---> 13 90--> 16 56  Abx: Completed course of Nafcillin for MSSA PNA  Continue to monitor off of antibiotics     Heme:   Hb: 10 3  Hct: 32 5  Platelets: 972  DVT ppx: Heparin gtt, SCDs  PTT: 75  INR 11/9: 1 31       Endo:   Hx of DM2: uncontrolled  -Continue 30 units Lantus BID   -Continue meal time coverage with SSI algorithm 5       Msk/Skin:   Frequent turns and repositioning  Out of bed to chair  Nystatin powder  PT/OT     Disposition:   Stable for transfer out of ICU     Code Status: Level 3 - DNAR and DNI    Counseling / Coordination of Care    ______________________________________________________________________    Chief Complaint: "stomach has lots of gas"      HPI/24hr events: None  ______________________________________________________________________    Physical Exam:   Physical Exam   Constitutional: She is oriented to person, place, and time  She appears well-developed  No distress  HENT:   Head: Normocephalic     Eyes: Pupils are equal, round, and reactive to light  No scleral icterus  Neck: Normal range of motion  Cardiovascular: Normal rate  Murmur heard  Pulmonary/Chest: Effort normal  No respiratory distress  She has decreased breath sounds in the right lower field and the left lower field  She has no wheezes  Abdominal: Soft  Bowel sounds are normal  She exhibits no distension and no mass  There is tenderness  There is no guarding  Musculoskeletal: Normal range of motion  She exhibits no edema  Neurological: She is alert and oriented to person, place, and time  GCS eye subscore is 4  GCS verbal subscore is 5  GCS motor subscore is 6  Skin: Skin is warm  She is not diaphoretic  There is pallor  Psychiatric: She has a normal mood and affect  Her behavior is normal  Thought content normal          ______________________________________________________________________  Vitals:    17 0100 17 0200 17 0400 17 0500   BP: 134/58 125/71 136/82 132/70   Pulse: 66 70 68 66   Resp: 20 20 22 (!) 29   Temp:   98 °F (36 7 °C)    TempSrc:   Oral    SpO2: 99% 100%  99%   Weight:       Height:         Arterial Line BP: 144/54  Arterial Line MAP (mmHg): 80 mmHg     Temperature:   Temp (24hrs), Av 2 °F (36 8 °C), Min:97 5 °F (36 4 °C), Max:99 °F (37 2 °C)    Current Temperature: 98 °F (36 7 °C)    Weights:   IBW: 70 8 kg    Body mass index is 37 73 kg/m²  Weight (last 2 days)     Date/Time   Weight    17 0600  123 (270 5)    17 0410  121 (266 54)              Hemodynamic Monitoring:  CVP:         Non-Invasive/Invasive Ventilation Settings:  Respiratory    Lab Data (Last 4 hours)    None         O2/Vent Data (Last 4 hours)    None              No results found for: PHART, OQU2EJV, PO2ART, ZBL8ZWY, Q9AKFKRM, BEART, SOURCE  SpO2 Activity: SpO2 Activity: At Rest    Intake and Outputs:  I/O       701 -  07 07    P  O  660 600    I V  (mL/kg) 613 2 (5) 613 5 (5)    IV Piggyback 100     Total Intake(mL/kg) 1373 2 (11 2) 1213 5 (9 9)    Urine (mL/kg/hr) 1174 (0 4) 910 (0 3)    Total Output 1174 910    Net +199 2 +303 5          Unmeasured Urine Occurrence 1 x 2 x          Nutrition:        Diet Orders            Start     Ordered    11/11/17 0705  Diet Dysphagia/Modified Consistency; Dysphagia 2-Mechanical Soft; Nectar Thick Liquid; Consistent Carbohydrate Diet Level 2 (5 carb servings/75 grams CHO/meal)  Diet effective now     Question Answer Comment   Diet Type Dysphagia/Modified Consistency    Dysphagia/Modified Consistency Dysphagia 2-Mechanical Soft    Liquid Modifier Nectar Thick Liquid    Other Restriction(s): Consistent Carbohydrate Diet Level 2 (5 carb servings/75 grams CHO/meal)    RD to adjust diet per protocol? Yes        11/11/17 0705    11/10/17 1403  Dietary nutrition supplements  Once     Question Answer Comment   Select Supplement: Ensure Pudding-Butterscotch    Frequency Breakfast, Lunch, Dinner        11/10/17 1402          Labs:     Results from last 7 days  Lab Units 11/13/17  0552 11/12/17  0511 11/11/17  0433   WBC Thousand/uL 16 56* 13 90* 12 93*   HEMOGLOBIN g/dL 10 3* 10 2* 10 1*   HEMATOCRIT % 32 5* 32 3* 31 9*   PLATELETS Thousands/uL 451* 380 372   NEUTROS PCT % 75 67 66   MONOS PCT % 10 12 13*       Results from last 7 days  Lab Units 11/13/17  0552 11/12/17  0511 11/11/17  0433  11/07/17  0438   SODIUM mmol/L 134* 134* 137  < > 136   POTASSIUM mmol/L 3 9 3 8 3 8  < > 4 5   CHLORIDE mmol/L 93* 93* 94*  < > 97*   CO2 mmol/L 34* 36* 34*  < > 35*   BUN mg/dL 22 22 21  < > 18   CREATININE mg/dL 0 87 0 90 0 96  < > 0 81   CALCIUM mg/dL 9 5 9 8 9 6  < > 8 9   TOTAL PROTEIN g/dL  --   --   --   --  6 1*   BILIRUBIN TOTAL mg/dL  --   --   --   --  0 83   ALK PHOS U/L  --   --   --   --  30*   ALT U/L  --   --   --   --  51   AST U/L  --   --   --   --  38   GLUCOSE RANDOM mg/dL 283* 314* 277*  < > 162*   < > = values in this interval not displayed      Results from last 7 days  Lab Units 11/10/17  0335 11/09/17  0427 11/08/17  0036   MAGNESIUM mg/dL 2 2 2 2 2 6     Lab Results   Component Value Date    PHOS 4 2 (H) 11/10/2017    PHOS 4 4 (H) 11/09/2017    PHOS 3 9 11/07/2017        Results from last 7 days  Lab Units 11/12/17  0511 11/11/17  0433 11/10/17  0330  11/09/17  1422   INR   --   --   --   --  1 31*   PTT seconds 75* 66* 79*  < > 37*   < > = values in this interval not displayed  0  Lab Value Date/Time   TROPONINI 3 02 (H) 11/09/2017 1817   TROPONINI 3 19 (H) 11/09/2017 1422   TROPONINI 3 22 (H) 11/09/2017 1157   TROPONINI 6 10 (H) 11/04/2017 0557   TROPONINI 6 27 (H) 11/03/2017 1430   TROPONINI 6 40 (H) 11/03/2017 0730   TROPONINI 7 63 (H) 11/03/2017 0511   TROPONINI 7 41 (H) 11/03/2017 0132   TROPONINI 9 82 (H) 11/02/2017 2004   TROPONINI 7 57 (HH) 11/02/2017 1646         ABG:  Lab Results   Component Value Date    PHART 7 489 (H) 11/10/2017    URB8IWZ 41 9 11/10/2017    PO2ART 103 0 11/10/2017    MRK9KFT 31 1 (H) 11/10/2017    BEART 7 1 11/10/2017    SOURCE Radial, Left 11/10/2017       Imaging:  I have personally reviewed pertinent reports  EKG: Telemetry reviewed     Micro:  Lab Results   Component Value Date    BLOODCX No Growth After 5 Days  11/02/2017    BLOODCX No Growth After 5 Days   11/02/2017    SPUTUMCULTUR 1+ Growth of Staphylococcus aureus (A) 11/03/2017       Allergies: No Known Allergies    Medications:   Scheduled Meds:  aspirin 324 mg Oral Daily   atorvastatin 40 mg Oral Daily   cholecalciferol 1,000 Units Oral Daily   furosemide 40 mg Intravenous Q6H   insulin glargine 30 Units Subcutaneous Q12H [address]   insulin lispro 4-20 Units Subcutaneous TID AC   ipratropium 0 5 mg Nebulization Q6H   levalbuterol 1 25 mg Nebulization Q6H   levothyroxine 125 mcg Oral Early Morning   melatonin 6 mg Oral HS   metoprolol tartrate 37 5 mg Oral Q12H JUAN LUIS   nystatin  Topical BID   polyethylene glycol 17 g Oral Daily   potassium chloride 20 mEq Oral BID With Meals   senna 2 tablet Oral HS     Continuous Infusions:  amiodarone 0 5 mg/min Last Rate: 0 5 mg/min (11/13/17 0400)   heparin (porcine) 3-20 Units/kg/hr (Order-Specific) Last Rate: 11 111 Units/kg/hr (11/13/17 0400)     PRN Meds:    acetaminophen 650 mg Q4H PRN   fentanyl citrate (PF) 25 mcg Q2H PRN   heparin (porcine) 2,000 Units PRN   heparin (porcine) 4,000 Units PRN   influenza vaccine 0 5 mL Prior to discharge   lidocaine (PF)  Code/Trauma/Sedation Med   metoprolol 2 5 mg Q6H PRN   oxyCODONE 2 5 mg Q4H PRN   Or     oxyCODONE 5 mg Q4H PRN   sodium bicarbonate  Code/Trauma/Sedation Med   sodium chloride  Code/Trauma/Sedation Med   trimethobenzamide 200 mg Q6H PRN       VTE Pharmacologic Prophylaxis: Heparin ( on heparin gtt)  VTE Mechanical Prophylaxis: sequential compression device    Invasive lines and devices:   Invasive Devices     Peripherally Inserted Central Catheter Line            PICC Line 71/01/17 Left Basilic 1 day                     Shannon Powell MD  Family Medicine Resident  [id number]

## 2017-11-13 NOTE — SOCIAL WORK
CM me with pt at bedside who is currently alert and oriented  CM reviewed PT recommendation for STR at d/c  Pt is agreeable and requested her son Chase Perez assist CM in where to send referrals  Pt requested to stay close to home  CM spoke with Mary Leon and updated him with same  He requested CM e mail ([email or web address]) list of in network facilities both where he resides and where pt resides  CM emailed [employer] a list of in network facilities within a 20 mile radius of both locations  He said he would review tonight and update CM  CM did inform him since where he lives is 2 hours away that pt may have an OOP cost related to transport to the facility

## 2017-11-13 NOTE — SPEECH THERAPY NOTE
Speech Language/Pathology                             SLP  Note  Patient Name: Yue DENNIS Date: 11/13/2017         Subjective:  Pt seen for dysphagia tx  Cog status seems improved  Pt on dysphagia 2 w/ NTL  Upper dentures found at bedside, placed in oral cavity, appeared loose  Pt cont w/ slightly hoarse/dysphonic voice  Objective:  Pt's denture appeared loose-but pt refused adhesive  Pt c/o being thirsty,trialed w/ sips of thin water  Pt appeared w/ adequate oral control of thin liquids, but noted immed cough on initial sip of thin by cup, delayed cough/throat clear on subsequent sips  Pt wanted to try straw- pt cont w/ throat clearing  Pt trialed w/ laura doone cookie, took 1 bite and stated too hard to chew even w/ dentures in place  Pt w/ prolonged mastication/manipulation  Pt trialed w/ ice chips- no overt s/s aspiration noted  Assessment:  Pt cont w/ aspiration risk w/ thin liquids, ok for ice chips  Pt cont w/ c/o difficulty chewing laura doone cookie       Plan/Recommendations:  Cont dysphagia 2 w/ nectar thick liquids  Will cont to follow

## 2017-11-13 NOTE — RESPIRATORY THERAPY NOTE
RT Ventilator Management Note  Jessie Jimenez 79 y o  female MRN: [id number]  Unit/Bed#: [facility] 10 Encounter: [id number]      Daily Screen       11/8/2017 0740 11/8/2017 1240          Patient safety screen outcome[de-identified] Passed Passed      Spont breathing trial % for 30 min: Yes Yes      Spont breathing trial outcome[de-identified] - Passed      Name of Medical Team Notified[de-identified] - Dr Villarreal Corporal      Preparing to extubate/ Notify Nurse: - Yes      Extubation order obtained: - Yes      Consider Cuff Test: - Yes      Patient extubated: - Yes              Physical Exam:          Resp Comments: (P) Pt  off BiPAP  Refused for RN around 0230  Pt  doing well on 3LPM NCC

## 2017-11-13 NOTE — PROGRESS NOTES
Progress Note - ICU Transfer to Step down/[facility]  surg  - Umberto العلي 79 y o  female MRN: [id number]    Unit/Bed#: MICU 10 Encounter: [id number]    Code Status: Level 3 - DNAR and DNI  POA:    POLST:      Reason for ICU adm:Cardiac arrest, acute hypercapnic respiratory failure    Active problems: Principal Problem:    Respiratory failure  Active Problems:    Cardiac arrest    Acute hypercapnic respiratory failure    Torsades de pointes    Prolonged QT interval    NSTEMI (non-ST elevated myocardial infarction)    Ischemic cardiomyopathy    LBBB (left bundle branch block)    CAD (coronary artery disease)    h/o Vocal cord paralysis    HTN (hypertension)    Hyperlipemia    Encephalopathy    Obesity, Class III, BMI 40-49 9 (morbid obesity) ([facility] [address] )    Delirium  Resolved Problems:    * No resolved hospital problems  *      Consultants:   Cardiology  PT/OT  Nutrition     History of Present Illness/Summary of clinical course:   Per Dr Chichi Rossi "Marcheta Seip a 79 y o  female who presents as transfer from [facility] ED  Litzy Dempsey was being checked on for wellness check the past 2 days, however she answered her door and did not want help   Today, when a wellness check was performed she did not answer and was found to be lying on the ground   Patient was brought to the [facility] ED   In the ED, patient was confused and having respiratory difficulty with reports initial O2 saturation in the 70s, therefore pt was placed on BiPAP   She was noted to have an elevated i-STAT troponin of 4 93 and lab troponin of 7 57   Noted to have elevated lactate of 4 3   Chest x-ray showing bilateral pleural effusions, left greater than right   CT head and C-spine were negative  Patient was transported by air to the [facility] ICU      Per review of records on Care everywhere, patient was hospitalized at [facility] from June through July for possible STEMI   She had elevated troponins and underwent catheterization, however no intervention was performed   Last echo ws 6/12 showing EF of 35%      On arrival to ICU, pt was on Bipap 20/5  Pt sleepy, but able to follow commands and answer simple questions  Pt offers no complaints and denies any areas of pain "    Patient was admitted to MICU w/ cardiology consulted  She was started on a heparin and amiodarone drip for NSTEMI  On hospital day 1, patient was emergently intubated for respiratory failure  Patient went into V-tach/VFib arrest later that afternoon and went through 2 rounds of epinephrine, as well as lidocaine and bicarbonate pushes with ROSC  Patient was started on lidocaine and dopamine infusions to prevent R on T phenomenon, as it was presumed this caused the arrest  Patient became febrile and was found to have MSSA pneumonia  She had originally been started on vancomycin and zosyn, which was transitioned to Nafcillin  She completed a 7 day course of antibiotics  Patient was extubated on 11/8 and after discussion with the patient, she was made DNR/DNI  On 11/9, patient went into rapid atrial fibrillation and was started again on heparin and amiodarone as well as oral beta blockers  Originally, cardiac catheterization was scheduled for 11/9, but patient could not tolerate laying flat secondary to respiratory status  Patient is now requiring only 2 liters nasal cannula w/ intermittent nocturnal bipap use  She is tolerating a dysphagia diet  Amiodarone was switched to PO, heparin was dcs last evening  Events today: patient states she felt anxious and SOB  Denied CP, blurry vision, lightheadedness  Vitals remained stable, sat O2 99 on 2 Liters  EKG was ordered  Cardiology noted no changes on EKG  Per request of cardiology, Heparin gtt was started  Troponin returned negative  Patient was given 0 25mg xanax  Symptoms improved  Patient followed by cardiology  Likely plan is to undergo cardiac angiogram possible ICD placement  Patient will require long term anticoagulation 2n2 Afib  Cardiology awaiting Cath CD results from [facility]  Written report placed in chart  Recent or scheduled procedures:   Coronary Angiography  ICD       Mobilization Plan:   PT/OT    Nutrition Plan:   Dysphagia Diet     Specific Diagnosis Plan:  Coronary Angiogram and likely ICD placement       Spoke with Dr Vera Pisano at  1800 regarding transfer         Conor Garcia MD  Family Medicine Resident  PGY2

## 2017-11-13 NOTE — PROGRESS NOTES
Cardiology Progress Note - Jessie Jimenez 79 y o  female MRN: [id number]    Unit/Bed#: MICU 10 Encounter: [id number]      Assessment and Plan:    1  NSTEMI type II  On aspirin, statin, beta blocker      2  Chronic multivessel CAD  On aspirin, statin, beta blocker  Has been medically managed since cath done at [facility] in 6/17  Unable to review records from [facility]  If not available then will plan for cardiac catheterization tomorrow       3  Chronic LBBB  Still present on telemetry       4  ICM with EF 35% with acute/chronic systolic HF  By echo here, EF improved to 50%  Currently on Lasix 40 IV q6hrs to help with volume overload/respiratory status  On home O2 3L  Switch to lasix 40 mg IV bid  Check CXR  On metoprolol 37 5 mg bid     5  HTN  Started on low dose beta blocker, thus far tolerating       6  HL  On statin       7  DM  On insulin      8  PMVT  On amiodarone gtt  No further episodes since 11/13  Keep K>4, Mg>2  Appreciate EP recommendations      9  PNA  Completed course of nafcillin       10  Paroxysmal afib  Started on IV amiodarone  Currently SR  On IV heparin as well  Subjective:   79year old woman with a history of multivessel disease managed medically, ICM with EF 35%, chronic LBBB, DM, 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, admitted after being found to at home, found on telemetry here to have an episode of PVC induced PMVT with baseline prolonged QT on 11/3 that resolved spontaneously per report  She was started on IV amiodarone, but had a second episode of torsades on 11/3, and was switched from Amiodarone to Lidocaine drip at that time, and was kept on dopamine to keep HR elevated as well as for inotropic effect       She was extubated 11/8  Being diuresed for pulmonary edema on CXR  Had right sided thoracentesis 11/10, was more persistently tachycardic in afib, IV amiodarone started, currently maintaining SR  No chest pain  No palpitations   Sleeping comfortably with head of bed up, on supplemental O2  No fevers  Objective:     Vitals: Blood pressure 127/70, pulse 62, temperature 98 °F (36 7 °C), temperature source Oral, resp  rate (!) 29, height 5' 11" (1 803 m), weight 123 kg (270 lb 8 1 oz), SpO2 100 %  , Body mass index is 37 73 kg/m² , Orthostatic Blood Pressures    Flowsheet Row Most Recent Value   Blood Pressure  127/70 filed at 2017 0555   Patient Position - Orthostatic VS  Lying filed at 2017 1400            Intake/Output Summary (Last 24 hours) at 17 4294  Last data filed at 17 0600   Gross per 24 hour   Intake          1191 29 ml   Output              910 ml   Net           281 29 ml       No significant arrhythmias seen on telemetry review    Physical Exam:    GEN: Mane Bonilla appears well, alert and oriented x 3, pleasant and cooperative   HEENT: anicteric  NECK: short thick neck  HEART: regular rhythm, normal S1 and S2, no murmur  LUNGS: Bibasilar crackles  ABDOMEN: soft, no tendernes, no distentiosn  EXTREMITIES: peripheral pulses normal; no  edema  NEURO: no focal findings   SKIN: normal without suspicious lesions on exposed skin      Current Facility-Administered Medications:     acetaminophen (TYLENOL) rectal suppository 650 mg, 650 mg, Rectal, Q4H PRN, [facility]  [COMPLETED] amiodarone 150 mg in dextrose 5 % 100 mL IV bolus, 150 mg, Intravenous, Once, Stopped at 17 0013 **FOLLOWED BY** [] amiodarone (CORDARONE) 900 mg in dextrose 5 % 500 mL infusion, 1 mg/min, Intravenous, Continuous, Stopped at 17 0039 **FOLLOWED BY** amiodarone (CORDARONE) 900 mg in dextrose 5 % 500 mL infusion, 0 5 mg/min, Intravenous, Continuous, Priti Chavez PA-C, Last Rate: 16 7 mL/hr at 17 0752, 0 5 mg/min at 17 5772    aspirin chewable tablet 324 mg, 324 mg, Oral, Daily, Machelle Damian PA-C, 324 mg at 17 1105    atorvastatin (LIPITOR) tablet 40 mg, 40 mg, Oral, Daily, ARLENE Fulton, 40 mg at 17 1103   cholecalciferol (VITAMIN D3) tablet 1,000 Units, 1,000 Units, Oral, Daily, Onesimo Marks PA-C, 1,000 Units at 11/12/17 1105    fentanyl citrate (PF) 100 MCG/2ML 25 mcg, 25 mcg, Intravenous, Q2H PRN, Onesimo Marks PA-C    furosemide (LASIX) injection 40 mg, 40 mg, Intravenous, Q6H, Mary Grace Maynard MD, 40 mg at 11/13/17 0602    heparin (porcine) 25,000 units in 250 mL infusion (premix), 3-20 Units/kg/hr (Order-Specific), Intravenous, Titrated, Immanuel Greenwood DO, Last Rate: 10 mL/hr at 11/13/17 0752, 11 111 Units/kg/hr at 11/13/17 5594    heparin (porcine) injection 2,000 Units, 2,000 Units, Intravenous, PRN, Marletta Norwood, DO    heparin (porcine) injection 4,000 Units, 4,000 Units, Intravenous, PRN, Marletta Norwood, DO    influenza inactivated quadrivalent vaccine (FLULAVAL) IM injection 0 5 mL, 0 5 mL, Intramuscular, Prior to discharge, Thierry Neff MD    insulin glargine (LANTUS) subcutaneous injection 30 Units, 30 Units, Subcutaneous, Q12H [address], Onesimo Marks PA-C, 30 Units at 11/12/17 2118    insulin lispro (HumaLOG) 100 units/mL subcutaneous injection 4-20 Units, 4-20 Units, Subcutaneous, TID AC, 8 Units at 11/12/17 1813 **AND** Fingerstick Glucose (POCT), , , TID AC, Onesimo Marks PA-C    ipratropium (ATROVENT) 0 02 % inhalation solution 0 5 mg, 0 5 mg, Nebulization, Q6H, Onesimo Marks PA-C, 0 5 mg at 11/13/17 4317    levalbuterol (XOPENEX) inhalation solution 1 25 mg, 1 25 mg, Nebulization, Q6H, Thierry Neff MD, 1 25 mg at 11/13/17 7579    levothyroxine tablet 125 mcg, 125 mcg, Oral, Early Morning, Onesimo Marks PA-C, 125 mcg at 11/13/17 0602    lidocaine (PF) (XYLOCAINE-MPF) 100 mg/5 mL injection, , , Code/Trauma/Sedation Med, Chirag Pascual DO, 100 mg at 11/03/17 1218    melatonin tablet 6 mg, 6 mg, Oral, HS, Nesha Mercer DO, 6 mg at 11/13/17 0007    metoprolol (LOPRESSOR) injection 2 5 mg, 2 5 mg, Intravenous, Q6H PRN, Abby Abdullahi DO, 2 5 mg at 11/09/17 3386   metoprolol tartrate (LOPRESSOR) partial tablet 37 5 mg, 37 5 mg, Oral, Q12H [address], Silver Smith MD, 37 5 mg at 11/12/17 2118    nystatin (MYCOSTATIN) powder, , Topical, BID, Valerie Caballero PA-C    oxyCODONE (ROXICODONE) IR tablet 2 5 mg, 2 5 mg, Oral, Q4H PRN **OR** oxyCODONE (ROXICODONE) IR tablet 5 mg, 5 mg, Oral, Q4H PRN, Sandy Buckner PA-C    polyethylene glycol (MIRALAX) packet 17 g, 17 g, Oral, Daily, ARLENE Weinberg, 17 g at 11/12/17 1109    potassium chloride (K-DUR,KLOR-CON) CR tablet 20 mEq, 20 mEq, Oral, BID With Meals, Silver Smith MD, 20 mEq at 11/12/17 1109    senna (SENOKOT) tablet 17 2 mg, 2 tablet, Oral, HS, SEELNA WeinbergNP, 17 2 mg at 11/13/17 0007    sodium bicarbonate 50 mEq/50 mL injection, , , Code/Trauma/Sedation Med, Celia Net, DO, 100 mEq at 11/03/17 1225    sodium chloride 0 9 % bolus, , , Code/Trauma/Sedation Med, Celia Net, DO, 1,000 mL at 11/03/17 1221    trimethobenzamide (TIGAN) IM injection 200 mg, 200 mg, Intramuscular, Q6H PRN, Kera Dose, DO, 200 mg at 11/13/17 0156    Labs & Results:    Lab Results   Component Value Date    CKTOTAL 277 (H) 11/03/2017    CKMBINDEX 4 3 (H) 11/03/2017    TROPONINI 3 02 (H) 11/09/2017    TROPONINI 3 19 (H) 11/09/2017    TROPONINI 3 22 (H) 11/09/2017       Lab Results   Component Value Date    GLUCOSE 283 (H) 11/13/2017    CALCIUM 9 5 11/13/2017     (L) 11/13/2017    K 3 9 11/13/2017    CO2 34 (H) 11/13/2017    CL 93 (L) 11/13/2017    BUN 22 11/13/2017    CREATININE 0 87 11/13/2017       Lab Results   Component Value Date    WBC 16 56 (H) 11/13/2017    HGB 10 3 (L) 11/13/2017    HCT 32 5 (L) 11/13/2017    MCV 92 11/13/2017     (H) 11/13/2017       Results from last 7 days  Lab Units 11/09/17  1422   INR  1 31*       Lab Results   Component Value Date    ALT 51 11/07/2017    AST 38 11/07/2017

## 2017-11-13 NOTE — CASE MANAGEMENT
Continued Stay Review    Date: 11/12-11/13/17    Vital Signs: /66   Pulse 62   Temp 97 8 °F (36 6 °C) (Oral)   Resp 21   Ht 5' 11" (1 803 m)   Wt 123 kg (270 lb 8 1 oz)   SpO2 100%   BMI 37 73 kg/m²     Medications:   Scheduled Meds:   aspirin 324 mg Oral Daily   atorvastatin 40 mg Oral Daily   cholecalciferol 1,000 Units Oral Daily   furosemide 40 mg Intravenous Q6H   insulin glargine 30 Units Subcutaneous Q12H [address]   insulin lispro 4-20 Units Subcutaneous TID AC   ipratropium 0 5 mg Nebulization Q6H   levalbuterol 1 25 mg Nebulization Q6H   levothyroxine 125 mcg Oral Early Morning   melatonin 6 mg Oral HS   metoprolol tartrate 37 5 mg Oral Q12H JUAN LUIS   nystatin  Topical BID   polyethylene glycol 17 g Oral Daily   potassium chloride 20 mEq Oral BID With Meals   senna 2 tablet Oral HS     Continuous Infusions:   amiodarone 0 5 mg/min Last Rate: 0 5 mg/min (11/13/17 0752)   heparin (porcine) 3-20 Units/kg/hr (Order-Specific) Last Rate: 11 111 Units/kg/hr (11/13/17 0752)     PRN Meds:   acetaminophen    fentanyl citrate (PF)    heparin (porcine)    influenza vaccine    lidocaine (PF)    metoprolol    oxyCODONE    sodium bicarbonate    sodium chloride    Trimethobenzamide x1    Abnormal Labs:   11/11 11/12 11/13   Sodium 137 134 134   Potassium 3 8 3 8 3 9   Chloride 94 93 93   CO2 34 36 34   Glucose 277 314 283     WBC 12 93 13 90 16 56   RBC 3 49 3 52 3 52   Hemoglobin 10 1 10 2 10 3   Hematocrit 31 9 32 3 32 5   MCV 91 92 92   MCH 28 9 29 0 29 3   MCHC 31 7 31 6 31 7   RDW 15 2 15 5 15 7   Platelets 304 002 611   PTT 65  POC glucose - 273, 340    Diagnostic Results:     11/11 CXR - 1  Worsening pulmonary vascular congestion  2   Enlarging bilateral pleural effusions      Age/Sex: 79 y o  female     Assessment/Plan:   11/13 Cardiology Progress Note  Impression:  1  NSTEMI - given polymorphic VT, will want to reassess coronary anatomy    Plan on coronary angiography in AM   2  Ischemic CMP  - recovery of LV systolic function  3  VT - on amiodarone gtt  4  PAF - on IV heparin      Recommendations:  1  Continue current medications  2  Plan on coronary angiography in Am   ________________________________________________  11/12 Critical Care Transfer Note  Summary of clinical course: Patient was admitted to MICU w/ cardiology consulted  She was started on a heparin and amiodarone drip for NSTEMI  On hospital day 1, patient was emergently intubated for respiratory failure  Patient went into V-tach/VFib arrest later that afternoon and went through 2 rounds of epinephrine, as well as lidocaine and bicarbonate pushes with ROSC  Patient was started on lidocaine and dopamine infusions to prevent R on T phenomenon, as it was presumed this caused the arrest  Patient became febrile and was found to have MSSA pneumonia  She had originally been started on vancomycin and zosyn, which was transitioned to Nafcillin  She completed a 7 day course of antibiotics  Patient was extubated on 11/8 and after discussion with the patient, she was made DNR/DNI  On 11/9, patient went into rapid atrial fibrillation and was started again on heparin and amiodarone as well as oral beta blockers  Originally, cardiac catheterization was scheduled for 11/9, but patient could not tolerate laying flat secondary to respiratory status  Patient is now requiring only 2 liters nasal cannula w/ intermittent nocturnal bipap use  She is tolerating a dysphagia diet  She is awaiting cardiac catheterization and ICD placement prior to leaving the hospital       Recent or scheduled procedures: Cardiac cath (tomorrow?), ICD placement  Nutrition Plan: Dysphagia level 2 - NTL                Specific Diagnosis Plan:  · Atrial fibrillation: oral metoprolol 37 5 BID, amiodarone gtt, heparin gtt  Cardiology following    · Torsades/vfib arrest: avoid QTc prolonging medications, pending ICD placement  · DMII: Increased Lantus to 30 units BID today w/ SSI algorithm 5 for intermediate coverage     Discharge Plan: TBD probable STR

## 2017-11-14 LAB
ANION GAP SERPL CALCULATED.3IONS-SCNC: 6 MMOL/L (ref 4–13)
APTT PPP: 48 SECONDS (ref 23–35)
APTT PPP: 52 SECONDS (ref 23–35)
APTT PPP: 55 SECONDS (ref 23–35)
ATRIAL RATE: 61 BPM
BASOPHILS # BLD AUTO: 0.04 THOUSANDS/ΜL (ref 0–0.1)
BASOPHILS NFR BLD AUTO: 0 % (ref 0–1)
BUN SERPL-MCNC: 17 MG/DL (ref 5–25)
CALCIUM SERPL-MCNC: 9.6 MG/DL (ref 8.3–10.1)
CHLORIDE SERPL-SCNC: 90 MMOL/L (ref 100–108)
CO2 SERPL-SCNC: 35 MMOL/L (ref 21–32)
CREAT SERPL-MCNC: 0.74 MG/DL (ref 0.6–1.3)
EOSINOPHIL # BLD AUTO: 0.38 THOUSAND/ΜL (ref 0–0.61)
EOSINOPHIL NFR BLD AUTO: 3 % (ref 0–6)
ERYTHROCYTE [DISTWIDTH] IN BLOOD BY AUTOMATED COUNT: 15.7 % (ref 11.6–15.1)
ERYTHROCYTE [DISTWIDTH] IN BLOOD BY AUTOMATED COUNT: 15.8 % (ref 11.6–15.1)
GFR SERPL CREATININE-BSD FRML MDRD: 84 ML/MIN/1.73SQ M
GLUCOSE SERPL-MCNC: 107 MG/DL (ref 65–140)
GLUCOSE SERPL-MCNC: 124 MG/DL (ref 65–140)
GLUCOSE SERPL-MCNC: 131 MG/DL (ref 65–140)
GLUCOSE SERPL-MCNC: 177 MG/DL (ref 65–140)
GLUCOSE SERPL-MCNC: 190 MG/DL (ref 65–140)
GLUCOSE SERPL-MCNC: 199 MG/DL (ref 65–140)
GLUCOSE SERPL-MCNC: 206 MG/DL (ref 65–140)
HCT VFR BLD AUTO: 31.2 % (ref 34.8–46.1)
HCT VFR BLD AUTO: 33.5 % (ref 34.8–46.1)
HGB BLD-MCNC: 10.4 G/DL (ref 11.5–15.4)
HGB BLD-MCNC: 9.7 G/DL (ref 11.5–15.4)
INR PPP: 1.34 (ref 0.86–1.16)
LYMPHOCYTES # BLD AUTO: 3.04 THOUSANDS/ΜL (ref 0.6–4.47)
LYMPHOCYTES NFR BLD AUTO: 20 % (ref 14–44)
MAGNESIUM SERPL-MCNC: 1.9 MG/DL (ref 1.6–2.6)
MCH RBC QN AUTO: 28.7 PG (ref 26.8–34.3)
MCH RBC QN AUTO: 28.8 PG (ref 26.8–34.3)
MCHC RBC AUTO-ENTMCNC: 31 G/DL (ref 31.4–37.4)
MCHC RBC AUTO-ENTMCNC: 31.1 G/DL (ref 31.4–37.4)
MCV RBC AUTO: 92 FL (ref 82–98)
MCV RBC AUTO: 93 FL (ref 82–98)
MONOCYTES # BLD AUTO: 1.04 THOUSAND/ΜL (ref 0.17–1.22)
MONOCYTES NFR BLD AUTO: 7 % (ref 4–12)
NEUTROPHILS # BLD AUTO: 10.74 THOUSANDS/ΜL (ref 1.85–7.62)
NEUTS SEG NFR BLD AUTO: 70 % (ref 43–75)
NRBC BLD AUTO-RTO: 0 /100 WBCS
P AXIS: 50 DEGREES
PLATELET # BLD AUTO: 385 THOUSANDS/UL (ref 149–390)
PLATELET # BLD AUTO: 484 THOUSANDS/UL (ref 149–390)
PMV BLD AUTO: 10 FL (ref 8.9–12.7)
PMV BLD AUTO: 10.2 FL (ref 8.9–12.7)
POTASSIUM SERPL-SCNC: 3.2 MMOL/L (ref 3.5–5.3)
PR INTERVAL: 200 MS
PROTHROMBIN TIME: 16.7 SECONDS (ref 12.1–14.4)
QRS AXIS: 8 DEGREES
QRSD INTERVAL: 163 MS
QT INTERVAL: 463 MS
QTC INTERVAL: 466 MS
RBC # BLD AUTO: 3.38 MILLION/UL (ref 3.81–5.12)
RBC # BLD AUTO: 3.61 MILLION/UL (ref 3.81–5.12)
SODIUM SERPL-SCNC: 131 MMOL/L (ref 136–145)
T WAVE AXIS: 174 DEGREES
TROPONIN I SERPL-MCNC: 0.76 NG/ML
VENTRICULAR RATE: 61 BPM
WBC # BLD AUTO: 15.29 THOUSAND/UL (ref 4.31–10.16)
WBC # BLD AUTO: 16.77 THOUSAND/UL (ref 4.31–10.16)

## 2017-11-14 PROCEDURE — 94760 N-INVAS EAR/PLS OXIMETRY 1: CPT

## 2017-11-14 PROCEDURE — 82948 REAGENT STRIP/BLOOD GLUCOSE: CPT

## 2017-11-14 PROCEDURE — 94760 N-INVAS EAR/PLS OXIMETRY 1: CPT | Performed by: SOCIAL WORKER

## 2017-11-14 PROCEDURE — 94640 AIRWAY INHALATION TREATMENT: CPT | Performed by: SOCIAL WORKER

## 2017-11-14 PROCEDURE — 85610 PROTHROMBIN TIME: CPT | Performed by: FAMILY MEDICINE

## 2017-11-14 PROCEDURE — 93005 ELECTROCARDIOGRAM TRACING: CPT

## 2017-11-14 PROCEDURE — 85730 THROMBOPLASTIN TIME PARTIAL: CPT | Performed by: INTERNAL MEDICINE

## 2017-11-14 PROCEDURE — 85025 COMPLETE CBC W/AUTO DIFF WBC: CPT | Performed by: NURSE PRACTITIONER

## 2017-11-14 PROCEDURE — 84484 ASSAY OF TROPONIN QUANT: CPT | Performed by: NURSE PRACTITIONER

## 2017-11-14 PROCEDURE — 85027 COMPLETE CBC AUTOMATED: CPT | Performed by: FAMILY MEDICINE

## 2017-11-14 PROCEDURE — 94640 AIRWAY INHALATION TREATMENT: CPT

## 2017-11-14 PROCEDURE — 85730 THROMBOPLASTIN TIME PARTIAL: CPT | Performed by: FAMILY MEDICINE

## 2017-11-14 PROCEDURE — 80048 BASIC METABOLIC PNL TOTAL CA: CPT | Performed by: NURSE PRACTITIONER

## 2017-11-14 PROCEDURE — 83735 ASSAY OF MAGNESIUM: CPT | Performed by: NURSE PRACTITIONER

## 2017-11-14 RX ORDER — POTASSIUM CHLORIDE 14.9 MG/ML
20 INJECTION INTRAVENOUS ONCE
Status: COMPLETED | OUTPATIENT
Start: 2017-11-14 | End: 2017-11-14

## 2017-11-14 RX ORDER — NITROGLYCERIN 0.4 MG/1
0.4 TABLET SUBLINGUAL
Status: DISCONTINUED | OUTPATIENT
Start: 2017-11-14 | End: 2017-11-23 | Stop reason: HOSPADM

## 2017-11-14 RX ORDER — POTASSIUM CHLORIDE 29.8 MG/ML
40 INJECTION INTRAVENOUS ONCE
Status: COMPLETED | OUTPATIENT
Start: 2017-11-14 | End: 2017-11-14

## 2017-11-14 RX ORDER — ALPRAZOLAM 0.25 MG/1
0.25 TABLET ORAL ONCE
Status: COMPLETED | OUTPATIENT
Start: 2017-11-14 | End: 2017-11-14

## 2017-11-14 RX ORDER — MAGNESIUM SULFATE HEPTAHYDRATE 40 MG/ML
2 INJECTION, SOLUTION INTRAVENOUS ONCE
Status: COMPLETED | OUTPATIENT
Start: 2017-11-14 | End: 2017-11-14

## 2017-11-14 RX ORDER — HEPARIN SODIUM 10000 [USP'U]/100ML
3-20 INJECTION, SOLUTION INTRAVENOUS
Status: DISCONTINUED | OUTPATIENT
Start: 2017-11-14 | End: 2017-11-16

## 2017-11-14 RX ORDER — AMIODARONE HYDROCHLORIDE 200 MG/1
200 TABLET ORAL 2 TIMES DAILY WITH MEALS
Status: DISCONTINUED | OUTPATIENT
Start: 2017-11-14 | End: 2017-11-23 | Stop reason: HOSPADM

## 2017-11-14 RX ORDER — NITROGLYCERIN 0.4 MG/1
TABLET SUBLINGUAL
Status: DISPENSED
Start: 2017-11-14 | End: 2017-11-15

## 2017-11-14 RX ADMIN — LEVALBUTEROL HYDROCHLORIDE 1.25 MG: 1.25 SOLUTION, CONCENTRATE RESPIRATORY (INHALATION) at 07:14

## 2017-11-14 RX ADMIN — MAGNESIUM SULFATE HEPTAHYDRATE 2 G: 40 INJECTION, SOLUTION INTRAVENOUS at 08:26

## 2017-11-14 RX ADMIN — POTASSIUM CHLORIDE 20 MEQ: 200 INJECTION, SOLUTION INTRAVENOUS at 09:59

## 2017-11-14 RX ADMIN — INSULIN LISPRO 10 UNITS: 100 INJECTION, SOLUTION INTRAVENOUS; SUBCUTANEOUS at 11:54

## 2017-11-14 RX ADMIN — MELATONIN TAB 3 MG 6 MG: 3 TAB at 21:15

## 2017-11-14 RX ADMIN — FUROSEMIDE 40 MG: 10 INJECTION, SOLUTION INTRAMUSCULAR; INTRAVENOUS at 17:19

## 2017-11-14 RX ADMIN — FUROSEMIDE 40 MG: 10 INJECTION, SOLUTION INTRAMUSCULAR; INTRAVENOUS at 05:13

## 2017-11-14 RX ADMIN — METOPROLOL TARTRATE 37.5 MG: 25 TABLET ORAL at 21:15

## 2017-11-14 RX ADMIN — IPRATROPIUM BROMIDE 0.5 MG: 0.5 SOLUTION RESPIRATORY (INHALATION) at 01:15

## 2017-11-14 RX ADMIN — IPRATROPIUM BROMIDE 0.5 MG: 0.5 SOLUTION RESPIRATORY (INHALATION) at 07:14

## 2017-11-14 RX ADMIN — INSULIN LISPRO 4 UNITS: 100 INJECTION, SOLUTION INTRAVENOUS; SUBCUTANEOUS at 21:14

## 2017-11-14 RX ADMIN — HEPARIN SODIUM AND DEXTROSE 11.1 UNITS/KG/HR: 10000; 5 INJECTION INTRAVENOUS at 13:29

## 2017-11-14 RX ADMIN — IPRATROPIUM BROMIDE 0.5 MG: 0.5 SOLUTION RESPIRATORY (INHALATION) at 19:06

## 2017-11-14 RX ADMIN — INSULIN LISPRO 5 UNITS: 100 INJECTION, SOLUTION INTRAVENOUS; SUBCUTANEOUS at 17:18

## 2017-11-14 RX ADMIN — NYSTATIN: 100000 POWDER TOPICAL at 17:19

## 2017-11-14 RX ADMIN — LEVALBUTEROL HYDROCHLORIDE 1.25 MG: 1.25 SOLUTION, CONCENTRATE RESPIRATORY (INHALATION) at 13:20

## 2017-11-14 RX ADMIN — LEVALBUTEROL HYDROCHLORIDE 1.25 MG: 1.25 SOLUTION, CONCENTRATE RESPIRATORY (INHALATION) at 19:06

## 2017-11-14 RX ADMIN — LEVALBUTEROL HYDROCHLORIDE 1.25 MG: 1.25 SOLUTION, CONCENTRATE RESPIRATORY (INHALATION) at 01:15

## 2017-11-14 RX ADMIN — INSULIN GLARGINE 30 UNITS: 100 INJECTION, SOLUTION SUBCUTANEOUS at 21:13

## 2017-11-14 RX ADMIN — POTASSIUM CHLORIDE 20 MEQ: 1500 TABLET, EXTENDED RELEASE ORAL at 17:19

## 2017-11-14 RX ADMIN — FUROSEMIDE 40 MG: 10 INJECTION, SOLUTION INTRAMUSCULAR; INTRAVENOUS at 11:16

## 2017-11-14 RX ADMIN — METOPROLOL TARTRATE 37.5 MG: 25 TABLET ORAL at 09:53

## 2017-11-14 RX ADMIN — ALPRAZOLAM 0.25 MG: 0.25 TABLET ORAL at 13:59

## 2017-11-14 RX ADMIN — IPRATROPIUM BROMIDE 0.5 MG: 0.5 SOLUTION RESPIRATORY (INHALATION) at 13:20

## 2017-11-14 RX ADMIN — AMIODARONE HYDROCHLORIDE 0.5 MG/MIN: 50 INJECTION, SOLUTION INTRAVENOUS at 09:55

## 2017-11-14 RX ADMIN — AMIODARONE HYDROCHLORIDE 200 MG: 200 TABLET ORAL at 19:59

## 2017-11-14 RX ADMIN — POTASSIUM CHLORIDE 40 MEQ: 400 INJECTION, SOLUTION INTRAVENOUS at 08:26

## 2017-11-14 RX ADMIN — SENNOSIDES 17.2 MG: 8.6 TABLET, FILM COATED ORAL at 21:15

## 2017-11-14 RX ADMIN — ASPIRIN 81 MG 324 MG: 81 TABLET ORAL at 08:26

## 2017-11-14 RX ADMIN — POTASSIUM CHLORIDE 20 MEQ: 1500 TABLET, EXTENDED RELEASE ORAL at 08:26

## 2017-11-14 RX ADMIN — NYSTATIN 1 APPLICATION: 100000 POWDER TOPICAL at 08:27

## 2017-11-14 NOTE — SOCIAL WORK
Received e-mail from pretty Florentino with SNF choices for rehab  First choice, [facility], Second choice, [facility], and [facility] referral sent  CM emailed pretty Florentino to confirm referrals have been placed  CM requested OT evaluate today       Reviewed and agreed with above documentation- Ayad Poor- MSW

## 2017-11-14 NOTE — PROGRESS NOTES
Called to room by RN Jamil Sanchez RN concerned because patient became confused and stated that she wanted to go home  Evaluated patient, patient alert and oriented x 3  Does not appear in any distress at this time  Offers no complaints at this time  Lungs clear to auscultation on 2L NC, heart RRR, telemetry reviewed NSR with HR in 60's, no events noted

## 2017-11-14 NOTE — PHYSICAL THERAPY NOTE
Informed by Alexandr Madrid this PM that pt was not appropriate for therapy session at that time as per nsg; pt was pending transfer to P5; will follow tomorrow      Ling Hendrickson, PT

## 2017-11-14 NOTE — PROGRESS NOTES
Patient confused, called nurse into room to say "i need to go home  I just had surgery today " Patient was reoriented, confirmed with patient that she did not have surgery today and she would need to stay the night  We reapplied the oxygen that she had taken out of her nose  Notified SLIM, will wait for new orders and continue to monitor her  VSS

## 2017-11-14 NOTE — PROGRESS NOTES
CCM notified of pt feeling anxious and c/o inability to breathe  Pt also began c/o lower back pain  VSS stable, Glucose 199, EKG completed and given to CCM team and Dr Abdelrahman Martinez with cardiology  Dr Abdelrahman Martinez and cardiology fellow at bedside to evaluate pt  Heparin turned back on and cardiac cath/testing canceled at this time  Will cont to monitor and provide care per CCM team and cardiology

## 2017-11-14 NOTE — PROGRESS NOTES
Progress Note - Critical Care   Elias Cordero 79 y o  female MRN: [id number]  Unit/Bed#: [facility] 10 Encounter: [id number]    ______________________________________________________________________  Assessment and Plan:     Patient Active Problem List    Diagnosis Date Noted    Delirium 11/07/2017    Encephalopathy 11/06/2017    Obesity, Class III, BMI 40-49 9 (morbid obesity) ([facility] [address] ) 11/06/2017    Cardiac arrest 11/03/2017    Acute hypercapnic respiratory failure 11/03/2017    Torsades de pointes 11/03/2017    Prolonged QT interval 11/03/2017    NSTEMI (non-ST elevated myocardial infarction) 11/03/2017    Ischemic cardiomyopathy 11/03/2017    LBBB (left bundle branch block) 11/03/2017    CAD (coronary artery disease) 11/03/2017    h/o Vocal cord paralysis 11/03/2017    HTN (hypertension) 11/03/2017    Hyperlipemia 11/03/2017    Respiratory failure 11/02/2017     Assessment:  80 yo female with PMhx of HTN, CAD Transfer from [facility] for acute on chronic systolic and diastolic heart failure  Found down at her home hypoxic and taken to [facility] ED  Patient palced on BiPAP, dound to have NSTEMI; pklaced on heparin and stable to transfer to [facility] for further evaluation  In MICU  Day one, patient went into Vtach/ vfib arrest   ROSC with epi/bicarb  Currently extubated, O2 sat 99% on 4 L nasal cannula,  on heparin drip and amiodarone gtt and awaiting  cath angiogram this am       Plan:       Neuro:   *Acute encephalopathy:  resolved  - continue delirium precautions  -daily CAM-ICU  Avoid any QTC prolonging medications    *Insomnia:   -Melatonin HS     CV:   Acute on chronic systolic and diastolic heart failure    Echo ( 11/2): Grade II diastolic dysfx, EF 70%      *NSTEMI type II with chronic MVCAD and LBBB  -s/p Vtach ROSC and A fib on 11/9   -Continue PO metoprolol 25mg,ASA 325mg,  amiodarone gtt    --Continue diuresis with Lasix 40mg TID   -Avoid QTc prolonging medications    -Followed by Cardiology: Coronary angiography scheduled for this am   - Likely require ICD placement given recent events     *Paroxysmal Afib:    -continue Amiodarone gtt   -Heparin gtt stopped   - will need anticoagulation    - HAS BLED score: 4: high risk of bleeds    -CHADS VASC score:  6 ;  Moderate risk for stroke    *HLD:  -Continue Lipitor 40mg HS    Labs/Imaging  Troponin: 4 93--> 7 57--> 7 63--> 6/10--> 3 02  ProBNP: 25,187  Echo (6/27/17): [facility] -Diastolic dysfunction with elevated filling pressures  Visually Estimated LV Ejection Fraction is:35% Normal left ventricular chamber size  Moderately reduced left ventricular systolic function  Global hypokinesis with inferior akinesis  Echo ( 11/2 ): Grade II diastolic dysfx, EF 40%       Lung: Acute hypoxic respiratory failure: improved  -Extubated 11/8/17: currently sat O2 99 on 2 L nasal cannula   -Sputum Cx/stain: MSSA PNA: van zosyn--> transition to nafacillin: 7 day course completed  -Continue diuresis  Q6 nebulizers        GI:   NPO for procedure this am  -Bowel regimen :  senna/miralax       FEN:   NO IVF  -Replete electrolytes prn  Na 131, K 3 2 ( replete)   Maintain: K >4, Mg>2  I/Os: 1008ml/24hrs  Net 24: - 207  4mL  Net since admission: -5616  2mL     :   No lackey  -Continue diuresis: Lasix 40mg TID    ID:   Leukocytosis trending up: continues to be Afebrile   -WBC: 10 88---> 13 90--> 16 56--> 15 29  Abx: Completed course of Nafcillin for MSSA PNA  Continue to monitor     Heme:   Hb: 10 3--> 9 7  Hct: 32 5--> 31 2  Platelets: 981--> 057  DVT ppx: Heparin gtt, SCDs  PTT: 75  INR 11/9: 1 31        Endo:   Hx of DM2: uncontrolled  -Continue 30 units Lantus BID  -SSI algorithm 5       Msk/Skin:   Frequent turns and repositioning    Out of bed to chair  Nystatin powder  PT/OT     Disposition:   Stable for transfer out of ICU     Code Status: Level 3 - DNAR and DNI    Counseling / Coordination of Care    ______________________________________________________________________    Chief Complaint: "feels SOB"      HPI/24hr events: None   ______________________________________________________________________    Physical Exam:   Physical Exam   Constitutional: She is oriented to person, place, and time  She appears well-developed  No distress  HENT:   Head: Normocephalic  Eyes: Pupils are equal, round, and reactive to light  No scleral icterus  Neck: Normal range of motion  Cardiovascular: Normal rate  Murmur heard  Regular, irregular rhythm this am   Pulmonary/Chest: Effort normal  No respiratory distress  She has decreased breath sounds in the right lower field and the left lower field  She has no wheezes  Left UL crackles   Abdominal: Soft  Bowel sounds are normal  She exhibits no distension and no mass  There is tenderness  There is no guarding  Musculoskeletal: Normal range of motion  She exhibits no edema  Neurological: She is alert and oriented to person, place, and time  GCS eye subscore is 4  GCS verbal subscore is 5  GCS motor subscore is 6  Skin: Skin is warm  She is not diaphoretic  There is pallor  Psychiatric: Her behavior is normal  Judgment and thought content normal  Her mood appears anxious  ______________________________________________________________________  Vitals:    17 011176 17 0558   BP:   127/62    Pulse:   62    Resp:   (!) 25    Temp:  98 °F (36 7 °C)     TempSrc:  Oral     SpO2: 96%  100%    Weight:    123 kg (270 lb 11 6 oz)   Height:         Arterial Line BP: 144/54  Arterial Line MAP (mmHg): 80 mmHg     Temperature:   Temp (24hrs), Av 9 °F (36 6 °C), Min:97 6 °F (36 4 °C), Max:98 3 °F (36 8 °C)    Current Temperature: 98 °F (36 7 °C)    Weights:   IBW: 70 8 kg    Body mass index is 37 76 kg/m²    Weight (last 2 days)     Date/Time   Weight    17 0558  123 (270 73)    17 0600  123 (270 5) Hemodynamic Monitoring:  CVP:         Non-Invasive/Invasive Ventilation Settings:  Respiratory    Lab Data (Last 4 hours)    None         O2/Vent Data (Last 4 hours)    None              No results found for: PHART, NCN0KRU, PO2ART, BBD2FOU, R1YZRDBC, BEART, SOURCE  SpO2 Activity: SpO2 Activity: At Rest    Intake and Outputs:    Intake/Output Summary (Last 24 hours) at 11/14/17 0744  Last data filed at 11/14/17 0430   Gross per 24 hour   Intake           800 61 ml   Output             1008 ml   Net          -207 39 ml       Nutrition:        Diet Orders            Start     Ordered    11/14/17 0001  Diet NPO; Sips with meds  Diet effective midnight     Question Answer Comment   Diet Type NPO    NPO Except: Sips with meds    RD to adjust diet per protocol?  Yes        11/13/17 1600    11/10/17 1403  Dietary nutrition supplements  Once     Question Answer Comment   Select Supplement: Ensure Pudding-[place]terscotch    Frequency Breakfast, Lunch, Dinner        11/10/17 1402          Labs:     Results from last 7 days  Lab Units 11/14/17  0505 11/13/17  0552 11/12/17  0511   WBC Thousand/uL 15 29* 16 56* 13 90*   HEMOGLOBIN g/dL 9 7* 10 3* 10 2*   HEMATOCRIT % 31 2* 32 5* 32 3*   PLATELETS Thousands/uL 385 451* 380   NEUTROS PCT % 70 75 67   MONOS PCT % 7 10 12       Results from last 7 days  Lab Units 11/14/17  0505 11/13/17  0552 11/12/17  0511   SODIUM mmol/L 131* 134* 134*   POTASSIUM mmol/L 3 2* 3 9 3 8   CHLORIDE mmol/L 90* 93* 93*   CO2 mmol/L 35* 34* 36*   BUN mg/dL 17 22 22   CREATININE mg/dL 0 74 0 87 0 90   CALCIUM mg/dL 9 6 9 5 9 8   GLUCOSE RANDOM mg/dL 124 283* 314*       Results from last 7 days  Lab Units 11/14/17  0505 11/10/17  0335 11/09/17  0427   MAGNESIUM mg/dL 1 9 2 2 2 2     Lab Results   Component Value Date    PHOS 4 2 (H) 11/10/2017    PHOS 4 4 (H) 11/09/2017    PHOS 3 9 11/07/2017        Results from last 7 days  Lab Units 11/14/17  0505 11/13/17  0553 11/12/17  0511  11/09/17  1422   INR --   --   --   --  1 31*   PTT seconds 52* 65* 75*  < > 37*   < > = values in this interval not displayed  0  Lab Value Date/Time   TROPONINI 3 02 (H) 11/09/2017 1817   TROPONINI 3 19 (H) 11/09/2017 1422   TROPONINI 3 22 (H) 11/09/2017 1157   TROPONINI 6 10 (H) 11/04/2017 0557   TROPONINI 6 27 (H) 11/03/2017 1430   TROPONINI 6 40 (H) 11/03/2017 0730   TROPONINI 7 63 (H) 11/03/2017 0511   TROPONINI 7 41 (H) 11/03/2017 0132   TROPONINI 9 82 (H) 11/02/2017 2004   TROPONINI 7 57 (HH) 11/02/2017 1646         ABG:  Lab Results   Component Value Date    PHART 7 489 (H) 11/10/2017    HFS5USY 41 9 11/10/2017    PO2ART 103 0 11/10/2017    RXA5YMZ 31 1 (H) 11/10/2017    BEART 7 1 11/10/2017    SOURCE Radial, Left 11/10/2017       Imaging:  I have personally reviewed pertinent reports  EKG: Telemetry reviewed     Micro:  Lab Results   Component Value Date    BLOODCX No Growth After 5 Days  11/02/2017    BLOODCX No Growth After 5 Days   11/02/2017    SPUTUMCULTUR 1+ Growth of Staphylococcus aureus (A) 11/03/2017       Allergies: No Known Allergies    Medications:   Scheduled Meds:    aspirin 324 mg Oral Daily   atorvastatin 40 mg Oral Daily   cholecalciferol 1,000 Units Oral Daily   furosemide 40 mg Intravenous Q6H   insulin glargine 30 Units Subcutaneous Q12H [address]   insulin lispro 4-20 Units Subcutaneous HS   insulin lispro 5-25 Units Subcutaneous TID AC   ipratropium 0 5 mg Nebulization Q6H   levalbuterol 1 25 mg Nebulization Q6H   levothyroxine 125 mcg Oral Early Morning   melatonin 6 mg Oral HS   metoprolol tartrate 37 5 mg Oral Q12H JUAN LUIS   nystatin  Topical BID   polyethylene glycol 17 g Oral Daily   potassium chloride 20 mEq Oral BID With Meals   potassium chloride 40 mEq Intravenous Once   senna 2 tablet Oral HS     Continuous Infusions:    amiodarone 0 5 mg/min Last Rate: 0 5 mg/min (11/13/17 0752)   heparin (porcine) 3-20 Units/kg/hr (Order-Specific) Last Rate: Stopped (11/14/17 1577)     PRN Meds:    acetaminophen 650 mg Q4H PRN   heparin (porcine) 2,000 Units PRN   heparin (porcine) 4,000 Units PRN   influenza vaccine 0 5 mL Prior to discharge   lidocaine (PF)  Code/Trauma/Sedation Med   metoprolol 2 5 mg Q6H PRN   oxyCODONE 2 5 mg Q4H PRN   Or     oxyCODONE 5 mg Q4H PRN   sodium bicarbonate  Code/Trauma/Sedation Med   sodium chloride  Code/Trauma/Sedation Med   trimethobenzamide 200 mg Q6H PRN       VTE Pharmacologic Prophylaxis: Heparin ( on heparin gtt)  VTE Mechanical Prophylaxis: sequential compression device    Invasive lines and devices:   Invasive Devices     Peripherally Inserted Central Catheter Line            PICC Line 20/39/97 Left Basilic 2 days                     Sammy Bowen MD  Family Medicine Resident  [id number]

## 2017-11-14 NOTE — PROGRESS NOTES
Cardiology Progress Note - Lee Anton 79 y o  female MRN: [id number]    Unit/Bed#: [facility] 10 Encounter: [id number]        Subjective:   Feels very anxious today  Some dyspnea, no chest pain  Hemodynamically stable  Review of Systems   Cardiovascular: Negative for chest pain, leg swelling and palpitations  Respiratory: Positive for shortness of breath  Psychiatric/Behavioral: The patient is nervous/anxious  Objective:   Vitals: Blood pressure 116/64, pulse 61, temperature 97 6 °F (36 4 °C), temperature source Oral, resp  rate 22, height 5' 11" (1 803 m), weight 123 kg (270 lb 11 6 oz), SpO2 99 %  , Body mass index is 37 76 kg/m² , Orthostatic Blood Pressures    Flowsheet Row Most Recent Value   Blood Pressure  116/64 filed at 11/14/2017 1100   Patient Position - Orthostatic VS  Lying filed at 11/13/2017 3758         Systolic (99JQI), HZB:309 , Min:110 , ZNW:056     Diastolic (45RMN), PFQ:96, Min:54, Max:74      Intake/Output Summary (Last 24 hours) at 11/14/17 1310  Last data filed at 11/14/17 1244   Gross per 24 hour   Intake          1040 89 ml   Output             1162 ml   Net          -121 11 ml     Weight (last 2 days)     Date/Time   Weight    11/14/17 0558  123 (270 73)    11/12/17 0600  123 (270 5)            Telemetry Review: NSR    Physical Exam   Cardiovascular: Normal rate, regular rhythm and normal heart sounds  Exam reveals no gallop and no friction rub  No murmur heard  Pulmonary/Chest: Breath sounds normal  She has no wheezes  She has no rales  Musculoskeletal: She exhibits no edema           Laboratory Results:    Results from last 7 days  Lab Units 11/09/17  1817 11/09/17  1422 11/09/17  1157   TROPONIN I ng/mL 3 02* 3 19* 3 22*       CBC with diff:   Results from last 7 days  Lab Units 11/14/17  0505 11/13/17  0552 11/12/17  0511 11/11/17  0433 11/10/17  0335 11/09/17  1422 11/09/17  0427   WBC Thousand/uL 15 29* 16 56* 13 90* 12 93* 11 47* 10 56* 10 88*   HEMOGLOBIN g/dL 9 7* 10 3* 10 2* 10 1* 9 7* 10 2* 9 5*   HEMATOCRIT % 31 2* 32 5* 32 3* 31 9* 31 2* 32 5* 30 4*   MCV fL 92 92 92 91 92 91 92   PLATELETS Thousands/uL 385 451* 380 372 297 307 267   MCH pg 28 7 29 3 29 0 28 9 28 4 28 5 28 6   MCHC g/dL 31 1* 31 7 31 6 31 7 31 1* 31 4 31 3*   RDW % 15 7* 15 7* 15 5* 15 2* 14 8 14 9 14 8   MPV fL 10 0 10 4 10 1 10 6 10 0 10 4 10 6   NRBC AUTO /100 WBCs 0 0 0 0 0  --   --          CMP:  Results from last 7 days  Lab Units 11/14/17  0505 11/13/17  0552 11/12/17  0511 11/11/17  0433 11/10/17  0335 11/09/17 2058 11/09/17  1157   SODIUM mmol/L 131* 134* 134* 137 138 137 136   POTASSIUM mmol/L 3 2* 3 9 3 8 3 8 3 5 3 7 4 2   CHLORIDE mmol/L 90* 93* 93* 94* 96* 96* 96*   CO2 mmol/L 35* 34* 36* 34* 33* 31 32   ANION GAP mmol/L 6 7 5 9 9 10 8   BUN mg/dL 17 22 22 21 21 17 15   CREATININE mg/dL 0 74 0 87 0 90 0 96 0 83 0 87 0 80   GLUCOSE RANDOM mg/dL 124 283* 314* 277* 175* 139 109   CALCIUM mg/dL 9 6 9 5 9 8 9 6 9 1 9 4 9 6   EGFR ml/min/1 73sq m 84 69 66 61 73 69 77         BMP:  Results from last 7 days  Lab Units 11/14/17  0505 11/13/17  0552 11/12/17  0511 11/11/17  0433 11/10/17  0335 11/09/17 2058 11/09/17  1157   SODIUM mmol/L 131* 134* 134* 137 138 137 136   POTASSIUM mmol/L 3 2* 3 9 3 8 3 8 3 5 3 7 4 2   CHLORIDE mmol/L 90* 93* 93* 94* 96* 96* 96*   CO2 mmol/L 35* 34* 36* 34* 33* 31 32   BUN mg/dL 17 22 22 21 21 17 15   CREATININE mg/dL 0 74 0 87 0 90 0 96 0 83 0 87 0 80   GLUCOSE RANDOM mg/dL 124 283* 314* 277* 175* 139 109   CALCIUM mg/dL 9 6 9 5 9 8 9 6 9 1 9 4 9 6       Magnesium:   Results from last 7 days  Lab Units 11/14/17  0505 11/10/17  0335 11/09/17  0427 11/08/17  0036 11/07/17  1822   MAGNESIUM mg/dL 1 9 2 2 2 2 2 6 2 2       Coags:   Results from last 7 days  Lab Units 11/14/17  0505 11/13/17  0553 11/12/17  0511 11/11/17  0433 11/10/17  0330 11/09/17  2058 11/09/17  1422   PTT seconds 52* 65* 75* 66* 79* 66* 37*   INR   --   --   --   --   --   --  1 31*     Cardiac testing:   Results for orders placed during the hospital encounter of 11/02/17   Echo complete with contrast if indicated       Study date:  03-Nov-2017    SUMMARY    LEFT VENTRICLE:  Systolic function was at the lower limits of normal  Ejection fraction was estimated to be 50 %  There was hypokinesis of the basal inferior wall(s)  There was mild concentric hypertrophy  Features were consistent with a pseudonormal left ventricular filling pattern, with concomitant abnormal relaxation and increased filling pressure (grade 2 diastolic dysfunction)  MITRAL VALVE:  There was mild to moderate regurgitation  AORTIC VALVE:  There was mild regurgitation  PERICARDIUM:  A small pericardial effusion was identified posterior to the heart  The fluid had no internal echoes  There was no evidence of hemodynamic compromise  There was a large left pleural effusion         Meds/Allergies     amiodarone 200 mg Oral BID With Meals   aspirin 324 mg Oral Daily   atorvastatin 40 mg Oral Daily   cholecalciferol 1,000 Units Oral Daily   furosemide 40 mg Intravenous Q6H   insulin glargine 30 Units Subcutaneous Q12H [address]   insulin lispro 4-20 Units Subcutaneous HS   insulin lispro 5-25 Units Subcutaneous TID AC   ipratropium 0 5 mg Nebulization Q6H   levalbuterol 1 25 mg Nebulization Q6H   levothyroxine 125 mcg Oral Early Morning   melatonin 6 mg Oral HS   metoprolol tartrate 37 5 mg Oral Q12H JUAN LUIS   nystatin  Topical BID   polyethylene glycol 17 g Oral Daily   potassium chloride 20 mEq Oral BID With Meals   senna 2 tablet Oral HS        Prescriptions Prior to Admission   Medication    albuterol (PROVENTIL HFA,VENTOLIN HFA) 90 mcg/act inhaler    atorvastatin (LIPITOR) 40 mg tablet    cetirizine (ZyrTEC) 10 mg tablet    cholecalciferol (VITAMIN D3) 1,000 units tablet    Cyanocobalamin 1000 MCG/ML KIT    escitalopram (LEXAPRO) 10 mg tablet    furosemide (LASIX) 40 mg tablet    gabapentin (NEURONTIN) 600 MG tablet    insulin glargine (LANTUS) 100 units/mL subcutaneous injection    levothyroxine 125 mcg tablet    lisinopril (ZESTRIL) 5 mg tablet    magnesium oxide (MAG-OX) 400 mg    metFORMIN (GLUMETZA) 1000 MG (MOD) 24 hr tablet    metoprolol tartrate (LOPRESSOR) 25 mg tablet    oxyCODONE-acetaminophen (PERCOCET) 5-325 mg per tablet       Assessment:  Principal Problem:    Respiratory failure  Active Problems:    Cardiac arrest    Acute hypercapnic respiratory failure    Torsades de pointes    Prolonged QT interval    NSTEMI (non-ST elevated myocardial infarction)    Ischemic cardiomyopathy    LBBB (left bundle branch block)    CAD (coronary artery disease)    h/o Vocal cord paralysis    HTN (hypertension)    Hyperlipemia    Encephalopathy    Obesity, Class III, BMI 40-49 9 (morbid obesity) (HCC)    Delirium      Impression:  1  NSTEMI - given polymorphic VT, will want to reassess coronary anatomy  Will get prior cath films from [facility], and may need repeat coronary angiography  2  Ischemic CMP  - recovery of LV systolic function  3  VT - off amiodarone  4  PAF - on IV heparin      Recommendations:  1  Continue current medications  2  May need repeat coronary angiography

## 2017-11-14 NOTE — PLAN OF CARE
DISCHARGE PLANNING - CARE MANAGEMENT     Discharge to post-acute care or home with appropriate resources Progressing        Nutrition/Hydration-ADULT     Nutrient/Hydration intake appropriate for improving, restoring or maintaining nutritional needs Progressing        Potential for Falls     Patient will remain free of falls Progressing        Prexisting or High Potential for Compromised Skin Integrity     Skin integrity is maintained or improved Progressing        SAFETY,RESTRAINT: NV/NON-SELF DESTRUCTIVE BEHAVIOR     Remains free of harm/injury (restraint for non violent/non self-detsructive behavior) Progressing     Returns to optimal restraint-free functioning Progressing

## 2017-11-14 NOTE — OCCUPATIONAL THERAPY NOTE
OT cancel note:    Orders for OT evaluation received  Spoke with CM who requested OT evaluation be done today  As per RN, Katie Avila, pt is not appropriate to be seen at this time  She recently c/o anxiety, difficulty breathing etc  Had an EKG done  Pt is also about to move up to P5  Hold OT for now, will attempt again tomorrow 11/15

## 2017-11-15 ENCOUNTER — APPOINTMENT (INPATIENT)
Dept: NON INVASIVE DIAGNOSTICS | Facility: HOSPITAL | Age: 67
DRG: 224 | End: 2017-11-15
Payer: COMMERCIAL

## 2017-11-15 PROBLEM — E87.1 HYPONATREMIA: Status: ACTIVE | Noted: 2017-11-15

## 2017-11-15 PROBLEM — D64.9 ANEMIA: Status: ACTIVE | Noted: 2017-11-15

## 2017-11-15 PROBLEM — D72.829 LEUKOCYTOSIS: Status: ACTIVE | Noted: 2017-11-15

## 2017-11-15 LAB
ANION GAP SERPL CALCULATED.3IONS-SCNC: 4 MMOL/L (ref 4–13)
APTT PPP: 69 SECONDS (ref 23–35)
BASOPHILS # BLD AUTO: 0.05 THOUSANDS/ΜL (ref 0–0.1)
BASOPHILS NFR BLD AUTO: 0 % (ref 0–1)
BUN SERPL-MCNC: 16 MG/DL (ref 5–25)
CALCIUM SERPL-MCNC: 9.4 MG/DL (ref 8.3–10.1)
CHLORIDE SERPL-SCNC: 92 MMOL/L (ref 100–108)
CO2 SERPL-SCNC: 35 MMOL/L (ref 21–32)
CREAT SERPL-MCNC: 0.73 MG/DL (ref 0.6–1.3)
EOSINOPHIL # BLD AUTO: 0.34 THOUSAND/ΜL (ref 0–0.61)
EOSINOPHIL NFR BLD AUTO: 2 % (ref 0–6)
ERYTHROCYTE [DISTWIDTH] IN BLOOD BY AUTOMATED COUNT: 16.1 % (ref 11.6–15.1)
GFR SERPL CREATININE-BSD FRML MDRD: 85 ML/MIN/1.73SQ M
GLUCOSE SERPL-MCNC: 164 MG/DL (ref 65–140)
GLUCOSE SERPL-MCNC: 170 MG/DL (ref 65–140)
GLUCOSE SERPL-MCNC: 179 MG/DL (ref 65–140)
GLUCOSE SERPL-MCNC: 182 MG/DL (ref 65–140)
GLUCOSE SERPL-MCNC: 185 MG/DL (ref 65–140)
HCT VFR BLD AUTO: 31.2 % (ref 34.8–46.1)
HGB BLD-MCNC: 9.6 G/DL (ref 11.5–15.4)
LYMPHOCYTES # BLD AUTO: 2.63 THOUSANDS/ΜL (ref 0.6–4.47)
LYMPHOCYTES NFR BLD AUTO: 19 % (ref 14–44)
MAGNESIUM SERPL-MCNC: 2.1 MG/DL (ref 1.6–2.6)
MCH RBC QN AUTO: 28.6 PG (ref 26.8–34.3)
MCHC RBC AUTO-ENTMCNC: 30.8 G/DL (ref 31.4–37.4)
MCV RBC AUTO: 93 FL (ref 82–98)
MONOCYTES # BLD AUTO: 1 THOUSAND/ΜL (ref 0.17–1.22)
MONOCYTES NFR BLD AUTO: 7 % (ref 4–12)
NEUTROPHILS # BLD AUTO: 9.91 THOUSANDS/ΜL (ref 1.85–7.62)
NEUTS SEG NFR BLD AUTO: 72 % (ref 43–75)
NRBC BLD AUTO-RTO: 0 /100 WBCS
PLATELET # BLD AUTO: 404 THOUSANDS/UL (ref 149–390)
PMV BLD AUTO: 10.3 FL (ref 8.9–12.7)
POTASSIUM SERPL-SCNC: 3.6 MMOL/L (ref 3.5–5.3)
RBC # BLD AUTO: 3.36 MILLION/UL (ref 3.81–5.12)
SODIUM SERPL-SCNC: 131 MMOL/L (ref 136–145)
WBC # BLD AUTO: 13.98 THOUSAND/UL (ref 4.31–10.16)

## 2017-11-15 PROCEDURE — 82948 REAGENT STRIP/BLOOD GLUCOSE: CPT

## 2017-11-15 PROCEDURE — C1769 GUIDE WIRE: HCPCS | Performed by: INTERNAL MEDICINE

## 2017-11-15 PROCEDURE — 83735 ASSAY OF MAGNESIUM: CPT | Performed by: NURSE PRACTITIONER

## 2017-11-15 PROCEDURE — 027035Z DILATION OF CORONARY ARTERY, ONE ARTERY WITH TWO DRUG-ELUTING INTRALUMINAL DEVICES, PERCUTANEOUS APPROACH: ICD-10-PCS | Performed by: INTERNAL MEDICINE

## 2017-11-15 PROCEDURE — 80048 BASIC METABOLIC PNL TOTAL CA: CPT | Performed by: NURSE PRACTITIONER

## 2017-11-15 PROCEDURE — 99152 MOD SED SAME PHYS/QHP 5/>YRS: CPT | Performed by: INTERNAL MEDICINE

## 2017-11-15 PROCEDURE — 94640 AIRWAY INHALATION TREATMENT: CPT

## 2017-11-15 PROCEDURE — 94760 N-INVAS EAR/PLS OXIMETRY 1: CPT

## 2017-11-15 PROCEDURE — 93458 L HRT ARTERY/VENTRICLE ANGIO: CPT | Performed by: INTERNAL MEDICINE

## 2017-11-15 PROCEDURE — 85730 THROMBOPLASTIN TIME PARTIAL: CPT | Performed by: INTERNAL MEDICINE

## 2017-11-15 PROCEDURE — 4A023N7 MEASUREMENT OF CARDIAC SAMPLING AND PRESSURE, LEFT HEART, PERCUTANEOUS APPROACH: ICD-10-PCS | Performed by: INTERNAL MEDICINE

## 2017-11-15 PROCEDURE — B2111ZZ FLUOROSCOPY OF MULTIPLE CORONARY ARTERIES USING LOW OSMOLAR CONTRAST: ICD-10-PCS | Performed by: INTERNAL MEDICINE

## 2017-11-15 PROCEDURE — 85025 COMPLETE CBC W/AUTO DIFF WBC: CPT | Performed by: NURSE PRACTITIONER

## 2017-11-15 PROCEDURE — 99153 MOD SED SAME PHYS/QHP EA: CPT | Performed by: INTERNAL MEDICINE

## 2017-11-15 PROCEDURE — C1894 INTRO/SHEATH, NON-LASER: HCPCS | Performed by: INTERNAL MEDICINE

## 2017-11-15 PROCEDURE — B245ZZ3 ULTRASONOGRAPHY OF LEFT HEART, INTRAVASCULAR: ICD-10-PCS | Performed by: INTERNAL MEDICINE

## 2017-11-15 RX ORDER — SODIUM CHLORIDE 9 MG/ML
50 INJECTION, SOLUTION INTRAVENOUS CONTINUOUS
Status: DISPENSED | OUTPATIENT
Start: 2017-11-15 | End: 2017-11-15

## 2017-11-15 RX ORDER — FUROSEMIDE 10 MG/ML
INJECTION INTRAMUSCULAR; INTRAVENOUS CODE/TRAUMA/SEDATION MEDICATION
Status: COMPLETED | OUTPATIENT
Start: 2017-11-15 | End: 2017-11-15

## 2017-11-15 RX ORDER — CLOPIDOGREL BISULFATE 75 MG/1
75 TABLET ORAL DAILY
Status: DISCONTINUED | OUTPATIENT
Start: 2017-11-16 | End: 2017-11-15

## 2017-11-15 RX ORDER — NITROGLYCERIN 20 MG/100ML
INJECTION INTRAVENOUS CODE/TRAUMA/SEDATION MEDICATION
Status: COMPLETED | OUTPATIENT
Start: 2017-11-15 | End: 2017-11-15

## 2017-11-15 RX ORDER — SIMETHICONE 80 MG
80 TABLET,CHEWABLE ORAL EVERY 6 HOURS PRN
Status: DISCONTINUED | OUTPATIENT
Start: 2017-11-15 | End: 2017-11-18

## 2017-11-15 RX ORDER — SODIUM CHLORIDE 9 MG/ML
50 INJECTION, SOLUTION INTRAVENOUS CONTINUOUS
Status: DISCONTINUED | OUTPATIENT
Start: 2017-11-16 | End: 2017-11-16

## 2017-11-15 RX ORDER — MIDAZOLAM HYDROCHLORIDE 1 MG/ML
INJECTION INTRAMUSCULAR; INTRAVENOUS CODE/TRAUMA/SEDATION MEDICATION
Status: COMPLETED | OUTPATIENT
Start: 2017-11-15 | End: 2017-11-15

## 2017-11-15 RX ORDER — CLOPIDOGREL BISULFATE 75 MG/1
600 TABLET ORAL ONCE
Status: DISCONTINUED | OUTPATIENT
Start: 2017-11-15 | End: 2017-11-15

## 2017-11-15 RX ORDER — FUROSEMIDE 10 MG/ML
80 INJECTION INTRAMUSCULAR; INTRAVENOUS ONCE
Status: COMPLETED | OUTPATIENT
Start: 2017-11-15 | End: 2017-11-15

## 2017-11-15 RX ORDER — ASPIRIN 81 MG/1
324 TABLET, CHEWABLE ORAL ONCE
Status: COMPLETED | OUTPATIENT
Start: 2017-11-16 | End: 2017-11-16

## 2017-11-15 RX ORDER — CLOPIDOGREL BISULFATE 75 MG/1
75 TABLET ORAL DAILY
Status: DISCONTINUED | OUTPATIENT
Start: 2017-11-17 | End: 2017-11-23 | Stop reason: HOSPADM

## 2017-11-15 RX ORDER — HEPARIN SODIUM 1000 [USP'U]/ML
INJECTION, SOLUTION INTRAVENOUS; SUBCUTANEOUS CODE/TRAUMA/SEDATION MEDICATION
Status: COMPLETED | OUTPATIENT
Start: 2017-11-15 | End: 2017-11-15

## 2017-11-15 RX ORDER — FENTANYL CITRATE 50 UG/ML
INJECTION, SOLUTION INTRAMUSCULAR; INTRAVENOUS CODE/TRAUMA/SEDATION MEDICATION
Status: COMPLETED | OUTPATIENT
Start: 2017-11-15 | End: 2017-11-15

## 2017-11-15 RX ORDER — SODIUM CHLORIDE 9 MG/ML
INJECTION, SOLUTION INTRAVENOUS
Status: COMPLETED | OUTPATIENT
Start: 2017-11-15 | End: 2017-11-15

## 2017-11-15 RX ORDER — LEVALBUTEROL 1.25 MG/.5ML
1.25 SOLUTION, CONCENTRATE RESPIRATORY (INHALATION)
Status: DISCONTINUED | OUTPATIENT
Start: 2017-11-16 | End: 2017-11-23

## 2017-11-15 RX ORDER — CLOPIDOGREL BISULFATE 75 MG/1
600 TABLET ORAL ONCE
Status: COMPLETED | OUTPATIENT
Start: 2017-11-16 | End: 2017-11-16

## 2017-11-15 RX ORDER — LEVALBUTEROL 1.25 MG/.5ML
1.25 SOLUTION, CONCENTRATE RESPIRATORY (INHALATION) EVERY 6 HOURS PRN
Status: DISCONTINUED | OUTPATIENT
Start: 2017-11-15 | End: 2017-11-23

## 2017-11-15 RX ORDER — VERAPAMIL HCL 2.5 MG/ML
AMPUL (ML) INTRAVENOUS CODE/TRAUMA/SEDATION MEDICATION
Status: COMPLETED | OUTPATIENT
Start: 2017-11-15 | End: 2017-11-15

## 2017-11-15 RX ADMIN — METOPROLOL TARTRATE 37.5 MG: 25 TABLET ORAL at 08:29

## 2017-11-15 RX ADMIN — SIMETHICONE CHEW TAB 80 MG 80 MG: 80 TABLET ORAL at 21:13

## 2017-11-15 RX ADMIN — POTASSIUM CHLORIDE 20 MEQ: 1500 TABLET, EXTENDED RELEASE ORAL at 08:29

## 2017-11-15 RX ADMIN — INSULIN GLARGINE 30 UNITS: 100 INJECTION, SOLUTION SUBCUTANEOUS at 21:15

## 2017-11-15 RX ADMIN — LEVALBUTEROL HYDROCHLORIDE 1.25 MG: 1.25 SOLUTION, CONCENTRATE RESPIRATORY (INHALATION) at 13:25

## 2017-11-15 RX ADMIN — AMIODARONE HYDROCHLORIDE 200 MG: 200 TABLET ORAL at 17:38

## 2017-11-15 RX ADMIN — MIDAZOLAM 1 MG: 1 INJECTION INTRAMUSCULAR; INTRAVENOUS at 11:06

## 2017-11-15 RX ADMIN — LEVALBUTEROL HYDROCHLORIDE 1.25 MG: 1.25 SOLUTION, CONCENTRATE RESPIRATORY (INHALATION) at 00:09

## 2017-11-15 RX ADMIN — IPRATROPIUM BROMIDE 0.5 MG: 0.5 SOLUTION RESPIRATORY (INHALATION) at 07:11

## 2017-11-15 RX ADMIN — NITROGLYCERIN 200 MCG: 20 INJECTION INTRAVENOUS at 11:08

## 2017-11-15 RX ADMIN — POTASSIUM CHLORIDE 20 MEQ: 1500 TABLET, EXTENDED RELEASE ORAL at 17:38

## 2017-11-15 RX ADMIN — FUROSEMIDE 40 MG: 10 INJECTION, SOLUTION INTRAMUSCULAR; INTRAVENOUS at 00:04

## 2017-11-15 RX ADMIN — IPRATROPIUM BROMIDE 0.5 MG: 0.5 SOLUTION RESPIRATORY (INHALATION) at 13:24

## 2017-11-15 RX ADMIN — LEVALBUTEROL HYDROCHLORIDE 1.25 MG: 1.25 SOLUTION, CONCENTRATE RESPIRATORY (INHALATION) at 19:27

## 2017-11-15 RX ADMIN — LEVOTHYROXINE SODIUM 125 MCG: 125 TABLET ORAL at 06:15

## 2017-11-15 RX ADMIN — NYSTATIN: 100000 POWDER TOPICAL at 08:30

## 2017-11-15 RX ADMIN — NYSTATIN: 100000 POWDER TOPICAL at 17:40

## 2017-11-15 RX ADMIN — SENNOSIDES 17.2 MG: 8.6 TABLET, FILM COATED ORAL at 21:15

## 2017-11-15 RX ADMIN — IPRATROPIUM BROMIDE 0.5 MG: 0.5 SOLUTION RESPIRATORY (INHALATION) at 00:09

## 2017-11-15 RX ADMIN — INSULIN LISPRO 5 UNITS: 100 INJECTION, SOLUTION INTRAVENOUS; SUBCUTANEOUS at 12:13

## 2017-11-15 RX ADMIN — IPRATROPIUM BROMIDE 0.5 MG: 0.5 SOLUTION RESPIRATORY (INHALATION) at 19:27

## 2017-11-15 RX ADMIN — HEPARIN SODIUM 4000 UNITS: 1000 INJECTION INTRAVENOUS; SUBCUTANEOUS at 11:10

## 2017-11-15 RX ADMIN — SODIUM CHLORIDE 50 ML/HR: 0.9 INJECTION, SOLUTION INTRAVENOUS at 11:06

## 2017-11-15 RX ADMIN — IOHEXOL 32 ML: 350 INJECTION, SOLUTION INTRAVENOUS at 11:19

## 2017-11-15 RX ADMIN — SODIUM CHLORIDE 50 ML/HR: 0.9 INJECTION, SOLUTION INTRAVENOUS at 11:49

## 2017-11-15 RX ADMIN — FUROSEMIDE 40 MG: 10 INJECTION, SOLUTION INTRAMUSCULAR; INTRAVENOUS at 23:44

## 2017-11-15 RX ADMIN — LEVALBUTEROL HYDROCHLORIDE 1.25 MG: 1.25 SOLUTION, CONCENTRATE RESPIRATORY (INHALATION) at 07:11

## 2017-11-15 RX ADMIN — METOPROLOL TARTRATE 37.5 MG: 25 TABLET ORAL at 21:14

## 2017-11-15 RX ADMIN — FUROSEMIDE 80 MG: 10 INJECTION, SOLUTION INTRAMUSCULAR; INTRAVENOUS at 11:49

## 2017-11-15 RX ADMIN — FUROSEMIDE 80 MG: 10 INJECTION, SOLUTION INTRAMUSCULAR; INTRAVENOUS at 11:29

## 2017-11-15 RX ADMIN — FUROSEMIDE 40 MG: 10 INJECTION, SOLUTION INTRAMUSCULAR; INTRAVENOUS at 11:57

## 2017-11-15 RX ADMIN — HEPARIN SODIUM AND DEXTROSE 11.1 UNITS/KG/HR: 10000; 5 INJECTION INTRAVENOUS at 19:21

## 2017-11-15 RX ADMIN — FUROSEMIDE 40 MG: 10 INJECTION, SOLUTION INTRAMUSCULAR; INTRAVENOUS at 17:38

## 2017-11-15 RX ADMIN — INSULIN LISPRO 5 UNITS: 100 INJECTION, SOLUTION INTRAVENOUS; SUBCUTANEOUS at 17:28

## 2017-11-15 RX ADMIN — ASPIRIN 81 MG 324 MG: 81 TABLET ORAL at 08:32

## 2017-11-15 RX ADMIN — FENTANYL CITRATE 25 MCG: 50 INJECTION, SOLUTION INTRAMUSCULAR; INTRAVENOUS at 11:06

## 2017-11-15 RX ADMIN — AMIODARONE HYDROCHLORIDE 200 MG: 200 TABLET ORAL at 08:29

## 2017-11-15 RX ADMIN — VERAPAMIL HYDROCHLORIDE 1.25 MG: 2.5 INJECTION, SOLUTION INTRAVENOUS at 11:09

## 2017-11-15 RX ADMIN — MELATONIN TAB 3 MG 6 MG: 3 TAB at 21:15

## 2017-11-15 RX ADMIN — SODIUM CHLORIDE 50 ML/HR: 0.9 INJECTION, SOLUTION INTRAVENOUS at 23:44

## 2017-11-15 RX ADMIN — IOHEXOL 75 ML: 350 INJECTION, SOLUTION INTRAVENOUS at 11:20

## 2017-11-15 RX ADMIN — FUROSEMIDE 40 MG: 10 INJECTION, SOLUTION INTRAMUSCULAR; INTRAVENOUS at 06:15

## 2017-11-15 RX ADMIN — ATORVASTATIN CALCIUM 40 MG: 40 TABLET, FILM COATED ORAL at 08:29

## 2017-11-15 RX ADMIN — POLYETHYLENE GLYCOL 3350 17 G: 17 POWDER, FOR SOLUTION ORAL at 08:30

## 2017-11-15 RX ADMIN — VITAMIN D, TAB 1000IU (100/BT) 1000 UNITS: 25 TAB at 08:29

## 2017-11-15 RX ADMIN — INSULIN LISPRO 4 UNITS: 100 INJECTION, SOLUTION INTRAVENOUS; SUBCUTANEOUS at 21:16

## 2017-11-15 RX ADMIN — INSULIN GLARGINE 30 UNITS: 100 INJECTION, SOLUTION SUBCUTANEOUS at 12:13

## 2017-11-15 NOTE — PROGRESS NOTES
Cardiology Progress Note - Armani Mathias 79 y o  female MRN: [id number]    Unit/Bed#: [facility] 526-01 Encounter: [id number]      Assessment:    1  NSTEMI -   2  Polymorphic VT  2  Ischemic CMP  - recovery of LV systolic function  3  VT - off amiodarone   4  PAF - on IV heparin  5  COPD -- on home O2 oxygen 3L/min    Plan:    Cardiac cath done earlier today  Showed MV CAD  Severe 90% stenosis in mid LAD and diffuse apical disease  D1 was severely diseased  There was diffuse atherosclerosis in circumflex with a non-critical 50% stenosis in mid vessel  Evaluated by CTS  Not deemed to be a good surgical candidate given frailty  Cont ASA, metoprolol, statin  Switch to lasix 40 mg IV bid  Plan for PCI to LAD in the AM  Discussed with patient who agrees to proceed  Will check limited echo given reduced LV function on LVgram      Subjective:   Patient seen and examined  Denies chest pain, pnd, orthopnea, abdominal pain, nausea    Objective:     Vitals: Blood pressure 136/65, pulse 58, temperature 98 5 °F (36 9 °C), temperature source Oral, resp  rate 20, height 5' 11" (1 803 m), weight 125 kg (275 lb 5 7 oz), SpO2 96 %  , Body mass index is 38 4 kg/m² , Orthostatic Blood Pressures    Flowsheet Row Most Recent Value   Blood Pressure  136/65 filed at 11/15/2017 1259   Patient Position - Orthostatic VS  Lying filed at 11/13/2017 2356            Intake/Output Summary (Last 24 hours) at 11/15/17 1430  Last data filed at 11/15/17 1341   Gross per 24 hour   Intake          2408 01 ml   Output             1570 ml   Net           838 01 ml     No significant arrhythmias seen on telemetry review  Physical Exam:    GEN: Armani Mathias appears well, alert and oriented x 3, obese  HEENT: anicteric  NECK: no jvd, short, thick   HEART: regular rhythm, normal S1 and S2, no murmurs  LUNGS: Decreased breath sounds   Basilar crackles  ABDOMEN: soft, no tenderness, no distention  EXTREMITIES: peripheral pulses normal; no edema  NEURO: no focal findings   SKIN: normal without suspicious lesions on exposed skin      Current Facility-Administered Medications:     acetaminophen (TYLENOL) rectal suppository 650 mg, 650 mg, Rectal, Q4H PRN, Jian Gleason MD    amiodarone tablet 200 mg, 200 mg, Oral, BID With Meals, ARLENE Martinez, 200 mg at 11/15/17 7159    aspirin chewable tablet 324 mg, 324 mg, Oral, Daily, Alan Stiles PA-C, 324 mg at 11/15/17 1720    atorvastatin (LIPITOR) tablet 40 mg, 40 mg, Oral, Daily, ARLENE Morales, 40 mg at 11/15/17 1906    cholecalciferol (VITAMIN D3) tablet 1,000 Units, 1,000 Units, Oral, Daily, Alan Stiles PA-C, 1,000 Units at 11/15/17 0136    furosemide (LASIX) injection 40 mg, 40 mg, Intravenous, Q6H, Paula Monet MD, 40 mg at 11/15/17 1157    furosemide (LASIX) injection, , , Code/Trauma/Sedation Med, Faviola Larios MD, 80 mg at 11/15/17 1129    heparin (porcine) 25,000 units in 250 mL infusion (premix), 3-20 Units/kg/hr (Order-Specific), Intravenous, Titrated, Jian Gleason MD, Stopped at 11/15/17 1102    influenza inactivated quadrivalent vaccine (FLULAVAL) IM injection 0 5 mL, 0 5 mL, Intramuscular, Prior to discharge, Clarita Zheng MD    insulin glargine (LANTUS) subcutaneous injection 30 Units, 30 Units, Subcutaneous, Q12H [address], Alan Stiles PA-C, 30 Units at 11/15/17 1213    insulin lispro (HumaLOG) 100 units/mL subcutaneous injection 4-20 Units, 4-20 Units, Subcutaneous, HS, ARLENE Martinez, 4 Units at 11/14/17 2114    insulin lispro (HumaLOG) 100 units/mL subcutaneous injection 5-25 Units, 5-25 Units, Subcutaneous, TID AC, 5 Units at 11/15/17 1213 **AND** Fingerstick Glucose (POCT), , , TID AC, ARLENE Rain    ipratropium (ATROVENT) 0 02 % inhalation solution 0 5 mg, 0 5 mg, Nebulization, Q6H, Alan Stiles PA-C, 0 5 mg at 11/15/17 1324    levalbuterol (XOPENEX) inhalation solution 1 25 mg, 1 25 mg, Nebulization, Q6H, Daniel Hernandez Jany Barnhart MD, 1 25 mg at 11/15/17 1325    levothyroxine tablet 125 mcg, 125 mcg, Oral, Early Morning, Sandy Buckner PA-C, 125 mcg at 11/15/17 0615    melatonin tablet 6 mg, 6 mg, Oral, HS, Kera Dose, DO, 6 mg at 11/14/17 2115    metoprolol (LOPRESSOR) injection 2 5 mg, 2 5 mg, Intravenous, Q6H PRN, Abby Bratis, DO, 2 5 mg at 11/09/17 1730    metoprolol tartrate (LOPRESSOR) partial tablet 37 5 mg, 37 5 mg, Oral, Q12H [address], Silver Smith MD, 37 5 mg at 11/15/17 0829    nitroglycerin (NITROSTAT) SL tablet 0 4 mg, 0 4 mg, Sublingual, Q5 Min PRN, José Luis Og MD    nystatin (MYCOSTATIN) powder, , Topical, BID, Valerie Caballero PA-C    oxyCODONE (ROXICODONE) IR tablet 2 5 mg, 2 5 mg, Oral, Q4H PRN **OR** oxyCODONE (ROXICODONE) IR tablet 5 mg, 5 mg, Oral, Q4H PRN, Sandy Buckner PA-C    polyethylene glycol (MIRALAX) packet 17 g, 17 g, Oral, Daily, Zula Schillings, CRNP, 17 g at 11/15/17 0830    potassium chloride (K-DUR,KLOR-CON) CR tablet 20 mEq, 20 mEq, Oral, BID With Meals, Silver Smith MD, 20 mEq at 11/15/17 0829    senna (SENOKOT) tablet 17 2 mg, 2 tablet, Oral, HS, Zula Schillings, CRNP, 17 2 mg at 11/14/17 2115    sodium chloride 0 9 % infusion, 50 mL/hr, Intravenous, Continuous, Hayden Cox MD, Last Rate: 50 mL/hr at 11/15/17 1149, 50 mL/hr at 11/15/17 1149    trimethobenzamide (TIGAN) IM injection 200 mg, 200 mg, Intramuscular, Q6H PRN, Kera Dose, DO, 200 mg at 11/13/17 0156    Labs & Results:    Lab Results   Component Value Date    CKTOTAL 277 (H) 11/03/2017    CKMBINDEX 4 3 (H) 11/03/2017    TROPONINI 0 76 (H) 11/14/2017    TROPONINI 3 02 (H) 11/09/2017    TROPONINI 3 19 (H) 11/09/2017       Lab Results   Component Value Date    GLUCOSE 164 (H) 11/15/2017    CALCIUM 9 4 11/15/2017     (L) 11/15/2017    K 3 6 11/15/2017    CO2 35 (H) 11/15/2017    CL 92 (L) 11/15/2017    BUN 16 11/15/2017    CREATININE 0 73 11/15/2017       Lab Results   Component Value Date    WBC 13 98 (H) 11/15/2017    HGB 9 6 (L) 11/15/2017    HCT 31 2 (L) 11/15/2017    MCV 93 11/15/2017     (H) 11/15/2017       Results from last 7 days  Lab Units 11/14/17  1326   INR  1 34*       Lab Results   Component Value Date    ALT 51 11/07/2017    AST 38 11/07/2017

## 2017-11-15 NOTE — PLAN OF CARE
Patient's nurse spoke to me that patient is incontinent with urine  In fact, patient urinated in her bed and was soaked with urine  Presently, patient is getting Lasix every 6 hours  Thus for now, we will have the patient on indwelling Beck[place] catheter to monitor patient's input and output accurately

## 2017-11-15 NOTE — CONSULTS
Consultation - Cardiothoracic Surgery   Nancy Lacey 79 y o  female MRN: [id number]  Unit/Bed#: [facility] 526-01 Encounter: [id number]      History of Present Illness     Physician Requesting Consult: July Degroot MD    Reason for Consult / Principal Problem:  Multivessel coronary artery disease    HPI: Nancy Lacey is a 79 y o   female with past medical history coronary artery disease, ischemic cardiomyopathy, heart failure with preserved EF, diabetes, anxiety, hypertension, hyperlipidemia COPD, obesity, who present to [address] [place] [address] emergency department on 11/02 the EMS  According the patient record patient was found at home with altered mental status confused, lying in a puddle of urine and EMS brought patient to the emergency department  Patient's oxygen saturation was in the 70s and was placed on BiPAP  Upon arrival to the emergency department patient was found to have troponin leak of 4 9, lactic acidosis of 4 3 white blood cell count of 17  Patient was ruled in for an NSTEMI started on heparin drip  Subsequently in the hospital patient 1s a polymorphic VT for 10 seconds cardiology consult suspect did R on T phenomena  Later that evening patient went into the [place] VFib arrest CPR was initiated and patient received 1 shock and 2 rounds of epi and lidocaine and bicarb with [place]osc achieved  Patient was urgently intubated and placed on a lidocaine drip and dopamine drip per recommendations of Cardiology to keep her rate elevated over 100 to prevent R on T phenomena  Patient had 1 more episode of V-tach when heart rate drops below 60 and patient had CPR again with subsequent [place] achieved  Additionally mild the hospital patient was ruled in for pneumonia and was treated  Patient remained on lidocaine and dopamine drip until she was able to be weaned off and ultimately patient was extubated on 11/08    Patient was not taken to the cath lab immediately as patient was still short of breath and confused requiring BiPAP Q HS  Patient ultimately had a thoracentesis on the left with 1 L removed and at PICC placed and patient was transferred to [facility] on 08/04 cardiac catheterization  That time patient was made a level 3 DNR DNI  Patient underwent cardiac catheterization today 11/15/2017 that showed multivessel coronary artery disease  At this point cardiothoracic surgical team was consulted for further surgical evaluation  Of note patient was hospitalized from June 2017 to August 2017 at [facility]   During that time patient was evaluated and also found of coronary artery disease  Echocardiogram completed their showed EF 59% with diastolic dysfunction and global hypokinesis  At that time there plan was to continue medical therapy for 1 year and if patient EF improvement consider intervention  Additionally during patient's time there she was unable to be extubated and was placed on a tracheostomy and PEG tube was inserted  Tracheostomy tube was removed August 2017  During patient's hospitalization here patient had echocardiogram completed on 11/03 that showed EF 50% with grade 2 diastolic dysfunction, hypokinesis of basal inferior wall with mild to moderate MR mild AI  Patient states prior to her hospitalization in December 2017 patient had not had any cardiac surgery problems, never followed with the cardiologist      Patient states her baseline she lives at home by herself though her daughter-in-law comes over every day to help her with activities of daily living such as getting dressed, cooking food  Patient states she does still drive  Patient admits to family history of heart disease in her 80s      Inpatient consult to Cardiothoracic Surgery  Consult performed by: Esha Gutierrez  Consult ordered by: Edouard Goldstein          Review of Systems   Constitutional: Negative for activity change, appetite change, chills, diaphoresis, fatigue, fever and unexpected weight change  HENT: Negative for congestion, dental problem, drooling, ear discharge, ear pain, facial swelling, hearing loss, mouth sores, nosebleeds, postnasal drip, rhinorrhea, sinus pain, sinus pressure, sneezing, sore throat, tinnitus, trouble swallowing and voice change  Eyes: Negative for photophobia, pain, discharge, redness, itching and visual disturbance  Respiratory: Positive for shortness of breath  Negative for apnea, cough, choking, chest tightness, wheezing and stridor  Cardiovascular: Negative for chest pain, palpitations and leg swelling  Gastrointestinal: Negative for abdominal distention, abdominal pain, anal bleeding, blood in stool, constipation, diarrhea, nausea and rectal pain  Endocrine: Negative for cold intolerance, heat intolerance, polydipsia, polyphagia and polyuria  Genitourinary: Negative for decreased urine volume, difficulty urinating, dyspareunia, dysuria, enuresis, flank pain, frequency, genital sores, hematuria, menstrual problem, pelvic pain, urgency, vaginal bleeding, vaginal discharge and vaginal pain  Musculoskeletal: Positive for gait problem  Negative for arthralgias, back pain, joint swelling, myalgias, neck pain and neck stiffness  Skin: Negative for color change, pallor, rash and wound  Allergic/Immunologic: Negative for environmental allergies, food allergies and immunocompromised state  Neurological: Positive for weakness  Negative for dizziness, tremors, seizures, syncope, facial asymmetry, speech difficulty, light-headedness, numbness and headaches  Hematological: Negative for adenopathy  Does not bruise/bleed easily  Psychiatric/Behavioral: Negative for agitation, behavioral problems, confusion, decreased concentration, dysphoric mood, hallucinations, self-injury and sleep disturbance  The patient is not nervous/anxious and is not hyperactive          Historical Information   Past Medical History:   Diagnosis Date    Anxiety     Diabetes mellitus ([facility] [address] )     Hypertension     Pancreatitis     coronary artery disease, ischemic cardiomyopathy, heart failure with preserved EF, diabetes, anxiety, hypertension, hyperlipidemia COPD, obesity,    Past Surgical History:   Procedure Laterality Date     SECTION      3    PEG (HISTORICAL)      TRACHEOSTOMY  2017    C section x3, tubal ligation, appendectomy, cholecystectomy    History   Alcohol Use No     History   Drug Use No     History   Smoking Status    Current Every Day Smoker    Packs/day: 1 00    Years: 40 00    Types: Cigarettes   Smokeless Tobacco    Never Used       Family History:   Family history of coronary disease in mother and father in their 80s    Meds/Allergies   all current active meds have been reviewed, current meds:   Current Facility-Administered Medications   Medication Dose Route Frequency    acetaminophen (TYLENOL) rectal suppository 650 mg  650 mg Rectal Q4H PRN    amiodarone tablet 200 mg  200 mg Oral BID With Meals    aspirin chewable tablet 324 mg  324 mg Oral Daily    atorvastatin (LIPITOR) tablet 40 mg  40 mg Oral Daily    cholecalciferol (VITAMIN D3) tablet 1,000 Units  1,000 Units Oral Daily    furosemide (LASIX) injection 40 mg  40 mg Intravenous Q6H    furosemide (LASIX) injection    Code/Trauma/Sedation Med    heparin (porcine) 25,000 units in 250 mL infusion (premix)  3-20 Units/kg/hr (Order-Specific) Intravenous Titrated    influenza inactivated quadrivalent vaccine (FLULAVAL) IM injection 0 5 mL  0 5 mL Intramuscular Prior to discharge    insulin glargine (LANTUS) subcutaneous injection 30 Units  30 Units Subcutaneous Q12H [address]    insulin lispro (HumaLOG) 100 units/mL subcutaneous injection 4-20 Units  4-20 Units Subcutaneous HS    insulin lispro (HumaLOG) 100 units/mL subcutaneous injection 5-25 Units  5-25 Units Subcutaneous TID AC    ipratropium (ATROVENT) 0 02 % inhalation solution 0 5 mg  0 5 mg Nebulization Q6H    levalbuterol (Fredislean Basyadira) inhalation solution 1 25 mg  1 25 mg Nebulization Q6H    levothyroxine tablet 125 mcg  125 mcg Oral Early Morning    melatonin tablet 6 mg  6 mg Oral HS    metoprolol (LOPRESSOR) injection 2 5 mg  2 5 mg Intravenous Q6H PRN    metoprolol tartrate (LOPRESSOR) partial tablet 37 5 mg  37 5 mg Oral Q12H [address]    nitroglycerin (NITROSTAT) SL tablet 0 4 mg  0 4 mg Sublingual Q5 Min PRN    nystatin (MYCOSTATIN) powder   Topical BID    oxyCODONE (ROXICODONE) IR tablet 2 5 mg  2 5 mg Oral Q4H PRN    Or    oxyCODONE (ROXICODONE) IR tablet 5 mg  5 mg Oral Q4H PRN    polyethylene glycol (MIRALAX) packet 17 g  17 g Oral Daily    potassium chloride (K-DUR,KLOR-CON) CR tablet 20 mEq  20 mEq Oral BID With Meals    senna (SENOKOT) tablet 17 2 mg  2 tablet Oral HS    sodium chloride 0 9 % infusion  50 mL/hr Intravenous Continuous    trimethobenzamide (TIGAN) IM injection 200 mg  200 mg Intramuscular Q6H PRN    and PTA meds:   Prior to Admission Medications   Prescriptions Last Dose Informant Patient Reported? Taking?    Cyanocobalamin 1000 MCG/ML KIT Unknown at Unknown time  Yes No   Sig: Inject 1 mL as directed every 30 (thirty) days   albuterol (PROVENTIL HFA,VENTOLIN HFA) 90 mcg/act inhaler Unknown at Unknown time  Yes No   Sig: Inhale 2 puffs every 4 (four) hours as needed for wheezing or shortness of breath   atorvastatin (LIPITOR) 40 mg tablet Unknown at Unknown time  Yes No   Sig: Take 40 mg by mouth daily   cetirizine (ZyrTEC) 10 mg tablet Unknown at Unknown time  Yes No   Sig: Take 10 mg by mouth daily   cholecalciferol (VITAMIN D3) 1,000 units tablet Unknown at Unknown time  Yes No   Sig: Take 1,000 Units by mouth daily   escitalopram (LEXAPRO) 10 mg tablet Unknown at Unknown time  Yes No   Sig: Take 10 mg by mouth daily   furosemide (LASIX) 40 mg tablet Unknown at Unknown time  Yes No   Sig: Take 40 mg by mouth daily as needed (SOB)   gabapentin (NEURONTIN) 600 MG tablet Unknown at Unknown time  Yes No   Sig: Take 600 mg by mouth 3 (three) times a day   insulin glargine (LANTUS) 100 units/mL subcutaneous injection Unknown at Unknown time  Yes No   Sig: Inject 42 Units under the skin daily at bedtime   levothyroxine 125 mcg tablet Unknown at Unknown time  Yes No   Sig: Take 125 mcg by mouth daily   lisinopril (ZESTRIL) 5 mg tablet Unknown at Unknown time  Yes No   Sig: Take 5 mg by mouth daily   magnesium oxide (MAG-OX) 400 mg Unknown at Unknown time  Yes No   Sig: Take 400 mg by mouth daily   metFORMIN (GLUMETZA) 1000 MG (MOD) 24 hr tablet Unknown at Unknown time  Yes No   Sig: Take 1,000 mg by mouth 2 (two) times a day with meals   metoprolol tartrate (LOPRESSOR) 25 mg tablet Unknown at Unknown time  Yes No   Sig: Take 25 mg by mouth every 12 (twelve) hours   oxyCODONE-acetaminophen (PERCOCET) 5-325 mg per tablet Unknown at Unknown time  Yes No   Sig: Take 1 tablet by mouth every 6 (six) hours as needed for moderate pain      Facility-Administered Medications: None     No Known Allergies    Objective     Vitals:   Blood pressure 136/65, pulse 58, temperature 98 5 °F (36 9 °C), temperature source Oral, resp  rate 20, height 5' 11" (1 803 m), weight 125 kg (275 lb 5 7 oz), SpO2 96 %  Invasive Devices     Peripherally Inserted Central Catheter Line            PICC Line 20/67/34 Left Basilic 4 days          Drain            Urethral Catheter Non-latex;Straight-tip 18 Fr  less than 1 day                Physical Exam:  HEENT/NECK:  PERRLA  No jugular venous distention  Cardiac: Regular rate and rhythm  III/VI systolic Murmur  Pulmonary:  Breath with expiratory wheezes bilaterally  Abdomen:  Non-tender, Non-distended  Positive bowel sounds  Lower extremities: Extremities warm/dry  Radial/PT/DP pules 2+ bilaterally  Trace edema B/L  Neuro: Alert and oriented X 3  Sensation is grossly intact  No focal deficits  Skin: Warm/Dry, without rashes or lesions      Lab Results:     Results from last 7 days  Lab Units 11/15/17  0610 11/14/17  1326 11/14/17  0505   WBC Thousand/uL 13 98* 16 77* 15 29*   HEMOGLOBIN g/dL 9 6* 10 4* 9 7*   HEMATOCRIT % 31 2* 33 5* 31 2*   PLATELETS Thousands/uL 404* 484* 385       Results from last 7 days  Lab Units 11/15/17  0610 11/14/17  0505 11/13/17  0552   SODIUM mmol/L 131* 131* 134*   POTASSIUM mmol/L 3 6 3 2* 3 9   CHLORIDE mmol/L 92* 90* 93*   CO2 mmol/L 35* 35* 34*   BUN mg/dL 16 17 22   CREATININE mg/dL 0 73 0 74 0 87   GLUCOSE RANDOM mg/dL 164* 124 283*   CALCIUM mg/dL 9 4 9 6 9 5       Results from last 7 days  Lab Units 11/15/17  0717 11/14/17  1944 11/14/17  1326  11/09/17  1422   INR   --   --  1 34*  --  1 31*   PTT seconds 69* 55* 48*  < > 37*   < > = values in this interval not displayed  No results found for: HGBA1C  Lab Results   Component Value Date    CKTOTAL 277 (H) 11/03/2017    CKMBINDEX 4 3 (H) 11/03/2017    TROPONINI 0 76 (H) 11/14/2017    Troponin ultimately peaked around 9      Imaging Studies:     Cardiac Catheterization:  SUMMARY     CORONARY CIRCULATION:  Left main: Normal   LAD: The vessel was small to medium sized and heavily calcified  Angiography showed severe atherosclerosis  There was a 90% stenosis in mid vessel  There was diffuse apical disease  D1 was severely diseased  Circumflex: The vessel was medium sized and gave rise to one OM branch  There was diffuse atherosclerosis  There was a non-critical 50% stenosis in mid vessel  Ramus intermedius: The vessel was small sized  There was a 90 % proximal stenosis  RCA: The vessel was normal sized and dominant  There was a 50% stenosis in mid vessel  There were no critical lesions      CARDIAC STRUCTURES:  The apex was severely hypokinetic  Other segments were mildly hypokinetic  The EF estimated by contrast ventriculography was 35 %  There appeared to be moderate to severe MR  The mitral valve exhibited moderate to severe regurgitation          Echocardiogram:  11/3:  SUMMARY     LEFT VENTRICLE:  Systolic function was at the lower limits of normal  Ejection fraction was estimated to be 50 %  There was hypokinesis of the basal inferior wall(s)  There was mild concentric hypertrophy  Features were consistent with a pseudonormal left ventricular filling pattern, with concomitant abnormal relaxation and increased filling pressure (grade 2 diastolic dysfunction)      MITRAL VALVE:  There was mild to moderate regurgitation      AORTIC VALVE:  There was mild regurgitation      PERICARDIUM:  A small pericardial effusion was identified posterior to the heart  The fluid had no internal echoes  There was no evidence of hemodynamic compromise  There was a large left pleural effusion          Echo per report at [facility]: diastolic dysfunction, EF 36% global hypokinesis     I have personally reviewed pertinent films in PACS    Assessment:  Patient Active Problem List    Diagnosis Date Noted    Anemia 11/15/2017    Hyponatremia 11/15/2017    Leukocytosis 11/15/2017    Delirium 11/07/2017    Encephalopathy 11/06/2017    Obesity, Class III, BMI 40-49 9 (morbid obesity) ([facility] [address] ) 11/06/2017    Cardiac arrest 11/03/2017    Acute hypercapnic respiratory failure 11/03/2017    Torsades de pointes 11/03/2017    Prolonged QT interval 11/03/2017    NSTEMI (non-ST elevated myocardial infarction) 11/03/2017    Ischemic cardiomyopathy 11/03/2017    LBBB (left bundle branch block) 11/03/2017    CAD (coronary artery disease) 11/03/2017    h/o Vocal cord paralysis 11/03/2017    HTN (hypertension) 11/03/2017    Hyperlipemia 11/03/2017    Respiratory failure 11/02/2017     Severe coronary artery disease; Ongoing CABG workup  Severe mitral regurgitation; Ongoing MVR workup    Plan:  Risks and benefits of coronary artery bypass grafting and possible MVR were discussed in detail today with the patient        At this point patient is not a candidate for heart surgery, recommend medical management +/- PCI and possible ICD per cardiology reccomendations  Case discussed with JOHN Torres       The patient was comfortable with our recommendations, and their questions were answered to their satisfaction  We will continue to evaluate the patient daily with further recommendations as work up is completed  Thank you for allowing us to participate in the care of this patient       SIGNATURE: Jose Moreau PA-C  DATE: November 15, 2017  TIME: 1:46 PM

## 2017-11-15 NOTE — SOCIAL WORK
Met withTerri-admissions at [facility] who was here to see patient  Patient is approved by that facility and admission paperwork will be faxed to son to sign  Patient is not cleared for discharge  She will need insurance auth for snf level of care prior to discharge

## 2017-11-15 NOTE — PLAN OF CARE
DISCHARGE PLANNING - CARE MANAGEMENT     Discharge to post-acute care or home with appropriate resources Progressing        Nutrition/Hydration-ADULT     Nutrient/Hydration intake appropriate for improving, restoring or maintaining nutritional needs Progressing        Potential for Falls     Patient will remain free of falls Progressing        SAFETY,RESTRAINT: NV/NON-SELF DESTRUCTIVE BEHAVIOR     Remains free of harm/injury (restraint for non violent/non self-detsructive behavior) Progressing     Returns to optimal restraint-free functioning Progressing

## 2017-11-15 NOTE — PROGRESS NOTES
[facility] Progress Note  Patient: Denisse Del Rosario 79 y o  female   MRN: [id number]  PCP: No primary care provider on file  [facility] En[facility]: [id number]  Date Of Visit: 11/15/17    Assessment:    Principal Problem:    Respiratory failure  Active Problems:    Cardiac arrest    Acute hypercapnic respiratory failure    Torsades de pointes    Prolonged QT interval    NSTEMI (non-ST elevated myocardial infarction)    Ischemic cardiomyopathy    LBBB (left bundle branch block)    CAD (coronary artery disease)    h/o Vocal cord paralysis    HTN (hypertension)    Hyperlipemia    Encephalopathy    Obesity, Class III, BMI 40-49 9 (morbid obesity) (HCC)    Delirium    Anemia    Hyponatremia    Leukocytosis      Plan:    1  Coronary artery disease, with multivessel disease status post cardiac catheterization today, 11/15; status post the VF/VT cardiac arrest:  Continue cardiovascular medications  Continue amiodarone  Continue telemetry  Patient is referred to cardiothoracic surgery for possibility of CABG  Cardiology on board  2   Atrial fibrillation:  On heparin drip  Continue antiarrhythmic medications  3   Acute hypoxic respiratory failure:  Continue oxygen  Wean as tolerated  Continue nebulizers p r n  4   History of vocal cord paralysis with dysphagia:  Continue modified diet  5   Diabetes mellitus type 2 with hyperglycemia:  Continue insulin management  Will adjust accordingly  6   Hyponatremia, likely due to patient's ischemic cardiomyopathy, and hyperglycemia:  Monitor patient's BMP  Manage patient's skin cardiomyopathy  Manage patient's hyperglycemia  7   Hypertension:  Continue blood pressure medications  8   Anemia, presently stable:  Monitor patient's CBC  For further workup and management if this worsens significantly      9   Leukocytosis, likely reactive, secondary to the above problems, particularly problem number 1  above:  Observe off antibiotics  No signs and symptoms of an active infection  Monitor patient's CBC  For further workup and management if this worsens significantly  10   Encephalopathy, likely delirium, presently resolved: Will continue observing the patient  Monitor  For further workup and management if this worsens significantly  VTE Pharmacologic Prophylaxis:   Pharmacologic: Heparin Drip  Mechanical: Mechanical VTE prophylaxis in place  Discussions with Specialists or Other Care Team Provider:  Patient's nurse  Case management  Education and Discussions with Family / Patient:  Patient  I spoke to the patient's son, Traci To and gave updates for today  I answered questions and concerns  Time Spent for Care: 30 minutes  More than 50% of total time spent on counseling and coordination of care as described above  Current Length of Stay: 13 day(s)    Current Patient Status: Inpatient   Certification Statement: The patient will continue to require additional inpatient hospital stay due to Above findings and plans  Discharge Plan:  None yet  Code Status: Level 3 - DNAR and DNI      Subjective:   Presently, patient tells me that she is not feeling well  Patient tells me that she feels very tired  Otherwise, no other complaints  Patient denies any chest pains or any other pains  Patient denies any shortness of breath  Objective:     Vitals:   Temp (24hrs), Av 1 °F (36 7 °C), Min:97 8 °F (36 6 °C), Max:98 5 °F (36 9 °C)    HR:  [57-62] 57  Resp:  [18-42] 20  BP: (131-151)/(51-86) 143/51  SpO2:  [96 %-100 %] 98 %  Body mass index is 38 4 kg/m²  Input and Output Summary (last 24 hours): Intake/Output Summary (Last 24 hours) at 11/15/17 1215  Last data filed at 11/15/17 0850   Gross per 24 hour   Intake          1911 57 ml   Output             1197 ml   Net           714 57 ml       Physical Exam:     Physical Exam   Constitutional: She is oriented to person, place, and time   No distress  HENT:   Head: Normocephalic and atraumatic  Eyes: Right eye exhibits no discharge  Left eye exhibits no discharge  No scleral icterus  Neck: No JVD present  No tracheal deviation present  Cardiovascular: Normal rate and regular rhythm  Exam reveals no gallop and no friction rub  No murmur heard  Pulmonary/Chest: Effort normal and breath sounds normal  No stridor  No respiratory distress  She has no wheezes  She has no rales  Abdominal: Soft  Bowel sounds are normal  She exhibits no distension  There is no tenderness  There is no rebound and no guarding  Musculoskeletal: She exhibits no edema, tenderness or deformity  Neurological: She is alert and oriented to person, place, and time  No cranial nerve deficit  Skin: Skin is warm  No rash noted  She is not diaphoretic  No erythema  No pallor  Psychiatric: She has a normal mood and affect  Her behavior is normal  Thought content normal    Vitals reviewed  Additional Data:     Labs:      Results from last 7 days  Lab Units 11/15/17  0610   WBC Thousand/uL 13 98*   HEMOGLOBIN g/dL 9 6*   HEMATOCRIT % 31 2*   PLATELETS Thousands/uL 404*   NEUTROS PCT % 72   LYMPHS PCT % 19   MONOS PCT % 7   EOS PCT % 2       Results from last 7 days  Lab Units 11/15/17  0610   SODIUM mmol/L 131*   POTASSIUM mmol/L 3 6   CHLORIDE mmol/L 92*   CO2 mmol/L 35*   BUN mg/dL 16   CREATININE mg/dL 0 73   CALCIUM mg/dL 9 4   GLUCOSE RANDOM mg/dL 164*       Results from last 7 days  Lab Units 11/14/17  1326   INR  1 34*       * I Have Reviewed All Lab Data Listed Above  * Additional Pertinent Lab Tests Reviewed: [facility]    Imaging:    Imaging Reports Reviewed Today Include:  Diagnostic imaging studies that were done on this admission  Imaging Personally Reviewed by Myself Includes:  None  Cultures:   Blood Culture:   Lab Results   Component Value Date    BLOODCX No Growth After 5 Days   11/02/2017    BLOODCX No Growth After 5 Days  11/02/2017     Urine Culture: No results found for: URINECX  Sputum Culture: No components found for: SPUTUMCX  Wound Culture: No results found for: WOUNDCULT    Last 24 Hours Medication List:     amiodarone 200 mg Oral BID With Meals   aspirin 324 mg Oral Daily   atorvastatin 40 mg Oral Daily   cholecalciferol 1,000 Units Oral Daily   furosemide 40 mg Intravenous Q6H   insulin glargine 30 Units Subcutaneous Q12H [facility]   insulin lispro 4-20 Units Subcutaneous HS   insulin lispro 5-25 Units Subcutaneous TID AC   ipratropium 0 5 mg Nebulization Q6H   levalbuterol 1 25 mg Nebulization Q6H   levothyroxine 125 mcg Oral Early Morning   melatonin 6 mg Oral HS   metoprolol tartrate 37 5 mg Oral Q12H JUAN LUIS   nystatin  Topical BID   polyethylene glycol 17 g Oral Daily   potassium chloride 20 mEq Oral BID With Meals   senna 2 tablet Oral HS        Today, Patient Was Seen By: Citlali Leos MD    ** Please Note: Dragon 360 Dictation voice to text software may have been used in the creation of this document   **

## 2017-11-16 ENCOUNTER — APPOINTMENT (INPATIENT)
Dept: NON INVASIVE DIAGNOSTICS | Facility: HOSPITAL | Age: 67
DRG: 224 | End: 2017-11-16
Payer: COMMERCIAL

## 2017-11-16 LAB
ANION GAP SERPL CALCULATED.3IONS-SCNC: 4 MMOL/L (ref 4–13)
APTT PPP: 60 SECONDS (ref 23–35)
ATRIAL RATE: 56 BPM
BUN SERPL-MCNC: 14 MG/DL (ref 5–25)
CALCIUM SERPL-MCNC: 9.2 MG/DL (ref 8.3–10.1)
CHLORIDE SERPL-SCNC: 90 MMOL/L (ref 100–108)
CO2 SERPL-SCNC: 35 MMOL/L (ref 21–32)
CREAT SERPL-MCNC: 0.82 MG/DL (ref 0.6–1.3)
ERYTHROCYTE [DISTWIDTH] IN BLOOD BY AUTOMATED COUNT: 16.3 % (ref 11.6–15.1)
GFR SERPL CREATININE-BSD FRML MDRD: 74 ML/MIN/1.73SQ M
GLUCOSE SERPL-MCNC: 135 MG/DL (ref 65–140)
GLUCOSE SERPL-MCNC: 153 MG/DL (ref 65–140)
GLUCOSE SERPL-MCNC: 157 MG/DL (ref 65–140)
GLUCOSE SERPL-MCNC: 270 MG/DL (ref 65–140)
GLUCOSE SERPL-MCNC: 299 MG/DL (ref 65–140)
HCT VFR BLD AUTO: 31.8 % (ref 34.8–46.1)
HGB BLD-MCNC: 9.8 G/DL (ref 11.5–15.4)
KCT BLD-ACNC: 224 SEC (ref 89–137)
MCH RBC QN AUTO: 28.4 PG (ref 26.8–34.3)
MCHC RBC AUTO-ENTMCNC: 30.8 G/DL (ref 31.4–37.4)
MCV RBC AUTO: 92 FL (ref 82–98)
P AXIS: 31 DEGREES
PLATELET # BLD AUTO: 438 THOUSANDS/UL (ref 149–390)
PMV BLD AUTO: 10.3 FL (ref 8.9–12.7)
POTASSIUM SERPL-SCNC: 4 MMOL/L (ref 3.5–5.3)
PR INTERVAL: 196 MS
QRS AXIS: -15 DEGREES
QRSD INTERVAL: 170 MS
QT INTERVAL: 500 MS
QTC INTERVAL: 482 MS
RBC # BLD AUTO: 3.45 MILLION/UL (ref 3.81–5.12)
SODIUM SERPL-SCNC: 129 MMOL/L (ref 136–145)
SPECIMEN SOURCE: ABNORMAL
T WAVE AXIS: 145 DEGREES
VENTRICULAR RATE: 56 BPM
WBC # BLD AUTO: 14.98 THOUSAND/UL (ref 4.31–10.16)

## 2017-11-16 PROCEDURE — 94760 N-INVAS EAR/PLS OXIMETRY 1: CPT

## 2017-11-16 PROCEDURE — 99153 MOD SED SAME PHYS/QHP EA: CPT | Performed by: INTERNAL MEDICINE

## 2017-11-16 PROCEDURE — 92978 ENDOLUMINL IVUS OCT C 1ST: CPT | Performed by: INTERNAL MEDICINE

## 2017-11-16 PROCEDURE — 93005 ELECTROCARDIOGRAM TRACING: CPT | Performed by: INTERNAL MEDICINE

## 2017-11-16 PROCEDURE — C8929 TTE W OR WO FOL WCON,DOPPLER: HCPCS

## 2017-11-16 PROCEDURE — 82948 REAGENT STRIP/BLOOD GLUCOSE: CPT

## 2017-11-16 PROCEDURE — 85347 COAGULATION TIME ACTIVATED: CPT

## 2017-11-16 PROCEDURE — C1769 GUIDE WIRE: HCPCS | Performed by: INTERNAL MEDICINE

## 2017-11-16 PROCEDURE — 85027 COMPLETE CBC AUTOMATED: CPT | Performed by: INTERNAL MEDICINE

## 2017-11-16 PROCEDURE — 85730 THROMBOPLASTIN TIME PARTIAL: CPT | Performed by: INTERNAL MEDICINE

## 2017-11-16 PROCEDURE — C1887 CATHETER, GUIDING: HCPCS | Performed by: INTERNAL MEDICINE

## 2017-11-16 PROCEDURE — C1725 CATH, TRANSLUMIN NON-LASER: HCPCS | Performed by: INTERNAL MEDICINE

## 2017-11-16 PROCEDURE — C1874 STENT, COATED/COV W/DEL SYS: HCPCS

## 2017-11-16 PROCEDURE — 99152 MOD SED SAME PHYS/QHP 5/>YRS: CPT | Performed by: INTERNAL MEDICINE

## 2017-11-16 PROCEDURE — 80048 BASIC METABOLIC PNL TOTAL CA: CPT | Performed by: INTERNAL MEDICINE

## 2017-11-16 PROCEDURE — 93454 CORONARY ARTERY ANGIO S&I: CPT | Performed by: INTERNAL MEDICINE

## 2017-11-16 PROCEDURE — C1753 CATH, INTRAVAS ULTRASOUND: HCPCS | Performed by: INTERNAL MEDICINE

## 2017-11-16 PROCEDURE — 92526 ORAL FUNCTION THERAPY: CPT

## 2017-11-16 PROCEDURE — C9600 PERC DRUG-EL COR STENT SING: HCPCS | Performed by: INTERNAL MEDICINE

## 2017-11-16 PROCEDURE — C1894 INTRO/SHEATH, NON-LASER: HCPCS | Performed by: INTERNAL MEDICINE

## 2017-11-16 PROCEDURE — 94640 AIRWAY INHALATION TREATMENT: CPT

## 2017-11-16 RX ORDER — MIDAZOLAM HYDROCHLORIDE 1 MG/ML
INJECTION INTRAMUSCULAR; INTRAVENOUS CODE/TRAUMA/SEDATION MEDICATION
Status: COMPLETED | OUTPATIENT
Start: 2017-11-16 | End: 2017-11-16

## 2017-11-16 RX ORDER — LIDOCAINE HYDROCHLORIDE 10 MG/ML
INJECTION, SOLUTION INFILTRATION; PERINEURAL CODE/TRAUMA/SEDATION MEDICATION
Status: COMPLETED | OUTPATIENT
Start: 2017-11-16 | End: 2017-11-16

## 2017-11-16 RX ORDER — FUROSEMIDE 10 MG/ML
INJECTION INTRAMUSCULAR; INTRAVENOUS CODE/TRAUMA/SEDATION MEDICATION
Status: COMPLETED | OUTPATIENT
Start: 2017-11-16 | End: 2017-11-16

## 2017-11-16 RX ORDER — ALPRAZOLAM 0.5 MG/1
0.5 TABLET ORAL 3 TIMES DAILY PRN
Status: DISCONTINUED | OUTPATIENT
Start: 2017-11-16 | End: 2017-11-23 | Stop reason: HOSPADM

## 2017-11-16 RX ORDER — FENTANYL CITRATE 50 UG/ML
INJECTION, SOLUTION INTRAMUSCULAR; INTRAVENOUS CODE/TRAUMA/SEDATION MEDICATION
Status: COMPLETED | OUTPATIENT
Start: 2017-11-16 | End: 2017-11-16

## 2017-11-16 RX ORDER — VERAPAMIL HCL 2.5 MG/ML
AMPUL (ML) INTRAVENOUS CODE/TRAUMA/SEDATION MEDICATION
Status: COMPLETED | OUTPATIENT
Start: 2017-11-16 | End: 2017-11-16

## 2017-11-16 RX ORDER — NITROGLYCERIN 20 MG/100ML
INJECTION INTRAVENOUS CODE/TRAUMA/SEDATION MEDICATION
Status: COMPLETED | OUTPATIENT
Start: 2017-11-16 | End: 2017-11-16

## 2017-11-16 RX ORDER — SODIUM CHLORIDE 9 MG/ML
50 INJECTION, SOLUTION INTRAVENOUS CONTINUOUS
Status: DISCONTINUED | OUTPATIENT
Start: 2017-11-16 | End: 2017-11-16

## 2017-11-16 RX ORDER — FUROSEMIDE 10 MG/ML
80 INJECTION INTRAMUSCULAR; INTRAVENOUS ONCE
Status: COMPLETED | OUTPATIENT
Start: 2017-11-16 | End: 2017-11-16

## 2017-11-16 RX ORDER — VANCOMYCIN HYDROCHLORIDE 1 G/200ML
1000 INJECTION, SOLUTION INTRAVENOUS ONCE
Status: CANCELLED | OUTPATIENT
Start: 2017-11-16 | End: 2017-11-16

## 2017-11-16 RX ORDER — ONDANSETRON 2 MG/ML
4 INJECTION INTRAMUSCULAR; INTRAVENOUS EVERY 6 HOURS PRN
Status: CANCELLED | OUTPATIENT
Start: 2017-11-16

## 2017-11-16 RX ORDER — SODIUM CHLORIDE 9 MG/ML
INJECTION, SOLUTION INTRAVENOUS CONTINUOUS
Status: CANCELLED | OUTPATIENT
Start: 2017-11-16

## 2017-11-16 RX ORDER — SODIUM CHLORIDE 9 MG/ML
50 INJECTION, SOLUTION INTRAVENOUS CONTINUOUS
Status: DISCONTINUED | OUTPATIENT
Start: 2017-11-16 | End: 2017-11-17

## 2017-11-16 RX ORDER — HEPARIN SODIUM 1000 [USP'U]/ML
INJECTION, SOLUTION INTRAVENOUS; SUBCUTANEOUS CODE/TRAUMA/SEDATION MEDICATION
Status: COMPLETED | OUTPATIENT
Start: 2017-11-16 | End: 2017-11-16

## 2017-11-16 RX ORDER — ACETAMINOPHEN 325 MG/1
650 TABLET ORAL EVERY 4 HOURS PRN
Status: DISCONTINUED | OUTPATIENT
Start: 2017-11-16 | End: 2017-11-23 | Stop reason: HOSPADM

## 2017-11-16 RX ADMIN — NITROGLYCERIN 200 MCG: 20 INJECTION INTRAVENOUS at 09:20

## 2017-11-16 RX ADMIN — VITAMIN D, TAB 1000IU (100/BT) 1000 UNITS: 25 TAB at 08:06

## 2017-11-16 RX ADMIN — LEVALBUTEROL HYDROCHLORIDE 1.25 MG: 1.25 SOLUTION, CONCENTRATE RESPIRATORY (INHALATION) at 07:15

## 2017-11-16 RX ADMIN — HEPARIN SODIUM 5000 UNITS: 1000 INJECTION INTRAVENOUS; SUBCUTANEOUS at 09:33

## 2017-11-16 RX ADMIN — NITROGLYCERIN 400 MCG: 20 INJECTION INTRAVENOUS at 09:32

## 2017-11-16 RX ADMIN — INSULIN GLARGINE 30 UNITS: 100 INJECTION, SOLUTION SUBCUTANEOUS at 22:33

## 2017-11-16 RX ADMIN — INSULIN GLARGINE 30 UNITS: 100 INJECTION, SOLUTION SUBCUTANEOUS at 11:15

## 2017-11-16 RX ADMIN — LEVALBUTEROL HYDROCHLORIDE 1.25 MG: 1.25 SOLUTION, CONCENTRATE RESPIRATORY (INHALATION) at 19:13

## 2017-11-16 RX ADMIN — LEVALBUTEROL HYDROCHLORIDE 1.25 MG: 1.25 SOLUTION, CONCENTRATE RESPIRATORY (INHALATION) at 13:40

## 2017-11-16 RX ADMIN — SENNOSIDES 17.2 MG: 8.6 TABLET, FILM COATED ORAL at 22:33

## 2017-11-16 RX ADMIN — FUROSEMIDE 40 MG: 10 INJECTION, SOLUTION INTRAMUSCULAR; INTRAVENOUS at 18:03

## 2017-11-16 RX ADMIN — IPRATROPIUM BROMIDE 0.5 MG: 0.5 SOLUTION RESPIRATORY (INHALATION) at 19:13

## 2017-11-16 RX ADMIN — MIDAZOLAM 1 MG: 1 INJECTION INTRAMUSCULAR; INTRAVENOUS at 09:32

## 2017-11-16 RX ADMIN — LIDOCAINE HYDROCHLORIDE 0.1 ML: 10 INJECTION, SOLUTION INFILTRATION; PERINEURAL at 09:19

## 2017-11-16 RX ADMIN — IPRATROPIUM BROMIDE 0.5 MG: 0.5 SOLUTION RESPIRATORY (INHALATION) at 13:40

## 2017-11-16 RX ADMIN — POLYETHYLENE GLYCOL 3350 17 G: 17 POWDER, FOR SOLUTION ORAL at 10:38

## 2017-11-16 RX ADMIN — MIDAZOLAM 2 MG: 1 INJECTION INTRAMUSCULAR; INTRAVENOUS at 09:11

## 2017-11-16 RX ADMIN — IPRATROPIUM BROMIDE 0.5 MG: 0.5 SOLUTION RESPIRATORY (INHALATION) at 07:16

## 2017-11-16 RX ADMIN — MELATONIN TAB 3 MG 6 MG: 3 TAB at 22:33

## 2017-11-16 RX ADMIN — FENTANYL CITRATE 25 MCG: 50 INJECTION, SOLUTION INTRAMUSCULAR; INTRAVENOUS at 09:31

## 2017-11-16 RX ADMIN — METOPROLOL TARTRATE 37.5 MG: 25 TABLET ORAL at 22:34

## 2017-11-16 RX ADMIN — NITROGLYCERIN 400 MCG: 20 INJECTION INTRAVENOUS at 09:27

## 2017-11-16 RX ADMIN — SODIUM CHLORIDE 50 ML/HR: 0.9 INJECTION, SOLUTION INTRAVENOUS at 17:58

## 2017-11-16 RX ADMIN — POTASSIUM CHLORIDE 20 MEQ: 1500 TABLET, EXTENDED RELEASE ORAL at 18:00

## 2017-11-16 RX ADMIN — INSULIN LISPRO 5 UNITS: 100 INJECTION, SOLUTION INTRAVENOUS; SUBCUTANEOUS at 11:14

## 2017-11-16 RX ADMIN — FUROSEMIDE 40 MG: 10 INJECTION, SOLUTION INTRAMUSCULAR; INTRAVENOUS at 11:16

## 2017-11-16 RX ADMIN — FUROSEMIDE 80 MG: 10 INJECTION, SOLUTION INTRAMUSCULAR; INTRAVENOUS at 10:34

## 2017-11-16 RX ADMIN — AMIODARONE HYDROCHLORIDE 200 MG: 200 TABLET ORAL at 08:04

## 2017-11-16 RX ADMIN — LEVOTHYROXINE SODIUM 125 MCG: 125 TABLET ORAL at 05:18

## 2017-11-16 RX ADMIN — IOHEXOL 130 ML: 350 INJECTION, SOLUTION INTRAVENOUS at 10:08

## 2017-11-16 RX ADMIN — CLOPIDOGREL 600 MG: 75 TABLET, FILM COATED ORAL at 09:01

## 2017-11-16 RX ADMIN — FUROSEMIDE 40 MG: 10 INJECTION, SOLUTION INTRAMUSCULAR; INTRAVENOUS at 05:18

## 2017-11-16 RX ADMIN — INSULIN LISPRO 12 UNITS: 100 INJECTION, SOLUTION INTRAVENOUS; SUBCUTANEOUS at 22:32

## 2017-11-16 RX ADMIN — ALPRAZOLAM 0.5 MG: 0.5 TABLET ORAL at 02:01

## 2017-11-16 RX ADMIN — VERAPAMIL HYDROCHLORIDE 1.25 MG: 2.5 INJECTION, SOLUTION INTRAVENOUS at 09:20

## 2017-11-16 RX ADMIN — FENTANYL CITRATE 50 MCG: 50 INJECTION, SOLUTION INTRAMUSCULAR; INTRAVENOUS at 09:11

## 2017-11-16 RX ADMIN — POTASSIUM CHLORIDE 20 MEQ: 1500 TABLET, EXTENDED RELEASE ORAL at 08:04

## 2017-11-16 RX ADMIN — METOPROLOL TARTRATE 37.5 MG: 25 TABLET ORAL at 08:05

## 2017-11-16 RX ADMIN — NYSTATIN: 100000 POWDER TOPICAL at 10:38

## 2017-11-16 RX ADMIN — PERFLUTREN 0.6 ML/MIN: 6.52 INJECTION, SUSPENSION INTRAVENOUS at 17:50

## 2017-11-16 RX ADMIN — INSULIN LISPRO 15 UNITS: 100 INJECTION, SOLUTION INTRAVENOUS; SUBCUTANEOUS at 17:59

## 2017-11-16 RX ADMIN — FUROSEMIDE 80 MG: 10 INJECTION, SOLUTION INTRAMUSCULAR; INTRAVENOUS at 10:14

## 2017-11-16 RX ADMIN — ASPIRIN 324 MG: 81 TABLET, CHEWABLE ORAL at 09:01

## 2017-11-16 RX ADMIN — NYSTATIN: 100000 POWDER TOPICAL at 18:01

## 2017-11-16 RX ADMIN — AMIODARONE HYDROCHLORIDE 200 MG: 200 TABLET ORAL at 18:03

## 2017-11-16 RX ADMIN — HEPARIN SODIUM 7500 UNITS: 1000 INJECTION INTRAVENOUS; SUBCUTANEOUS at 09:20

## 2017-11-16 RX ADMIN — ATORVASTATIN CALCIUM 40 MG: 40 TABLET, FILM COATED ORAL at 08:05

## 2017-11-16 NOTE — OCCUPATIONAL THERAPY NOTE
Occupational Therapy Evaluation Cancel Note    OT evaluation cancelled at this time  Pt is currently off floor in Cath Lab  Occupational therapy will continue to re-evaluate patient status and resume treatment as patient is medially appropriate

## 2017-11-16 NOTE — PHYSICAL THERAPY NOTE
Chart reviewed  Noted pt is off the floor in cath lab; will follow as clinically appropriate      Luciana Marcos, PT

## 2017-11-16 NOTE — PROGRESS NOTES
[facility] Progress Note  Patient: Sade Ruby 79 y o  female   MRN: [id number]  PCP: No primary care provider on file  [facility]: [id number]  Date Of Visit: 11/16/17    Assessment:    Principal Problem:    Respiratory failure  Active Problems:    Cardiac arrest    Acute hypercapnic respiratory failure    Torsades de pointes    Prolonged QT interval    NSTEMI (non-ST elevated myocardial infarction)    Ischemic cardiomyopathy    LBBB (left bundle branch block)    CAD (coronary artery disease)    h/o Vocal cord paralysis    HTN (hypertension)    Hyperlipemia    Encephalopathy    Obesity, Class III, BMI 40-49 9 (morbid obesity) (HCC)    Delirium    Anemia    Hyponatremia    Leukocytosis      Plan:    1  Coronary artery disease, with multivessel disease status post cardiac catheterization, 11/15; status post PCI with drug-eluting stent in patient's LAD; status post the VF/VT cardiac arrest:  Continue cardiovascular medications  Continue amiodarone  Continue telemetry  Cardiology on board  According to cardiac surgery, patient is high risk for CABG, thus patient underwent PCI today  According to the sign out to me by the advance practitioner for Cardiology interventionalist, no restart of heparin drip or any anticoagulation today  Tomorrow, we may start the patient on oral anticoagulation  Pros and Cons of the different anticoagulants were discussed with the patient including Coumadin and direct oral anticoagulants  Patient preferred the direct oral anticoagulants      2  Atrial fibrillation:  Status post heparin drip  Continue antiarrhythmic medications  Patient will be started on a direct oral anticoagulant tomorrow  From a discussion with Dr Beryle Puff, he prefers apixaban for this patient      3  Acute hypoxic respiratory failure:  Continue oxygen  Wean as tolerated  Continue nebulizers p r n        4   History of vocal cord paralysis with dysphagia:  Continue modified diet      5   Diabetes mellitus type 2 with hyperglycemia:  Continue insulin management  Will adjust accordingly      6  Hyponatremia, likely due to patient's ischemic cardiomyopathy, and hyperglycemia:  Monitor patient's BMP  Manage patient's ischemic cardiomyopathy  Manage patient's hyperglycemia      7  Hypertension:  Continue blood pressure medications      8  Anemia, presently stable:  Monitor patient's CBC  For further workup and management if this worsens significantly      9   Leukocytosis, likely reactive, secondary to the above problems, particularly problem number 1  above:  Observe off antibiotics  No signs and symptoms of an active infection  Monitor patient's CBC  For further workup and management if this worsens significantly      10  Encephalopathy, likely delirium, presently resolved: Will continue observing the patient  Monitor  For further workup and management if this worsens significantly  11   Urinary continence: For now, will continue with the [place] catheter  Patient also on every 6 hours of Lasix IV  12   Morbidly obese:  Needs to lose weight  VTE Pharmacologic Prophylaxis:   Pharmacologic: Pharmacologic VTE Prophylaxis contraindicated due to As per cardiologist recommendations  Patient just had a PCI today     Patient will be started on direct oral anticoagulant treatment tomorrow     Mechanical: Mechanical VTE prophylaxis in place  Discussions with Specialists or Other Care Team Provider:  Patient's nurse  Case management  Cardiologist   Advance practitioner for cardiologist interventionalist     Education and Discussions with Family / Patient:  Patient  I called patient's son, Alvin Rodriguez, and left a voicemail message for call back  Time Spent for Care: Approximately 35 minutes     More than 50% of total time spent on counseling and coordination of care as described above      Current Length of Stay: 14 day(s)    Current Patient Status: Inpatient Certification Statement: The patient will continue to require additional inpatient hospital stay due to Above findings and plans  Discharge Plan:  None yet today  Code Status: Level 3 - DNAR and DNI      Subjective:   Patient is just status post PCI  Presently, patient feels fine  Patient denies any shortness of breath or any pains at all  However, patient still feels tired  Otherwise no other complaints  Objective:     Vitals:   Temp (24hrs), Av 5 °F (36 4 °C), Min:97 3 °F (36 3 °C), Max:97 6 °F (36 4 °C)    HR:  [52-64] 53  Resp:  [18-20] 20  BP: (114-155)/(55-72) 129/61  SpO2:  [97 %-100 %] 99 %  Body mass index is 38 19 kg/m²  Input and Output Summary (last 24 hours): Intake/Output Summary (Last 24 hours) at 17 1428  Last data filed at 17 1258   Gross per 24 hour   Intake           713 33 ml   Output             3925 ml   Net         -3211 67 ml       Physical Exam:     Physical Exam   Constitutional: She is oriented to person, place, and time  No distress  HENT:   Head: Normocephalic and atraumatic  Eyes: Right eye exhibits no discharge  Left eye exhibits no discharge  No scleral icterus  Neck: No JVD present  No tracheal deviation present  Cardiovascular: Normal rate and regular rhythm  Exam reveals no gallop and no friction rub  No murmur heard  Pulmonary/Chest: Effort normal and breath sounds normal  No stridor  No respiratory distress  She has no wheezes  She has no rales  Abdominal: Bowel sounds are normal  She exhibits no distension  There is no tenderness  There is no rebound and no guarding  Musculoskeletal: She exhibits no edema, tenderness or deformity  Neurological: She is alert and oriented to person, place, and time  No cranial nerve deficit  Skin: Skin is warm  No rash noted  She is not diaphoretic  No erythema  No pallor  Psychiatric: She has a normal mood and affect   Her behavior is normal  Thought content normal    Vitals reviewed  Additional Data:     Labs:      Results from last 7 days  Lab Units 11/16/17  0519 11/15/17  0610   WBC Thousand/uL 14 98* 13 98*   HEMOGLOBIN g/dL 9 8* 9 6*   HEMATOCRIT % 31 8* 31 2*   PLATELETS Thousands/uL 438* 404*   NEUTROS PCT %  --  72   LYMPHS PCT %  --  19   MONOS PCT %  --  7   EOS PCT %  --  2       Results from last 7 days  Lab Units 11/16/17  0518   SODIUM mmol/L 129*   POTASSIUM mmol/L 4 0   CHLORIDE mmol/L 90*   CO2 mmol/L 35*   BUN mg/dL 14   CREATININE mg/dL 0 82   CALCIUM mg/dL 9 2   GLUCOSE RANDOM mg/dL 153*       Results from last 7 days  Lab Units 11/14/17  1326   INR  1 34*       * I Have Reviewed All Lab Data Listed Above  * Additional Pertinent Lab Tests Reviewed: [facility] Admission Reviewed    Imaging:    Imaging Reports Reviewed Today Include:  Diagnostic imaging studies that were done on this admission  Imaging Personally Reviewed by Myself Includes:  None  Cultures:   Blood Culture:   Lab Results   Component Value Date    BLOODCX No Growth After 5 Days  11/02/2017    BLOODCX No Growth After 5 Days   11/02/2017     Urine Culture: No results found for: URINECX  Sputum Culture: No components found for: SPUTUMCX  Wound Culture: No results found for: WOUNDCULT    Last 24 Hours Medication List:     amiodarone 200 mg Oral BID With Meals   aspirin 324 mg Oral Daily   atorvastatin 40 mg Oral Daily   cholecalciferol 1,000 Units Oral Daily   [START ON 11/17/2017] clopidogrel 75 mg Oral Daily   furosemide 40 mg Intravenous Q6H   insulin glargine 30 Units Subcutaneous Q12H [address]   insulin lispro 4-20 Units Subcutaneous HS   insulin lispro 5-25 Units Subcutaneous TID AC   ipratropium 0 5 mg Nebulization TID   levalbuterol 1 25 mg Nebulization TID   levothyroxine 125 mcg Oral Early Morning   melatonin 6 mg Oral HS   metoprolol tartrate 37 5 mg Oral Q12H JUAN LUIS   nystatin  Topical BID   polyethylene glycol 17 g Oral Daily   potassium chloride 20 mEq Oral BID With Meals   senna 2 tablet Oral HS        Today, Patient Was Seen By: Tai Gaston MD    ** Please Note: Dragon 360 Dictation voice to text software may have been used in the creation of this document   **

## 2017-11-16 NOTE — SPEECH THERAPY NOTE
Speech Language/Pathology                             SLP  Note  Patient Name: Lorrie Hanley  OTZVM'[id number] Date: 11/16/2017       Subjective:  Pt seen for dysphagia tx at lunch; s/p cath this am  Diet ordered as regular w/ thin liquids  RN changed back to dysphagia 2 w/ NTL  Objective:  Regular foods cut into small pcs  Upper dentures in place  Pt able to self feed- appeared w/ adequate mastication/manipulation and transfer  Pt also took cup sips of NTL- c/o being thirsty and took successive sips of NTL  Pt noted w/ consistent throat clearing w/ NTL by cup, and delayed coughing after successive sips of NTL by cup  Min throat clearing noted w/ straw sips of NTL and after cues to take smaller sips  Pt ate sherbert  W/o overt s/s aspiration  Assessment:  ? Increased aspiration risk w/ NTL since last initial eval    ? Need for change to HTL  Plan/Recommendations:   Will cont to follow- trial toast for possible upgrade of food  Assess for need to downgrade to HTL

## 2017-11-16 NOTE — RESPIRATORY THERAPY NOTE
RT Protocol Note  Elias Cordero 79 y o  female MRN: [id number]  Unit/Bed#: [facility] 526-01 Encounter: [id number]    Assessment    Principal Problem:    Respiratory failure  Active Problems:    Cardiac arrest    Acute hypercapnic respiratory failure    Torsades de pointes    Prolonged QT interval    NSTEMI (non-ST elevated myocardial infarction)    Ischemic cardiomyopathy    LBBB (left bundle branch block)    CAD (coronary artery disease)    h/o Vocal cord paralysis    HTN (hypertension)    Hyperlipemia    Encephalopathy    Obesity, Class III, BMI 40-49 9 (morbid obesity) ([facility] )    Delirium    Anemia    Hyponatremia    Leukocytosis      Home Pulmonary Medications:  Albuterol mdi    Past Medical History:   Diagnosis Date    Anxiety     Diabetes mellitus ([facility] )     Hypertension     Pancreatitis      Social History     Social History    Marital status: Unknown     Spouse name: N/A    Number of children: N/A    Years of education: N/A     Social History Main Topics    Smoking status: Current Every Day Smoker     Packs/day: 1 00     Years: 40 00     Types: Cigarettes    Smokeless tobacco: Never Used    Alcohol use No    Drug use: No    Sexual activity: Not Asked     Other Topics Concern    None     Social History Narrative    None       Subjective    Subjective Data: pt has no resp c/o    Objective    Physical Exam:   Assessment Type: Pre-treatment  General Appearance: Alert, Awake  Respiratory Pattern: Normal  Chest Assessment: Chest expansion symmetrical  Bilateral Breath Sounds: Diminished, Clear    Vitals:  Blood pressure 131/60, pulse 62, temperature (!) 97 4 °F (36 3 °C), temperature source Oral, resp  rate 20, height 5' 11" (1 803 m), weight 125 kg (275 lb 5 7 oz), SpO2 98 %        Results from last 7 days  Lab Units 11/10/17  0756   PH ART  7 489*   PCO2 ART mm Hg 41 9   PO2 ART mm Hg 103 0   HCO3 ART mmol/L 31 1*   BASE EXC ART mmol/L 7 1   O2 CONTENT ART mL/dL 14 7*   O2 HGB, ARTERIAL % 96 9   ABG SOURCE Radial, Left   ROBB TEST  Yes       Imaging and other studies: I have personally reviewed pertinent reports  O2 Device: BiPAP     Plan    Respiratory Plan: Mild Distress pathway  Airway Clearance Plan: Incentive Spirometer     Resp Comments: Changed frequency from Q6 to TID and Q6prn, pt has no wheezing present for past days

## 2017-11-17 ENCOUNTER — APPOINTMENT (INPATIENT)
Dept: RADIOLOGY | Facility: HOSPITAL | Age: 67
DRG: 224 | End: 2017-11-17
Payer: COMMERCIAL

## 2017-11-17 ENCOUNTER — ANESTHESIA EVENT (INPATIENT)
Dept: NON INVASIVE DIAGNOSTICS | Facility: HOSPITAL | Age: 67
DRG: 224 | End: 2017-11-17
Payer: COMMERCIAL

## 2017-11-17 ENCOUNTER — APPOINTMENT (INPATIENT)
Dept: NON INVASIVE DIAGNOSTICS | Facility: HOSPITAL | Age: 67
DRG: 224 | End: 2017-11-17
Attending: INTERNAL MEDICINE
Payer: COMMERCIAL

## 2017-11-17 PROBLEM — E16.2 HYPOGLYCEMIA: Status: ACTIVE | Noted: 2017-11-17

## 2017-11-17 LAB
ANION GAP SERPL CALCULATED.3IONS-SCNC: 6 MMOL/L (ref 4–13)
BUN SERPL-MCNC: 11 MG/DL (ref 5–25)
CALCIUM SERPL-MCNC: 8.4 MG/DL (ref 8.3–10.1)
CHLORIDE SERPL-SCNC: 97 MMOL/L (ref 100–108)
CO2 SERPL-SCNC: 33 MMOL/L (ref 21–32)
CREAT SERPL-MCNC: 0.82 MG/DL (ref 0.6–1.3)
ERYTHROCYTE [DISTWIDTH] IN BLOOD BY AUTOMATED COUNT: 15.9 % (ref 11.6–15.1)
GFR SERPL CREATININE-BSD FRML MDRD: 74 ML/MIN/1.73SQ M
GLUCOSE SERPL-MCNC: 116 MG/DL (ref 65–140)
GLUCOSE SERPL-MCNC: 156 MG/DL (ref 65–140)
GLUCOSE SERPL-MCNC: 199 MG/DL (ref 65–140)
GLUCOSE SERPL-MCNC: 293 MG/DL (ref 65–140)
GLUCOSE SERPL-MCNC: 54 MG/DL (ref 65–140)
GLUCOSE SERPL-MCNC: 67 MG/DL (ref 65–140)
GLUCOSE SERPL-MCNC: 71 MG/DL (ref 65–140)
GLUCOSE SERPL-MCNC: 76 MG/DL (ref 65–140)
HCT VFR BLD AUTO: 30 % (ref 34.8–46.1)
HGB BLD-MCNC: 9.3 G/DL (ref 11.5–15.4)
MCH RBC QN AUTO: 28.9 PG (ref 26.8–34.3)
MCHC RBC AUTO-ENTMCNC: 31 G/DL (ref 31.4–37.4)
MCV RBC AUTO: 93 FL (ref 82–98)
PLATELET # BLD AUTO: 383 THOUSANDS/UL (ref 149–390)
PMV BLD AUTO: 10 FL (ref 8.9–12.7)
POTASSIUM SERPL-SCNC: 3.4 MMOL/L (ref 3.5–5.3)
RBC # BLD AUTO: 3.22 MILLION/UL (ref 3.81–5.12)
SODIUM SERPL-SCNC: 136 MMOL/L (ref 136–145)
WBC # BLD AUTO: 14.56 THOUSAND/UL (ref 4.31–10.16)

## 2017-11-17 PROCEDURE — 82948 REAGENT STRIP/BLOOD GLUCOSE: CPT

## 2017-11-17 PROCEDURE — 94760 N-INVAS EAR/PLS OXIMETRY 1: CPT

## 2017-11-17 PROCEDURE — C1898 LEAD, PMKR, OTHER THAN TRANS: HCPCS

## 2017-11-17 PROCEDURE — 02HL3KZ INSERTION OF DEFIBRILLATOR LEAD INTO LEFT VENTRICLE, PERCUTANEOUS APPROACH: ICD-10-PCS | Performed by: INTERNAL MEDICINE

## 2017-11-17 PROCEDURE — 71010 HB CHEST X-RAY 1 VIEW FRONTAL (PORTABLE): CPT

## 2017-11-17 PROCEDURE — 33249 INSJ/RPLCMT DEFIB W/LEAD(S): CPT | Performed by: PHYSICIAN ASSISTANT

## 2017-11-17 PROCEDURE — C1769 GUIDE WIRE: HCPCS | Performed by: PHYSICIAN ASSISTANT

## 2017-11-17 PROCEDURE — C1892 INTRO/SHEATH,FIXED,PEEL-AWAY: HCPCS | Performed by: PHYSICIAN ASSISTANT

## 2017-11-17 PROCEDURE — 3E0102A INTRODUCTION OF ANTI-INFECTIVE ENVELOPE INTO SUBCUTANEOUS TISSUE, OPEN APPROACH: ICD-10-PCS | Performed by: INTERNAL MEDICINE

## 2017-11-17 PROCEDURE — 0JH609Z INSERTION OF CARDIAC RESYNCHRONIZATION DEFIBRILLATOR PULSE GENERATOR INTO CHEST SUBCUTANEOUS TISSUE AND FASCIA, OPEN APPROACH: ICD-10-PCS | Performed by: INTERNAL MEDICINE

## 2017-11-17 PROCEDURE — C1887 CATHETER, GUIDING: HCPCS | Performed by: PHYSICIAN ASSISTANT

## 2017-11-17 PROCEDURE — C1730 CATH, EP, 19 OR FEW ELECT: HCPCS | Performed by: PHYSICIAN ASSISTANT

## 2017-11-17 PROCEDURE — 80048 BASIC METABOLIC PNL TOTAL CA: CPT | Performed by: INTERNAL MEDICINE

## 2017-11-17 PROCEDURE — C1777 LEAD, AICD, ENDO SINGLE COIL: HCPCS

## 2017-11-17 PROCEDURE — C1900 LEAD, CORONARY VENOUS: HCPCS

## 2017-11-17 PROCEDURE — C1882 AICD, OTHER THAN SING/DUAL: HCPCS

## 2017-11-17 PROCEDURE — 02H63KZ INSERTION OF DEFIBRILLATOR LEAD INTO RIGHT ATRIUM, PERCUTANEOUS APPROACH: ICD-10-PCS | Performed by: INTERNAL MEDICINE

## 2017-11-17 PROCEDURE — 85027 COMPLETE CBC AUTOMATED: CPT | Performed by: INTERNAL MEDICINE

## 2017-11-17 PROCEDURE — 94640 AIRWAY INHALATION TREATMENT: CPT

## 2017-11-17 PROCEDURE — 33225 L VENTRIC PACING LEAD ADD-ON: CPT | Performed by: PHYSICIAN ASSISTANT

## 2017-11-17 PROCEDURE — 02HK3KZ INSERTION OF DEFIBRILLATOR LEAD INTO RIGHT VENTRICLE, PERCUTANEOUS APPROACH: ICD-10-PCS | Performed by: INTERNAL MEDICINE

## 2017-11-17 RX ORDER — LIDOCAINE HYDROCHLORIDE 10 MG/ML
INJECTION, SOLUTION INFILTRATION; PERINEURAL CODE/TRAUMA/SEDATION MEDICATION
Status: COMPLETED | OUTPATIENT
Start: 2017-11-17 | End: 2017-11-17

## 2017-11-17 RX ORDER — SODIUM CHLORIDE 9 MG/ML
INJECTION, SOLUTION INTRAVENOUS CONTINUOUS PRN
Status: DISCONTINUED | OUTPATIENT
Start: 2017-11-17 | End: 2017-11-17 | Stop reason: SURG

## 2017-11-17 RX ORDER — FENTANYL CITRATE 50 UG/ML
INJECTION, SOLUTION INTRAMUSCULAR; INTRAVENOUS AS NEEDED
Status: DISCONTINUED | OUTPATIENT
Start: 2017-11-17 | End: 2017-11-17 | Stop reason: SURG

## 2017-11-17 RX ORDER — DEXTROSE, SODIUM CHLORIDE, AND POTASSIUM CHLORIDE 5; .45; .15 G/100ML; G/100ML; G/100ML
75 INJECTION INTRAVENOUS CONTINUOUS
Status: DISCONTINUED | OUTPATIENT
Start: 2017-11-17 | End: 2017-11-17

## 2017-11-17 RX ORDER — ONDANSETRON 2 MG/ML
4 INJECTION INTRAMUSCULAR; INTRAVENOUS ONCE AS NEEDED
Status: CANCELLED | OUTPATIENT
Start: 2017-11-17

## 2017-11-17 RX ORDER — POTASSIUM CHLORIDE 29.8 MG/ML
40 INJECTION INTRAVENOUS ONCE
Status: COMPLETED | OUTPATIENT
Start: 2017-11-17 | End: 2017-11-18

## 2017-11-17 RX ORDER — MEPERIDINE HYDROCHLORIDE 25 MG/ML
12.5 INJECTION INTRAMUSCULAR; INTRAVENOUS; SUBCUTANEOUS AS NEEDED
Status: CANCELLED | OUTPATIENT
Start: 2017-11-17

## 2017-11-17 RX ORDER — DEXTROSE MONOHYDRATE 25 G/50ML
INJECTION, SOLUTION INTRAVENOUS
Status: COMPLETED
Start: 2017-11-17 | End: 2017-11-17

## 2017-11-17 RX ORDER — GENTAMICIN SULFATE 40 MG/ML
INJECTION, SOLUTION INTRAMUSCULAR; INTRAVENOUS CODE/TRAUMA/SEDATION MEDICATION
Status: COMPLETED | OUTPATIENT
Start: 2017-11-17 | End: 2017-11-17

## 2017-11-17 RX ORDER — PROPOFOL 10 MG/ML
INJECTION, EMULSION INTRAVENOUS CONTINUOUS PRN
Status: DISCONTINUED | OUTPATIENT
Start: 2017-11-17 | End: 2017-11-17 | Stop reason: SURG

## 2017-11-17 RX ORDER — DEXTROSE MONOHYDRATE 25 G/50ML
50 INJECTION, SOLUTION INTRAVENOUS ONCE
Status: COMPLETED | OUTPATIENT
Start: 2017-11-17 | End: 2017-11-17

## 2017-11-17 RX ORDER — ALBUTEROL SULFATE 2.5 MG/3ML
2.5 SOLUTION RESPIRATORY (INHALATION) ONCE AS NEEDED
Status: CANCELLED | OUTPATIENT
Start: 2017-11-17

## 2017-11-17 RX ORDER — INSULIN GLARGINE 100 [IU]/ML
30 INJECTION, SOLUTION SUBCUTANEOUS
Status: DISCONTINUED | OUTPATIENT
Start: 2017-11-17 | End: 2017-11-19

## 2017-11-17 RX ADMIN — LEVOTHYROXINE SODIUM 125 MCG: 125 TABLET ORAL at 05:52

## 2017-11-17 RX ADMIN — MELATONIN TAB 3 MG 6 MG: 3 TAB at 21:36

## 2017-11-17 RX ADMIN — ALTEPLASE 2 MG: 2.2 INJECTION, POWDER, LYOPHILIZED, FOR SOLUTION INTRAVENOUS at 01:37

## 2017-11-17 RX ADMIN — AMIODARONE HYDROCHLORIDE 200 MG: 200 TABLET ORAL at 17:34

## 2017-11-17 RX ADMIN — ATORVASTATIN CALCIUM 40 MG: 40 TABLET, FILM COATED ORAL at 09:35

## 2017-11-17 RX ADMIN — DEXTROSE, SODIUM CHLORIDE, AND POTASSIUM CHLORIDE 75 ML/HR: 5; .45; .15 INJECTION INTRAVENOUS at 09:17

## 2017-11-17 RX ADMIN — FUROSEMIDE 40 MG: 10 INJECTION, SOLUTION INTRAMUSCULAR; INTRAVENOUS at 12:39

## 2017-11-17 RX ADMIN — VANCOMYCIN HYDROCHLORIDE 1 G: 1 INJECTION, POWDER, LYOPHILIZED, FOR SOLUTION INTRAVENOUS at 14:55

## 2017-11-17 RX ADMIN — CEFAZOLIN SODIUM 3000 MG: 2 SOLUTION INTRAVENOUS at 14:45

## 2017-11-17 RX ADMIN — CLOPIDOGREL BISULFATE 75 MG: 75 TABLET ORAL at 09:35

## 2017-11-17 RX ADMIN — INSULIN LISPRO 2 UNITS: 100 INJECTION, SOLUTION INTRAVENOUS; SUBCUTANEOUS at 17:35

## 2017-11-17 RX ADMIN — FUROSEMIDE 40 MG: 10 INJECTION, SOLUTION INTRAMUSCULAR; INTRAVENOUS at 23:44

## 2017-11-17 RX ADMIN — ALTEPLASE 2 MG: 2.2 INJECTION, POWDER, LYOPHILIZED, FOR SOLUTION INTRAVENOUS at 02:22

## 2017-11-17 RX ADMIN — DEXTROSE MONOHYDRATE 50 ML: 500 INJECTION PARENTERAL at 08:15

## 2017-11-17 RX ADMIN — GENTAMICIN SULFATE 80 MG: 40 INJECTION, SOLUTION INTRAMUSCULAR; INTRAVENOUS at 16:35

## 2017-11-17 RX ADMIN — NYSTATIN: 100000 POWDER TOPICAL at 17:36

## 2017-11-17 RX ADMIN — SODIUM CHLORIDE: 0.9 INJECTION, SOLUTION INTRAVENOUS at 14:35

## 2017-11-17 RX ADMIN — DEXTROSE MONOHYDRATE 50 ML: 25 INJECTION, SOLUTION INTRAVENOUS at 08:15

## 2017-11-17 RX ADMIN — POTASSIUM CHLORIDE 40 MEQ: 400 INJECTION, SOLUTION INTRAVENOUS at 09:27

## 2017-11-17 RX ADMIN — FUROSEMIDE 40 MG: 10 INJECTION, SOLUTION INTRAMUSCULAR; INTRAVENOUS at 17:34

## 2017-11-17 RX ADMIN — ALPRAZOLAM 0.5 MG: 0.5 TABLET ORAL at 01:29

## 2017-11-17 RX ADMIN — PROPOFOL 25 MCG/KG/MIN: 10 INJECTION, EMULSION INTRAVENOUS at 14:35

## 2017-11-17 RX ADMIN — FENTANYL CITRATE 25 MCG: 50 INJECTION, SOLUTION INTRAMUSCULAR; INTRAVENOUS at 14:52

## 2017-11-17 RX ADMIN — OXYCODONE HYDROCHLORIDE 5 MG: 5 TABLET ORAL at 21:34

## 2017-11-17 RX ADMIN — METOPROLOL TARTRATE 37.5 MG: 25 TABLET ORAL at 20:47

## 2017-11-17 RX ADMIN — IPRATROPIUM BROMIDE 0.5 MG: 0.5 SOLUTION RESPIRATORY (INHALATION) at 07:44

## 2017-11-17 RX ADMIN — INSULIN GLARGINE 30 UNITS: 100 INJECTION, SOLUTION SUBCUTANEOUS at 21:34

## 2017-11-17 RX ADMIN — IOHEXOL 20 ML: 350 INJECTION, SOLUTION INTRAVENOUS at 16:34

## 2017-11-17 RX ADMIN — VITAMIN D, TAB 1000IU (100/BT) 1000 UNITS: 25 TAB at 09:35

## 2017-11-17 RX ADMIN — AMIODARONE HYDROCHLORIDE 200 MG: 200 TABLET ORAL at 09:35

## 2017-11-17 RX ADMIN — FUROSEMIDE 40 MG: 10 INJECTION, SOLUTION INTRAMUSCULAR; INTRAVENOUS at 00:28

## 2017-11-17 RX ADMIN — INSULIN LISPRO 2 UNITS: 100 INJECTION, SOLUTION INTRAVENOUS; SUBCUTANEOUS at 21:33

## 2017-11-17 RX ADMIN — LIDOCAINE HYDROCHLORIDE 20 ML: 10 INJECTION, SOLUTION INFILTRATION; PERINEURAL at 14:58

## 2017-11-17 RX ADMIN — NYSTATIN: 100000 POWDER TOPICAL at 09:32

## 2017-11-17 RX ADMIN — DEXTROSE MONOHYDRATE 25 ML: 25 INJECTION, SOLUTION INTRAVENOUS at 06:56

## 2017-11-17 RX ADMIN — POTASSIUM CHLORIDE 20 MEQ: 1500 TABLET, EXTENDED RELEASE ORAL at 17:34

## 2017-11-17 RX ADMIN — LEVALBUTEROL HYDROCHLORIDE 1.25 MG: 1.25 SOLUTION, CONCENTRATE RESPIRATORY (INHALATION) at 07:44

## 2017-11-17 RX ADMIN — ASPIRIN 81 MG 324 MG: 81 TABLET ORAL at 09:35

## 2017-11-17 RX ADMIN — FUROSEMIDE 40 MG: 10 INJECTION, SOLUTION INTRAMUSCULAR; INTRAVENOUS at 05:52

## 2017-11-17 NOTE — PHYSICAL THERAPY NOTE
Pt is currently off the floor in cardiac cath lab (scheduled for BiVICD as per cardiology note); will follow      Clearance Bosworth, PT

## 2017-11-17 NOTE — PROGRESS NOTES
Pt had glucose of 54, NPO for ICD placement today, paged [facility], Dr Narda Bello gave verbal for 1/2 amp of Dextrose to be given to patient  Blood glucose to be taken in 15 mins

## 2017-11-17 NOTE — PROGRESS NOTES
[facility] Progress Note  Patient: Lee Anton 79 y o  female   MRN: [id number]  PCP: No primary care provider on file  [facility] Encounter: [id number]  Date Of Visit: 11/17/17    Assessment:    Principal Problem:    Respiratory failure  Active Problems:    Cardiac arrest    Acute hypercapnic respiratory failure    Torsades de pointes    Prolonged QT interval    NSTEMI (non-ST elevated myocardial infarction)    Ischemic cardiomyopathy    LBBB (left bundle branch block)    CAD (coronary artery disease)    h/o Vocal cord paralysis    HTN (hypertension)    Hyperlipemia    Encephalopathy    Obesity, Class III, BMI 40-49 9 (morbid obesity) (HCC)    Delirium    Anemia    Hyponatremia    Leukocytosis    Hypoglycemia      Plan:    1   Coronary artery disease, with multivessel disease status post cardiac catheterization, 11/15; status post PCI with drug-eluting stent in patient's LAD; status post the VF/VT cardiac arrest:  Continue cardiovascular medications   Continue amiodarone   Continue telemetry   Cardiology on board  According to cardiac surgery, patient is high risk for CABG, thus patient underwent PCI 11/16        2   Atrial fibrillation:  Status post heparin drip   Continue antiarrhythmic medications  Patient will be started on a direct oral anticoagulant once okay with Cardiology  From my discussion with Dr Jalen Carreon, 11/16, he prefers apixaban for this patient  However, with ICD placement today, thus will hold off starting the oral anticoagulant in the meantime      3   Acute hypoxic respiratory failure:  Continue oxygen   Wean as tolerated   Continue nebulizers p r n        4   History of vocal cord paralysis with dysphagia:  Continue modified diet      5   Diabetes mellitus type 2 previously with hyperglycemia; today, patient had hypoglycemic episodes likely due to patient's being NPO:  Continue insulin management   Will adjust accordingly    Today, I held off patient's morning dose of long-acting insulin  I also cut back on patient's insulin sliding scale  I ordered for a stat dose of D50 water  For now, I ordered the patient to be on D5 half-normal saline with potassium      6   Hyponatremia, presently resolved, likely due to patient's ischemic cardiomyopathy, and previous hyperglycemia:  Monitor patient's BMP  Manage patient's ischemic cardiomyopathy   Manage patient's diabetes mellitus      7   Hypertension:  Continue blood pressure medications      8   Anemia, presently stable:  Monitor patient's CBC   For further workup and management if this worsens significantly      9   Leukocytosis, likely reactive, secondary to the above problems, particularly problem number 1  above:  Observe off antibiotics   No signs and symptoms of an active infection   Monitor patient's CBC   For further workup and management if this worsens significantly      10   Encephalopathy, previously due to delirium, resolved; today, patient was noticed to be sleepy and this is may be due to the hypoglycemia:  Will continue observing the patient   Monitor  Please see management patient's hypoglycemia above  For further workup and management if this worsens significantly      11  Urinary continence: For now, will continue with the [place] catheter  Patient still also on every 6 hours of Lasix IV      12  Morbidly obese:  Needs to lose weight  VTE Pharmacologic Prophylaxis:   Pharmacologic: Pharmacologic VTE Prophylaxis contraindicated due to ICD placement today  Mechanical: Mechanical VTE prophylaxis in place  Discussions with Specialists or Other Care Team Provider:  Patient's nurse  Case management  I spoke to the cardiologist about this case  From my discussion with him, no anticoagulation at this point  I also texted him about calling patient's son about the procedure  Education and Discussions with Family / Patient:  Patient    I spoke to the patient's son, Amalia Christopher, and gave updates for today   I answered all questions and concerns  Time Spent for Care: Approx 40 mins     More than 50% of total time spent on counseling and coordination of care as described above  Current Length of Stay: 15 day(s)    Current Patient Status: Inpatient   Certification Statement: The patient will continue to require additional inpatient hospital stay due to Above findings and plans  Discharge Plan:  None yet  Code Status: Level 3 - DNAR and DNI      Subjective:   Presently, patient is hypoglycemic  Patient was placed on NPO for an ICD placement today  Presently, patient is asleep but awakens to name calling and could answer simple questions, but goes back to sleep again  Patient denies any shortness of breath or any pains at this point  Patient denies any chest pains  No other complaints  Objective:     Vitals:   Temp (24hrs), Av 9 °F (36 6 °C), Min:97 4 °F (36 3 °C), Max:98 5 °F (36 9 °C)    HR:  [52-63] 56  Resp:  [16-20] 16  BP: (120-155)/(55-72) 145/66  SpO2:  [91 %-100 %] 95 %  Body mass index is 38 8 kg/m²  Input and Output Summary (last 24 hours): Intake/Output Summary (Last 24 hours) at 17 0837  Last data filed at 17 0600   Gross per 24 hour   Intake              855 ml   Output             3375 ml   Net            -2520 ml       Physical Exam:     Physical Exam   Constitutional: No distress  HENT:   Head: Normocephalic and atraumatic  Eyes: Right eye exhibits no discharge  Left eye exhibits no discharge  No scleral icterus  Neck: No JVD present  No tracheal deviation present  Cardiovascular: Normal rate and regular rhythm  Exam reveals no gallop and no friction rub  No murmur heard  Pulmonary/Chest: Effort normal and breath sounds normal  No stridor  No respiratory distress  She has no wheezes  She has no rales  Abdominal: Soft  Bowel sounds are normal  She exhibits no distension  There is no tenderness  There is no rebound and no guarding  Musculoskeletal: She exhibits no edema, tenderness or deformity  Neurological: No cranial nerve deficit  Presently, patient is asleep but awakens to name calling and was able to answer simple questions appropriately  Skin: Skin is warm  No rash noted  She is not diaphoretic  No erythema  No pallor  Vitals reviewed  Additional Data:     Labs:      Results from last 7 days  Lab Units 11/17/17  0552  11/15/17  0610   WBC Thousand/uL 14 56*  < > 13 98*   HEMOGLOBIN g/dL 9 3*  < > 9 6*   HEMATOCRIT % 30 0*  < > 31 2*   PLATELETS Thousands/uL 383  < > 404*   NEUTROS PCT %  --   --  72   LYMPHS PCT %  --   --  19   MONOS PCT %  --   --  7   EOS PCT %  --   --  2   < > = values in this interval not displayed  Results from last 7 days  Lab Units 11/17/17  0552   SODIUM mmol/L 136   POTASSIUM mmol/L 3 4*   CHLORIDE mmol/L 97*   CO2 mmol/L 33*   BUN mg/dL 11   CREATININE mg/dL 0 82   CALCIUM mg/dL 8 4   GLUCOSE RANDOM mg/dL 67       Results from last 7 days  Lab Units 11/14/17  1326   INR  1 34*       * I Have Reviewed All Lab Data Listed Above  * Additional Pertinent Lab Tests Reviewed: Gloria 66 Admission Reviewed    Imaging:    Imaging Reports Reviewed Today Include:  Diagnostic imaging studies that were done on this admission  Imaging Personally Reviewed by Myself Includes:  None  Cultures:   Blood Culture:   Lab Results   Component Value Date    BLOODCX No Growth After 5 Days  11/02/2017    BLOODCX No Growth After 5 Days   11/02/2017     Urine Culture: No results found for: URINECX  Sputum Culture: No components found for: SPUTUMCX  Wound Culture: No results found for: WOUNDCULT    Last 24 Hours Medication List:     amiodarone 200 mg Oral BID With Meals   aspirin 324 mg Oral Daily   atorvastatin 40 mg Oral Daily   cholecalciferol 1,000 Units Oral Daily   clopidogrel 75 mg Oral Daily   furosemide 40 mg Intravenous Q6H   insulin glargine 30 Units Subcutaneous HS   insulin lispro 1-5 Units Subcutaneous HS   insulin lispro 1-6 Units Subcutaneous TID AC   ipratropium 0 5 mg Nebulization TID   levalbuterol 1 25 mg Nebulization TID   levothyroxine 125 mcg Oral Early Morning   melatonin 6 mg Oral HS   metoprolol tartrate 37 5 mg Oral Q12H JUAN LUIS   nystatin  Topical BID   polyethylene glycol 17 g Oral Daily   potassium chloride 20 mEq Oral BID With Meals   potassium chloride 40 mEq Intravenous Once   senna 2 tablet Oral HS        Today, Patient Was Seen By: Lety Bowman MD    ** Please Note: Dragon 360 Dictation voice to text software may have been used in the creation of this document   **

## 2017-11-17 NOTE — ANESTHESIA PREPROCEDURE EVALUATION
Review of Systems/Medical History  Patient summary reviewed        Cardiovascular  Hyperlipidemia, Hypertension controlled, Past MI , CAD, , Dysrhythmias, , CHF ,    Pulmonary  Negative pulmonary ROS Smoker cigarette smoker , ,        GI/Hepatic  Negative GI/hepatic ROS          Negative  ROS        Endo/Other  Negative endo/other ROS Diabetes well controlled type 2 ,      GYN  Negative gynecology ROS          Hematology  Negative hematology ROS Anemia anemia of chronic disease,     Musculoskeletal  Negative musculoskeletal ROS        Neurology  Negative neurology ROS      Psychology   Negative psychology ROS Anxiety,            Physical Exam    Airway    Mallampati score: IV  TM Distance: >3 FB  Neck ROM: limited     Dental   Comment: Upper dentures,     Cardiovascular      Pulmonary      Other Findings        Anesthesia Plan  ASA Score- 4       Anesthesia Type- IV sedation with anesthesia with ASA Monitors  Additional Monitors:   Airway Plan:     Comment: I have seen the patient and reviewed the history  Patient to receive IV sedation with full ASA monitors  Risks discussed with the patient, consent signed          Induction- intravenous  Informed Consent- Anesthetic plan and risks discussed with patient  I personally reviewed this patient with the CRNA  Discussed and agreed on the Anesthesia Plan with the CRNA  Alvaro Garduno

## 2017-11-17 NOTE — DISCHARGE INSTRUCTIONS
Please refer to post pacemaker/ICD implantation discharge instructions and restrictions and your ICD booklet/temporary card  Keep incision dry for one week  Do not use lotions/powders/creams on incision  Remove outer bandage 48 hours after implantation  Leave underlying steri-strips in place, they will either fall off on their own or will be removed at 2 week follow up appointment  No overhead reaching/pushing/pulling/lifting greater than 5-10lbs with left arm for one month  Please call the office if you notice redness, swelling, bleeding, or drainage from incision or if you develop fevers  Implantable Cardioverter Defibrillator   WHAT YOU NEED TO KNOW:   An implantable cardioverter defibrillator (ICD) is a small device that monitors your heart rate and rhythm  It is placed inside your chest or abdomen  It may be used if you have an arrhythmia  An arrhythmia is an irregular heart rate or a heart rate that is too fast or too slow  Some arrhythmias may cause your heart to suddenly stop beating  An ICD can give a shock to your heart to make it start beating again  It can also make your heart beat faster or slower  DISCHARGE INSTRUCTIONS:   Call 911 for any of the following:   · You have any of the following signs of a heart attack:      ¨ Squeezing, pressure, or pain in your chest that lasts longer than 5 minutes or returns    ¨ Discomfort or pain in your back, neck, jaw, stomach, or arm     ¨ Trouble breathing    ¨ Nausea or vomiting    ¨ Lightheadedness or a sudden cold sweat, especially with chest pain or trouble breathing    · You become weak, dizzy, or faint  · You feel your heart skip beats or beat very fast or slow, but you do not feel a shock from your ICD  · You feel lightheaded, short of breath, and have chest pain  · You cough up blood  · You have trouble breathing  Seek care immediately if:   · Your arm or leg feels warm, tender, and painful   It may look swollen and red     · Your stitches or staples come apart  · Blood soaks through your bandage  · You feel more than 3 shocks in a row from your ICD  Contact your healthcare provider if:   · You have a fever  · You feel 1 or more shocks from your ICD and feel fine afterwards  · Your feet or ankles swell  · The skin around your stitches or staples is red, swollen, or draining pus or fluid  · You have chills, a cough, and feel weak or achy  · You are sad or anxious and find it hard to do your usual activities  · You have questions or concerns about your condition or care  Medicines: You may need any of the following:  · Heart medicine  may be given to strengthen or control your heartbeat  · Blood thinners    help prevent blood clots  Examples of blood thinners include heparin and warfarin  Clots can cause strokes, heart attacks, and death  The following are general safety guidelines to follow while you are taking a blood thinner:    ¨ Watch for bleeding and bruising while you take blood thinners  Watch for bleeding from your gums or nose  Watch for blood in your urine and bowel movements  Use a soft washcloth on your skin, and a soft toothbrush to brush your teeth  This can keep your skin and gums from bleeding  If you shave, use an electric shaver  Do not play contact sports  ¨ Tell your dentist and other healthcare providers that you take anticoagulants  Wear a bracelet or necklace that says you take this medicine  ¨ Do not start or stop any medicines unless your healthcare provider tells you to  Many medicines cannot be used with blood thinners  ¨ Tell your healthcare provider right away if you forget to take the medicine, or if you take too much  ¨ Warfarin  is a blood thinner that you may need to take  The following are things you should be aware of if you take warfarin  § Foods and medicines can affect the amount of warfarin in your blood   Do not make major changes to your diet while you take warfarin  Warfarin works best when you eat about the same amount of vitamin K every day  Vitamin K is found in green leafy vegetables and certain other foods  Ask for more information about what to eat when you are taking warfarin  § You will need to see your healthcare provider for follow-up visits when you are on warfarin  You will need regular blood tests  These tests are used to decide how much medicine you need  · Prescription pain medicine  may be given  Ask your how to take this medicine safely  · Take your medicine as directed  Contact your healthcare provider if you think your medicine is not helping or if you have side effects  Tell him or her if you are allergic to any medicine  Keep a list of the medicines, vitamins, and herbs you take  Include the amounts, and when and why you take them  Bring the list or the pill bottles to follow-up visits  Carry your medicine list with you in case of an emergency  Care for your wound as directed:  Wear loose-fitting clothing over the ICD site  Do not get your wound wet until your healthcare provider says it is okay  Carefully wash the wound with soap and water  Dry the area and put on new, clean bandages as directed  Change your bandages when they get wet or dirty  Do not put powders or lotions over your incision  Check your wound everyday for signs of infections such as swelling, redness, or pus  Self-care:   · Apply ice  on your wound for 15 to 20 minutes every hour or as directed  Use an ice pack, or put crushed ice in a plastic bag  Cover it with a towel  Ice helps prevent tissue damage and decreases swelling and pain  · Do not lift anything heavier than 3 pounds  Lifting may put too much stress on your incision  Ask your healthcare provider when you can lift heavy objects  · Limit the use of your arm nearest to your ICD  Place your arm closest to the ICD in a sling  Wear as directed   This will help decrease swelling and pain  Prop your arm on pillows or blankets when you take off your sling to keep it elevated comfortably  Do not lift your arm closest to your ICD, over your heard for 5 days  Perform gentle range of motion exercises (ROM) exercises as directed to prevent arm and shoulder stiffness  Safety instructions when you have an ICD:  Talk to your healthcare provider about driving and playing sports after you have an ICD placed  The following are instructions to keep you safe with an ICD:  · Carry an ID card for your ICD at all times  This card has important information about your ICD  Healthcare providers need to know if you have an ICD so they can keep you safe during medical procedures and tests  · Wear medical alert jewelry  that says you have an ICD  Ask your healthcare provider where to get these items  · Stay away from magnets or machines with electric fields  Some MRI machines may be safe to use with your ICD  Ask your healthcare provider before you have an MRI  Avoid leaning into a car engine or doing welding  These things can interfere with how your ICD works  · Keep your cell phone and MP3 player away from your ICD  Do not place your cell phone or MP3 player in a breast pocket over your ICD site  Use your cell phone with the ear on the opposite side from your ICD  Wear arm bands with cell phones or MP3 players on the opposite arm from where your ICD is  · Tell airport security you have an ICD  You may need to be searched by hand when you go through a security gate  The security gate or handheld wand could harm your ICD  · Keep an ICD diary  Record when you get a shock and what you were doing before you got the shock  Keep track of how you felt before and after the shock, as well as how many shocks you received  Write down the day and time of each shock   Bring the diary with you when you see your healthcare provider or cardiologist   Learn how to take your pulse:  Check your pulse any time you feel light headed, dizzy, or short of breath  Use the second hand of a clock or a timer  Flip your hand with palms face up  Place your pointer finger and second finger onto the side of your wrist that is under your thumb  Apply gentle pressure  When you feel your pulse, count the number of beats in 15 seconds  Multiply this number by 4 to find the beats per minute  A normal pulse is 60 to 100 beats per minutes  Follow up with your healthcare provider as directed: Your healthcare provider will check your ICD frequently  He will place a special magnet over your ICD to check it  Information will be sent to a computer about your heart rhythm, and how well your ICD is working  Your ICD battery may need to be replaced every 5 to 7 years  Write down your questions so you remember to ask them during your visits  © 2017 [employer] Information is for End User's use only and may not be sold, redistributed or otherwise used for commercial purposes  All illustrations and images included in CareNotes® are the copyrighted property of [employer]  or [employer]ukh Belcher  The above information is an  only  It is not intended as medical advice for individual conditions or treatments  Talk to your doctor, nurse or pharmacist before following any medical regimen to see if it is safe and effective for you

## 2017-11-17 NOTE — CASE MANAGEMENT
PLEASE UPDATE AUTHORIZATION IN [place] WITH NEXT REVIEW DATE ASAP - THANKS ! [address] [facility] in the [facility] by Sukh Belcher for 2017  Network Utilization Review Department  Phone: [phone]; Fax [phone]  ATTENTION: The Network Utilization Review Department is now centralized for our 7 Facilities  Make a note that we have a new phone and fax numbers for our Department  Please call with any questions or concerns to [phone] and carefully follow the prompts so that you are directed to the right person  All voicemails are confidential  Fax any determinations, approvals, denials, and requests for initial or continue stay review clinical to [phone]  Due to HIGH CALL volume, it would be easier if you could please send faxed requests to expedite your requests and in part, help us provide discharge notifications faster          Continued Stay Review    Date: 17 ACUTE MED SURG LEVEL OF CARE    Vital Signs: /60   Pulse (!) 53   Temp (!) 97 1 °F (36 2 °C) (Oral)   Resp 18   Ht 5' 11" (1 803 m)   Wt 126 kg (278 lb 3 5 oz)   SpO2 99%   BMI 38 80 kg/m²     Vitals:   Temp (24hrs), Av 9 °F (36 6 °C), Min:97 4 °F (36 3 °C), Max:98 5 °F (36 9 °C)   HR:  [52-63] 56  Resp:  [16-20] 16  BP: (120-155)/(55-72) 145/66  SpO2:  [91 %-100 %] 95 %  Body mass index is 38 8 kg/m²       Input and Output Summary (last 24 hours): Last data filed at 17 0600    Gross per 24 hour   Intake              855 ml   Output             3375 ml   Net            -2520 ml     NPO    Continuous IV Infusions:   dextrose 5 % and sodium chloride 0 45 % with KCl 20 mEq/L 75 mL/hr Last Rate: 75 mL/hr (17 0917)       Medications:   Scheduled Meds:   amiodarone 200 mg Oral BID With Meals   aspirin 324 mg Oral Daily   atorvastatin 40 mg Oral Daily   cholecalciferol 1,000 Units Oral Daily   clopidogrel 75 mg Oral Daily   furosemide 40 mg Intravenous Q6H insulin glargine 30 Units Subcutaneous HS   insulin lispro 1-5 Units Subcutaneous HS   insulin lispro 1-6 Units Subcutaneous TID AC   ipratropium 0 5 mg Nebulization TID   levalbuterol 1 25 mg Nebulization TID   levothyroxine 125 mcg Oral Early Morning   melatonin 6 mg Oral HS   metoprolol tartrate 37 5 mg Oral Q12H JUAN LUIS   nystatin  Topical BID   polyethylene glycol 17 g Oral Daily   potassium chloride 20 mEq Oral BID With Meals   senna 2 tablet Oral HS       PRN Meds:     Acetaminophen     ALPRAZolam 0 5 mg TID prn given x 1/ 24 hrs    furosemide    ipratropium    levalbuterol    metoprolol    nitroglycerin    oxyCODONE **OR** oxyCODONE    simethicone    trimethobenzamide    LABS/Diagnostic Results:   CBC  Results from last 7 days  Lab Units 11/17/17  0552   11/15/17  0610   WBC Thousand/uL 14 56*  < > 13 98*   HEMOGLOBIN g/dL 9 3*  < > 9 6*   HEMATOCRIT % 30 0*  < > 31 2*   PLATELETS Thousands/uL 383  < > 404*   NEUTROS PCT %  --   --  72   LYMPHS PCT %  --   --  19   MONOS PCT %  --   --  7   EOS PCT %  --   --  2   < > = values in this interval not displayed      CMP  Results from last 7 days  Lab Units 11/17/17  0552   SODIUM mmol/L 136   POTASSIUM mmol/L 3 4*   CHLORIDE mmol/L 97*   CO2 mmol/L 33*   BUN mg/dL 11   CREATININE mg/dL 0 82   CALCIUM mg/dL 8 4   GLUCOSE RANDOM mg/dL 67      Lab Units 11/14/17  1326   INR   1 34*         Age/Sex: 79 y o  female     Assessment/Plan:   Assessment:  Principal Problem:    Respiratory failure  Active Problems:    Cardiac arrest    Acute hypercapnic respiratory failure    Torsades de pointes    Prolonged QT interval    NSTEMI (non-ST elevated myocardial infarction)    Ischemic cardiomyopathy    LBBB (left bundle branch block)    CAD (coronary artery disease)    h/o Vocal cord paralysis    HTN (hypertension)    Hyperlipemia    Encephalopathy    Obesity, Class III, BMI 40-49 9 (morbid obesity) (HCC)    Delirium    Anemia    Hyponatremia    Leukocytosis Hypoglycemia      Plan:   1   Coronary artery disease, with multivessel disease status post cardiac catheterization, 11/15; status post PCI with drug-eluting stent in patient's LAD; status post the VF/VT cardiac arrest:  Continue cardiovascular medications   Continue amiodarone   Continue telemetry   Cardiology on board   According to cardiac surgery, patient is high risk for CABG, thus patient underwent PCI 11/16        2   Atrial fibrillation:  Status post heparin drip   Continue antiarrhythmic medications   Patient will be started on a direct oral anticoagulant once okay with Cardiology   Antelmo my discussion with Dr Agustín Lewis, 11/16, he prefers apixaban for this patient  However, with ICD placement today, thus will hold off starting the oral anticoagulant in the meantime      3   Acute hypoxic respiratory failure:  Continue oxygen   Wean as tolerated   Continue nebulizers p r n        4   History of vocal cord paralysis with dysphagia:  Continue modified diet      5   Diabetes mellitus type 2 previously with hyperglycemia; today, patient had hypoglycemic episodes likely due to patient's being NPO:  Continue insulin management   Will adjust accordingly  Today, I held off patient's morning dose of long-acting insulin  I also cut back on patient's insulin sliding scale  I ordered for a stat dose of D50 water  For now, I ordered the patient to be on D5 half-normal saline with potassium      6   Hyponatremia, presently resolved, likely due to patient's ischemic cardiomyopathy, and previous hyperglycemia:  Monitor patient's BMP   Manage patient's ischemic cardiomyopathy   Manage patient's diabetes mellitus      7   Hypertension:  Continue blood pressure medications      8   Anemia, presently stable:  Monitor patient's CBC   For further workup and management if this worsens significantly      9   Leukocytosis, likely reactive, secondary to the above problems, particularly problem number 1  above:  Observe off antibiotics   No signs and symptoms of an active infection   Monitor patient's CBC   For further workup and management if this worsens significantly      10   Encephalopathy, previously due to delirium, resolved; today, patient was noticed to be sleepy and this is may be due to the hypoglycemia:  Will continue observing the patient   Monitor  Please see management patient's hypoglycemia above  For further workup and management if this worsens significantly      11   Urinary continence:  For now, will continue with the [place] catheter   Patient still also on every 6 hours of Lasix IV      12   Morbidly obese:  Needs to lose weight         VTE Pharmacologic Prophylaxis:   Pharmacologic: Pharmacologic VTE Prophylaxis contraindicated due to ICD placement today  Mechanical: Mechanical VTE prophylaxis in place       Current Length of Stay: 15 day(s)     Current Patient Status: Inpatient   Certification Statement: The patient will continue to require additional inpatient hospital stay due to Above findings and plans        Assessment:  Principal Problem:    Respiratory failure  Active Problems:    Cardiac arrest    Acute hypercapnic respiratory failure    Torsades de pointes    Prolonged QT interval    NSTEMI (non-ST elevated myocardial infarction)    Ischemic cardiomyopathy    LBBB (left bundle branch block)    CAD (coronary artery disease)    h/o Vocal cord paralysis    HTN (hypertension)    Hyperlipemia    Encephalopathy    Obesity, Class III, BMI 40-49 9 (morbid obesity) ([facility] [address] )    Delirium    Anemia    Hyponatremia    Leukocytosis    Hypoglycemia        Cardiology  Progress Notes Date of Service: 11/17/2017  8:42 AM     Impression:  1  NSTEMI - underwent PCI of LAD yesterday  2  Ischemic CMP  - by yesterday's echo, EF 35%  Would benefit from biventricular pacing    3  VT - etiology multifactorial   I do not think that her risk of sudden cardiac death was precipitated solely by ischemia in LAD territory, and would benefit from ICD within 40 days of revascularization  Revascularization did not significantly reduce risk for further VT  4  PAF - restart anticoagulation s/p procedure      Recommendations:  1  Continue current medications  2  Plan on BiVICD today          Discharge Plan:   ANTICIPATE DISCHARGE TO SHORT TERM SKILLED REHAB AT [facility]  WHEN MEDICALLY CLEARED    PT EVAL PENDING    CASE MANAGEMENT FOLLOWING CLOSELY FOR ALL DISCHARGE NEEDS + PLANNING

## 2017-11-17 NOTE — PROGRESS NOTES
Cardiology Progress Note - Shelly Vasquez 79 y o  female MRN: [id number]    Unit/Bed#: [facility] 526-01 Encounter: [id number]        Subjective:    No significant events overnight  Underwent PCI of LAD  No dyspnea  Review of Systems   Cardiovascular: Negative for chest pain, leg swelling and palpitations  Respiratory: Negative for shortness of breath  Objective:   Vitals: Blood pressure 145/66, pulse 56, temperature (!) 97 4 °F (36 3 °C), temperature source Oral, resp  rate 16, height 5' 11" (1 803 m), weight 126 kg (278 lb 3 5 oz), SpO2 95 %  , Body mass index is 38 8 kg/m² , Orthostatic Blood Pressures    Flowsheet Row Most Recent Value   Blood Pressure  145/66 filed at 11/17/2017 0754   Patient Position - Orthostatic VS  Lying filed at 11/17/2017 6654         Systolic (67WGR), NPQ:850 , Min:120 , FYN:026     Diastolic (67QWF), FPT:57, Min:55, Max:72      Intake/Output Summary (Last 24 hours) at 11/17/17 0842  Last data filed at 11/17/17 0600   Gross per 24 hour   Intake              855 ml   Output             3375 ml   Net            -2520 ml     Weight (last 2 days)     Date/Time   Weight    11/17/17 0600  126 (278 22)    11/16/17 0526  124 (273 81)    11/15/17 0600  125 (275 35)              Telemetry Review: NSR    Physical Exam   Cardiovascular: Normal rate, regular rhythm and normal heart sounds  Exam reveals no gallop and no friction rub  No murmur heard  Pulmonary/Chest: Breath sounds normal  She has no wheezes  She has no rales  Musculoskeletal: She exhibits no edema           Laboratory Results:    Results from last 7 days  Lab Units 11/14/17  1259   TROPONIN I ng/mL 0 76*       CBC with diff:   Results from last 7 days  Lab Units 11/17/17  0552 11/16/17  0519 11/15/17  0610 11/14/17  1326 11/14/17  0505 11/13/17  0552 11/12/17  0511 11/11/17  0433   WBC Thousand/uL 14 56* 14 98* 13 98* 16 77* 15 29* 16 56* 13 90* 12 93*   HEMOGLOBIN g/dL 9 3* 9 8* 9 6* 10 4* 9 7* 10 3* 10 2* 10 1* HEMATOCRIT % 30 0* 31 8* 31 2* 33 5* 31 2* 32 5* 32 3* 31 9*   MCV fL 93 92 93 93 92 92 92 91   PLATELETS Thousands/uL 383 438* 404* 484* 385 451* 380 372   MCH pg 28 9 28 4 28 6 28 8 28 7 29 3 29 0 28 9   MCHC g/dL 31 0* 30 8* 30 8* 31 0* 31 1* 31 7 31 6 31 7   RDW % 15 9* 16 3* 16 1* 15 8* 15 7* 15 7* 15 5* 15 2*   MPV fL 10 0 10 3 10 3 10 2 10 0 10 4 10 1 10 6   NRBC AUTO /100 WBCs  --   --  0  --  0 0 0 0         CMP:  Results from last 7 days  Lab Units 11/17/17  0552 11/16/17  0518 11/15/17  0610 11/14/17  0505 11/13/17  0552 11/12/17  0511 11/11/17  0433   SODIUM mmol/L 136 129* 131* 131* 134* 134* 137   POTASSIUM mmol/L 3 4* 4 0 3 6 3 2* 3 9 3 8 3 8   CHLORIDE mmol/L 97* 90* 92* 90* 93* 93* 94*   CO2 mmol/L 33* 35* 35* 35* 34* 36* 34*   ANION GAP mmol/L 6 4 4 6 7 5 9   BUN mg/dL 11 14 16 17 22 22 21   CREATININE mg/dL 0 82 0 82 0 73 0 74 0 87 0 90 0 96   GLUCOSE RANDOM mg/dL 67 153* 164* 124 283* 314* 277*   CALCIUM mg/dL 8 4 9 2 9 4 9 6 9 5 9 8 9 6   EGFR ml/min/1 73sq m 74 74 85 84 69 66 61         BMP:  Results from last 7 days  Lab Units 11/17/17  0552 11/16/17  0518 11/15/17  0610 11/14/17  0505 11/13/17  0552 11/12/17  0511 11/11/17  0433   SODIUM mmol/L 136 129* 131* 131* 134* 134* 137   POTASSIUM mmol/L 3 4* 4 0 3 6 3 2* 3 9 3 8 3 8   CHLORIDE mmol/L 97* 90* 92* 90* 93* 93* 94*   CO2 mmol/L 33* 35* 35* 35* 34* 36* 34*   BUN mg/dL 11 14 16 17 22 22 21   CREATININE mg/dL 0 82 0 82 0 73 0 74 0 87 0 90 0 96   GLUCOSE RANDOM mg/dL 67 153* 164* 124 283* 314* 277*   CALCIUM mg/dL 8 4 9 2 9 4 9 6 9 5 9 8 9 6     Magnesium:   Results from last 7 days  Lab Units 11/15/17  0610 11/14/17  0505   MAGNESIUM mg/dL 2 1 1 9       Coags:   Results from last 7 days  Lab Units 11/16/17  0132 11/15/17  0717 11/14/17  1944 11/14/17  1326 11/14/17  0505 11/13/17  0553 11/12/17  0511   PTT seconds 60* 69* 55* 48* 52* 65* 75*   INR   --   --   --  1 34*  --   --   --          Meds/Allergies     amiodarone 200 mg Oral BID With Meals   aspirin 324 mg Oral Daily   atorvastatin 40 mg Oral Daily   cholecalciferol 1,000 Units Oral Daily   clopidogrel 75 mg Oral Daily   furosemide 40 mg Intravenous Q6H   insulin glargine 30 Units Subcutaneous HS   insulin lispro 1-5 Units Subcutaneous HS   insulin lispro 1-6 Units Subcutaneous TID AC   ipratropium 0 5 mg Nebulization TID   levalbuterol 1 25 mg Nebulization TID   levothyroxine 125 mcg Oral Early Morning   melatonin 6 mg Oral HS   metoprolol tartrate 37 5 mg Oral Q12H JUAN LUIS   nystatin  Topical BID   polyethylene glycol 17 g Oral Daily   potassium chloride 20 mEq Oral BID With Meals   potassium chloride 40 mEq Intravenous Once   senna 2 tablet Oral HS       dextrose 5 % and sodium chloride 0 45 % with KCl 20 mEq/L 75 mL/hr     Prescriptions Prior to Admission   Medication    albuterol (PROVENTIL HFA,VENTOLIN HFA) 90 mcg/act inhaler    atorvastatin (LIPITOR) 40 mg tablet    cetirizine (ZyrTEC) 10 mg tablet    cholecalciferol (VITAMIN D3) 1,000 units tablet    Cyanocobalamin 1000 MCG/ML KIT    escitalopram (LEXAPRO) 10 mg tablet    furosemide (LASIX) 40 mg tablet    gabapentin (NEURONTIN) 600 MG tablet    insulin glargine (LANTUS) 100 units/mL subcutaneous injection    levothyroxine 125 mcg tablet    lisinopril (ZESTRIL) 5 mg tablet    magnesium oxide (MAG-OX) 400 mg    metFORMIN (GLUMETZA) 1000 MG (MOD) 24 hr tablet    metoprolol tartrate (LOPRESSOR) 25 mg tablet    oxyCODONE-acetaminophen (PERCOCET) 5-325 mg per tablet       Assessment:  Principal Problem:    Respiratory failure  Active Problems:    Cardiac arrest    Acute hypercapnic respiratory failure    Torsades de pointes    Prolonged QT interval    NSTEMI (non-ST elevated myocardial infarction)    Ischemic cardiomyopathy    LBBB (left bundle branch block)    CAD (coronary artery disease)    h/o Vocal cord paralysis    HTN (hypertension)    Hyperlipemia    Encephalopathy    Obesity, Class III, BMI 40-49 9 (morbid obesity) ([facility] [address] )    Delirium    Anemia    Hyponatremia    Leukocytosis    Hypoglycemia      Impression:  1  NSTEMI - underwent PCI of LAD yesterday  2  Ischemic CMP  - by yesterday's echo, EF 35%  Would benefit from biventricular pacing  3  VT - etiology multifactorial   I do not think that her risk of sudden cardiac death was precipitated solely by ischemia in LAD territory, and would benefit from ICD within 40 days of revascularization  Revascularization did not significantly reduce risk for further VT  4  PAF - restart anticoagulation s/p procedure      Recommendations:  1  Continue current medications  2  Plan on BiVICD today

## 2017-11-17 NOTE — ANESTHESIA POSTPROCEDURE EVALUATION
Post-Op Assessment Note      CV Status:  Stable    Mental Status:  Alert and awake    Hydration Status:  Euvolemic    PONV Controlled:  Controlled    Airway Patency:  Patent    Post Op Vitals Reviewed: Yes          Staff: Anesthesiologist, CRNA           BP (!) 124/48 (11/17/17 1719)    Temp     Pulse 64 (11/17/17 1719)   Resp (!) 24 (11/17/17 1719)    SpO2 96 % (11/17/17 1719)

## 2017-11-18 ENCOUNTER — APPOINTMENT (INPATIENT)
Dept: RADIOLOGY | Facility: HOSPITAL | Age: 67
DRG: 224 | End: 2017-11-18
Payer: COMMERCIAL

## 2017-11-18 LAB
ANION GAP SERPL CALCULATED.3IONS-SCNC: 3 MMOL/L (ref 4–13)
ATRIAL RATE: 61 BPM
BUN SERPL-MCNC: 12 MG/DL (ref 5–25)
CALCIUM SERPL-MCNC: 9.3 MG/DL (ref 8.3–10.1)
CHLORIDE SERPL-SCNC: 93 MMOL/L (ref 100–108)
CO2 SERPL-SCNC: 37 MMOL/L (ref 21–32)
CREAT SERPL-MCNC: 0.82 MG/DL (ref 0.6–1.3)
ERYTHROCYTE [DISTWIDTH] IN BLOOD BY AUTOMATED COUNT: 16.7 % (ref 11.6–15.1)
GFR SERPL CREATININE-BSD FRML MDRD: 74 ML/MIN/1.73SQ M
GLUCOSE SERPL-MCNC: 172 MG/DL (ref 65–140)
GLUCOSE SERPL-MCNC: 267 MG/DL (ref 65–140)
GLUCOSE SERPL-MCNC: 308 MG/DL (ref 65–140)
GLUCOSE SERPL-MCNC: 324 MG/DL (ref 65–140)
GLUCOSE SERPL-MCNC: 98 MG/DL (ref 65–140)
HCT VFR BLD AUTO: 30.5 % (ref 34.8–46.1)
HGB BLD-MCNC: 9.4 G/DL (ref 11.5–15.4)
MAGNESIUM SERPL-MCNC: 2 MG/DL (ref 1.6–2.6)
MCH RBC QN AUTO: 28.6 PG (ref 26.8–34.3)
MCHC RBC AUTO-ENTMCNC: 30.8 G/DL (ref 31.4–37.4)
MCV RBC AUTO: 93 FL (ref 82–98)
P AXIS: 42 DEGREES
PLATELET # BLD AUTO: 376 THOUSANDS/UL (ref 149–390)
PMV BLD AUTO: 10.3 FL (ref 8.9–12.7)
POTASSIUM SERPL-SCNC: 3.7 MMOL/L (ref 3.5–5.3)
PR INTERVAL: 188 MS
QRS AXIS: 30 DEGREES
QRSD INTERVAL: 160 MS
QT INTERVAL: 396 MS
QTC INTERVAL: 398 MS
RBC # BLD AUTO: 3.29 MILLION/UL (ref 3.81–5.12)
SODIUM SERPL-SCNC: 133 MMOL/L (ref 136–145)
T WAVE AXIS: -56 DEGREES
VENTRICULAR RATE: 61 BPM
WBC # BLD AUTO: 13.47 THOUSAND/UL (ref 4.31–10.16)

## 2017-11-18 PROCEDURE — 71020 HB CHEST X-RAY 2VW FRONTAL&LATL: CPT

## 2017-11-18 PROCEDURE — 94640 AIRWAY INHALATION TREATMENT: CPT

## 2017-11-18 PROCEDURE — 93005 ELECTROCARDIOGRAM TRACING: CPT

## 2017-11-18 PROCEDURE — 85027 COMPLETE CBC AUTOMATED: CPT | Performed by: INTERNAL MEDICINE

## 2017-11-18 PROCEDURE — 80048 BASIC METABOLIC PNL TOTAL CA: CPT | Performed by: INTERNAL MEDICINE

## 2017-11-18 PROCEDURE — 83735 ASSAY OF MAGNESIUM: CPT | Performed by: INTERNAL MEDICINE

## 2017-11-18 PROCEDURE — 82948 REAGENT STRIP/BLOOD GLUCOSE: CPT

## 2017-11-18 PROCEDURE — 94760 N-INVAS EAR/PLS OXIMETRY 1: CPT

## 2017-11-18 RX ORDER — SIMETHICONE 80 MG
80 TABLET,CHEWABLE ORAL EVERY 6 HOURS PRN
Status: DISCONTINUED | OUTPATIENT
Start: 2017-11-18 | End: 2017-11-23 | Stop reason: HOSPADM

## 2017-11-18 RX ORDER — PANTOPRAZOLE SODIUM 40 MG/1
40 TABLET, DELAYED RELEASE ORAL
Status: DISCONTINUED | OUTPATIENT
Start: 2017-11-18 | End: 2017-11-23 | Stop reason: HOSPADM

## 2017-11-18 RX ORDER — ASPIRIN 81 MG/1
81 TABLET, CHEWABLE ORAL DAILY
Status: DISCONTINUED | OUTPATIENT
Start: 2017-11-19 | End: 2017-11-23 | Stop reason: HOSPADM

## 2017-11-18 RX ORDER — FUROSEMIDE 10 MG/ML
40 INJECTION INTRAMUSCULAR; INTRAVENOUS
Status: DISCONTINUED | OUTPATIENT
Start: 2017-11-18 | End: 2017-11-21

## 2017-11-18 RX ADMIN — FUROSEMIDE 40 MG: 10 INJECTION, SOLUTION INTRAMUSCULAR; INTRAVENOUS at 17:20

## 2017-11-18 RX ADMIN — LEVALBUTEROL HYDROCHLORIDE 1.25 MG: 1.25 SOLUTION, CONCENTRATE RESPIRATORY (INHALATION) at 08:00

## 2017-11-18 RX ADMIN — INSULIN LISPRO 3 UNITS: 100 INJECTION, SOLUTION INTRAVENOUS; SUBCUTANEOUS at 21:24

## 2017-11-18 RX ADMIN — IPRATROPIUM BROMIDE 0.5 MG: 0.5 SOLUTION RESPIRATORY (INHALATION) at 13:58

## 2017-11-18 RX ADMIN — INSULIN LISPRO 1 UNITS: 100 INJECTION, SOLUTION INTRAVENOUS; SUBCUTANEOUS at 08:23

## 2017-11-18 RX ADMIN — CLOPIDOGREL BISULFATE 75 MG: 75 TABLET ORAL at 08:23

## 2017-11-18 RX ADMIN — LEVOTHYROXINE SODIUM 125 MCG: 125 TABLET ORAL at 05:33

## 2017-11-18 RX ADMIN — MELATONIN TAB 3 MG 6 MG: 3 TAB at 21:23

## 2017-11-18 RX ADMIN — OXYCODONE HYDROCHLORIDE 5 MG: 5 TABLET ORAL at 12:09

## 2017-11-18 RX ADMIN — INSULIN LISPRO 5 UNITS: 100 INJECTION, SOLUTION INTRAVENOUS; SUBCUTANEOUS at 17:20

## 2017-11-18 RX ADMIN — METOPROLOL TARTRATE 37.5 MG: 25 TABLET ORAL at 21:23

## 2017-11-18 RX ADMIN — LEVALBUTEROL HYDROCHLORIDE 1.25 MG: 1.25 SOLUTION, CONCENTRATE RESPIRATORY (INHALATION) at 19:53

## 2017-11-18 RX ADMIN — FUROSEMIDE 40 MG: 10 INJECTION, SOLUTION INTRAMUSCULAR; INTRAVENOUS at 05:33

## 2017-11-18 RX ADMIN — NYSTATIN: 100000 POWDER TOPICAL at 17:21

## 2017-11-18 RX ADMIN — PANTOPRAZOLE SODIUM 40 MG: 40 TABLET, DELAYED RELEASE ORAL at 14:49

## 2017-11-18 RX ADMIN — IPRATROPIUM BROMIDE 0.5 MG: 0.5 SOLUTION RESPIRATORY (INHALATION) at 19:53

## 2017-11-18 RX ADMIN — INSULIN LISPRO 3 UNITS: 100 INJECTION, SOLUTION INTRAVENOUS; SUBCUTANEOUS at 12:10

## 2017-11-18 RX ADMIN — ASPIRIN 81 MG 324 MG: 81 TABLET ORAL at 08:23

## 2017-11-18 RX ADMIN — INSULIN GLARGINE 30 UNITS: 100 INJECTION, SOLUTION SUBCUTANEOUS at 21:22

## 2017-11-18 RX ADMIN — VITAMIN D, TAB 1000IU (100/BT) 1000 UNITS: 25 TAB at 08:23

## 2017-11-18 RX ADMIN — IPRATROPIUM BROMIDE 0.5 MG: 0.5 SOLUTION RESPIRATORY (INHALATION) at 08:30

## 2017-11-18 RX ADMIN — METOPROLOL TARTRATE 37.5 MG: 25 TABLET ORAL at 08:23

## 2017-11-18 RX ADMIN — INSULIN LISPRO 3 UNITS: 100 INJECTION, SOLUTION INTRAVENOUS; SUBCUTANEOUS at 17:20

## 2017-11-18 RX ADMIN — POTASSIUM CHLORIDE 20 MEQ: 1500 TABLET, EXTENDED RELEASE ORAL at 08:23

## 2017-11-18 RX ADMIN — LEVALBUTEROL HYDROCHLORIDE 1.25 MG: 1.25 SOLUTION, CONCENTRATE RESPIRATORY (INHALATION) at 13:59

## 2017-11-18 RX ADMIN — AMIODARONE HYDROCHLORIDE 200 MG: 200 TABLET ORAL at 08:23

## 2017-11-18 RX ADMIN — POTASSIUM CHLORIDE 20 MEQ: 1500 TABLET, EXTENDED RELEASE ORAL at 17:20

## 2017-11-18 RX ADMIN — AMIODARONE HYDROCHLORIDE 200 MG: 200 TABLET ORAL at 17:20

## 2017-11-18 RX ADMIN — NYSTATIN: 100000 POWDER TOPICAL at 08:22

## 2017-11-18 RX ADMIN — ATORVASTATIN CALCIUM 40 MG: 40 TABLET, FILM COATED ORAL at 08:23

## 2017-11-18 NOTE — PROGRESS NOTES
Cardiology Progress Note - Mario Arcos 79 y o  female MRN: [id number]    [facility] Encounter: [id number]        Subjective:    No significant events overnight  Underwent BiVICD yesterday  Review of Systems   Cardiovascular: Negative for chest pain, leg swelling and palpitations  Respiratory: Negative for shortness of breath  Objective:   Vitals: Blood pressure 144/63, pulse 64, temperature 97 6 °F (36 4 °C), temperature source Oral, resp  rate 18, height 5' 11" (1 803 m), weight 122 kg (269 lb 2 9 oz), SpO2 95 %  , Body mass index is 37 54 kg/m² ,   Orthostatic Blood Pressures    Flowsheet Row Most Recent Value   Blood Pressure  144/63 filed at 11/18/2017 0700   Patient Position - Orthostatic VS  Lying filed at 11/18/2017 9836         Systolic (37NOK), WYW:681 , Min:124 , XXV:890     Diastolic (50BDU), RHW:77, Min:48, Max:66      Intake/Output Summary (Last 24 hours) at 11/18/17 1134  Last data filed at 11/18/17 0900   Gross per 24 hour   Intake             1743 ml   Output             5475 ml   Net            -3732 ml     Weight (last 2 days)     Date/Time   Weight    11/18/17 0553  122 (269 18)    11/17/17 0600  126 (278 22)    11/16/17 0526  124 (273 81)              Telemetry Review: NSR    Physical Exam   Cardiovascular: Normal rate, regular rhythm and normal heart sounds  Exam reveals no gallop and no friction rub  No murmur heard  Pulmonary/Chest: Breath sounds normal  She has no wheezes  She has no rales  Musculoskeletal: She exhibits no edema           Laboratory Results:    Results from last 7 days  Lab Units 11/14/17  1259   TROPONIN I ng/mL 0 76*       CBC with diff:     Results from last 7 days  Lab Units 11/18/17  0509 11/17/17  0552 11/16/17  0519 11/15/17  0610 11/14/17  1326 11/14/17  0505 11/13/17  0552 11/12/17  0511   WBC Thousand/uL 13 47* 14 56* 14 98* 13 98* 16 77* 15 29* 16 56* 13 90*   HEMOGLOBIN g/dL 9 4* 9 3* 9 8* 9 6* 10 4* 9 7* 10 3* 10 2*   HEMATOCRIT % 30 5* 30 0* 31 8* 31 2* 33 5* 31 2* 32 5* 32 3*   MCV fL 93 93 92 93 93 92 92 92   PLATELETS Thousands/uL 376 383 438* 404* 484* 385 451* 380   MCH pg 28 6 28 9 28 4 28 6 28 8 28 7 29 3 29 0   MCHC g/dL 30 8* 31 0* 30 8* 30 8* 31 0* 31 1* 31 7 31 6   RDW % 16 7* 15 9* 16 3* 16 1* 15 8* 15 7* 15 7* 15 5*   MPV fL 10 3 10 0 10 3 10 3 10 2 10 0 10 4 10 1   NRBC AUTO /100 WBCs  --   --   --  0  --  0 0 0         CMP:    Results from last 7 days  Lab Units 11/18/17  0509 11/17/17  0552 11/16/17  0518 11/15/17  0610 11/14/17  0505 11/13/17  0552 11/12/17  0511   SODIUM mmol/L 133* 136 129* 131* 131* 134* 134*   POTASSIUM mmol/L 3 7 3 4* 4 0 3 6 3 2* 3 9 3 8   CHLORIDE mmol/L 93* 97* 90* 92* 90* 93* 93*   CO2 mmol/L 37* 33* 35* 35* 35* 34* 36*   ANION GAP mmol/L 3* 6 4 4 6 7 5   BUN mg/dL 12 11 14 16 17 22 22   CREATININE mg/dL 0 82 0 82 0 82 0 73 0 74 0 87 0 90   GLUCOSE RANDOM mg/dL 98 67 153* 164* 124 283* 314*   CALCIUM mg/dL 9 3 8 4 9 2 9 4 9 6 9 5 9 8   EGFR ml/min/1 73sq m 74 74 74 85 84 69 66         BMP:    Results from last 7 days  Lab Units 11/18/17  0509 11/17/17  0552 11/16/17  0518 11/15/17  0610 11/14/17  0505 11/13/17  0552 11/12/17  0511   SODIUM mmol/L 133* 136 129* 131* 131* 134* 134*   POTASSIUM mmol/L 3 7 3 4* 4 0 3 6 3 2* 3 9 3 8   CHLORIDE mmol/L 93* 97* 90* 92* 90* 93* 93*   CO2 mmol/L 37* 33* 35* 35* 35* 34* 36*   BUN mg/dL 12 11 14 16 17 22 22   CREATININE mg/dL 0 82 0 82 0 82 0 73 0 74 0 87 0 90   GLUCOSE RANDOM mg/dL 98 67 153* 164* 124 283* 314*   CALCIUM mg/dL 9 3 8 4 9 2 9 4 9 6 9 5 9 8     Magnesium:     Results from last 7 days  Lab Units 11/18/17  0509 11/15/17  0610 11/14/17  0505   MAGNESIUM mg/dL 2 0 2 1 1 9       Coags:     Results from last 7 days  Lab Units 11/16/17  0132 11/15/17  0717 11/14/17  1944 11/14/17  1326 11/14/17  0505 11/13/17  0553 11/12/17  0511   PTT seconds 60* 69* 55* 48* 52* 65* 75*   INR   --   --   --  1 34*  --   --   --          Meds/Allergies     amiodarone 200 mg Oral BID With Meals   aspirin 324 mg Oral Daily   atorvastatin 40 mg Oral Daily   cholecalciferol 1,000 Units Oral Daily   clopidogrel 75 mg Oral Daily   furosemide 40 mg Intravenous Q6H   insulin glargine 30 Units Subcutaneous HS   insulin lispro 1-5 Units Subcutaneous HS   insulin lispro 1-6 Units Subcutaneous TID AC   ipratropium 0 5 mg Nebulization TID   levalbuterol 1 25 mg Nebulization TID   levothyroxine 125 mcg Oral Early Morning   melatonin 6 mg Oral HS   metoprolol tartrate 37 5 mg Oral Q12H JUAN LUIS   nystatin  Topical BID   polyethylene glycol 17 g Oral Daily   potassium chloride 20 mEq Oral BID With Meals   senna 2 tablet Oral HS        Prescriptions Prior to Admission   Medication    albuterol (PROVENTIL HFA,VENTOLIN HFA) 90 mcg/act inhaler    atorvastatin (LIPITOR) 40 mg tablet    cetirizine (ZyrTEC) 10 mg tablet    cholecalciferol (VITAMIN D3) 1,000 units tablet    Cyanocobalamin 1000 MCG/ML KIT    escitalopram (LEXAPRO) 10 mg tablet    furosemide (LASIX) 40 mg tablet    gabapentin (NEURONTIN) 600 MG tablet    insulin glargine (LANTUS) 100 units/mL subcutaneous injection    levothyroxine 125 mcg tablet    lisinopril (ZESTRIL) 5 mg tablet    magnesium oxide (MAG-OX) 400 mg    metFORMIN (GLUMETZA) 1000 MG (MOD) 24 hr tablet    metoprolol tartrate (LOPRESSOR) 25 mg tablet    oxyCODONE-acetaminophen (PERCOCET) 5-325 mg per tablet       Assessment:  Principal Problem:    Respiratory failure  Active Problems:    Cardiac arrest    Acute hypercapnic respiratory failure    Torsades de pointes    Prolonged QT interval    NSTEMI (non-ST elevated myocardial infarction)    Ischemic cardiomyopathy    LBBB (left bundle branch block)    CAD (coronary artery disease)    h/o Vocal cord paralysis    HTN (hypertension)    Hyperlipemia    Encephalopathy    Obesity, Class III, BMI 40-49 9 (morbid obesity) (HCC)    Delirium    Anemia    Hyponatremia    Leukocytosis    Hypoglycemia      Impression:  1  NSTEMI - underwent PCI of LAD yesterday  2  Ischemic CMP  - by yesterday's echo, EF 35%  S/P BiVICD  3  VT - etiology multifactorial   I do not think that her risk of sudden cardiac death was precipitated solely by ischemia in LAD territory, and would benefit from ICD within 40 days of revascularization  Revascularization did not significantly reduce risk for further VT  4  PAF - in NSR     Recommendations:  1  Reduce IV diuretics  2  Will start anticoagulation tomorrow  3  Continue remainder of medications

## 2017-11-18 NOTE — PROGRESS NOTES
[facility] Progress Note  Patient: Jessie Jimenez 79 y o  female   MRN: [id number]  PCP: No primary care provider on file  Unit/Bed#: [facility] 526-01 Encounter: [id number]  Date Of Visit: 11/18/17    Assessment:    Principal Problem:    Respiratory failure  Active Problems:    Cardiac arrest    Acute hypercapnic respiratory failure    Torsades de pointes    Prolonged QT interval    NSTEMI (non-ST elevated myocardial infarction)    Ischemic cardiomyopathy    LBBB (left bundle branch block)    CAD (coronary artery disease)    h/o Vocal cord paralysis    HTN (hypertension)    Hyperlipemia    Encephalopathy    Obesity, Class III, BMI 40-49 9 (morbid obesity) ([address] )    Delirium    Anemia    Hyponatremia    Leukocytosis    Hypoglycemia      Plan:    1   Coronary artery disease, with multivessel disease status post cardiac catheterization, 11/15; status post PCI with drug-eluting stent in patient's LAD; status post the VF/VT cardiac arrest:  Continue cardiovascular medications   Continue amiodarone   Continue telemetry   Cardiology on board   According to cardiac surgery, patient is high risk for CABG, thus patient underwent PCI 11/16  Thus presently, patient is both on aspirin and Plavix      2   Atrial fibrillation:  Status post heparin drip   Continue antiarrhythmic medications   Patient will be started on a direct oral anticoagulant once okay with Cardiology  Ez Celayarow my discussion with Dr Abdelrahman Martinez, cardiologist, today, apixaban will be started tomorrow, 11/19  According to him, patient's aspirin will be cut back to 81 mg once patient is on apixaban  Continue Plavix  3   Acute hypoxic respiratory failure, resolved:  Continue oxygen p r n     Continue nebulizers p r n        4   History of vocal cord paralysis with dysphagia:  Continue modified diet      5   Diabetes mellitus type 2 previously with hyperglycemia; had hypoglycemic episodes yesterday, 11/17, resolved:  Continue basal bolus insulin treatment  We will adjust this accordingly      6   Hyponatremia, likely due to patient's ischemic cardiomyopathy, hyperglycemia and D5 containing IV fluids yesterday:  Monitor patient's BMP  Manage patient's ischemic cardiomyopathy   Manage patient's diabetes mellitus  IV fluids were stopped      7   Hypertension:  Continue blood pressure medications      8   Anemia, presently stable:  Monitor patient's CBC   For further workup and management if this worsens significantly      9   Leukocytosis, likely reactive, secondary to the above problems, particularly problem number 1 above:  Observe off antibiotics   No signs and symptoms of an active infection   Monitor patient's CBC   For further workup and management if this worsens significantly      10   Encephalopathy, previously due to delirium, resolved; yesterday, this was due to hypoglycemia, resolved:  Will continue observing the patient   Monitor        11   Urinary continence:  Patient is still on Beckford catheter as patient was on aggressive doses of IV Lasix the previous days  Presently, patient's Lasix dose was decreased  Thus I may be able to discontinue patient's Beckford catheter now      12   Morbidly obese:  Needs to lose weight  15   Ischemic cardiomyopathy; acute systolic congestive heart failure with an ejection fraction of 35%:  Patient underwent biventricular ICD placement yesterday, 11/17  Continue heart failure medications  Cardiology on board  14   Dyspepsia, with abdominal gas:  Simethicone p r n  John Spinner Protonix  VTE Pharmacologic Prophylaxis:   Pharmacologic: Patient will be started on apixaban tomorrow  For now, no anticoagulation  Case discussed with Cardiology  Mechanical: Mechanical VTE prophylaxis in place  Discussions with Specialists or Other Care Team Provider:  Patient's nurse  Dr Gisella Burgos, cardiologist     Education and Discussions with Family / Patient:  Patient    I called patient's son, Chicho Conner, and left a voicemail message for call back  Time Spent for Care: 30 minutes  More than 50% of total time spent on counseling and coordination of care as described above  Current Length of Stay: 16 day(s)    Current Patient Status: Inpatient   Certification Statement: The patient will continue to require additional inpatient hospital stay due to Above findings and plans    Discharge Plan:  None yet today  Likely in the next 48 hours, once cleared by Cardiology  Code Status: Level 3 - DNAR and DNI      Subjective:   Presently, patient is doing fine and feels better  Patient denies any shortness of breath  Patient has occasional pains at the ICD site  Patient complains of discomfort and gas in her epigastric area  Otherwise no other complaints  Patient is not on oxygen anymore and has good oxygen saturations  Objective:     Vitals:   Temp (24hrs), Av 2 °F (36 8 °C), Min:97 4 °F (36 3 °C), Max:98 8 °F (37 1 °C)    HR:  [60-68] 67  Resp:  [18-24] 18  BP: (124-151)/(48-66) 141/58  SpO2:  [91 %-96 %] 94 %  Body mass index is 37 54 kg/m²  Input and Output Summary (last 24 hours): Intake/Output Summary (Last 24 hours) at 17 1301  Last data filed at 17 1209   Gross per 24 hour   Intake             2103 ml   Output             6075 ml   Net            -3972 ml       Physical Exam:     Physical Exam   Constitutional: She is oriented to person, place, and time  No distress  Obese  HENT:   Head: Normocephalic and atraumatic  Eyes: Right eye exhibits no discharge  Left eye exhibits no discharge  No scleral icterus  Neck: No JVD present  No tracheal deviation present  Cardiovascular: Normal rate  Exam reveals no gallop and no friction rub  No murmur heard  Irregularly irregular heart rhythm  Pulmonary/Chest: Effort normal and breath sounds normal  No stridor  No respiratory distress  She has no wheezes  She has no rales  Abdominal: Soft  Bowel sounds are normal  She exhibits no distension   There is no tenderness  There is no rebound and no guarding  Obese  Musculoskeletal: She exhibits no edema, tenderness or deformity  Neurological: She is alert and oriented to person, place, and time  No cranial nerve deficit  Patient's coherent  Skin: Skin is warm  No rash noted  She is not diaphoretic  No erythema  No pallor  Psychiatric: She has a normal mood and affect  Her behavior is normal  Thought content normal    Vitals reviewed  Additional Data:     Labs:      Results from last 7 days  Lab Units 11/18/17  0509  11/15/17  0610   WBC Thousand/uL 13 47*  < > 13 98*   HEMOGLOBIN g/dL 9 4*  < > 9 6*   HEMATOCRIT % 30 5*  < > 31 2*   PLATELETS Thousands/uL 376  < > 404*   NEUTROS PCT %  --   --  72   LYMPHS PCT %  --   --  19   MONOS PCT %  --   --  7   EOS PCT %  --   --  2   < > = values in this interval not displayed  Results from last 7 days  Lab Units 11/18/17  0509   SODIUM mmol/L 133*   POTASSIUM mmol/L 3 7   CHLORIDE mmol/L 93*   CO2 mmol/L 37*   BUN mg/dL 12   CREATININE mg/dL 0 82   CALCIUM mg/dL 9 3   GLUCOSE RANDOM mg/dL 98       Results from last 7 days  Lab Units 11/14/17  1326   INR  1 34*       * I Have Reviewed All Lab Data Listed Above  * Additional Pertinent Lab Tests Reviewed: [facility] Admission Reviewed    Imaging:    Imaging Reports Reviewed Today Include:  Diagnostic imaging studies that were done on this admission  Imaging Personally Reviewed by Myself Includes:  None  Cultures:   Blood Culture:   Lab Results   Component Value Date    BLOODCX No Growth After 5 Days  11/02/2017    BLOODCX No Growth After 5 Days   11/02/2017     Urine Culture: No results found for: URINECX  Sputum Culture: No components found for: SPUTUMCX  Wound Culture: No results found for: WOUNDCULT    Last 24 Hours Medication List:     amiodarone 200 mg Oral BID With Meals   aspirin 324 mg Oral Daily   atorvastatin 40 mg Oral Daily   cholecalciferol 1,000 Units Oral Daily clopidogrel 75 mg Oral Daily   furosemide 40 mg Intravenous BID (diuretic)   insulin glargine 30 Units Subcutaneous HS   insulin lispro 1-5 Units Subcutaneous HS   insulin lispro 1-6 Units Subcutaneous TID AC   ipratropium 0 5 mg Nebulization TID   levalbuterol 1 25 mg Nebulization TID   levothyroxine 125 mcg Oral Early Morning   melatonin 6 mg Oral HS   metoprolol tartrate 37 5 mg Oral Q12H JUAN LUIS   nystatin  Topical BID   polyethylene glycol 17 g Oral Daily   potassium chloride 20 mEq Oral BID With Meals   senna 2 tablet Oral HS        Today, Patient Was Seen By: Machelle Sands MD    ** Please Note: Dragon 360 Dictation voice to text software may have been used in the creation of this document   **

## 2017-11-18 NOTE — PROGRESS NOTES
Spoke with BETHANY Puri, about placing picc line - on Right arm, Since pt had picc removed from Left arm, for ICD placement on 11/17  Pt has IV access now, that pt should go to IR for picc if needed    She stated they will hold off now for picc and re-eval with MD

## 2017-11-19 ENCOUNTER — APPOINTMENT (INPATIENT)
Dept: RADIOLOGY | Facility: HOSPITAL | Age: 67
DRG: 224 | End: 2017-11-19
Payer: COMMERCIAL

## 2017-11-19 LAB
ANION GAP SERPL CALCULATED.3IONS-SCNC: 4 MMOL/L (ref 4–13)
ATRIAL RATE: 60 BPM
BUN SERPL-MCNC: 10 MG/DL (ref 5–25)
CALCIUM SERPL-MCNC: 9.3 MG/DL (ref 8.3–10.1)
CHLORIDE SERPL-SCNC: 89 MMOL/L (ref 100–108)
CK SERPL-CCNC: 39 U/L (ref 26–192)
CK SERPL-CCNC: 51 U/L (ref 26–192)
CO2 SERPL-SCNC: 33 MMOL/L (ref 21–32)
CREAT SERPL-MCNC: 0.8 MG/DL (ref 0.6–1.3)
ERYTHROCYTE [DISTWIDTH] IN BLOOD BY AUTOMATED COUNT: 17.1 % (ref 11.6–15.1)
GFR SERPL CREATININE-BSD FRML MDRD: 77 ML/MIN/1.73SQ M
GLUCOSE SERPL-MCNC: 211 MG/DL (ref 65–140)
GLUCOSE SERPL-MCNC: 217 MG/DL (ref 65–140)
GLUCOSE SERPL-MCNC: 334 MG/DL (ref 65–140)
GLUCOSE SERPL-MCNC: 341 MG/DL (ref 65–140)
GLUCOSE SERPL-MCNC: 364 MG/DL (ref 65–140)
HCT VFR BLD AUTO: 32.1 % (ref 34.8–46.1)
HGB BLD-MCNC: 10.3 G/DL (ref 11.5–15.4)
MAGNESIUM SERPL-MCNC: 2.1 MG/DL (ref 1.6–2.6)
MCH RBC QN AUTO: 29.3 PG (ref 26.8–34.3)
MCHC RBC AUTO-ENTMCNC: 32.1 G/DL (ref 31.4–37.4)
MCV RBC AUTO: 92 FL (ref 82–98)
PHOSPHATE SERPL-MCNC: 3 MG/DL (ref 2.3–4.1)
PLATELET # BLD AUTO: 330 THOUSANDS/UL (ref 149–390)
PMV BLD AUTO: 10.2 FL (ref 8.9–12.7)
POTASSIUM SERPL-SCNC: 4.2 MMOL/L (ref 3.5–5.3)
PR INTERVAL: 174 MS
QRS AXIS: 145 DEGREES
QRSD INTERVAL: 170 MS
QT INTERVAL: 544 MS
QTC INTERVAL: 544 MS
RBC # BLD AUTO: 3.51 MILLION/UL (ref 3.81–5.12)
SODIUM SERPL-SCNC: 126 MMOL/L (ref 136–145)
T WAVE AXIS: -4 DEGREES
TROPONIN I SERPL-MCNC: 0.14 NG/ML
VENTRICULAR RATE: 60 BPM
WBC # BLD AUTO: 13.21 THOUSAND/UL (ref 4.31–10.16)

## 2017-11-19 PROCEDURE — 93005 ELECTROCARDIOGRAM TRACING: CPT

## 2017-11-19 PROCEDURE — 80048 BASIC METABOLIC PNL TOTAL CA: CPT | Performed by: INTERNAL MEDICINE

## 2017-11-19 PROCEDURE — 94760 N-INVAS EAR/PLS OXIMETRY 1: CPT

## 2017-11-19 PROCEDURE — 84100 ASSAY OF PHOSPHORUS: CPT | Performed by: HOSPITALIST

## 2017-11-19 PROCEDURE — 85027 COMPLETE CBC AUTOMATED: CPT | Performed by: INTERNAL MEDICINE

## 2017-11-19 PROCEDURE — 84484 ASSAY OF TROPONIN QUANT: CPT | Performed by: HOSPITALIST

## 2017-11-19 PROCEDURE — 82550 ASSAY OF CK (CPK): CPT | Performed by: HOSPITALIST

## 2017-11-19 PROCEDURE — 71010 HB CHEST X-RAY 1 VIEW FRONTAL (PORTABLE): CPT

## 2017-11-19 PROCEDURE — G8987 SELF CARE CURRENT STATUS: HCPCS

## 2017-11-19 PROCEDURE — 97167 OT EVAL HIGH COMPLEX 60 MIN: CPT

## 2017-11-19 PROCEDURE — 94640 AIRWAY INHALATION TREATMENT: CPT

## 2017-11-19 PROCEDURE — 83735 ASSAY OF MAGNESIUM: CPT | Performed by: HOSPITALIST

## 2017-11-19 PROCEDURE — 82948 REAGENT STRIP/BLOOD GLUCOSE: CPT

## 2017-11-19 PROCEDURE — G8988 SELF CARE GOAL STATUS: HCPCS

## 2017-11-19 RX ORDER — INSULIN GLARGINE 100 [IU]/ML
40 INJECTION, SOLUTION SUBCUTANEOUS
Status: DISCONTINUED | OUTPATIENT
Start: 2017-11-19 | End: 2017-11-20

## 2017-11-19 RX ADMIN — OXYCODONE HYDROCHLORIDE 5 MG: 5 TABLET ORAL at 14:43

## 2017-11-19 RX ADMIN — IPRATROPIUM BROMIDE 0.5 MG: 0.5 SOLUTION RESPIRATORY (INHALATION) at 14:05

## 2017-11-19 RX ADMIN — APIXABAN 5 MG: 5 TABLET, FILM COATED ORAL at 17:34

## 2017-11-19 RX ADMIN — INSULIN LISPRO 3 UNITS: 100 INJECTION, SOLUTION INTRAVENOUS; SUBCUTANEOUS at 21:41

## 2017-11-19 RX ADMIN — AMIODARONE HYDROCHLORIDE 200 MG: 200 TABLET ORAL at 08:03

## 2017-11-19 RX ADMIN — POTASSIUM CHLORIDE 20 MEQ: 1500 TABLET, EXTENDED RELEASE ORAL at 17:34

## 2017-11-19 RX ADMIN — FUROSEMIDE 40 MG: 10 INJECTION, SOLUTION INTRAMUSCULAR; INTRAVENOUS at 08:04

## 2017-11-19 RX ADMIN — MELATONIN TAB 3 MG 6 MG: 3 TAB at 21:42

## 2017-11-19 RX ADMIN — ASPIRIN 81 MG 81 MG: 81 TABLET ORAL at 08:04

## 2017-11-19 RX ADMIN — NYSTATIN: 100000 POWDER TOPICAL at 08:05

## 2017-11-19 RX ADMIN — INSULIN LISPRO 2 UNITS: 100 INJECTION, SOLUTION INTRAVENOUS; SUBCUTANEOUS at 08:04

## 2017-11-19 RX ADMIN — VITAMIN D, TAB 1000IU (100/BT) 1000 UNITS: 25 TAB at 08:03

## 2017-11-19 RX ADMIN — NYSTATIN: 100000 POWDER TOPICAL at 17:35

## 2017-11-19 RX ADMIN — ATORVASTATIN CALCIUM 40 MG: 40 TABLET, FILM COATED ORAL at 08:03

## 2017-11-19 RX ADMIN — INSULIN GLARGINE 40 UNITS: 100 INJECTION, SOLUTION SUBCUTANEOUS at 21:42

## 2017-11-19 RX ADMIN — FUROSEMIDE 40 MG: 10 INJECTION, SOLUTION INTRAMUSCULAR; INTRAVENOUS at 17:34

## 2017-11-19 RX ADMIN — CLOPIDOGREL BISULFATE 75 MG: 75 TABLET ORAL at 08:04

## 2017-11-19 RX ADMIN — LEVALBUTEROL HYDROCHLORIDE 1.25 MG: 1.25 SOLUTION, CONCENTRATE RESPIRATORY (INHALATION) at 07:36

## 2017-11-19 RX ADMIN — IPRATROPIUM BROMIDE 0.5 MG: 0.5 SOLUTION RESPIRATORY (INHALATION) at 07:36

## 2017-11-19 RX ADMIN — PANTOPRAZOLE SODIUM 40 MG: 40 TABLET, DELAYED RELEASE ORAL at 05:09

## 2017-11-19 RX ADMIN — LEVOTHYROXINE SODIUM 125 MCG: 125 TABLET ORAL at 05:09

## 2017-11-19 RX ADMIN — NITROGLYCERIN 0.4 MG: 0.4 TABLET SUBLINGUAL at 06:41

## 2017-11-19 RX ADMIN — INSULIN LISPRO 3 UNITS: 100 INJECTION, SOLUTION INTRAVENOUS; SUBCUTANEOUS at 08:04

## 2017-11-19 RX ADMIN — LEVALBUTEROL HYDROCHLORIDE 1.25 MG: 1.25 SOLUTION, CONCENTRATE RESPIRATORY (INHALATION) at 14:05

## 2017-11-19 RX ADMIN — AMIODARONE HYDROCHLORIDE 200 MG: 200 TABLET ORAL at 17:34

## 2017-11-19 RX ADMIN — LEVALBUTEROL HYDROCHLORIDE 1.25 MG: 1.25 SOLUTION, CONCENTRATE RESPIRATORY (INHALATION) at 19:47

## 2017-11-19 RX ADMIN — OXYCODONE HYDROCHLORIDE 5 MG: 5 TABLET ORAL at 03:32

## 2017-11-19 RX ADMIN — METOPROLOL TARTRATE 37.5 MG: 25 TABLET ORAL at 08:03

## 2017-11-19 RX ADMIN — INSULIN LISPRO 5 UNITS: 100 INJECTION, SOLUTION INTRAVENOUS; SUBCUTANEOUS at 17:35

## 2017-11-19 RX ADMIN — APIXABAN 5 MG: 5 TABLET, FILM COATED ORAL at 11:57

## 2017-11-19 RX ADMIN — METOPROLOL TARTRATE 37.5 MG: 25 TABLET ORAL at 21:44

## 2017-11-19 RX ADMIN — IPRATROPIUM BROMIDE 0.5 MG: 0.5 SOLUTION RESPIRATORY (INHALATION) at 19:47

## 2017-11-19 RX ADMIN — POTASSIUM CHLORIDE 20 MEQ: 1500 TABLET, EXTENDED RELEASE ORAL at 08:03

## 2017-11-19 RX ADMIN — ALPRAZOLAM 0.5 MG: 0.5 TABLET ORAL at 10:12

## 2017-11-19 RX ADMIN — INSULIN LISPRO 6 UNITS: 100 INJECTION, SOLUTION INTRAVENOUS; SUBCUTANEOUS at 11:58

## 2017-11-19 RX ADMIN — POLYETHYLENE GLYCOL 3350 17 G: 17 POWDER, FOR SOLUTION ORAL at 08:04

## 2017-11-19 NOTE — PROGRESS NOTES
Cardiology Progress Note - Rufino Perry 79 y o  female MRN: [id number]    Unit/Bed#: [facility] 526-01 Encounter: [id number]        Subjective: Had episode of chest pain overnight  Patient with intermittent diaphragmatic stimulation from pacemaker  Review of Systems   Cardiovascular: Negative for chest pain, leg swelling and palpitations  Respiratory: Negative for shortness of breath  Objective:   Vitals: Blood pressure 153/68, pulse 63, temperature 97 8 °F (36 6 °C), resp  rate 18, height 5' 11" (1 803 m), weight 122 kg (269 lb 13 5 oz), SpO2 94 %  , Body mass index is 37 64 kg/m² ,   Orthostatic Blood Pressures    Flowsheet Row Most Recent Value   Blood Pressure  153/68 filed at 11/19/2017 0704   Patient Position - Orthostatic VS  Lying filed at 11/18/2017 9793         Systolic (50DTY), QAW:205 , Min:112 , RBD:256     Diastolic (33LUJ), JYS:34, Min:58, Max:72      Intake/Output Summary (Last 24 hours) at 11/19/17 1035  Last data filed at 11/19/17 0908   Gross per 24 hour   Intake              960 ml   Output             3130 ml   Net            -2170 ml     Weight (last 2 days)     Date/Time   Weight    11/19/17 0513  122 (269 84)    11/18/17 0553  122 (269 18)    11/17/17 0600  126 (278 22)              Telemetry Review: NSR    Physical Exam   Cardiovascular: Normal rate, regular rhythm and normal heart sounds  Exam reveals no gallop and no friction rub  No murmur heard  Pulmonary/Chest: Breath sounds normal  She has no wheezes  She has no rales  Musculoskeletal: She exhibits no edema           Laboratory Results:    Results from last 7 days  Lab Units 11/19/17  0714 11/19/17  0440 11/14/17  1259   CK TOTAL U/L 39 51  --    TROPONIN I ng/mL 0 14*  --  0 76*       CBC with diff:     Results from last 7 days  Lab Units 11/19/17  0440 11/18/17  0509 11/17/17  0552 11/16/17  0519 11/15/17  0610 11/14/17  1326 11/14/17  0505 11/13/17  0552   WBC Thousand/uL 13 21* 13 47* 14 56* 14 98* 13 98* 16 77* 15 29* 16 56*   HEMOGLOBIN g/dL 10 3* 9 4* 9 3* 9 8* 9 6* 10 4* 9 7* 10 3*   HEMATOCRIT % 32 1* 30 5* 30 0* 31 8* 31 2* 33 5* 31 2* 32 5*   MCV fL 92 93 93 92 93 93 92 92   PLATELETS Thousands/uL 330 376 383 438* 404* 484* 385 451*   MCH pg 29 3 28 6 28 9 28 4 28 6 28 8 28 7 29 3   MCHC g/dL 32 1 30 8* 31 0* 30 8* 30 8* 31 0* 31 1* 31 7   RDW % 17 1* 16 7* 15 9* 16 3* 16 1* 15 8* 15 7* 15 7*   MPV fL 10 2 10 3 10 0 10 3 10 3 10 2 10 0 10 4   NRBC AUTO /100 WBCs  --   --   --   --  0  --  0 0         CMP:    Results from last 7 days  Lab Units 11/19/17  0440 11/18/17  0509 11/17/17  0552 11/16/17  0518 11/15/17  0610 11/14/17  0505 11/13/17  0552   SODIUM mmol/L 126* 133* 136 129* 131* 131* 134*   POTASSIUM mmol/L 4 2 3 7 3 4* 4 0 3 6 3 2* 3 9   CHLORIDE mmol/L 89* 93* 97* 90* 92* 90* 93*   CO2 mmol/L 33* 37* 33* 35* 35* 35* 34*   ANION GAP mmol/L 4 3* 6 4 4 6 7   BUN mg/dL 10 12 11 14 16 17 22   CREATININE mg/dL 0 80 0 82 0 82 0 82 0 73 0 74 0 87   GLUCOSE RANDOM mg/dL 217* 98 67 153* 164* 124 283*   CALCIUM mg/dL 9 3 9 3 8 4 9 2 9 4 9 6 9 5   EGFR ml/min/1 73sq m 77 74 74 74 85 84 69         BMP:    Results from last 7 days  Lab Units 11/19/17  0440 11/18/17  0509 11/17/17  0552 11/16/17  0518 11/15/17  0610 11/14/17  0505 11/13/17  0552   SODIUM mmol/L 126* 133* 136 129* 131* 131* 134*   POTASSIUM mmol/L 4 2 3 7 3 4* 4 0 3 6 3 2* 3 9   CHLORIDE mmol/L 89* 93* 97* 90* 92* 90* 93*   CO2 mmol/L 33* 37* 33* 35* 35* 35* 34*   BUN mg/dL 10 12 11 14 16 17 22   CREATININE mg/dL 0 80 0 82 0 82 0 82 0 73 0 74 0 87   GLUCOSE RANDOM mg/dL 217* 98 67 153* 164* 124 283*   CALCIUM mg/dL 9 3 9 3 8 4 9 2 9 4 9 6 9 5     Magnesium:     Results from last 7 days  Lab Units 11/19/17  0440 11/18/17  0509 11/15/17  0610 11/14/17  0505   MAGNESIUM mg/dL 2 1 2 0 2 1 1 9       Coags:     Results from last 7 days  Lab Units 11/16/17  0132 11/15/17  0717 11/14/17  1944 11/14/17  1326 11/14/17  0505 11/13/17  0553   PTT seconds 60* 69* 55* 48* 52* 65*   INR   --   --   --  1 34*  --   --          Meds/Allergies     amiodarone 200 mg Oral BID With Meals   aspirin 81 mg Oral Daily   atorvastatin 40 mg Oral Daily   cholecalciferol 1,000 Units Oral Daily   clopidogrel 75 mg Oral Daily   furosemide 40 mg Intravenous BID (diuretic)   insulin glargine 30 Units Subcutaneous HS   insulin lispro 1-5 Units Subcutaneous HS   insulin lispro 1-6 Units Subcutaneous TID AC   insulin lispro 3 Units Subcutaneous TID With Meals   ipratropium 0 5 mg Nebulization TID   levalbuterol 1 25 mg Nebulization TID   levothyroxine 125 mcg Oral Early Morning   melatonin 6 mg Oral HS   metoprolol tartrate 37 5 mg Oral Q12H JUAN LUIS   nystatin  Topical BID   pantoprazole 40 mg Oral Early Morning   polyethylene glycol 17 g Oral Daily   potassium chloride 20 mEq Oral BID With Meals   senna 2 tablet Oral HS        Prescriptions Prior to Admission   Medication    albuterol (PROVENTIL HFA,VENTOLIN HFA) 90 mcg/act inhaler    atorvastatin (LIPITOR) 40 mg tablet    cetirizine (ZyrTEC) 10 mg tablet    cholecalciferol (VITAMIN D3) 1,000 units tablet    Cyanocobalamin 1000 MCG/ML KIT    escitalopram (LEXAPRO) 10 mg tablet    furosemide (LASIX) 40 mg tablet    gabapentin (NEURONTIN) 600 MG tablet    insulin glargine (LANTUS) 100 units/mL subcutaneous injection    levothyroxine 125 mcg tablet    lisinopril (ZESTRIL) 5 mg tablet    magnesium oxide (MAG-OX) 400 mg    metFORMIN (GLUMETZA) 1000 MG (MOD) 24 hr tablet    metoprolol tartrate (LOPRESSOR) 25 mg tablet    oxyCODONE-acetaminophen (PERCOCET) 5-325 mg per tablet       Assessment:  Principal Problem:    Respiratory failure  Active Problems:    Cardiac arrest    Acute hypercapnic respiratory failure    Torsades de pointes    Prolonged QT interval    NSTEMI (non-ST elevated myocardial infarction)    Ischemic cardiomyopathy    LBBB (left bundle branch block)    CAD (coronary artery disease)    h/o Vocal cord paralysis    HTN (hypertension)    Hyperlipemia    Encephalopathy    Obesity, Class III, BMI 40-49 9 (morbid obesity) (HCC)    Delirium    Anemia    Hyponatremia    Leukocytosis    Hypoglycemia      Impression:  1  NSTEMI - underwent PCI of LAD yesterday  2  Ischemic CMP  - by yesterday's echo, EF 35%  S/P BiVICD  3  VT - etiology multifactorial   I do not think that her risk of sudden cardiac death was precipitated solely by ischemia in LAD territory, and would benefit from ICD within 40 days of revascularization  Revascularization did not significantly reduce risk for further VT  4  PAF - paced  5  Hyponatremia      Recommendations:  1  Continue IV diuretics  2  Start apixaban  3  Continue remainder of medications  4  Have pacer interrogated tomorrow to adjust for episodic diaphragmatic stimulation

## 2017-11-19 NOTE — PLAN OF CARE
SAFETY,RESTRAINT: NV/NON-SELF DESTRUCTIVE BEHAVIOR     Remains free of harm/injury (restraint for non violent/non self-detsructive behavior) Completed     Returns to optimal restraint-free functioning Completed          DISCHARGE PLANNING - CARE MANAGEMENT     Discharge to post-acute care or home with appropriate resources Progressing        Nutrition/Hydration-ADULT     Nutrient/Hydration intake appropriate for improving, restoring or maintaining nutritional needs Progressing        Potential for Falls     Patient will remain free of falls Progressing        Prexisting or High Potential for Compromised Skin Integrity     Skin integrity is maintained or improved Progressing

## 2017-11-19 NOTE — PROGRESS NOTES
Noted what appeared to be a jolt/jump every time that her pacer is capturing  She is AV paced on the monitor  She had called stating she felt SOB, pulse ox 94% RA, VSS  Denies CP, tightness or heaviness  Respiratory therapist Doreen Bain at bedside noted it as well    SLIM Desmond Gary) made aware  It is not noted on the monitor that the pacer is firing; but when visualizing the patient you can clearly see the jolted every time it paces her  Cardiology fellow [facility] SAMIA) made aware at [address] [place] [address], he is not in house at this time, he will ordered EKG and stat chest xray

## 2017-11-19 NOTE — PROGRESS NOTES
[facility] Progress Note  Patient: Carlos Alberto Espinosa 79 y o  female   MRN: [id number]  PCP: No primary care provider on file  [facility]: [id number]  Date Of Visit: 11/19/17    Assessment:    Principal Problem:    Respiratory failure  Active Problems:    Cardiac arrest    Acute hypercapnic respiratory failure    Torsades de pointes    Prolonged QT interval    NSTEMI (non-ST elevated myocardial infarction)    Ischemic cardiomyopathy    LBBB (left bundle branch block)    CAD (coronary artery disease)    h/o Vocal cord paralysis    HTN (hypertension)    Hyperlipemia    Encephalopathy    Obesity, Class III, BMI 40-49 9 (morbid obesity) ([address] )    Delirium    Anemia    Hyponatremia    Leukocytosis    Hypoglycemia      Plan:    1   Coronary artery disease, with multivessel disease status post cardiac catheterization, 11/15; status post PCI with drug-eluting stent in patient's LAD; status post the VF/VT cardiac arrest:  Continue cardiovascular medications   Continue amiodarone   Continue telemetry   Cardiology on board   According to cardiac surgery, patient is high risk for CABG, thus patient underwent PCI 11/16  Thus presently, patient is both on aspirin and Plavix      2   Atrial fibrillation:  Status post heparin drip   Continue antiarrhythmic medications   Patient now on apixaban as per Cardiology       3   Acute hypoxic respiratory failure, resolved:  Continue oxygen p r n     Continue nebulizers p r n        4   History of vocal cord paralysis with dysphagia:  Continue modified diet      5   Diabetes mellitus type 2 presently with significant hyperglycemia; had hypoglycemic episodes, 11/17, in the setting that patient was NPO at that time , resolved:  Continue basal bolus insulin treatment  I increased patient's insulin doses today     We will adjust this accordingly      6   Hyponatremia, likely due to patient's ischemic cardiomyopathy, hyperglycemia and previous D5 containing IV fluids when patient was having hypoglycemia:  Monitor patient's BMP  Manage patient's ischemic cardiomyopathy   Manage patient's diabetes mellitus  IV fluids were stopped      7   Hypertension:  Continue blood pressure medications      8   Anemia, presently stable:  Monitor patient's CBC   For further workup and management if this worsens significantly      9   Leukocytosis, likely reactive, secondary to the above problems, particularly problem number 1 above:  Observe off antibiotics   No signs and symptoms of an active infection   Monitor patient's CBC   For further workup and management if this worsens significantly      10   Encephalopathy, previously due to delirium, resolved; 11/17, this was due to hypoglycemia, resolved:  Will continue observing the patient   Monitor        11   Urinary continence, resolved:  Status post Beckford catheter as patient was on aggressive doses of IV Lasix the previous days  Beckford catheter was removed yesterday, 11/18  Patient passed a voiding trial      12   Morbidly obese:  Needs to lose weight      13  Ischemic cardiomyopathy; acute systolic congestive heart failure with an ejection fraction of 35%:  Patient underwent biventricular ICD placement yesterday, 11/17  Continue heart failure medications  Cardiology on board  Patient still on Lasix IV twice a day      14  Dyspepsia, with abdominal gas, presently resolved:  Simethicone p r n  Cristina Folk Protonix  15   Had chest pain last night, likely secondary to intermittent diaphragmatic stimulation from the pacemaker, according to Cardiology:  Pacemaker interrogation tomorrow to adjust for episodic diaphragmatic stimulation  VTE Pharmacologic Prophylaxis:   Pharmacologic: Apixaban (Eliquis)  Mechanical: Mechanical VTE prophylaxis in place  Discussions with Specialists or Other Care Team Provider:  Patient's nurse  Education and Discussions with Family / Patient:  Patient    I called patient's son, and left a voicemail message for call back  Time Spent for Care: 30 minutes  More than 50% of total time spent on counseling and coordination of care as described above  Current Length of Stay: 17 day(s)    Current Patient Status: Inpatient   Certification Statement: The patient will continue to require additional inpatient hospital stay due to Above findings and plans  Discharge Plan:  None yet today  Code Status: Level 3 - DNAR and DNI      Subjective:   Presently, patient is doing fine  Patient admits to having an episode of chest pain last night  This has resolved already  Presently, patient does not have any pains  Patient denies any shortness of breath  No complaints at this point  Objective:     Vitals:   Temp (24hrs), Av 9 °F (36 6 °C), Min:97 5 °F (36 4 °C), Max:98 8 °F (37 1 °C)    HR:  [60-65] 62  Resp:  [17-21] 18  BP: (112-153)/(60-72) 142/65  SpO2:  [92 %-96 %] 94 %  Body mass index is 37 64 kg/m²  Input and Output Summary (last 24 hours): Intake/Output Summary (Last 24 hours) at 17 1132  Last data filed at 17 0908   Gross per 24 hour   Intake              960 ml   Output             2530 ml   Net            -1570 ml       Physical Exam:     Physical Exam   Constitutional: She is oriented to person, place, and time  No distress  Obese  HENT:   Head: Normocephalic and atraumatic  Eyes: Right eye exhibits no discharge  Left eye exhibits no discharge  No scleral icterus  Neck: No JVD present  No tracheal deviation present  Cardiovascular: Normal rate and regular rhythm  Exam reveals no gallop and no friction rub  No murmur heard  Pulmonary/Chest: Effort normal and breath sounds normal  No stridor  No respiratory distress  She has no wheezes  She has no rales  Abdominal: Soft  Bowel sounds are normal  She exhibits no distension  There is no tenderness  There is no rebound and no guarding  Musculoskeletal: She exhibits no edema, tenderness or deformity  Neurological: She is alert and oriented to person, place, and time  No cranial nerve deficit  Skin: Skin is warm  No rash noted  She is not diaphoretic  No erythema  No pallor  Psychiatric: She has a normal mood and affect  Her behavior is normal  Thought content normal    Vitals reviewed  Additional Data:     Labs:      Results from last 7 days  Lab Units 11/19/17  0440  11/15/17  0610   WBC Thousand/uL 13 21*  < > 13 98*   HEMOGLOBIN g/dL 10 3*  < > 9 6*   HEMATOCRIT % 32 1*  < > 31 2*   PLATELETS Thousands/uL 330  < > 404*   NEUTROS PCT %  --   --  72   LYMPHS PCT %  --   --  19   MONOS PCT %  --   --  7   EOS PCT %  --   --  2   < > = values in this interval not displayed  Results from last 7 days  Lab Units 11/19/17  0440   SODIUM mmol/L 126*   POTASSIUM mmol/L 4 2   CHLORIDE mmol/L 89*   CO2 mmol/L 33*   BUN mg/dL 10   CREATININE mg/dL 0 80   CALCIUM mg/dL 9 3   GLUCOSE RANDOM mg/dL 217*       Results from last 7 days  Lab Units 11/14/17  1326   INR  1 34*       * I Have Reviewed All Lab Data Listed Above  * Additional Pertinent Lab Tests Reviewed: [facility] Admission Reviewed    Imaging:    Imaging Reports Reviewed Today Include:  Diagnostic imaging studies that were done on this admission  Imaging Personally Reviewed by Myself Includes:  None  Cultures:   Blood Culture:   Lab Results   Component Value Date    BLOODCX No Growth After 5 Days  11/02/2017    BLOODCX No Growth After 5 Days   11/02/2017     Urine Culture: No results found for: URINECX  Sputum Culture: No components found for: SPUTUMCX  Wound Culture: No results found for: WOUNDCULT    Last 24 Hours Medication List:     amiodarone 200 mg Oral BID With Meals   apixaban 5 mg Oral BID   aspirin 81 mg Oral Daily   atorvastatin 40 mg Oral Daily   cholecalciferol 1,000 Units Oral Daily   clopidogrel 75 mg Oral Daily   furosemide 40 mg Intravenous BID (diuretic)   insulin glargine 40 Units Subcutaneous HS   insulin lispro 1-5 Units Subcutaneous HS   insulin lispro 1-6 Units Subcutaneous TID AC   insulin lispro 10 Units Subcutaneous TID With Meals   ipratropium 0 5 mg Nebulization TID   levalbuterol 1 25 mg Nebulization TID   levothyroxine 125 mcg Oral Early Morning   melatonin 6 mg Oral HS   metoprolol tartrate 37 5 mg Oral Q12H JUAN LUIS   nystatin  Topical BID   pantoprazole 40 mg Oral Early Morning   polyethylene glycol 17 g Oral Daily   potassium chloride 20 mEq Oral BID With Meals   senna 2 tablet Oral HS        Today, Patient Was Seen By: Dallin Rodriguez MD    ** Please Note: Dragon 360 Dictation voice to text software may have been used in the creation of this document   **

## 2017-11-19 NOTE — PROGRESS NOTES
Made aware of patient having chest discomfort due to the firing the pacemaker 700 [employer]  Cardiology called, case discussed    I saw and evaluated patient lying in bed  As per patient, she feels very tired, fatigued and heaviness on her chest   ROS negative otherwise  A/P:  - Anginal pain is likely due to her significant multivessel cardiac disease history    -Patient received Nitrostat sublingually  Vital signs checked at bedside prior to Nitrostat 164/71mmHg, ; repeated 5mins later after medication 151/64mmHg  -After receiving nitostat, patient says she feels like a feather on her chest   -She appeared much better   -cardiology will continue to follow-up  -labs ordered Mg,Phos, CKMB, troponins (to trend)  -EKG reviewed Paced beats   ST changes on anterior leads V1,V2, V3,

## 2017-11-19 NOTE — OCCUPATIONAL THERAPY NOTE
[address] [facility] Evaluation     [facility]: Yue Ortiz  [facility]  Problem List  Patient Active Problem List   Diagnosis    Respiratory failure    Cardiac arrest    Acute hypercapnic respiratory failure    Torsades de pointes    Prolonged QT interval    NSTEMI (non-ST elevated myocardial infarction)    Ischemic cardiomyopathy    LBBB (left bundle branch block)    CAD (coronary artery disease)    h/o Vocal cord paralysis    HTN (hypertension)    Hyperlipemia    Encephalopathy    Obesity, Class III, BMI 40-49 9 (morbid obesity) ([facility] [address] )    Delirium    Anemia    Hyponatremia    Leukocytosis    Hypoglycemia     Past Medical History  Past Medical History:   Diagnosis Date    Anxiety     Diabetes mellitus ([facility] [facility] )     Hypertension     Pancreatitis      Past Surgical History  Past Surgical History:   Procedure Laterality Date     SECTION      3    PEG (HISTORICAL)      TRACHEOSTOMY  20 1024   Note Type   Note type Eval only   Restrictions/Precautions   Weight Bearing Precautions Per Order No   Other Precautions Telemetry; Fall Risk;Aspiration;Cognitive   Pain Assessment   Pain Assessment No/denies pain   Home Living   Type of [address] Two level;Bed/bath upstairs;Stairs to enter with rails  (3-4 RAUL, full flight to 2nd floor)   Bathroom Shower/Tub Tub/shower unit   Bathroom Toilet Standard   Bathroom Equipment Grab bars in shower;Grab bars around toilet;Commode  (tub bench that doesn't fit)    [address]; Wheelchair-manual   Prior Function   Level of [place] Independent with ADLs and functional mobility; Needs assistance with IADLs   Lives With Alone   Receives Help From Family   ADL Assistance Independent   IADLs Needs assistance   Falls in the last 6 months 0   Vocational Retired   Comments Pt reports her daughter in law is with her during the day to assist and son assists after work Lifestyle   Autonomy Pt reports she was I with ADLs and received A with IADLs including grocery shopping   Reciprocal Relationships Pt has a son, who works during the day, and a daughter in law who assists her as needed during the day and with grocery shopping   Intrinsic Gratification Pt enjoys spending time with son and daughter in law and has a new Shitzu puppy who she wants to train as a service animal   Psychosocial   Psychosocial (WDL) X   Patient Behaviors/Mood Anxious; Cooperative   Subjective   Subjective I'm feeling confused at times   ADL   Toileting Assistance  4  Minimal Assistance   Toileting Deficit Steadying;Verbal cueing;Supervison/safety; Increased time to complete; Bedside commode;Perineal hygiene   Bed Mobility   Supine to Sit Unable to assess   Sit to Supine 5  Supervision   Additional items Assist x 1;Bedrails; Increased time required;Verbal cues   Transfers   Sit to Stand 4  Minimal assistance   Additional items Assist x 1; Armrests   Stand to Sit 4  Minimal assistance   Additional items Assist x 1; Armrests; Increased time required;Verbal cues   Toilet transfer 4  Minimal assistance   Additional Comments Pt demonstrated increase WOB after commode to EOB transfer and required several minutes before attempting to scoot up the bed  Pt unable to scoot and required sit <> stand and side steps up towards [facility] before sit <> supine     Functional Mobility   Functional Mobility 4  Minimal assistance   Additional Comments with <5 feet 2* fatigue, SOB and c/o dizziness   Balance   Static Sitting Fair   Dynamic Sitting Fair -   Static Standing Fair -   Dynamic Standing Poor +   Ambulatory Fair -   Activity Tolerance   Activity Tolerance Patient limited by fatigue  (increased WOB and anxiety with movement)   Nurse Made Aware Oseas Salgado notified of treatment   RUE Assessment   RUE Assessment WFL   LUE Assessment   LUE Assessment WFL   Hand Function   Gross Motor Coordination Functional   Fine Motor Coordination Functional   Cognition   Overall Cognitive Status WFL   Arousal/Participation Alert; Cooperative;Lethargic   Attention Attends with cues to redirect   Orientation Level Oriented X4   Memory Decreased recall of recent events   Following Commands Follows one step commands with increased time or repetition   Comments Pt reported increase confusion when increased physical demands were noted  Pt able to answer questions appropriately when given time to recover and rest    Assessment   Limitation Decreased ADL status; Decreased UE strength;Decreased Safe judgement during ADL;Decreased endurance;Decreased self-care trans;Decreased high-level ADLs   Prognosis Good   Assessment Pt is a 78 y/o female with admission on 11/2/2017  Pt with respiratory failure, NSTEMI, cardiac arrest, CAD, ischemic cardiomyopathy, HTN, morbidly obese and afib with h/o of vocal cord paralysis with dysphagia  Pt reports the following home setup: 2 level home with 3-4 RAUL with hand rails and full flight to 2nd floor, bedroom and bathroom upstairs, tub shower combo with grab bars, has a tub bench but it doesn't fit, standard toilet with grab bars  Pt lives alone and reports was I with ADLs PTA with A with IADLs from family as needed  Pt has a daughter in law who spends time with her during the day and her son is available after work  Pt reports using a RW and w/c prn PTA  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 who they are training to be a service dog  Pt completed toileting from the commode with min A for steadying and pericares and transfer from commode to EOB with min A  Pt completed bed mobility with Supervision  Pt required increased time to recover with small periods of physical demands  Pt reported she felt anxious, lightheaded and dizzy but no pain   Pt presents with decreased strength, endurance, balance and safety awareness with decreased cognitive ability with increased physical demands impacting her ability to perform ADLs at her PLOF  Pt will benefit continued skilled OT services to address these concerns and will continue to follow to address the below stated goals 1-2xs/week  From OT standpoint recommend short term rehab upon d/c 2* the above mentioned deficits and and limitations with home set up  Plan   Treatment Interventions ADL retraining;Functional transfer training;UE strengthening/ROM; Endurance training;Patient/family training;Equipment evaluation/education; Energy conservation   Goal Expiration Date 11/29/17   OT Frequency 1-2x/wk   Recommendation   OT Discharge Recommendation Short Term Rehab  (2* barriers with home setup )   Equipment Recommended Bedside commode   Barthel Index   Feeding 5   Bathing 0   Grooming Score 0   Dressing Score 5   Bladder Score 10   Bowels Score 10   Toilet Use Score 5   Transfers (Bed/Chair) Score 10   Mobility (Level Surface) Score 0   Stairs Score 0   Barthel Index Score 45   Goals  Pt will improve activity tolerance to G for 30 min treatment session with s/s of distress    Pt will complete ADLs with MI with min verbal cues for safety and cognitive supports  Pt will complete toileting with MI using least restrictive DME PRN  Pt will complete functional transfers, including toilet on/off all surfaces with G balance/safety  Pt will engage in ongoing cognitive assessment w/ G participation w/ mod I to A w/ safe d/c planning/recommendations  Pt will demonstrate G carryover of pt/caregiver education and training as appropriate w/ mod I w/o cues w/ G tolerance  Pt will demonstrate 100% carryover of learned E C  techniques s/p skilled education w/o cues t/o fx'l I/ADL/leisure interest tasks w/ mod I  CHAS Sharp, OTR/L

## 2017-11-19 NOTE — PLAN OF CARE
Problem: OCCUPATIONAL THERAPY ADULT  Goal: Performs self-care activities at highest level of function for planned discharge setting  See evaluation for individualized goals  Treatment Interventions: ADL retraining, Functional transfer training, UE strengthening/ROM, Endurance training, Patient/family training, Equipment evaluation/education, Energy conservation  Equipment Recommended: Bedside commode       See flowsheet documentation for full assessment, interventions and recommendations  Limitation: Decreased ADL status, Decreased UE strength, Decreased Safe judgement during ADL, Decreased endurance, Decreased self-care trans, Decreased high-level ADLs  Prognosis: Good  Assessment: Pt is a 78 y/o female with admission on 11/2/2017  Pt with respiratory failure, NSTEMI, cardiac arrest, CAD, ischemic cardiomyopathy, HTN, morbidly obese and afib with h/o of vocal cord paralysis with dysphagia  Pt reports the following home setup: 2 level home with 3-4 RAUL with hand rails and full flight to 2nd floor, bedroom and bathroom upstairs, tub shower combo with grab bars, has a tub bench but it doesn't fit, standard toilet with grab bars  Pt lives alone and reports was I with ADLs PTA with A with IADLs from family as needed  Pt has a daughter in law who spends time with her during the day and her son is available after work  Pt reports using a RW and w/c prn PTA  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 who they are training to be a service dog  Pt completed toileting from the commode with min A for steadying and pericares and transfer from commode to EOB with min A  Pt completed bed mobility with Supervision  Pt required increased time to recover with small periods of physical demands  Pt reported she felt anxious, lightheaded and dizzy but no pain   Pt presents with decreased strength, endurance, balance and safety awareness with decreased cognitive ability with increased physical demands impacting her ability to perform ADLs at her PLOF  Pt will benefit continued skilled OT services to address these concerns and will continue to follow to address the below stated goals 1-2xs/week  From OT standpoint recommend short term rehab upon d/c 2* the above mentioned deficits and and limitations with home set up        OT Discharge Recommendation: Short Term Rehab (2* barriers with home setup )

## 2017-11-20 PROBLEM — R44.3 HALLUCINATIONS: Status: ACTIVE | Noted: 2017-11-20

## 2017-11-20 LAB
ANION GAP SERPL CALCULATED.3IONS-SCNC: 6 MMOL/L (ref 4–13)
ATRIAL RATE: 63 BPM
BUN SERPL-MCNC: 8 MG/DL (ref 5–25)
CALCIUM SERPL-MCNC: 9.1 MG/DL (ref 8.3–10.1)
CHLORIDE SERPL-SCNC: 92 MMOL/L (ref 100–108)
CO2 SERPL-SCNC: 30 MMOL/L (ref 21–32)
CREAT SERPL-MCNC: 0.78 MG/DL (ref 0.6–1.3)
ERYTHROCYTE [DISTWIDTH] IN BLOOD BY AUTOMATED COUNT: 17.1 % (ref 11.6–15.1)
GFR SERPL CREATININE-BSD FRML MDRD: 79 ML/MIN/1.73SQ M
GLUCOSE SERPL-MCNC: 212 MG/DL (ref 65–140)
GLUCOSE SERPL-MCNC: 224 MG/DL (ref 65–140)
GLUCOSE SERPL-MCNC: 231 MG/DL (ref 65–140)
GLUCOSE SERPL-MCNC: 260 MG/DL (ref 65–140)
GLUCOSE SERPL-MCNC: 324 MG/DL (ref 65–140)
HCT VFR BLD AUTO: 30.2 % (ref 34.8–46.1)
HGB BLD-MCNC: 9.4 G/DL (ref 11.5–15.4)
MCH RBC QN AUTO: 29.1 PG (ref 26.8–34.3)
MCHC RBC AUTO-ENTMCNC: 31.1 G/DL (ref 31.4–37.4)
MCV RBC AUTO: 94 FL (ref 82–98)
P AXIS: 48 DEGREES
PLATELET # BLD AUTO: 300 THOUSANDS/UL (ref 149–390)
PMV BLD AUTO: 10.2 FL (ref 8.9–12.7)
POTASSIUM SERPL-SCNC: 3.8 MMOL/L (ref 3.5–5.3)
PR INTERVAL: 192 MS
QRS AXIS: 45 DEGREES
QRSD INTERVAL: 162 MS
QT INTERVAL: 542 MS
QTC INTERVAL: 554 MS
RBC # BLD AUTO: 3.23 MILLION/UL (ref 3.81–5.12)
SODIUM SERPL-SCNC: 128 MMOL/L (ref 136–145)
T WAVE AXIS: 56 DEGREES
VENTRICULAR RATE: 63 BPM
WBC # BLD AUTO: 13.47 THOUSAND/UL (ref 4.31–10.16)

## 2017-11-20 PROCEDURE — 97535 SELF CARE MNGMENT TRAINING: CPT

## 2017-11-20 PROCEDURE — 97110 THERAPEUTIC EXERCISES: CPT

## 2017-11-20 PROCEDURE — 93005 ELECTROCARDIOGRAM TRACING: CPT

## 2017-11-20 PROCEDURE — 80048 BASIC METABOLIC PNL TOTAL CA: CPT | Performed by: INTERNAL MEDICINE

## 2017-11-20 PROCEDURE — 94760 N-INVAS EAR/PLS OXIMETRY 1: CPT

## 2017-11-20 PROCEDURE — 92526 ORAL FUNCTION THERAPY: CPT

## 2017-11-20 PROCEDURE — 85027 COMPLETE CBC AUTOMATED: CPT | Performed by: INTERNAL MEDICINE

## 2017-11-20 PROCEDURE — 82948 REAGENT STRIP/BLOOD GLUCOSE: CPT

## 2017-11-20 PROCEDURE — 94640 AIRWAY INHALATION TREATMENT: CPT

## 2017-11-20 RX ORDER — INSULIN GLARGINE 100 [IU]/ML
40 INJECTION, SOLUTION SUBCUTANEOUS EVERY 12 HOURS SCHEDULED
Status: DISCONTINUED | OUTPATIENT
Start: 2017-11-20 | End: 2017-11-21

## 2017-11-20 RX ADMIN — LEVOTHYROXINE SODIUM 125 MCG: 125 TABLET ORAL at 05:23

## 2017-11-20 RX ADMIN — LEVALBUTEROL HYDROCHLORIDE 1.25 MG: 1.25 SOLUTION, CONCENTRATE RESPIRATORY (INHALATION) at 19:49

## 2017-11-20 RX ADMIN — IPRATROPIUM BROMIDE 0.5 MG: 0.5 SOLUTION RESPIRATORY (INHALATION) at 13:42

## 2017-11-20 RX ADMIN — AMIODARONE HYDROCHLORIDE 200 MG: 200 TABLET ORAL at 08:19

## 2017-11-20 RX ADMIN — POTASSIUM CHLORIDE 20 MEQ: 1500 TABLET, EXTENDED RELEASE ORAL at 08:19

## 2017-11-20 RX ADMIN — SENNOSIDES 17.2 MG: 8.6 TABLET, FILM COATED ORAL at 21:02

## 2017-11-20 RX ADMIN — VITAMIN D, TAB 1000IU (100/BT) 1000 UNITS: 25 TAB at 08:20

## 2017-11-20 RX ADMIN — LEVALBUTEROL HYDROCHLORIDE 1.25 MG: 1.25 SOLUTION, CONCENTRATE RESPIRATORY (INHALATION) at 13:42

## 2017-11-20 RX ADMIN — IPRATROPIUM BROMIDE 0.5 MG: 0.5 SOLUTION RESPIRATORY (INHALATION) at 19:49

## 2017-11-20 RX ADMIN — NYSTATIN: 100000 POWDER TOPICAL at 08:19

## 2017-11-20 RX ADMIN — APIXABAN 5 MG: 5 TABLET, FILM COATED ORAL at 08:19

## 2017-11-20 RX ADMIN — PANTOPRAZOLE SODIUM 40 MG: 40 TABLET, DELAYED RELEASE ORAL at 05:23

## 2017-11-20 RX ADMIN — METOPROLOL TARTRATE 37.5 MG: 25 TABLET ORAL at 21:04

## 2017-11-20 RX ADMIN — INSULIN LISPRO 2 UNITS: 100 INJECTION, SOLUTION INTRAVENOUS; SUBCUTANEOUS at 17:10

## 2017-11-20 RX ADMIN — INSULIN LISPRO 3 UNITS: 100 INJECTION, SOLUTION INTRAVENOUS; SUBCUTANEOUS at 08:20

## 2017-11-20 RX ADMIN — POLYETHYLENE GLYCOL 3350 17 G: 17 POWDER, FOR SOLUTION ORAL at 08:17

## 2017-11-20 RX ADMIN — INSULIN LISPRO 5 UNITS: 100 INJECTION, SOLUTION INTRAVENOUS; SUBCUTANEOUS at 12:13

## 2017-11-20 RX ADMIN — POTASSIUM CHLORIDE 20 MEQ: 1500 TABLET, EXTENDED RELEASE ORAL at 16:55

## 2017-11-20 RX ADMIN — LEVALBUTEROL HYDROCHLORIDE 1.25 MG: 1.25 SOLUTION, CONCENTRATE RESPIRATORY (INHALATION) at 07:36

## 2017-11-20 RX ADMIN — FUROSEMIDE 40 MG: 10 INJECTION, SOLUTION INTRAMUSCULAR; INTRAVENOUS at 08:19

## 2017-11-20 RX ADMIN — ALPRAZOLAM 0.5 MG: 0.5 TABLET ORAL at 21:02

## 2017-11-20 RX ADMIN — ASPIRIN 81 MG 81 MG: 81 TABLET ORAL at 08:19

## 2017-11-20 RX ADMIN — NYSTATIN: 100000 POWDER TOPICAL at 17:06

## 2017-11-20 RX ADMIN — METOPROLOL TARTRATE 37.5 MG: 25 TABLET ORAL at 08:19

## 2017-11-20 RX ADMIN — ACETAMINOPHEN 650 MG: 325 TABLET, FILM COATED ORAL at 20:22

## 2017-11-20 RX ADMIN — IPRATROPIUM BROMIDE 0.5 MG: 0.5 SOLUTION RESPIRATORY (INHALATION) at 07:36

## 2017-11-20 RX ADMIN — INSULIN GLARGINE 40 UNITS: 100 INJECTION, SOLUTION SUBCUTANEOUS at 21:02

## 2017-11-20 RX ADMIN — CLOPIDOGREL BISULFATE 75 MG: 75 TABLET ORAL at 08:19

## 2017-11-20 RX ADMIN — INSULIN LISPRO 2 UNITS: 100 INJECTION, SOLUTION INTRAVENOUS; SUBCUTANEOUS at 21:03

## 2017-11-20 RX ADMIN — ATORVASTATIN CALCIUM 40 MG: 40 TABLET, FILM COATED ORAL at 08:19

## 2017-11-20 RX ADMIN — FUROSEMIDE 40 MG: 10 INJECTION, SOLUTION INTRAMUSCULAR; INTRAVENOUS at 16:55

## 2017-11-20 RX ADMIN — AMIODARONE HYDROCHLORIDE 200 MG: 200 TABLET ORAL at 16:55

## 2017-11-20 RX ADMIN — APIXABAN 5 MG: 5 TABLET, FILM COATED ORAL at 17:07

## 2017-11-20 RX ADMIN — MELATONIN TAB 3 MG 6 MG: 3 TAB at 21:02

## 2017-11-20 NOTE — PROGRESS NOTES
Cardiology Progress Note - Jessie Jimenez 79 y o  female MRN: [id number]    Unit/Bed#: [facility] 526-01 Encounter: [id number]      Assessment:  Principal Problem:    Respiratory failure  Active Problems:    Cardiac arrest    Acute hypercapnic respiratory failure    Torsades de pointes    Prolonged QT interval    NSTEMI (non-ST elevated myocardial infarction)    Ischemic cardiomyopathy    LBBB (left bundle branch block)    CAD (coronary artery disease)    h/o Vocal cord paralysis    HTN (hypertension)    Hyperlipemia    Encephalopathy    Obesity, Class III, BMI 40-49 9 (morbid obesity) (HCC)    Delirium    Anemia    Hyponatremia    Leukocytosis    Hypoglycemia      Plan:  Patient is comfortable this morning  She has no chest pain or significant dyspnea  She is V paced on telemetry  Her device is to be interrogated today in reference to concern in reference to diaphragmatic stimulation  She is status post PCI of the LAD November 16  She has an ischemic cardiomyopathy  She has noncritical disease in the circumflex and right coronary vessel  A 90% ramus lesion was noted but the vessel is small in size and this will be managed medically  Subjective:   Patient seen and examined  No significant events overnight  Objective:     Vitals: Blood pressure 133/62, pulse 60, temperature 99 °F (37 2 °C), resp  rate 20, height 5' 11" (1 803 m), weight 116 kg (255 lb 1 2 oz), SpO2 94 %  , Body mass index is 35 58 kg/m² , Orthostatic Blood Pressures    Flowsheet Row Most Recent Value   Blood Pressure  133/62 filed at 11/20/2017 0243   Patient Position - Orthostatic VS  Lying filed at 11/18/2017 1100      ,      Intake/Output Summary (Last 24 hours) at 11/20/17 0737  Last data filed at 11/20/17 0657   Gross per 24 hour   Intake             1800 ml   Output             2700 ml   Net             -900 ml       No significant arrhythmias seen on telemetry review         Physical Exam:    GEN: Jessie Jimenez appears well, alert and oriented x 3, pleasant and cooperative   NECK: supple, no carotid bruits, no JVD or HJR  HEART: normal rate, regular rhythm, normal S1 and S2, no murmurs, clicks, gallops or rubs   LUNGS:  Reduced breath sounds with intermittent wheezes  ABDOMEN: normal bowel sounds, soft, no tenderness, no distention  EXTREMITIES: edema  SKIN: warm and well perfused, no suspicious lesions on exposed skin    Labs & Results:    Admission on 11/02/2017   No results displayed because visit has over 200 results  Xr Chest Portable    Result Date: 11/19/2017  Narrative: CHEST INDICATION:  Shortness of breath  COMPARISON:  11/18/2017 VIEWS:   AP frontal IMAGES:  1 FINDINGS:     Cardiomediastinal silhouette appears stable  Left chest wall AICD device  Probable bilateral pleural effusions with left retrocardiac atelectasis or infiltrate  Lungs are otherwise clear  Normal pulmonary vasculature  No pneumothorax  Visualized osseous structures appear within normal limits for the patient's age  Impression: Probable bilateral pleural effusions with left retrocardiac atelectasis or infiltrate  Workstation performed: [id number]     Xr Chest Portable    Result Date: 11/19/2017  Narrative: CHEST INDICATION:  Status post pacemaker insertion  COMPARISON:  11/11/2017 VIEWS:   AP frontal IMAGES:  1 FINDINGS:  The patient is rotated to the left  Cardiomediastinal silhouette appears stable  Biventricular left-sided AICD device is evident without pneumothorax  Probable bilateral pleural effusions and left retrocardiac volume loss  Stable bony thorax  Impression: No pneumothorax following pacemaker/AICD insertion  Bilateral pleural effusions and left basilar volume loss  Workstation performed: [id number]     Xr Chest Portable    Result Date: 11/11/2017  Narrative: CHEST INDICATION:  Status post thoracentesis  COMPARISON:  November 10, 2017 VIEWS:   AP frontal IMAGES:  1 FINDINGS:     The heart is enlarged    Atherosclerotic changes in the aorta  Lung volumes diminished  Kelsie and pulmonary vessels diffusely enlarged  Bilateral pleural effusions, left side greater than right which have progressed  Right internal jugular central line with tip at cavoatrial junction  Visualized osseous structures appear within normal limits for the patient's age  Impression: 1  Worsening pulmonary vascular congestion  2   Enlarging bilateral pleural effusions  Workstation performed: [id number]     Xr Chest Portable    Result Date: 11/10/2017  Narrative: CHEST INDICATION:  thoracentesis  History taken directly from the electronic ordering system  Right-sided thoracentesis  COMPARISON:  Chest x-ray of 11/10/2017 VIEWS:   AP frontal IMAGES:  2 FINDINGS:  Right IJ line tip overlies the SVC  Heart size is not well evaluated on this single portable AP view  There is no appreciable right-sided pleural effusion  No pneumothorax  Small left pleural effusion is suggested, probably stable  Visualized osseous structures appear within normal limits for the patient's age  Impression: 1  No right pleural effusion  No evidence for pneumothorax  Small left pleural effusion appears stable  Workstation performed: [id number]     Xr Chest Portable    Result Date: 11/10/2017  Narrative: CHEST INDICATION:  Volume status COMPARISON:  11/8/2017 VIEWS:   AP frontal IMAGES:  1 FINDINGS:  Right internal jugular central venous catheter tip remains within the superior vena cava  Mild cardiomegaly is stable  Interstitial pulmonary edema is again seen which appears slightly increased within the right lung  No pneumothorax identified  Visualized osseous structures appear within normal limits for the patient's age  Impression: Worsening pulmonary edema  Unchanged cardiomegaly  Workstation performed: [id number]     Xr Chest Portable    Result Date: 11/3/2017  Narrative: CHEST INDICATION:  Central line placement  COMPARISON:  November 3, 2017, earlier in the day  Citlali Collado  VIEWS:  AP portable semierect view in the conventional and line placement techniques  FINDINGS: Right internal jugular central line is identified  Tip in superior vena cava  There is no pneumothorax  The cardiomediastinal silhouette is unremarkable  Lung volumes diminished  Kelsie and pulmonary vessels diffusely enlarged  Developing alveolar infiltrates in the right upper lobe and right lower lobe, likely cardiogenic in nature  Moderate-sized left pleural effusion  Endotracheal tube with tip approximately 4 cm above diony  Orogastric tube coursing beneath the left hemidiaphragm  Tip not seen  External pacing leads    Bony thorax is unremarkable  Impression: 1  Right internal jugular central line in satisfactory position  2   Worsening congestive heart failure  Workstation performed: [id number]     Xr Chest Portable    Result Date: 11/3/2017  Narrative: CHEST INDICATION:  Respiratory failure COMPARISON:  1 view chest 11-17 at 15:49 VIEWS:   AP frontal IMAGES:  1 FINDINGS:     Cardiac silhouette is partially obscured  Aortic calcification is present  Left basilar and lingular airspace consolidation unchanged  Small bilateral pleural effusions left greater than right unchanged  Distended central pulmonary vasculature slightly worse  No pneumothorax  Widened right acromioclavicular joint attributed to prior surgery  Impression: Mild progression of central pulmonary vascular congestion  Left basilar and lingular airspace consolidation and bilateral pleural effusions left greater than right unchanged  Workstation performed: [id number]     Xr Chest 1 View Portable    Result Date: 11/2/2017  Narrative: CHEST  PORTABLE INDICATION: Severe shortness of breath COMPARISON:  None VIEWS:   AP semierect; IMAGES:  2 FINDINGS: The cardiomediastinal silhouette is partially obscured on the left  Moderate left and small right pleural effusions  Bony thorax unremarkable   Electrode (EKG) monitoring devices overlie the chest  Impression: Bilateral pleural effusions, left greater than right  Underlying process of the left lung base not excludable    Workstation performed: [id number]     Xr Chest 2 Views    Result Date: 11/19/2017  Narrative: CHEST - DUAL ENERGY INDICATION:  Status post AICD insertion  COMPARISON:  11/17/2017 VIEWS:  PA (including soft tissue/bone algorithms) and lateral projections IMAGES:  4 FINDINGS:     Cardiomediastinal silhouette appears stable  Left chest wall AICD device again evident without pneumothorax  Bilateral pleural effusions, left greater than right, and left retrocardiac volume loss as before  Postoperative changes distal right clavicle again noted  Impression: No pneumothorax  Bilateral pleural effusions and left basilar volume loss as before  Workstation performed: [id number]     Xr Abdomen 1 View Kub    Result Date: 11/8/2017  Narrative: ABDOMEN INDICATION:  Vomiting  Abdominal pain  COMPARISON:  None  VIEWS:  AP supine IMAGES:  2 FINDINGS: Study limited due to incomplete visualization of the entire abdomen  There is a nonobstructive bowel gas pattern  Orogastric tube is present with tip in the left upper quadrant  Dilated, air-filled loops of large and small bowel throughout the abdomen  No discernible free air on this supine study  Upright or left lateral decubitus imaging is more sensitive to detect subtle free air in the appropriate setting  No pathologic calcifications or soft tissue masses  Visualized lung bases are clear  Visualized osseous structures are unremarkable for the patient's age  Impression: 1  Limited examination  Incomplete visualization of the entire abdomen  2   Nonobstructive bowel gas pattern  Workstation performed: [id number]     Ct Head Wo Contrast    Result Date: 11/6/2017  Narrative: CT BRAIN - WITHOUT CONTRAST INDICATION:  Altered mental status COMPARISON:  CT head 11-17 TECHNIQUE:  CT examination of the brain was performed    In addition to axial images, coronal reformatted images were created and submitted for interpretation  Radiation dose length product (DLP) for this visit:  1066 73 mGy-cm   This examination, like all CT scans performed in the [facility], was performed utilizing techniques to minimize radiation dose exposure, including the use of iterative reconstruction and automated exposure control  IMAGE QUALITY:  Diagnostic  FINDINGS:  PARENCHYMA:  Decreased attenuation is noted in the supratentorial white matter demonstrating an appearance most consistent with mild microangiopathic change  Chronic lacunar infarct right basal ganglia  Subacute to chronic lacunar infarct left external  capsule unchanged  Calcification bilateral cavernous internal carotid and distal vertebral arteries  No intracranial mass, mass effect or midline shift  No CT signs of acute infarction  There is no parenchymal hemorrhage  VENTRICLES AND EXTRA-AXIAL SPACES:  Mild prominence of CSF spaces diffusely compatible with generalized volume loss  No hydrocephalus  VISUALIZED ORBITS AND PARANASAL SINUSES:  Unremarkable  CALVARIUM AND EXTRACRANIAL SOFT TISSUES:   Normal      Impression: No CT evidence of acute intracranial process or significant interval change since November 2, 2017  Chronic microangiopathy  Workstation performed: [id number]     Ct Head Without Contrast    Result Date: 11/2/2017  Narrative: CT BRAIN - WITHOUT CONTRAST INDICATION:  59-year-old woman with history of hypertension and diabetes presenting with change in mental status and respiratory distress  COMPARISON:  None  TECHNIQUE:  CT examination of the brain was performed  In addition to axial images, coronal reformatted images were created and submitted for interpretation  Radiation dose length product (DLP) for this visit:  1078 43 mGy-cm     This examination, like all CT scans performed in the [facility], was performed utilizing techniques to minimize radiation dose exposure, including the use of iterative reconstruction and automated exposure control  IMAGE QUALITY:  Diagnostic  FINDINGS:  PARENCHYMA:  No intracranial mass, mass effect or midline shift  No CT signs of acute infarction  There is no parenchymal hemorrhage  Chronic lacunar infarct involving the right internal capsule and corona radiata  Diffusely decreased white matter attenuation, typical of chronic microvascular ischemic change  VENTRICLES AND EXTRA-AXIAL SPACES:  Normal for patient's age  No extra-axial blood  VISUALIZED ORBITS AND PARANASAL SINUSES:  Unremarkable  CALVARIUM AND EXTRACRANIAL SOFT TISSUES:   Normal      Impression: No acute intracranial abnormality  Workstation performed: [id number]     Ct Cervical Spine Without Contrast    Result Date: 11/2/2017  Narrative: CT CERVICAL SPINE - WITHOUT CONTRAST INDICATION: Found on floor COMPARISON: None  TECHNIQUE:  CT examination of the cervical spine was performed without intravenous contrast   Contiguous axial images were obtained  Sagittal and coronal reconstructions were performed  Radiation dose length product (DLP) for this visit:  1298 71 mGy-cm   This examination, like all CT scans performed in the [facility], was performed utilizing techniques to minimize radiation dose exposure, including the use of iterative reconstruction and automated exposure control  IMAGE QUALITY:  Diagnostic  FINDINGS: ALIGNMENT:  There is straightening of normal cervical lordosis  No subluxation or compression deformity  VERTEBRAL BODIES:  No fracture  DEGENERATIVE CHANGES:  Mild multilevel cervical degenerative changes are noted without critical central canal stenosis  PREVERTEBRAL AND PARASPINAL SOFT TISSUES: Nonspecific foci of air are identified in the region of the right vertebral artery and right internal carotid artery which is nonspecific finding  CTA neck can be considered for further evaluation  Foci of air are  also identified in the right submandibular gland region, possibly within a vessel  THORACIC INLET:  Moderate partially visualized bilateral pleural effusions  Please proceed with CT chest with contrast      Impression: Nonspecific foci of air are identified in the region of the right vertebral artery and right internal carotid artery which is nonspecific finding  CTA neck can be considered for further evaluation  No acute fracture or dislocation identified  If there is focal neurologic deficit, cervical spine MRI can be considered  Moderate partially visualized bilateral pleural effusions  Please proceed with CT chest with contrast  Workstation performed: [id number]     Vas Upper Limb Venous Duplex Scan, Unilateral/limited    Result Date: 11/11/2017  Narrative:  [facility] REPORT CLINICAL: Indications:  Patient presents to determine propagation vs resolution of previously noted SVT in the left cephalic vein while doing a bedside ultrasound to asses for line access  Risk Factors: The patient has history of obesity and immobilization  Clinical: Left Upper Limb There is edema  CONCLUSION: Impression RIGHT UPPER LIMB LIMITED: Evaluation shows no evidence of thrombus in the internal jugular vein, and the subclavian vein  The brachiocephalic vein could not be identified due to a line and bandage  LEFT UPPER LIMB: Evaluation shows evidence of acute, occlusive, superficial vein thrombus in the cephalic vein from the distal upper arm to the proximal forearm  No evidence of acute or chronic deep vein thrombosis  Patty Evans RN notified of preliminary results  SIGNATURE: Electronically Signed by: Lin Mace MD, [address] on 2017-11-11 04:40:16 PM    Xr Chest Portable Icu    Result Date: 11/9/2017  Narrative: CHEST INDICATION:  Shortness of breath  COMPARISON:  11/8/2017 VIEWS:   AP frontal IMAGES:  1 FINDINGS:  Central line extends above the cavoatrial junction  ET tube and NG tube have been removed   Stable mild cardiomegaly  Moderate pulmonary edema  Visualized osseous structures appear within normal limits for the patient's age  Impression: Moderate pulmonary edema, unchanged  Workstation performed: [id number]     Xr Chest Portable Icu    Result Date: 11/8/2017  Narrative: CHEST INDICATION:  Hypoxemia COMPARISON:  11/7/2017 VIEWS:   AP frontal IMAGES:  1 FINDINGS:     Heart shadow is enlarged but unchanged from prior exam  Persistent bilateral effusions and persistent pulmonary congestion noted similar to prior study  Endotracheal tube positioning is appropriate  Central line tip at cavoatrial junction  Nasogastric tube extends below left hemidiaphragm  Visualized osseous structures appear within normal limits for the patient's age  Impression: There is no significant interval change since 11/7/2017 Workstation performed: [id number]     Xr Chest Portable Icu    Result Date: 11/7/2017  Narrative: CHEST INDICATION:  Hypoxemia  COMPARISON:  11/6/2017 VIEWS:   AP frontal IMAGES:  1 FINDINGS:  Lines and tubes are unchanged  Cardiomediastinal silhouette appears stable  Slight improvement in vascular congestion with persistent bilateral pleural effusions and basilar volume loss, more so on the left  No pneumothorax  Stable bony thorax  Impression: Slight improvement in central vascular congestion with persistent bibasilar pleural effusions and atelectasis  Workstation performed: [id number]     Xr Chest Portable Icu    Result Date: 11/7/2017  Narrative: CHEST INDICATION:  Hypoxemia  COMPARISON:  11/6/2017 VIEWS:   AP frontal IMAGES:  1 FINDINGS:  Lines and tubes appear in satisfactory position  Cardiomediastinal silhouette appears stable  Pulmonary edema with bilateral pleural effusions and bibasilar volume loss again seen  No pneumothorax  Post surgical changes right distal clavicle  Impression: Pulmonary edema with bilateral pleural effusions and basilar volume loss   Workstation performed: [id number] Xr Chest Portable Icu    Result Date: 11/6/2017  Narrative: CHEST INDICATION:  Hypoxia  Patient is ventilated  COMPARISON:  November 4, 2017 VIEWS:   AP frontal IMAGES:  1 FINDINGS:     Heart shadow appears stable in size, probably at least mildly enlarged  It is partly obscured  Atherosclerotic vascular calcifications are noted  There are pleural effusions which are moderate size  There is probably bibasilar edema or infiltrate  Upper lung fields are clear although there is some vascular prominence noted  Right IJ central line is stable  Endotracheal and endogastric tubes appear stable  Osseous structures unremarkable  Impression: Pleural effusions and bibasilar opacities which are probably edema or atelectasis  Stable line and tube positioning  Cardiomegaly  Vascular congestion Workstation performed: [id number]     Xr Chest Portable Icu    Result Date: 11/4/2017  Narrative: CHEST INDICATION:  Respiratory failure  COMPARISON:  Chest x-ray from 11/3/2017  VIEWS:   AP frontal IMAGES:  1 FINDINGS:  Endotracheal tube, nasogastric tube, and right IJ central lines unchanged  Cardiomediastinal silhouette appears unremarkable  There is unchanged pulmonary edema and moderate left pleural effusion  No pneumothorax  Visualized osseous structures appear within normal limits for the patient's age  Impression: There is unchanged pulmonary edema and moderate left pleural effusion  Workstation performed: [id number]     Xr Chest Portable Icu    Result Date: 11/3/2017  Narrative: CHEST INDICATION:  Status post intubation  COMPARISON:  Earlier today VIEWS:   AP frontal IMAGES:  1 FINDINGS:  Monitoring wires and leads project over the chest  ET tube projects over the tracheal air column near the level of the lower clavicles  Enteric tube courses to the stomach  Cardiomediastinal silhouette appears stable  Moderate central vascular congestion with bilateral pleural effusions and left basilar volume loss   Visualized osseous structures appear within normal limits for the patient's age  Impression: ET tube projects above the diony  Pulmonary edema with bilateral pleural effusions and left basilar volume loss  Workstation performed: [id number]     Xr Chest Picc Line Portable    Result Date: 11/11/2017  Narrative: CHEST  INDICATION: PICC line placement  COMPARISON:  11/11/2017  VIEWS:   AP frontal; 1 image FINDINGS: Left PICC line has been placed, tip projects over the cavoatrial junction  Right jugular central venous catheter is unchanged in position  The cardiomediastinal silhouette is stable  Improving pulmonary vascular congestion is noted  Stable bilateral pleural effusions are present  Osseous structures are age appropriate  Impression: Improving pulmonary vascular congestion  Stable bilateral pleural effusions  Workstation performed: [id number]       EKG personally reviewed by Lars Willard MD      Counseling / Coordination of Care  Total floor / unit time spent today 30 minutes  Greater than 50% of total time was spent with the patient and / or family counseling and / or coordination of care

## 2017-11-20 NOTE — SPEECH THERAPY NOTE
Speech Language/Pathology                             SLP  Note  Patient Name: Lee Anton  NTKBT'P Date: 11/20/2017       Subjective:  Pt seen for dysphagia tx at lunch, on dysphagia 2 w/ NTL  Pt trialed w/ toast and thin water to assess for diet upgrade  Voice still somewhat hoarse  Objective:  Pt ate toast w/ jelly, drank NTL by cup and trialed w/ ice water by straw (ice water was left at bedside)  Pt w/ slow mastication/manipulation and transfer  Pt appeared w/ good oral control of liquids  Swallows complete  Pt w/ consistent throat clearing w/ thin liquids- ? Aspiration risk  Assessment:  Pt appeared appropriate for diet upgrade-discussed dysphagia 3 vs regular- pt preferred regular w/ choosing softer foods  Pt cont w/ ? Aspiration risk w/ thin liquids    Plan/Recommendations:  rec advance diet to regular w/ NTL  VBS to assess for aspiration risk w/ thin liquids

## 2017-11-20 NOTE — PROGRESS NOTES
[facility] Progress Note  Patient: Michael Jose 79 y o  female   MRN: [id number]  PCP: No primary care provider on file  [facility]: [id number]  Date Of Visit: 11/20/17    Assessment:    Principal Problem:    Respiratory failure  Active Problems:    Cardiac arrest    Acute hypercapnic respiratory failure    Torsades de pointes    Prolonged QT interval    NSTEMI (non-ST elevated myocardial infarction)    Ischemic cardiomyopathy    LBBB (left bundle branch block)    CAD (coronary artery disease)    h/o Vocal cord paralysis    HTN (hypertension)    Hyperlipemia    Encephalopathy    Obesity, Class III, BMI 40-49 9 (morbid obesity) (HCC)    Delirium    Anemia    Hyponatremia    Leukocytosis    Hypoglycemia      Plan:    1   Coronary artery disease, with multivessel disease status post cardiac catheterization, 11/15; status post PCI with drug-eluting stent in patient's LAD; status post the VF/VT cardiac arrest:  Continue cardiovascular medications   Continue amiodarone   Continue telemetry   Cardiology on board   According to cardiac surgery, patient is high risk for CABG, thus patient underwent PCI 11/16   Thus presently, patient is both on aspirin and Plavix      2   Atrial fibrillation:  Status post heparin drip   Continue antiarrhythmic medications   Patient now on apixaban as per Cardiology       3   Acute hypoxic respiratory failure, resolved:  Continue oxygen p r n     Continue nebulizers p r n        4   History of vocal cord paralysis with dysphagia:  Continue modified diet  According to speech therapist, we may advance patient's diet to regular with nectar thick liquids    However, they are recommending a VBS to evaluate for possibility that patient may be aspirating with thin liquids      5   Diabetes mellitus type 2 presently still with significant hyperglycemia; had hypoglycemic episodes, 11/17, in the setting that patient was NPO at that time , resolved:  Continue basal bolus insulin treatment  I will again increase patient's insulin doses today  According the patient's son, patient is on long-acting insulin twice a day instead of once a day  We will continue adjusting this accordingly  According to the patient's nurse, patient was also not on a diabetic diet and was only on a dysphagia diet  Thus will make sure that patient will be on diabetic diet in addition to the modified diet from now on      6   Hyponatremia, likely due to patient's ischemic cardiomyopathy, hyperglycemia and previous D5 containing IV fluids when patient was having hypoglycemia:  Monitor patient's BMP  Manage patient's ischemic cardiomyopathy   Manage patient's diabetes mellitus   IV fluids were stopped      7   Hypertension:  Continue blood pressure medications      8   Anemia, presently stable:  Monitor patient's CBC   For further workup and management if this worsens significantly      9   Leukocytosis, likely reactive, secondary to the above problems, particularly problem number 1 above:  Observe off antibiotics   No signs and symptoms of an active infection   Monitor patient's CBC   For further workup and management if this worsens significantly      10   Encephalopathy, previously due to delirium, resolved; , this was due to hypoglycemia, resolved:  Will continue observing the patient   Monitor  Patient's son is very concerned about this  Patient's son told me that he was here last night and her mother had episodes of hallucinations  He told me that he was talking to her mother then all of a sudden, her mother told him that she could see that the girlfriend of his brother is burying somebody in their backyard; she also told him that his father has  when he is still alive  According to him, her mother has been having this even before  To illustrate this, 1 time, she was told by her mother that somebody is burying their dead dog in their backyard, when there is none    According the patient's son, this started when patient was admitted at [facility] was intubated there was on tracheostomy tube  According to him, after some time, her mother improved with rehab  However, a month prior to the admission here, patient was already having hallucinations and had been saying and acting odd  According to the patient's son, at times, patient would have blank stares  Thus there is a possibility the patient on both occasions at [facility] and on this admission, patient developed hypoxic/anoxic encephalopathy and this may have caused patient's hallucinations and behaviors  We will ask Neurology to evaluate this patient      11   Urinary continence, resolved:  Status post Beckford catheter as patient was on aggressive doses of IV Lasix the previous days  Beckford catheter was removed, 11/18  Patient passed a voiding trial      12   Morbidly obese:  Needs to lose weight      13   Ischemic cardiomyopathy; acute systolic congestive heart failure with an ejection fraction of 35%:  Patient underwent biventricular ICD placement, 11/17   Continue heart failure medications  Cardiology on board  Patient still on Lasix IV twice a day as per Cardiology      14   Dyspepsia, with abdominal gas, presently resolved:  Simethicone p r n     Protonix      15  Had chest pains the other night, presently resolved, secondary to intermittent diaphragmatic stimulation from the pacemaker, according to Cardiology:  Pacemaker was interrogated today and adjusted for episodic diaphragmatic stimulation  So far no more diaphragmatic stimulation  VTE Pharmacologic Prophylaxis:   Pharmacologic: Apixaban (Eliquis)  Mechanical: Mechanical VTE prophylaxis in place  Discussions with Specialists or Other Care Team Provider:  Patient's nurse  Case management  Education and Discussions with Family / Patient:  Patient    I spoke to the patient's son at length and discussed with him our findings and plans   I answered all questions and concerns  Please see above notes regarding our conversation regarding patient's encephalopathy  Time Spent for Care:  Approximately 40 mins  More than 50% of total time spent on counseling and coordination of care as described above  Current Length of Stay: 18 day(s)    Current Patient Status: Inpatient   Certification Statement: The patient will continue to require additional inpatient hospital stay due to Above findings and plans  Discharge Plan:  Not yet today    Code Status: Level 3 - DNAR and DNI      Subjective:   Presently, patient tells me that she is doing fine  Patient denies any more chest pains after the adjustment and interrogation of patient's pacemaker  Patient denies any shortness of breath  Patient does not have any complaints at this point  Objective:     Vitals:   Temp (24hrs), Av 7 °F (36 5 °C), Min:97 1 °F (36 2 °C), Max:99 °F (37 2 °C)    HR:  [60-72] 62  Resp:  [18-20] 20  BP: (133-179)/(62-74) 179/72  SpO2:  [93 %-99 %] 94 %  Body mass index is 35 58 kg/m²  Input and Output Summary (last 24 hours): Intake/Output Summary (Last 24 hours) at 17 1255  Last data filed at 17 1157   Gross per 24 hour   Intake             2280 ml   Output             2475 ml   Net             -195 ml       Physical Exam:     Physical Exam   Constitutional: She is oriented to person, place, and time  No distress  HENT:   Head: Normocephalic and atraumatic  Eyes: Right eye exhibits no discharge  Left eye exhibits no discharge  No scleral icterus  Neck: No JVD present  No tracheal deviation present  Cardiovascular: Normal rate and regular rhythm  Exam reveals no friction rub  No murmur heard  Pulmonary/Chest: Effort normal and breath sounds normal  No stridor  No respiratory distress  She has no wheezes  She has no rales  Abdominal: Soft  Bowel sounds are normal  She exhibits no distension  There is no tenderness   There is no rebound and no guarding  Musculoskeletal: She exhibits no edema, tenderness or deformity  Neurological: She is alert and oriented to person, place, and time  No cranial nerve deficit  Patient is presently coherent  Skin: Skin is warm  No rash noted  She is not diaphoretic  No erythema  No pallor  Psychiatric: She has a normal mood and affect  Vitals reviewed  Additional Data:     Labs:      Results from last 7 days  Lab Units 11/20/17  0553  11/15/17  0610   WBC Thousand/uL 13 47*  < > 13 98*   HEMOGLOBIN g/dL 9 4*  < > 9 6*   HEMATOCRIT % 30 2*  < > 31 2*   PLATELETS Thousands/uL 300  < > 404*   NEUTROS PCT %  --   --  72   LYMPHS PCT %  --   --  19   MONOS PCT %  --   --  7   EOS PCT %  --   --  2   < > = values in this interval not displayed  Results from last 7 days  Lab Units 11/20/17  0553   SODIUM mmol/L 128*   POTASSIUM mmol/L 3 8   CHLORIDE mmol/L 92*   CO2 mmol/L 30   BUN mg/dL 8   CREATININE mg/dL 0 78   CALCIUM mg/dL 9 1   GLUCOSE RANDOM mg/dL 224*       Results from last 7 days  Lab Units 11/14/17  1326   INR  1 34*       * I Have Reviewed All Lab Data Listed Above  * Additional Pertinent Lab Tests Reviewed: [facility]    Imaging:    Imaging Reports Reviewed Today Include:  Diagnostic imaging studies that were done on this admission  Imaging Personally Reviewed by Myself Includes:  None  Cultures:   Blood Culture:   Lab Results   Component Value Date    BLOODCX No Growth After 5 Days  11/02/2017    BLOODCX No Growth After 5 Days   11/02/2017     Urine Culture: No results found for: URINECX  Sputum Culture: No components found for: SPUTUMCX  Wound Culture: No results found for: WOUNDCULT    Last 24 Hours Medication List:     amiodarone 200 mg Oral BID With Meals   apixaban 5 mg Oral BID   aspirin 81 mg Oral Daily   atorvastatin 40 mg Oral Daily   cholecalciferol 1,000 Units Oral Daily   clopidogrel 75 mg Oral Daily   furosemide 40 mg Intravenous BID (diuretic)   insulin glargine 40 Units Subcutaneous HS   insulin lispro 1-5 Units Subcutaneous HS   insulin lispro 1-6 Units Subcutaneous TID AC   insulin lispro 10 Units Subcutaneous TID With Meals   ipratropium 0 5 mg Nebulization TID   levalbuterol 1 25 mg Nebulization TID   levothyroxine 125 mcg Oral Early Morning   melatonin 6 mg Oral HS   metoprolol tartrate 37 5 mg Oral Q12H JUAN LUIS   nystatin  Topical BID   pantoprazole 40 mg Oral Early Morning   polyethylene glycol 17 g Oral Daily   potassium chloride 20 mEq Oral BID With Meals   senna 2 tablet Oral HS        Today, Patient Was Seen By: Lety Bowman MD    ** Please Note: Dragon 360 Dictation voice to text software may have been used in the creation of this document   **

## 2017-11-20 NOTE — PLAN OF CARE
Problem: PHYSICAL THERAPY ADULT  Goal: Performs mobility at highest level of function for planned discharge setting  See evaluation for individualized goals  Treatment/Interventions: Functional transfer training, LE strengthening/ROM, Therapeutic exercise, Endurance training, Bed mobility, Gait training, Spoke to nursing, Equipment eval/education  Equipment Recommended: Other (Comment) (TBD)       See flowsheet documentation for full assessment, interventions and recommendations  Outcome: Progressing  Prognosis: Good  Problem List: Decreased strength, Decreased endurance, Decreased mobility, Decreased cognition, Decreased safety awareness, Impaired judgement, Obesity  Assessment: PRESENTLY THE PT  DEMONSTRATES ONGOING DECONDITIONING WITH NEED FOR FREQUENT REST PERIODS  BED MOBILITY CONTINUES TO REQUIRE MAXIMAL ASSISTANCE OF 1 PERSON WITH FURTHER MOBILITY DEFERED PENDING REASSESMENT NEXT SESSION  THE PT  CURRENTLY CONTINUES TO FUNCTION BELOW BASELINE AND GIVEN LIMITED ASSISTANCE WITHIN THE HOME SETTING WOULD EXPECT NEED FOR CONTINUED REHAB UPON D/C  Barriers to Discharge: Inaccessible home environment  Barriers to Discharge Comments: lives alone, stairs  Recommendation:  (REHAB)     PT - OK to Discharge: (S)  ([facility] )    See flowsheet documentation for full assessment     Herlinda Pena PTA

## 2017-11-20 NOTE — CONSULTS
Consultation - Neurology   Denisse Del Rosario 79 y o  female MRN: [id number]  Unit/Bed#: [facility] 526-01 Encounter: [id number]      Assessment/Plan   Assessment:  Denisse Del Rosario is a 79 y o  female with a history of HTN, DM2, pancreatitis and anxiety who presented to the ED at [facility] via EMS on 11/2 with AMS and respiratory distress  She was transferred to [facility]  She was originally treated for sepsis of pulmonary etiology  She was intubated secondary to respiratory distress  She went into V-tach cardiac arrest and was resuscitated  She underwent a cardiac catheterization, percutaneous intervention with drug eluting stent in the LAD, and biventricular ICD pacemaker placement  Neurology was consulted secondary to patient's intermittent encephalopathy with bizarre hallucinations which have been occurring since 6/2017  Plan:  1  Intermittent episodes of AMS, Hallucinations, Reports of staring spells  On exam, Patient is alert and oriented  She has no evidence of confusion or hallucinations  Likely multifactorial etiology, including toxic/metabolic as she has been acutely ill and has known hyponatremia; however, it may also be related to the loss of her significant other  Recommend Psychiatric evaluation  Plan for routine EEG  2  NSTEMI/Cardiac arrest with known h/o CAD  S/p VT cardiac arrest on 11/3  S/p cardiac catheterization 11/15  S/p PCI with drug-eluting stent in patient's LAD 11/16  Amiodarone  Aspirin 81mg  Plavix 75mg daily  Lipitor 40mg  Cardiology following  ECHO completed x 2 (see formal reports)    3  Ischemic cardiomyopathy  Biventricular ICD placement 11/17  Lasix    4  Afib  Eliquis  Lopressor    5  HTN  Lopressor  Labile BP    6  DM2  Hyperglycemic episodes  As per primary team  Manage blood sugar  Lantus  Humalog    7  HLD  Statin    8  Leukocytosis  No s/s of acute infection  MSSA on sputum culture initially  Treated with antibiotics      9  History of vocal cord paralysis with dysphagia  H/o trach/Peg s/p removal    10  B/L pleural effusion  As per primary team    11  Morbid obesity    12  Chronic hyponatremia  Possibly related to ischemic cardiomyopathy  hyperglycemia  As per primary team    13  Chronic anemia  As per primary team    History of Present Illness     Reason for Consult / Principal Problem: AMS, Encephalopathy    HPI: Mario Arcos is a 79 y o  left handed female with a history of HTN, DM2, pancreatitis and anxiety who presented to the ED at [facility] via EMS on 11/2 with AMS and respiratory distress  The patient's family called the police because the patient was not answering her phone  Police found patient on the floor in a puddle of urine  Initial saturations by EMS were in the 70s  She was placed on Bipap and her O2 improved  Troponins were elevated  He the patient had lactic acidosis of 4 3  The patient was transferred to [facility]  The patient was initially treated with antibiotics for sepsis with a presumed respiratory source  Sputum culture positive for MSSA  While in the ICU, the patient had an episode of polymorphic V-tach times 10 seconds  Amiodarone was initiated  The patient developed acute respiratory failure requiring intubation  She was diagnosed with type 2 NSTEMI secondary to respiratory failure and underlying CAD  On 11/3 she had a polymorphic VT arrest attributed to R on T phenomenon  ROSC achieved after two rounds of CPR  The patient was managed in the ICU and eventually stabilized  She was extubated  She underwent cardiac cath on 11/14, PCI with drug-eluting stent in patient's LAD on 11/16 and Biventricular ICD placement on 11/17  The patient had intermittent episodes of encephalopathy and delirium during her hospitalization thus far  Her son has also disclosed that she has been having bizarre hallucinations since June  Of note, the patient was inpatient at [address] [place] [address] from 6/11/17 to 7/13/17 due to CHF and ARF   The patient then went to a short term rehab and eventually transitioned home  The patient's daughter-in-law lived with her x 3 months  The day of her admission here was the first day she was alone  Additionally, the patient has a history of tracheostomy and PEG on 7/5/17 due to vocal cord paralysis and then removal of tracheostomy tube in August of 2017  Today on my exam the patient denies pain  She does admit to shortness of breath which has improved significantly  She denies confusion or hallucinations  She denies vision changes, headaches, new numbness or weakness  Patient gave me permission to speak with her son to ask him about her confused episodes, but stated that she didn't think he would say she was hallucinating  I spoke with the patient's son at length  He did in fact confirm that she has been having significant bizarre hallucinations  He states that they have been occurring intermittently since the beginning of her hospitalization in June 2017 at [address] [place] [address]  The persisted during the hospitalization and for the 3 months she was at home when her daughter-in-law came to live with her  He also explained that the patient's significant other x 25 years had just passed earlier this year and he had felt that his mom didn't have time to grieve due to her own medical issues  He provided me with several examples of her hallucinations, including the patient telling him that she was watching a show about  where there were white elephants eating balls of cocaine  He also described another event where she said that a family member had been burying dead bodies in their backyard  When asked who was being being buried she had said her ex- who is still alive  He states that she does not have a psychiatric history or a history of dementia that he is aware about   He is very concerned about these events and wants to "get to the bottom of them "  Of note the primary team also told me that her son had mentioned that the patient has had occasional episodes of staring spells  He did not discuss them with me  Inpatient consult to Neurology  Consult performed by: Zuleika Ta ordered by: Katie Nassar        Review of Systems A 12 point ROS was completed and other than the complaints mentioned above in the HPI, all remaining systems were negative  Historical Information   Past Medical History:   Diagnosis Date    Anxiety     Diabetes mellitus ([address] )     Hypertension     Pancreatitis      Past Surgical History:   Procedure Laterality Date     SECTION      3    PEG (HISTORICAL)      TRACHEOSTOMY       Social History   History   Alcohol Use No     History   Drug Use No     History   Smoking Status    Current Every Day Smoker    Packs/day: 1 00    Years: 40 00    Types: Cigarettes   Smokeless Tobacco    Never Used     Family History: History reviewed  No pertinent family history  Review of previous medical records was completed      Meds/Allergies   current meds:   Current Facility-Administered Medications   Medication Dose Route Frequency    acetaminophen (TYLENOL) tablet 650 mg  650 mg Oral Q4H PRN    ALPRAZolam (XANAX) tablet 0 5 mg  0 5 mg Oral TID PRN    amiodarone tablet 200 mg  200 mg Oral BID With Meals    apixaban (ELIQUIS) tablet 5 mg  5 mg Oral BID    aspirin chewable tablet 81 mg  81 mg Oral Daily    atorvastatin (LIPITOR) tablet 40 mg  40 mg Oral Daily    cholecalciferol (VITAMIN D3) tablet 1,000 Units  1,000 Units Oral Daily    clopidogrel (PLAVIX) tablet 75 mg  75 mg Oral Daily    furosemide (LASIX) injection 40 mg  40 mg Intravenous BID (diuretic)    influenza inactivated quadrivalent vaccine (FLULAVAL) IM injection 0 5 mL  0 5 mL Intramuscular Prior to discharge    insulin glargine (LANTUS) subcutaneous injection 40 Units  40 Units Subcutaneous Q12H [facility]    insulin lispro (HumaLOG) 100 units/mL subcutaneous injection 1-5 Units  1-5 Units Subcutaneous HS    insulin lispro (HumaLOG) 100 units/mL subcutaneous injection 1-6 Units  1-6 Units Subcutaneous TID AC    insulin lispro (HumaLOG) 100 units/mL subcutaneous injection 10 Units  10 Units Subcutaneous TID With Meals    ipratropium (ATROVENT) 0 02 % inhalation solution 0 5 mg  0 5 mg Nebulization TID    ipratropium (ATROVENT) 0 02 % inhalation solution 0 5 mg  0 5 mg Nebulization Q6H PRN    levalbuterol (XOPENEX) inhalation solution 1 25 mg  1 25 mg Nebulization TID    levalbuterol (XOPENEX) inhalation solution 1 25 mg  1 25 mg Nebulization Q6H PRN    levothyroxine tablet 125 mcg  125 mcg Oral Early Morning    melatonin tablet 6 mg  6 mg Oral HS    metoprolol (LOPRESSOR) injection 2 5 mg  2 5 mg Intravenous Q6H PRN    metoprolol tartrate (LOPRESSOR) partial tablet 37 5 mg  37 5 mg Oral Q12H [address]    nitroglycerin (NITROSTAT) SL tablet 0 4 mg  0 4 mg Sublingual Q5 Min PRN    nystatin (MYCOSTATIN) powder   Topical BID    oxyCODONE (ROXICODONE) IR tablet 2 5 mg  2 5 mg Oral Q4H PRN    Or    oxyCODONE (ROXICODONE) IR tablet 5 mg  5 mg Oral Q4H PRN    pantoprazole (PROTONIX) EC tablet 40 mg  40 mg Oral Early Morning    polyethylene glycol (MIRALAX) packet 17 g  17 g Oral Daily    potassium chloride (K-DUR,KLOR-CON) CR tablet 20 mEq  20 mEq Oral BID With Meals    senna (SENOKOT) tablet 17 2 mg  2 tablet Oral HS    simethicone (MYLICON) chewable tablet 80 mg  80 mg Oral Q6H PRN    trimethobenzamide (TIGAN) IM injection 200 mg  200 mg Intramuscular Q6H PRN    and PTA meds:   Prior to Admission Medications   Prescriptions Last Dose Informant Patient Reported? Taking?    Cyanocobalamin 1000 MCG/ML KIT Unknown at Unknown time  Yes No   Sig: Inject 1 mL as directed every 30 (thirty) days   albuterol (PROVENTIL HFA,VENTOLIN HFA) 90 mcg/act inhaler Unknown at Unknown time  Yes No   Sig: Inhale 2 puffs every 4 (four) hours as needed for wheezing or shortness of breath   atorvastatin (LIPITOR) 40 mg tablet Unknown at Unknown time  Yes No   Sig: Take 40 mg by mouth daily   cetirizine (ZyrTEC) 10 mg tablet Unknown at Unknown time  Yes No   Sig: Take 10 mg by mouth daily   cholecalciferol (VITAMIN D3) 1,000 units tablet Unknown at Unknown time  Yes No   Sig: Take 1,000 Units by mouth daily   escitalopram (LEXAPRO) 10 mg tablet Unknown at Unknown time  Yes No   Sig: Take 10 mg by mouth daily   furosemide (LASIX) 40 mg tablet Unknown at Unknown time  Yes No   Sig: Take 40 mg by mouth daily as needed (SOB)   gabapentin (NEURONTIN) 600 MG tablet Unknown at Unknown time  Yes No   Sig: Take 600 mg by mouth 3 (three) times a day   insulin glargine (LANTUS) 100 units/mL subcutaneous injection Unknown at Unknown time  Yes No   Sig: Inject 42 Units under the skin daily at bedtime   levothyroxine 125 mcg tablet Unknown at Unknown time  Yes No   Sig: Take 125 mcg by mouth daily   lisinopril (ZESTRIL) 5 mg tablet Unknown at Unknown time  Yes No   Sig: Take 5 mg by mouth daily   magnesium oxide (MAG-OX) 400 mg Unknown at Unknown time  Yes No   Sig: Take 400 mg by mouth daily   metFORMIN (GLUMETZA) 1000 MG (MOD) 24 hr tablet Unknown at Unknown time  Yes No   Sig: Take 1,000 mg by mouth 2 (two) times a day with meals   metoprolol tartrate (LOPRESSOR) 25 mg tablet Unknown at Unknown time  Yes No   Sig: Take 25 mg by mouth every 12 (twelve) hours   oxyCODONE-acetaminophen (PERCOCET) 5-325 mg per tablet Unknown at Unknown time  Yes No   Sig: Take 1 tablet by mouth every 6 (six) hours as needed for moderate pain      Facility-Administered Medications: None       No Known Allergies    Objective   Vitals:Blood pressure (!) 179/72, pulse 62, temperature 97 6 °F (36 4 °C), temperature source Oral, resp  rate 20, height 5' 11" (1 803 m), weight 116 kg (255 lb 1 2 oz), SpO2 98 %  ,Body mass index is 35 58 kg/m²      Intake/Output Summary (Last 24 hours) at 11/20/17 1506  Last data filed at 11/20/17 1313   Gross per 24 hour   Intake             2630 ml   Output             2175 ml   Net 455 ml       Invasive Devices: Invasive Devices     Peripheral Intravenous Line            Peripheral IV 11/17/17 Left Hand 3 days                Physical Exam   Constitutional: She is oriented to person, place, and time  She appears well-developed and well-nourished  Seated EOB   HENT:   Head: Normocephalic and atraumatic  Eyes: Conjunctivae and EOM are normal  Pupils are equal, round, and reactive to light  Right eye exhibits no discharge  Left eye exhibits no discharge  No scleral icterus  Neck: Normal range of motion  Neck supple  Cardiovascular: Normal rate, regular rhythm and intact distal pulses  Exam reveals no gallop and no friction rub  No murmur heard  Pulmonary/Chest: Effort normal  No stridor  No respiratory distress  She has no wheezes  She has no rales  She exhibits no tenderness  Coarse Breath sounds  Increased work of breathing when speaking a sentence  Splinting noted at times of exertion  Abdominal: Soft  She exhibits no distension  There is no tenderness  Musculoskeletal:   See neuro exam   Lymphadenopathy:     She has no cervical adenopathy  Neurological: She is alert and oriented to person, place, and time  She has a normal Finger-Nose-Finger Test    Skin: Skin is warm and dry  No rash noted  No erythema  L chest incision with steristrips in place   Psychiatric: Her speech is normal and behavior is normal  Judgment and thought content normal    Flat affect     Neurologic Exam     Mental Status   Oriented to person, place, and time  Follows 2 step commands  Attention: normal  Concentration: normal    Speech: speech is normal   Level of consciousness: alert  Knowledge: consistent with education  Able to perform simple calculations  Able to name object  Normal comprehension       Cranial Nerves     CN II   Visual acuity: normal (grossly)  Right visual field deficit: none  Left visual field deficit: none     CN III, IV, VI   Pupils are equal, round, and reactive to light  Extraocular motions are normal    Right pupil: Size: 2 mm  Shape: regular  Reactivity: brisk  Consensual response: intact  Accommodation: intact  Left pupil: Size: 2 mm  Shape: regular  Reactivity: brisk  Consensual response: intact  Accommodation: intact  CN III: no CN III palsy  CN VI: no CN VI palsy  Nystagmus: none   Diplopia: none  Conjugate gaze: present    CN V   Facial sensation intact  CN VII   Facial expression full, symmetric  CN XI   CN XI normal      CN XII   CN XII normal      Motor Exam   Muscle bulk: normal  Overall muscle tone: normal  Unable to do pronator drift due to UE ROM restrictions s/p cardiac procedures  Unable to do formal strength testing of BUEs, but they appear symmetric with no focal deficits   strength is symmetric and 4+/5      BLEs 4+/5 diffusely throughout     Sensory Exam   Light touch normal      Gait, Coordination, and Reflexes     Coordination   Finger to nose coordination: normal  1+ reflexes throughout       Lab Results:   Recent Results (from the past 24 hour(s))   Fingerstick Glucose (POCT)    Collection Time: 11/19/17  4:37 PM   Result Value Ref Range    POC Glucose 341 (H) 65 - 140 mg/dl   Fingerstick Glucose (POCT)    Collection Time: 11/19/17  9:01 PM   Result Value Ref Range    POC Glucose 334 (H) 65 - 140 mg/dl   Basic metabolic panel    Collection Time: 11/20/17  5:53 AM   Result Value Ref Range    Sodium 128 (L) 136 - 145 mmol/L    Potassium 3 8 3 5 - 5 3 mmol/L    Chloride 92 (L) 100 - 108 mmol/L    CO2 30 21 - 32 mmol/L    Anion Gap 6 4 - 13 mmol/L    BUN 8 5 - 25 mg/dL    Creatinine 0 78 0 60 - 1 30 mg/dL    Glucose 224 (H) 65 - 140 mg/dL    Calcium 9 1 8 3 - 10 1 mg/dL    eGFR 79 ml/min/1 73sq m   CBC    Collection Time: 11/20/17  5:53 AM   Result Value Ref Range    WBC 13 47 (H) 4 31 - 10 16 Thousand/uL    RBC 3 23 (L) 3 81 - 5 12 Million/uL    Hemoglobin 9 4 (L) 11 5 - 15 4 g/dL    Hematocrit 30 2 (L) 34 8 - 46 1 %    MCV 94 82 - 98 fL    MCH 29 1 26 8 - 34 3 pg    MCHC 31 1 (L) 31 4 - 37 4 g/dL    RDW 17 1 (H) 11 6 - 15 1 %    Platelets 171 530 - 514 Thousands/uL    MPV 10 2 8 9 - 12 7 fL   ECG 12 lead    Collection Time: 11/20/17  6:33 AM   Result Value Ref Range    Ventricular Rate 63 BPM    Atrial Rate 63 BPM    MD Interval 192 ms    QRSD Interval 162 ms    QT Interval 542 ms    QTC Interval 554 ms    P Axis 48 degrees    QRS Axis 45 degrees    T Wave Axis 56 degrees   Fingerstick Glucose (POCT)    Collection Time: 11/20/17  7:00 AM   Result Value Ref Range    POC Glucose 260 (H) 65 - 140 mg/dl   Fingerstick Glucose (POCT)    Collection Time: 11/20/17 10:40 AM   Result Value Ref Range    POC Glucose 324 (H) 65 - 140 mg/dl     Imaging Studies: I have personally reviewed pertinent films in PACS   11/2/17 CT head wo contrast  No acute intracranial abnormality  11/2/17 CT cervical spine wo contrast  Nonspecific foci of air are identified in the region of the right vertebral artery and right internal carotid artery which is nonspecific finding  CTA neck can be considered for further evaluation  No acute fracture or dislocation identified  If there is focal neurologic deficit, cervical spine MRI can be considered  Moderate partially visualized bilateral pleural effusions  Please proceed with CT chest with contrast     11/6/17 CT head wo contrast  No CT evidence of acute intracranial process or significant interval change since November 2, 2017  Chronic microangiopathy  11/10/17 VAS upper limb venous duplex  RIGHT UPPER LIMB LIMITED:  Evaluation shows no evidence of thrombus in the internal jugular vein, and the  subclavian vein  The brachiocephalic vein could not be identified due to a line  and bandage  LEFT UPPER LIMB:  Evaluation shows evidence of acute, occlusive, superficial vein thrombus in the  cephalic vein from the distal upper arm to the proximal forearm    No evidence of acute or chronic deep vein thrombosis  EKG, Pathology, and Other Studies: I have personally reviewed pertinent reports  11/3/17 ECHO   EF 50%; hypokinesis of basal inferior walls, mild concentric left ventricular hypertrophy, findings suggesting grade 2 diastolic dysfunction  Normal bilateral atria  11/16/17 ECHO  EF of 40% with hypokinesis of the basal to mid anterior, anteroseptal and inferoseptal walls  VTE Prophylaxis: Sequential compression device Caffie Patron)    Patient also on Eliquis

## 2017-11-20 NOTE — PHYSICAL THERAPY NOTE
Physical Therapy Progress Note     11/20/17 9333   Pain Assessment   Pain Assessment No/denies pain   Pain Score No Pain   Hospital Pain Intervention(s) Repositioned; Ambulation/increased activity   Response to Interventions AGREEABLE TO BEDLEVEL ACTIVITY   Pain Rating: FLACC (Rest) - Face 0   Pain Rating: FLACC (Rest) - Legs 0   Pain Rating: FLACC (Rest) - Activity 0   Pain Rating: FLACC (Rest) - Cry 0   Pain Rating: FLACC (Rest) - Consolability 0   Score: FLACC (Rest) 0   Restrictions/Precautions   Weight Bearing Precautions Per Order No   Other Precautions Bed Alarm;Telemetry; Fall Risk;Cognitive;Aspiration   General   Chart Reviewed Yes   Response to Previous Treatment Patient with no complaints from previous session  Family/Caregiver Present No   Cognition   Overall Cognitive Status WFL   Arousal/Participation Alert; Cooperative   Attention Attends with cues to redirect   Orientation Level Oriented X4   Memory Decreased recall of precautions   Following Commands Follows one step commands with increased time or repetition   Subjective   Subjective THE PT  REPORTS BEING UP EARLIER IN DAY  AGREEABLE TO BEDLEVEL EXERCISE WITH ACKNOWLEDGEMENT OF REASSESMENT NEXT SESSION FOR UPRIGHT MOBILITY  Bed Mobility   Rolling R 2  Maximal assistance   Additional items Assist x 1; Increased time required;Verbal cues   Rolling L 2  Maximal assistance   Additional items Assist x 1; Increased time required;Verbal cues   Exercises   THR Supine;15 reps;AROM; Bilateral  (OCCASSIONAL REDIRECTION )   Assessment   Prognosis Good   Problem List Decreased strength;Decreased endurance;Decreased mobility; Decreased cognition;Decreased safety awareness; Impaired judgement;Obesity   Assessment PRESENTLY THE PT  DEMONSTRATES ONGOING DECONDITIONING WITH NEED FOR FREQUENT REST PERIODS  BED MOBILITY CONTINUES TO REQUIRE MAXIMAL ASSISTANCE OF 1 PERSON WITH FURTHER MOBILITY DEFERED PENDING REASSESMENT NEXT SESSION   THE PT  CURRENTLY CONTINUES TO FUNCTION BELOW BASELINE AND GIVEN LIMITED ASSISTANCE WITHIN THE HOME SETTING WOULD EXPECT NEED FOR CONTINUED REHAB UPON D/C  Barriers to Discharge Inaccessible home environment   Goals   Patient Goals TO SLEEP   LTG Expiration Date 11/24/17  (PT TO SEE NEXT SESSION FOR PROGRESSION OF MOBILITY  )   Treatment Day 1   Plan   Treatment/Interventions LE strengthening/ROM; Therapeutic exercise; Bed mobility   Progress Slow progress, medical status limitations   PT Frequency 5x/wk   Recommendation   Recommendation (REHAB)   Equipment Recommended (TO BE DETERMINED )   PT - OK to Discharge (ONCE MEDICALLY CLEARED FOR REHAB )     Radhames Espinosa, PTA

## 2017-11-20 NOTE — PLAN OF CARE
Problem: SLP ADULT - SWALLOWING, IMPAIRED  Goal: Advance to least restrictive diet without signs or symptoms of aspiration for planned discharge setting  See evaluation for individualized goals    Outcome: Progressing  Regular w/ Nectar thick liquids

## 2017-11-20 NOTE — PLAN OF CARE
CARDIOVASCULAR - ADULT     Maintains optimal cardiac output and hemodynamic stability Progressing        DISCHARGE PLANNING - CARE MANAGEMENT     Discharge to post-acute care or home with appropriate resources Progressing        Nutrition/Hydration-ADULT     Nutrient/Hydration intake appropriate for improving, restoring or maintaining nutritional needs Progressing        Potential for Falls     Patient will remain free of falls Progressing        Prexisting or High Potential for Compromised Skin Integrity     Skin integrity is maintained or improved Progressing

## 2017-11-20 NOTE — CASE MANAGEMENT
Notification of Discharge  This is a Notification of Discharge from our facility [address]  Please be advised that this patient has been discharge from our facility  Below you will find the admission and discharge date and time including the patients disposition  PRESENTATION DATE: 11/2/2017  6:47 PM  IP ADMISSION DATE: 11/2/17 1847  DISCHARGE DATE: No discharge date for patient encounter  DISPOSITION: [facility] in the [facility] by Sukh Belcher for 2017  Network Utilization Review Department  Phone: [phone]; Fax [phone]  ATTENTION: The Network Utilization Review Department is now centralized for our 7 Facilities  Make a note that we have a new phone and fax numbers for our Department  Please call with any questions or concerns to [phone] and carefully follow the prompts so that you are directed to the right person  All voicemails are confidential  Fax any determinations, approvals, denials, and requests for initial or continue stay review clinical to [phone]  Due to HIGH CALL volume, it would be easier if you could please send faxed requests to expedite your requests and in part, help us provide discharge notifications faster

## 2017-11-20 NOTE — SOCIAL WORK
Met with patient at her request to discuss snf choices  Patient reports she had past stay at [facility] and would like referral there  Patient spoke to her son about this during his visit yesterday  [facility] referral was made

## 2017-11-21 ENCOUNTER — GENERIC CONVERSION - ENCOUNTER (OUTPATIENT)
Dept: OTHER | Facility: OTHER | Age: 67
End: 2017-11-21

## 2017-11-21 ENCOUNTER — APPOINTMENT (INPATIENT)
Dept: NEUROLOGY | Facility: AMBULATORY SURGERY CENTER | Age: 67
DRG: 224 | End: 2017-11-21
Payer: COMMERCIAL

## 2017-11-21 LAB
ERYTHROCYTE [DISTWIDTH] IN BLOOD BY AUTOMATED COUNT: 17.1 % (ref 11.6–15.1)
GLUCOSE SERPL-MCNC: 248 MG/DL (ref 65–140)
GLUCOSE SERPL-MCNC: 317 MG/DL (ref 65–140)
GLUCOSE SERPL-MCNC: 340 MG/DL (ref 65–140)
HCT VFR BLD AUTO: 31.1 % (ref 34.8–46.1)
HGB BLD-MCNC: 10.1 G/DL (ref 11.5–15.4)
MCH RBC QN AUTO: 29.4 PG (ref 26.8–34.3)
MCHC RBC AUTO-ENTMCNC: 32.5 G/DL (ref 31.4–37.4)
MCV RBC AUTO: 90 FL (ref 82–98)
PLATELET # BLD AUTO: 339 THOUSANDS/UL (ref 149–390)
PMV BLD AUTO: 10.3 FL (ref 8.9–12.7)
RBC # BLD AUTO: 3.44 MILLION/UL (ref 3.81–5.12)
WBC # BLD AUTO: 12.93 THOUSAND/UL (ref 4.31–10.16)

## 2017-11-21 PROCEDURE — 82948 REAGENT STRIP/BLOOD GLUCOSE: CPT

## 2017-11-21 PROCEDURE — 94760 N-INVAS EAR/PLS OXIMETRY 1: CPT

## 2017-11-21 PROCEDURE — 97535 SELF CARE MNGMENT TRAINING: CPT | Performed by: STUDENT IN AN ORGANIZED HEALTH CARE EDUCATION/TRAINING PROGRAM

## 2017-11-21 PROCEDURE — 97532 HB COGNITIVE SKILLS DEVELOPMENT: CPT | Performed by: STUDENT IN AN ORGANIZED HEALTH CARE EDUCATION/TRAINING PROGRAM

## 2017-11-21 PROCEDURE — 94640 AIRWAY INHALATION TREATMENT: CPT

## 2017-11-21 PROCEDURE — 95816 EEG AWAKE AND DROWSY: CPT

## 2017-11-21 PROCEDURE — 85027 COMPLETE CBC AUTOMATED: CPT | Performed by: INTERNAL MEDICINE

## 2017-11-21 RX ORDER — DOCUSATE SODIUM 100 MG/1
100 CAPSULE, LIQUID FILLED ORAL 2 TIMES DAILY
Status: DISCONTINUED | OUTPATIENT
Start: 2017-11-21 | End: 2017-11-23 | Stop reason: HOSPADM

## 2017-11-21 RX ORDER — INSULIN GLARGINE 100 [IU]/ML
43 INJECTION, SOLUTION SUBCUTANEOUS EVERY 12 HOURS SCHEDULED
Status: DISCONTINUED | OUTPATIENT
Start: 2017-11-21 | End: 2017-11-23 | Stop reason: HOSPADM

## 2017-11-21 RX ORDER — LACTULOSE 20 G/30ML
10 SOLUTION ORAL 2 TIMES DAILY
Status: DISCONTINUED | OUTPATIENT
Start: 2017-11-21 | End: 2017-11-23 | Stop reason: HOSPADM

## 2017-11-21 RX ORDER — FUROSEMIDE 40 MG/1
40 TABLET ORAL
Status: DISCONTINUED | OUTPATIENT
Start: 2017-11-21 | End: 2017-11-23 | Stop reason: HOSPADM

## 2017-11-21 RX ADMIN — ALPRAZOLAM 0.5 MG: 0.5 TABLET ORAL at 21:33

## 2017-11-21 RX ADMIN — NYSTATIN: 100000 POWDER TOPICAL at 17:25

## 2017-11-21 RX ADMIN — NYSTATIN: 100000 POWDER TOPICAL at 08:16

## 2017-11-21 RX ADMIN — INSULIN LISPRO 2 UNITS: 100 INJECTION, SOLUTION INTRAVENOUS; SUBCUTANEOUS at 21:34

## 2017-11-21 RX ADMIN — LACTULOSE 10 G: 20 SOLUTION ORAL at 17:25

## 2017-11-21 RX ADMIN — LEVALBUTEROL HYDROCHLORIDE 1.25 MG: 1.25 SOLUTION, CONCENTRATE RESPIRATORY (INHALATION) at 19:23

## 2017-11-21 RX ADMIN — IPRATROPIUM BROMIDE 0.5 MG: 0.5 SOLUTION RESPIRATORY (INHALATION) at 07:31

## 2017-11-21 RX ADMIN — PANTOPRAZOLE SODIUM 40 MG: 40 TABLET, DELAYED RELEASE ORAL at 05:58

## 2017-11-21 RX ADMIN — FUROSEMIDE 40 MG: 40 TABLET ORAL at 08:16

## 2017-11-21 RX ADMIN — INSULIN GLARGINE 40 UNITS: 100 INJECTION, SOLUTION SUBCUTANEOUS at 08:29

## 2017-11-21 RX ADMIN — SENNOSIDES 17.2 MG: 8.6 TABLET, FILM COATED ORAL at 21:33

## 2017-11-21 RX ADMIN — INSULIN GLARGINE 43 UNITS: 100 INJECTION, SOLUTION SUBCUTANEOUS at 21:33

## 2017-11-21 RX ADMIN — LEVOTHYROXINE SODIUM 125 MCG: 125 TABLET ORAL at 05:58

## 2017-11-21 RX ADMIN — POTASSIUM CHLORIDE 20 MEQ: 1500 TABLET, EXTENDED RELEASE ORAL at 17:25

## 2017-11-21 RX ADMIN — INSULIN LISPRO 5 UNITS: 100 INJECTION, SOLUTION INTRAVENOUS; SUBCUTANEOUS at 12:08

## 2017-11-21 RX ADMIN — AMIODARONE HYDROCHLORIDE 200 MG: 200 TABLET ORAL at 17:25

## 2017-11-21 RX ADMIN — CLOPIDOGREL BISULFATE 75 MG: 75 TABLET ORAL at 08:17

## 2017-11-21 RX ADMIN — IPRATROPIUM BROMIDE 0.5 MG: 0.5 SOLUTION RESPIRATORY (INHALATION) at 14:04

## 2017-11-21 RX ADMIN — FUROSEMIDE 40 MG: 40 TABLET ORAL at 17:25

## 2017-11-21 RX ADMIN — ATORVASTATIN CALCIUM 40 MG: 40 TABLET, FILM COATED ORAL at 08:17

## 2017-11-21 RX ADMIN — POTASSIUM CHLORIDE 20 MEQ: 1500 TABLET, EXTENDED RELEASE ORAL at 08:18

## 2017-11-21 RX ADMIN — POLYETHYLENE GLYCOL 3350 17 G: 17 POWDER, FOR SOLUTION ORAL at 08:16

## 2017-11-21 RX ADMIN — APIXABAN 5 MG: 5 TABLET, FILM COATED ORAL at 17:25

## 2017-11-21 RX ADMIN — METOPROLOL TARTRATE 37.5 MG: 25 TABLET ORAL at 21:33

## 2017-11-21 RX ADMIN — IPRATROPIUM BROMIDE 0.5 MG: 0.5 SOLUTION RESPIRATORY (INHALATION) at 19:23

## 2017-11-21 RX ADMIN — INSULIN LISPRO 3 UNITS: 100 INJECTION, SOLUTION INTRAVENOUS; SUBCUTANEOUS at 08:18

## 2017-11-21 RX ADMIN — LEVALBUTEROL HYDROCHLORIDE 1.25 MG: 1.25 SOLUTION, CONCENTRATE RESPIRATORY (INHALATION) at 07:31

## 2017-11-21 RX ADMIN — MELATONIN TAB 3 MG 6 MG: 3 TAB at 21:33

## 2017-11-21 RX ADMIN — ASPIRIN 81 MG 81 MG: 81 TABLET ORAL at 08:16

## 2017-11-21 RX ADMIN — AMIODARONE HYDROCHLORIDE 200 MG: 200 TABLET ORAL at 08:16

## 2017-11-21 RX ADMIN — INSULIN LISPRO 5 UNITS: 100 INJECTION, SOLUTION INTRAVENOUS; SUBCUTANEOUS at 17:27

## 2017-11-21 RX ADMIN — VITAMIN D, TAB 1000IU (100/BT) 1000 UNITS: 25 TAB at 08:18

## 2017-11-21 RX ADMIN — METOPROLOL TARTRATE 37.5 MG: 25 TABLET ORAL at 08:17

## 2017-11-21 RX ADMIN — APIXABAN 5 MG: 5 TABLET, FILM COATED ORAL at 08:18

## 2017-11-21 RX ADMIN — LEVALBUTEROL HYDROCHLORIDE 1.25 MG: 1.25 SOLUTION, CONCENTRATE RESPIRATORY (INHALATION) at 14:03

## 2017-11-21 RX ADMIN — DOCUSATE SODIUM 100 MG: 100 CAPSULE, LIQUID FILLED ORAL at 17:25

## 2017-11-21 NOTE — PROGRESS NOTES
Okay to keep peripheral IV until tomorrow per Dr Cresencio Mariscal as pt probable d/c tomorrow to rehab

## 2017-11-21 NOTE — OCCUPATIONAL THERAPY NOTE
17 1527   Restrictions/Precautions   Weight Bearing Precautions Per Order No   Other Precautions Bed Alarm;Telemetry; Fall Risk;Cognitive;Aspiration   Pain Assessment   Pain Assessment No/denies pain   Pain Score No Pain   ADL   Where Assessed Edge of bed   LB Dressing Assistance 5  Supervision/Setup   LB Dressing Deficit Setup; Requires assistive device for steadying;Use of adaptive equipment   LB Dressing Comments don socks and pants   Cognition   Overall Cognitive Status WFL   Arousal/Participation Alert   Attention Attends with cues to redirect   Orientation Level Oriented X4   Memory Decreased recall of precautions   Following Commands Follows one step commands with increased time or repetition   Cognition Assessment Tools MOCA   Score 22   Activity Tolerance   Activity Tolerance Patient tolerated treatment well   Assessment   Assessment Pt participates in OT session with focus on cognitive reorientation, activity tolerance, and LB dressing to increase I with adls  Pt scored 22/30 on MOCA, indicating minor cognitive deficits  Please see below for details  Pt setup LB dressing to don socks and pants with LHAE  Pt requires extra time to complete activity 2* rest breaks needed  Pt min A supine to sit EOB 2* decreased strength  Pt will continue to benefit from OT services to address activity tolerance, transfers, and adls  Plan   Treatment Interventions ADL retraining; Endurance training;Cognitive reorientation; Activityengagement   Goal Expiration Date 17   Treatment Day 1   OT Frequency 1-2x/wk   Recommendation   OT Discharge Recommendation Short Term Rehab  (2* barriers with home setup)     PT SEEN FOR LIS COGNITIVE ASSESSMENT  SCORED  20/32 INDICATING MINOR COGNITIVE IMPAIRMENT FOR AGE/EDUCATION  SCORES ARE AS FOLLOWS:    VISUOSPATIAL/EXECUTIVE FUNCTION: 3/5  Pt made errors with connecting bubbles in pattern and drawing cube  NAMIN/3  Pt called rhino a hippo      ATTENTION: 5/6  Pt makes error with repeating numbers in backward order  LANGUAGE: 3/3    ABSTRACTION: 2/2    DELAYED RECALL: 3/5  Pt did not recall velvet and red  ORIENTATION: 4/6  Pt did not recall the date or day of the week

## 2017-11-21 NOTE — PLAN OF CARE
CARDIOVASCULAR - ADULT     Maintains optimal cardiac output and hemodynamic stability Progressing        DISCHARGE PLANNING - CARE MANAGEMENT     Discharge to post-acute care or home with appropriate resources Progressing        MUSCULOSKELETAL - ADULT     Maintain or return mobility to safest level of function Progressing        Nutrition/Hydration-ADULT     Nutrient/Hydration intake appropriate for improving, restoring or maintaining nutritional needs Progressing        Potential for Falls     Patient will remain free of falls Progressing        Prexisting or High Potential for Compromised Skin Integrity     Skin integrity is maintained or improved Progressing

## 2017-11-21 NOTE — SOCIAL WORK
Spoke to son, Eula Finley, [phone] to discuss referrals for rehab  He had received admissions packet from [facility] and was working in that  Explained patient requested referral to [facility] and she has been reviewed by both snf and acute rehab  Son agrees with patient's wishes to go to [facility] if she is approved and an insurance [address] is received

## 2017-11-21 NOTE — RESTORATIVE TECHNICIAN NOTE
Restorative Specialist Mobility Note       Activity: Ambulate in baker     Assistive Device: Front wheel walker     Ambulation Response:  Tolerated well  Repositioned: Turns self, Up in chair  Positioning Frequency: Able to turn self

## 2017-11-21 NOTE — PLAN OF CARE
Problem: OCCUPATIONAL THERAPY ADULT  Goal: Performs self-care activities at highest level of function for planned discharge setting  See evaluation for individualized goals  Treatment Interventions: ADL retraining, Functional transfer training, UE strengthening/ROM, Endurance training, Patient/family training, Equipment evaluation/education, Energy conservation  Equipment Recommended: Bedside commode       See flowsheet documentation for full assessment, interventions and recommendations  Outcome: Progressing  Limitation: Decreased ADL status, Decreased UE strength, Decreased Safe judgement during ADL, Decreased endurance, Decreased self-care trans, Decreased high-level ADLs  Prognosis: Good  Assessment: Pt participates in OT session with focus on cognitive reorientation, activity tolerance, and LB dressing to increase I with adls  Pt scored 22/30 on MOCA, indicating minor cognitive deficits  Please see below for details  Pt setup LB dressing to don socks and pants with LHAE  Pt requires extra time to complete activity 2* rest breaks needed  Pt min A supine to sit EOB 2* decreased strength  Pt will continue to benefit from OT services to address activity tolerance, transfers, and adls       OT Discharge Recommendation: Short Term Rehab (2* barriers with home setup)

## 2017-11-21 NOTE — PROGRESS NOTES
[facility] Progress Note  Patient: Daniel Richmond 79 y o  female   MRN: [id number]  PCP: No primary care provider on file  [facility] Encounter: [id number]  Date Of Visit: 11/21/17    Assessment:    Principal Problem:    Respiratory failure  Active Problems:    Cardiac arrest    Acute hypercapnic respiratory failure    Torsades de pointes    Prolonged QT interval    NSTEMI (non-ST elevated myocardial infarction)    Ischemic cardiomyopathy    LBBB (left bundle branch block)    CAD (coronary artery disease)    h/o Vocal cord paralysis    HTN (hypertension)    Hyperlipemia    Encephalopathy    Obesity, Class III, BMI 40-49 9 (morbid obesity) ([address] )    Delirium    Anemia    Hyponatremia    Leukocytosis    Hypoglycemia    Hallucinations      Plan:    1   Coronary artery disease, with multivessel disease status post cardiac catheterization, 11/15; status post PCI with LAD drug-eluting stent ; status post the VF/VT cardiac arrest:  Continue cardiovascular medications   Continue amiodarone   Continue telemetry   Cardiology on board  Patient is high risk for CABG per CTSx      2   Atrial fibrillation:   Continue antiarrhythmic medications   Patient now on apixaban as per Cardiology       3   Acute hypoxic respiratory failure, resolved:  Continue oxygen p r n     Continue nebulizers p r n        4   History of vocal cord paralysis with dysphagia:  Continue modified diet        5   Diabetes mellitus type 2 presently still with significant hyperglycemia   Will increase lantus and monitor     6   Hyponatremia, likely due to patient's ischemic cardiomyopathy, hyperglycemia and previous D5 containing IV fluids when patient was having hypoglycemia: Slowly improving     7   Hypertension:  Continue blood pressure medications      8   Anemia, presently stable:  Monitor patient's CBC   For further workup and management if this worsens significantly      9   Leukocytosis, likely reactive, secondary to the above problems, particularly problem number 1 above:  Observe off antibiotics   No signs and symptoms of an active infection       10   Encephalopathy, previously due to delirium--  Psych input appreciated  Grievance due to recent loss of loved one    VTE Pharmacologic Prophylaxis:   Pharmacologic: Apixaban (Eliquis)  Mechanical VTE Prophylaxis in Place:     Patient Centered Rounds: I have performed bedside rounds with nursing staff today  Discussions with Specialists or Other Care Team Provider:     Education and Discussions with Family / Patient: I spoke with Pt's son    Time Spent for Care: 20 minutes  More than 50% of total time spent on counseling and coordination of care as described above  Current Length of Stay: 19 day(s)    Current Patient Status: Inpatient   Certification Statement: The patient will continue to require additional inpatient hospital stay due to above    Discharge Plan / Estimated Discharge Date: Rehab when bed available    Code Status: Level 3 - DNAR and DNI      Subjective:   Pt seen and examined  Denies hallucinations  Alert and awake    Objective:     Vitals:   Temp (24hrs), Av 8 °F (36 6 °C), Min:97 °F (36 1 °C), Max:99 2 °F (37 3 °C)    HR:  [59-66] 62  Resp:  [18-24] 22  BP: (119-148)/(57-67) 124/61  SpO2:  [94 %-99 %] 98 %  Body mass index is 35 58 kg/m²  Input and Output Summary (last 24 hours): Intake/Output Summary (Last 24 hours) at 17 1637  Last data filed at 17 1500   Gross per 24 hour   Intake             1680 ml   Output             3589 ml   Net            -1909 ml       Physical Exam:     Physical Exam   Constitutional: She is oriented to person, place, and time  No distress  Eyes: Conjunctivae and EOM are normal  Pupils are equal, round, and reactive to light  Neck: Normal range of motion  Neck supple  No JVD present  Cardiovascular: Normal rate  Pulmonary/Chest: Effort normal and breath sounds normal  No respiratory distress   She has no wheezes  Abdominal: Soft  Bowel sounds are normal  She exhibits no distension  Musculoskeletal: She exhibits no edema  Neurological: She is alert and oriented to person, place, and time  Skin: Skin is warm and dry  She is not diaphoretic  (  Additional Data:     Labs:      Results from last 7 days  Lab Units 11/21/17  0346  11/15/17  0610   WBC Thousand/uL 12 93*  < > 13 98*   HEMOGLOBIN g/dL 10 1*  < > 9 6*   HEMATOCRIT % 31 1*  < > 31 2*   PLATELETS Thousands/uL 339  < > 404*   NEUTROS PCT %  --   --  72   LYMPHS PCT %  --   --  19   MONOS PCT %  --   --  7   EOS PCT %  --   --  2   < > = values in this interval not displayed  Results from last 7 days  Lab Units 11/20/17  0553   SODIUM mmol/L 128*   POTASSIUM mmol/L 3 8   CHLORIDE mmol/L 92*   CO2 mmol/L 30   BUN mg/dL 8   CREATININE mg/dL 0 78   CALCIUM mg/dL 9 1   GLUCOSE RANDOM mg/dL 224*           * I Have Reviewed All Lab Data Listed Above        Imaging:      Recent Cultures (last 7 days):           Last 24 Hours Medication List:     amiodarone 200 mg Oral BID With Meals   apixaban 5 mg Oral BID   aspirin 81 mg Oral Daily   atorvastatin 40 mg Oral Daily   cholecalciferol 1,000 Units Oral Daily   clopidogrel 75 mg Oral Daily   docusate sodium 100 mg Oral BID   furosemide 40 mg Oral BID (diuretic)   insulin glargine 43 Units Subcutaneous Q12H [address]   insulin lispro 1-5 Units Subcutaneous HS   insulin lispro 1-6 Units Subcutaneous TID AC   insulin lispro 10 Units Subcutaneous TID With Meals   ipratropium 0 5 mg Nebulization TID   lactulose 10 g Oral BID   levalbuterol 1 25 mg Nebulization TID   levothyroxine 125 mcg Oral Early Morning   melatonin 6 mg Oral HS   metoprolol tartrate 37 5 mg Oral Q12H JUAN LUIS   nystatin  Topical BID   pantoprazole 40 mg Oral Early Morning   polyethylene glycol 17 g Oral Daily   potassium chloride 20 mEq Oral BID With Meals   senna 2 tablet Oral HS        Today, Patient Was Seen By: Barrera Jean-Baptiste MD    ** Please Note: This note has been constructed using a voice recognition system   **

## 2017-11-21 NOTE — PROGRESS NOTES
I was notified by the nursing staff that the patient was very anxious this AM about her blood draw, and the blood work could not be completed  The patient will be given a break at this point from blood work, and the nursing staff can re-attempt blood work completion later this AM   The nursing staff is also concerned about over-diuresis  I will defer to Cardiology in regards to the diuretic dosing regimen

## 2017-11-21 NOTE — PROGRESS NOTES
Pt stated she had to urinate  Pt assisted to Bathroom by nurse with another nurse in the room for assistance  Upon ambulating pt lost balance near doorway, pt was steadied by nurse and assisted to the toilet  Pt started breathing very heavy, stated she felt SOB  2LNC applied  Pt then assisted back to bed  o2 sat 97% on 2LNC  Pt stated that she felt anxious about urinating so much and feels weak  Pt also stated that she worked herself up about lab draw  Pt a very hard stick, was only able to obtain cbc earlier, bmp ordered as well  Pt says she does not want to be stuck again  SLIM made aware  Will continue to monitor

## 2017-11-21 NOTE — SPEECH THERAPY NOTE
Order for VBS received & study set for tomorrow at 10:30 am   Alissa Somers [facility]  [phone]

## 2017-11-21 NOTE — PROGRESS NOTES
Cardiology Progress Note - Mario Arcos 79 y o  female MRN: [id number]    Unit/Bed#: [facility] 526-01 Encounter: [id number]      Assessment:  Principal Problem:    Respiratory failure  Active Problems:    Cardiac arrest    Acute hypercapnic respiratory failure    Torsades de pointes    Prolonged QT interval    NSTEMI (non-ST elevated myocardial infarction)    Ischemic cardiomyopathy    LBBB (left bundle branch block)    CAD (coronary artery disease)    h/o Vocal cord paralysis    HTN (hypertension)    Hyperlipemia    Encephalopathy    Obesity, Class III, BMI 40-49 9 (morbid obesity) (HCC)    Delirium    Anemia    Hyponatremia    Leukocytosis    Hypoglycemia    Hallucinations      Plan:  Patient feels that her breathing is better  She has no chest pain  Her telemetry is stable  I will likely convert her to oral diuretic but today  I will otherwise continue her present medical regimen  Subjective:   Patient seen and examined  No significant events overnight  Objective:     Vitals: Blood pressure 144/63, pulse 66, temperature 99 2 °F (37 3 °C), resp  rate 18, height 5' 11" (1 803 m), weight 116 kg (255 lb 1 2 oz), SpO2 99 %  , Body mass index is 35 58 kg/m² , Orthostatic Blood Pressures    Flowsheet Row Most Recent Value   Blood Pressure  144/63 filed at 11/20/2017 2340   Patient Position - Orthostatic VS  Lying filed at 11/20/2017 0759      ,      Intake/Output Summary (Last 24 hours) at 11/21/17 0738  Last data filed at 11/21/17 0732   Gross per 24 hour   Intake             2030 ml   Output             3555 ml   Net            -1525 ml       No significant arrhythmias seen on telemetry review         Physical Exam:    GEN: Mario Arcos appears well, alert and oriented x 3, pleasant and cooperative   NECK: supple, no carotid bruits, no JVD or HJR  HEART: normal rate, regular rhythm, normal S1 and S2, no murmurs, clicks, gallops or rubs   LUNGS: clear to auscultation bilaterally; reduced breath sounds at the bases  ABDOMEN: normal bowel sounds, soft, no tenderness, no distention  EXTREMITIES: edema  SKIN: warm and well perfused, no suspicious lesions on exposed skin    Labs & Results:    Admission on 11/02/2017   No results displayed because visit has over 200 results  Xr Chest Portable    Result Date: 11/19/2017  Narrative: CHEST INDICATION:  Shortness of breath  COMPARISON:  11/18/2017 VIEWS:   AP frontal IMAGES:  1 FINDINGS:     Cardiomediastinal silhouette appears stable  Left chest wall AICD device  Probable bilateral pleural effusions with left retrocardiac atelectasis or infiltrate  Lungs are otherwise clear  Normal pulmonary vasculature  No pneumothorax  Visualized osseous structures appear within normal limits for the patient's age  Impression: Probable bilateral pleural effusions with left retrocardiac atelectasis or infiltrate  Workstation performed: [id number]     Xr Chest Portable    Result Date: 11/19/2017  Narrative: CHEST INDICATION:  Status post pacemaker insertion  COMPARISON:  11/11/2017 VIEWS:   AP frontal IMAGES:  1 FINDINGS:  The patient is rotated to the left  Cardiomediastinal silhouette appears stable  Biventricular left-sided AICD device is evident without pneumothorax  Probable bilateral pleural effusions and left retrocardiac volume loss  Stable bony thorax  Impression: No pneumothorax following pacemaker/AICD insertion  Bilateral pleural effusions and left basilar volume loss  Workstation performed: [id number]     Xr Chest Portable    Result Date: 11/11/2017  Narrative: CHEST INDICATION:  Status post thoracentesis  COMPARISON:  November 10, 2017 VIEWS:   AP frontal IMAGES:  1 FINDINGS:     The heart is enlarged  Atherosclerotic changes in the aorta  Lung volumes diminished  Kelsie and pulmonary vessels diffusely enlarged  Bilateral pleural effusions, left side greater than right which have progressed   Right internal jugular central line with tip at cavoatrial junction  Visualized osseous structures appear within normal limits for the patient's age  Impression: 1  Worsening pulmonary vascular congestion  2   Enlarging bilateral pleural effusions  Workstation performed: [id number]     Xr Chest Portable    Result Date: 11/10/2017  Narrative: CHEST INDICATION:  thoracentesis  History taken directly from the electronic ordering system  Right-sided thoracentesis  COMPARISON:  Chest x-ray of 11/10/2017 VIEWS:   AP frontal IMAGES:  2 FINDINGS:  Right IJ line tip overlies the SVC  Heart size is not well evaluated on this single portable AP view  There is no appreciable right-sided pleural effusion  No pneumothorax  Small left pleural effusion is suggested, probably stable  Visualized osseous structures appear within normal limits for the patient's age  Impression: 1  No right pleural effusion  No evidence for pneumothorax  Small left pleural effusion appears stable  Workstation performed: [id number]     Xr Chest Portable    Result Date: 11/10/2017  Narrative: CHEST INDICATION:  Volume status COMPARISON:  11/8/2017 VIEWS:   AP frontal IMAGES:  1 FINDINGS:  Right internal jugular central venous catheter tip remains within the superior vena cava  Mild cardiomegaly is stable  Interstitial pulmonary edema is again seen which appears slightly increased within the right lung  No pneumothorax identified  Visualized osseous structures appear within normal limits for the patient's age  Impression: Worsening pulmonary edema  Unchanged cardiomegaly  Workstation performed: [id number]     Xr Chest Portable    Result Date: 11/3/2017  Narrative: CHEST INDICATION:  Central line placement  COMPARISON:  November 3, 2017, earlier in the day  Georgette Ormond VIEWS:  AP portable semierect view in the conventional and line placement techniques  FINDINGS: Right internal jugular central line is identified  Tip in superior vena cava  There is no pneumothorax   The cardiomediastinal silhouette is unremarkable  Lung volumes diminished  Kelsie and pulmonary vessels diffusely enlarged  Developing alveolar infiltrates in the right upper lobe and right lower lobe, likely cardiogenic in nature  Moderate-sized left pleural effusion  Endotracheal tube with tip approximately 4 cm above dinoy  Orogastric tube coursing beneath the left hemidiaphragm  Tip not seen  External pacing leads    Bony thorax is unremarkable  Impression: 1  Right internal jugular central line in satisfactory position  2   Worsening congestive heart failure  Workstation performed: [id number]     Xr Chest Portable    Result Date: 11/3/2017  Narrative: CHEST INDICATION:  Respiratory failure COMPARISON:  1 view chest 11-17 at 15:49 VIEWS:   AP frontal IMAGES:  1 FINDINGS:     Cardiac silhouette is partially obscured  Aortic calcification is present  Left basilar and lingular airspace consolidation unchanged  Small bilateral pleural effusions left greater than right unchanged  Distended central pulmonary vasculature slightly worse  No pneumothorax  Widened right acromioclavicular joint attributed to prior surgery  Impression: Mild progression of central pulmonary vascular congestion  Left basilar and lingular airspace consolidation and bilateral pleural effusions left greater than right unchanged  Workstation performed: [id number]     Xr Chest 1 View Portable    Result Date: 11/2/2017  Narrative: CHEST  PORTABLE INDICATION: Severe shortness of breath COMPARISON:  None VIEWS:   AP semierect; IMAGES:  2 FINDINGS: The cardiomediastinal silhouette is partially obscured on the left  Moderate left and small right pleural effusions  Bony thorax unremarkable  Electrode (EKG) monitoring devices overlie the chest      Impression: Bilateral pleural effusions, left greater than right  Underlying process of the left lung base not excludable        Workstation performed: [id number]     Xr Chest 2 Views    Result Date: 11/19/2017  Narrative: CHEST - DUAL ENERGY INDICATION:  Status post AICD insertion  COMPARISON:  11/17/2017 VIEWS:  PA (including soft tissue/bone algorithms) and lateral projections IMAGES:  4 FINDINGS:     Cardiomediastinal silhouette appears stable  Left chest wall AICD device again evident without pneumothorax  Bilateral pleural effusions, left greater than right, and left retrocardiac volume loss as before  Postoperative changes distal right clavicle again noted  Impression: No pneumothorax  Bilateral pleural effusions and left basilar volume loss as before  Workstation performed: [id number]     Xr Abdomen 1 View Kub    Result Date: 11/8/2017  Narrative: ABDOMEN INDICATION:  Vomiting  Abdominal pain  COMPARISON:  None  VIEWS:  AP supine IMAGES:  2 FINDINGS: Study limited due to incomplete visualization of the entire abdomen  There is a nonobstructive bowel gas pattern  Orogastric tube is present with tip in the left upper quadrant  Dilated, air-filled loops of large and small bowel throughout the abdomen  No discernible free air on this supine study  Upright or left lateral decubitus imaging is more sensitive to detect subtle free air in the appropriate setting  No pathologic calcifications or soft tissue masses  Visualized lung bases are clear  Visualized osseous structures are unremarkable for the patient's age  Impression: 1  Limited examination  Incomplete visualization of the entire abdomen  2   Nonobstructive bowel gas pattern  Workstation performed: [id number]     Ct Head Wo Contrast    Result Date: 11/6/2017  Narrative: CT BRAIN - WITHOUT CONTRAST INDICATION:  Altered mental status COMPARISON:  CT head 11-17 TECHNIQUE:  CT examination of the brain was performed  In addition to axial images, coronal reformatted images were created and submitted for interpretation  Radiation dose length product (DLP) for this visit:  1066 73 mGy-cm     This examination, like all CT scans performed in the [facility], was performed utilizing techniques to minimize radiation dose exposure, including the use of iterative reconstruction and automated exposure control  IMAGE QUALITY:  Diagnostic  FINDINGS:  PARENCHYMA:  Decreased attenuation is noted in the supratentorial white matter demonstrating an appearance most consistent with mild microangiopathic change  Chronic lacunar infarct right basal ganglia  Subacute to chronic lacunar infarct left external  capsule unchanged  Calcification bilateral cavernous internal carotid and distal vertebral arteries  No intracranial mass, mass effect or midline shift  No CT signs of acute infarction  There is no parenchymal hemorrhage  VENTRICLES AND EXTRA-AXIAL SPACES:  Mild prominence of CSF spaces diffusely compatible with generalized volume loss  No hydrocephalus  VISUALIZED ORBITS AND PARANASAL SINUSES:  Unremarkable  CALVARIUM AND EXTRACRANIAL SOFT TISSUES:   Normal      Impression: No CT evidence of acute intracranial process or significant interval change since November 2, 2017  Chronic microangiopathy  Workstation performed: [id number]     Ct Head Without Contrast    Result Date: 11/2/2017  Narrative: CT BRAIN - WITHOUT CONTRAST INDICATION:  26-year-old woman with history of hypertension and diabetes presenting with change in mental status and respiratory distress  COMPARISON:  None  TECHNIQUE:  CT examination of the brain was performed  In addition to axial images, coronal reformatted images were created and submitted for interpretation  Radiation dose length product (DLP) for this visit:  1078 43 mGy-cm   This examination, like all CT scans performed in the [facility], was performed utilizing techniques to minimize radiation dose exposure, including the use of iterative reconstruction and automated exposure control  IMAGE QUALITY:  Diagnostic  FINDINGS:  PARENCHYMA:  No intracranial mass, mass effect or midline shift   No CT signs of acute infarction  There is no parenchymal hemorrhage  Chronic lacunar infarct involving the right internal capsule and corona radiata  Diffusely decreased white matter attenuation, typical of chronic microvascular ischemic change  VENTRICLES AND EXTRA-AXIAL SPACES:  Normal for patient's age  No extra-axial blood  VISUALIZED ORBITS AND PARANASAL SINUSES:  Unremarkable  CALVARIUM AND EXTRACRANIAL SOFT TISSUES:   Normal      Impression: No acute intracranial abnormality  Workstation performed: [id number]     Ct Cervical Spine Without Contrast    Result Date: 11/2/2017  Narrative: CT CERVICAL SPINE - WITHOUT CONTRAST INDICATION: Found on floor COMPARISON: None  TECHNIQUE:  CT examination of the cervical spine was performed without intravenous contrast   Contiguous axial images were obtained  Sagittal and coronal reconstructions were performed  Radiation dose length product (DLP) for this visit:  1298 71 mGy-cm   This examination, like all CT scans performed in the [facility], was performed utilizing techniques to minimize radiation dose exposure, including the use of iterative reconstruction and automated exposure control  IMAGE QUALITY:  Diagnostic  FINDINGS: ALIGNMENT:  There is straightening of normal cervical lordosis  No subluxation or compression deformity  VERTEBRAL BODIES:  No fracture  DEGENERATIVE CHANGES:  Mild multilevel cervical degenerative changes are noted without critical central canal stenosis  PREVERTEBRAL AND PARASPINAL SOFT TISSUES: Nonspecific foci of air are identified in the region of the right vertebral artery and right internal carotid artery which is nonspecific finding  CTA neck can be considered for further evaluation  Foci of air are  also identified in the right submandibular gland region, possibly within a vessel  THORACIC INLET:  Moderate partially visualized bilateral pleural effusions   Please proceed with CT chest with contrast      Impression: Nonspecific foci of air are identified in the region of the right vertebral artery and right internal carotid artery which is nonspecific finding  CTA neck can be considered for further evaluation  No acute fracture or dislocation identified  If there is focal neurologic deficit, cervical spine MRI can be considered  Moderate partially visualized bilateral pleural effusions  Please proceed with CT chest with contrast  Workstation performed: [id number]     Vas Upper Limb Venous Duplex Scan, Unilateral/limited    Result Date: 11/11/2017  Narrative:  [facility] REPORT CLINICAL: Indications:  Patient presents to determine propagation vs resolution of previously noted SVT in the left cephalic vein while doing a bedside ultrasound to asses for line access  Risk Factors: The patient has history of obesity and immobilization  Clinical: Left Upper Limb There is edema  CONCLUSION: Impression RIGHT UPPER LIMB LIMITED: Evaluation shows no evidence of thrombus in the internal jugular vein, and the subclavian vein  The brachiocephalic vein could not be identified due to a line and bandage  LEFT UPPER LIMB: Evaluation shows evidence of acute, occlusive, superficial vein thrombus in the cephalic vein from the distal upper arm to the proximal forearm  No evidence of acute or chronic deep vein thrombosis  Martin Leo RN notified of preliminary results  SIGNATURE: Electronically Signed by: Tom Sweet MD, [address] on 2017-11-11 04:40:16 PM    Xr Chest Portable Icu    Result Date: 11/9/2017  Narrative: CHEST INDICATION:  Shortness of breath  COMPARISON:  11/8/2017 VIEWS:   AP frontal IMAGES:  1 FINDINGS:  Central line extends above the cavoatrial junction  ET tube and NG tube have been removed  Stable mild cardiomegaly  Moderate pulmonary edema  Visualized osseous structures appear within normal limits for the patient's age  Impression: Moderate pulmonary edema, unchanged   Workstation performed: [id number]     Xr Chest Portable Icu    Result Date: 11/8/2017  Narrative: CHEST INDICATION:  Hypoxemia COMPARISON:  11/7/2017 VIEWS:   AP frontal IMAGES:  1 FINDINGS:     Heart shadow is enlarged but unchanged from prior exam  Persistent bilateral effusions and persistent pulmonary congestion noted similar to prior study  Endotracheal tube positioning is appropriate  Central line tip at cavoatrial junction  Nasogastric tube extends below left hemidiaphragm  Visualized osseous structures appear within normal limits for the patient's age  Impression: There is no significant interval change since 11/7/2017 Workstation performed: [id number]     Xr Chest Portable Icu    Result Date: 11/7/2017  Narrative: CHEST INDICATION:  Hypoxemia  COMPARISON:  11/6/2017 VIEWS:   AP frontal IMAGES:  1 FINDINGS:  Lines and tubes are unchanged  Cardiomediastinal silhouette appears stable  Slight improvement in vascular congestion with persistent bilateral pleural effusions and basilar volume loss, more so on the left  No pneumothorax  Stable bony thorax  Impression: Slight improvement in central vascular congestion with persistent bibasilar pleural effusions and atelectasis  Workstation performed: [id number]     Xr Chest Portable Icu    Result Date: 11/7/2017  Narrative: CHEST INDICATION:  Hypoxemia  COMPARISON:  11/6/2017 VIEWS:   AP frontal IMAGES:  1 FINDINGS:  Lines and tubes appear in satisfactory position  Cardiomediastinal silhouette appears stable  Pulmonary edema with bilateral pleural effusions and bibasilar volume loss again seen  No pneumothorax  Post surgical changes right distal clavicle  Impression: Pulmonary edema with bilateral pleural effusions and basilar volume loss  Workstation performed: [id number]     Xr Chest Portable Icu    Result Date: 11/6/2017  Narrative: CHEST INDICATION:  Hypoxia  Patient is ventilated   COMPARISON:  November 4, 2017 VIEWS:   AP frontal IMAGES:  1 FINDINGS:     Heart shadow appears stable in size, probably at least mildly enlarged  It is partly obscured  Atherosclerotic vascular calcifications are noted  There are pleural effusions which are moderate size  There is probably bibasilar edema or infiltrate  Upper lung fields are clear although there is some vascular prominence noted  Right IJ central line is stable  Endotracheal and endogastric tubes appear stable  Osseous structures unremarkable  Impression: Pleural effusions and bibasilar opacities which are probably edema or atelectasis  Stable line and tube positioning  Cardiomegaly  Vascular congestion Workstation performed: [id number]     Xr Chest Portable Icu    Result Date: 11/4/2017  Narrative: CHEST INDICATION:  Respiratory failure  COMPARISON:  Chest x-ray from 11/3/2017  VIEWS:   AP frontal IMAGES:  1 FINDINGS:  Endotracheal tube, nasogastric tube, and right IJ central lines unchanged  Cardiomediastinal silhouette appears unremarkable  There is unchanged pulmonary edema and moderate left pleural effusion  No pneumothorax  Visualized osseous structures appear within normal limits for the patient's age  Impression: There is unchanged pulmonary edema and moderate left pleural effusion  Workstation performed: [id number]     Xr Chest Portable Icu    Result Date: 11/3/2017  Narrative: CHEST INDICATION:  Status post intubation  COMPARISON:  Earlier today VIEWS:   AP frontal IMAGES:  1 FINDINGS:  Monitoring wires and leads project over the chest  ET tube projects over the tracheal air column near the level of the lower clavicles  Enteric tube courses to the stomach  Cardiomediastinal silhouette appears stable  Moderate central vascular congestion with bilateral pleural effusions and left basilar volume loss  Visualized osseous structures appear within normal limits for the patient's age  Impression: ET tube projects above the diony  Pulmonary edema with bilateral pleural effusions and left basilar volume loss   Workstation performed: [id number]     Xr Chest Picc Line Portable    Result Date: 11/11/2017  Narrative: CHEST  INDICATION: PICC line placement  COMPARISON:  11/11/2017  VIEWS:   AP frontal; 1 image FINDINGS: Left PICC line has been placed, tip projects over the cavoatrial junction  Right jugular central venous catheter is unchanged in position  The cardiomediastinal silhouette is stable  Improving pulmonary vascular congestion is noted  Stable bilateral pleural effusions are present  Osseous structures are age appropriate  Impression: Improving pulmonary vascular congestion  Stable bilateral pleural effusions  Workstation performed: [id number]       EKG personally reviewed by Lillie Walsh MD      Counseling / Coordination of Care  Total floor / unit time spent today 30 minutes  Greater than 50% of total time was spent with the patient and / or family counseling and / or coordination of care

## 2017-11-21 NOTE — SOCIAL WORK
The patient is not approved for [facility] but they suggest patient be reviewed by [facility] [place] [facility]  Liaison was here to assess and will review with the doctor and if approved will request auth

## 2017-11-21 NOTE — PROCEDURES
ELECTROENCEPHALOGRAM    Patient Name:  Shelly Vasquez  MRN: [id number]   :  1950 File #: Charlie Camarena [id number]   Age: 79 y o  Encounter #: [id number]   Date performed: 2017 Referring Provider: Keiry Eagle DO          Report date: 2017          Study type: awake and drowsy EEG    ICD 10 diagnosis: Spells/Fit NOS R56 9 and Transient alteration of awareness R40 4    Patient History:  EEG is requested to assess for seizures and/or classification of epilepsy  Patient is 79 y o  female with intermittent encephalopathy and bizarre hallucination  The patient has history of hypertension, diabetes, pancreatitis, anxiety, sepsis, respiratory distress, history of ventricular tachycardia cardiac arrest status post resuscitation, and placement of biventricular ICD pacemaker  Current AEDs:  Medications include:   amiodarone 200 mg Oral BID With Meals   apixaban 5 mg Oral BID   aspirin 81 mg Oral Daily   atorvastatin 40 mg Oral Daily   cholecalciferol 1,000 Units Oral Daily   clopidogrel 75 mg Oral Daily   furosemide 40 mg Oral BID (diuretic)   insulin glargine 40 Units Subcutaneous Q12H [address]   insulin lispro 1-5 Units Subcutaneous HS   insulin lispro 1-6 Units Subcutaneous TID AC   insulin lispro 10 Units Subcutaneous TID With Meals   ipratropium 0 5 mg Nebulization TID   levalbuterol 1 25 mg Nebulization TID   levothyroxine 125 mcg Oral Early Morning   melatonin 6 mg Oral HS   metoprolol tartrate 37 5 mg Oral Q12H JUAN LUIS   nystatin  Topical BID   pantoprazole 40 mg Oral Early Morning   polyethylene glycol 17 g Oral Daily   potassium chloride 20 mEq Oral BID With Meals   senna 2 tablet Oral HS       Description of Procedure: The EEG was performed with electrodes applied using the International 10-20 System  Additional electrodes used included EOG, ECG and T1/T2 electrodes  A single lead ECG channel is also present    At least 16 channels are reviewed at a time and formatted into longitudinal bipolar, transverse bipolar, and referential (to common reference or calculated common reference) montages  The EEG was recorded with the patient awake, drowsy, and asleep  The recording was technically satisfactory  EEG was recorded from 10:07 to 10:33  Findings:   Background Activity: The background is grossly symmetric with respect to voltages and activities  There is an excess of stage 2 sleep and drowsiness and very brief periods of wakefulness before she falls asleep again  During very brief periods of wakefulness, the background consists of low voltage mixture of alpha, theta, and beta activity (the patient was only able to interact with the EEG technician for 20 seconds before she falls sleep)  Drowsiness is characterized by attentuation of the alpha rhythm, prominent anterior beta activity, central theta activity, roving eye movements and positive occipital sharp transients of sleep (POSTS)  Stage 2 sleep is characterized by symmetric sleep spindles and K-complexes  Activation Procedures:   Hyperventilation was not performed  Stepped photic stimulation from 1 to 30 fps was performed and produced no abnormality  Other findings: The single lead ECG shows a regular cardiac rhythm with a widened QRS complex and occasional premature ventricular complexes (PVCs)  Interpretation: This 26 minutes EEG shows excessive stage 2 sleep, which may indicate diffuse cerebral dysfunction that impairs her ability to stay awake, possibly related to medications, but it is etiologically nonspecific          Ayla Brown MD  [facility]  [address] [place] [address]
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
## 2017-11-22 ENCOUNTER — GENERIC CONVERSION - ENCOUNTER (OUTPATIENT)
Dept: OTHER | Facility: OTHER | Age: 67
End: 2017-11-22

## 2017-11-22 ENCOUNTER — APPOINTMENT (INPATIENT)
Dept: RADIOLOGY | Facility: HOSPITAL | Age: 67
DRG: 224 | End: 2017-11-22
Attending: INTERNAL MEDICINE
Payer: COMMERCIAL

## 2017-11-22 LAB
ANION GAP SERPL CALCULATED.3IONS-SCNC: 8 MMOL/L (ref 4–13)
BUN SERPL-MCNC: 7 MG/DL (ref 5–25)
CALCIUM SERPL-MCNC: 9.1 MG/DL (ref 8.3–10.1)
CHLORIDE SERPL-SCNC: 93 MMOL/L (ref 100–108)
CO2 SERPL-SCNC: 31 MMOL/L (ref 21–32)
CREAT SERPL-MCNC: 0.76 MG/DL (ref 0.6–1.3)
GFR SERPL CREATININE-BSD FRML MDRD: 81 ML/MIN/1.73SQ M
GLUCOSE SERPL-MCNC: 151 MG/DL (ref 65–140)
GLUCOSE SERPL-MCNC: 188 MG/DL (ref 65–140)
GLUCOSE SERPL-MCNC: 222 MG/DL (ref 65–140)
GLUCOSE SERPL-MCNC: 228 MG/DL (ref 65–140)
GLUCOSE SERPL-MCNC: 231 MG/DL (ref 65–140)
POTASSIUM SERPL-SCNC: 3.7 MMOL/L (ref 3.5–5.3)
SODIUM SERPL-SCNC: 132 MMOL/L (ref 136–145)

## 2017-11-22 PROCEDURE — 80048 BASIC METABOLIC PNL TOTAL CA: CPT | Performed by: INTERNAL MEDICINE

## 2017-11-22 PROCEDURE — 82948 REAGENT STRIP/BLOOD GLUCOSE: CPT

## 2017-11-22 PROCEDURE — 97110 THERAPEUTIC EXERCISES: CPT

## 2017-11-22 PROCEDURE — 94760 N-INVAS EAR/PLS OXIMETRY 1: CPT

## 2017-11-22 PROCEDURE — 92526 ORAL FUNCTION THERAPY: CPT

## 2017-11-22 PROCEDURE — 97535 SELF CARE MNGMENT TRAINING: CPT | Performed by: STUDENT IN AN ORGANIZED HEALTH CARE EDUCATION/TRAINING PROGRAM

## 2017-11-22 PROCEDURE — 94640 AIRWAY INHALATION TREATMENT: CPT

## 2017-11-22 PROCEDURE — 74230 X-RAY XM SWLNG FUNCJ C+: CPT

## 2017-11-22 PROCEDURE — 97530 THERAPEUTIC ACTIVITIES: CPT

## 2017-11-22 PROCEDURE — 92611 MOTION FLUOROSCOPY/SWALLOW: CPT

## 2017-11-22 PROCEDURE — 97530 THERAPEUTIC ACTIVITIES: CPT | Performed by: STUDENT IN AN ORGANIZED HEALTH CARE EDUCATION/TRAINING PROGRAM

## 2017-11-22 RX ORDER — LIDOCAINE 50 MG/G
1 PATCH TOPICAL DAILY
Status: DISCONTINUED | OUTPATIENT
Start: 2017-11-22 | End: 2017-11-23 | Stop reason: HOSPADM

## 2017-11-22 RX ADMIN — INSULIN GLARGINE 43 UNITS: 100 INJECTION, SOLUTION SUBCUTANEOUS at 21:15

## 2017-11-22 RX ADMIN — CLOPIDOGREL BISULFATE 75 MG: 75 TABLET ORAL at 08:51

## 2017-11-22 RX ADMIN — DOCUSATE SODIUM 100 MG: 100 CAPSULE, LIQUID FILLED ORAL at 17:10

## 2017-11-22 RX ADMIN — POTASSIUM CHLORIDE 20 MEQ: 1500 TABLET, EXTENDED RELEASE ORAL at 08:51

## 2017-11-22 RX ADMIN — INSULIN LISPRO 1 UNITS: 100 INJECTION, SOLUTION INTRAVENOUS; SUBCUTANEOUS at 08:32

## 2017-11-22 RX ADMIN — AMIODARONE HYDROCHLORIDE 200 MG: 200 TABLET ORAL at 08:51

## 2017-11-22 RX ADMIN — AMIODARONE HYDROCHLORIDE 200 MG: 200 TABLET ORAL at 17:10

## 2017-11-22 RX ADMIN — LEVALBUTEROL HYDROCHLORIDE 1.25 MG: 1.25 SOLUTION, CONCENTRATE RESPIRATORY (INHALATION) at 13:29

## 2017-11-22 RX ADMIN — LACTULOSE 10 G: 20 SOLUTION ORAL at 17:10

## 2017-11-22 RX ADMIN — NYSTATIN: 100000 POWDER TOPICAL at 17:11

## 2017-11-22 RX ADMIN — INSULIN LISPRO 3 UNITS: 100 INJECTION, SOLUTION INTRAVENOUS; SUBCUTANEOUS at 17:16

## 2017-11-22 RX ADMIN — LEVOTHYROXINE SODIUM 125 MCG: 125 TABLET ORAL at 05:24

## 2017-11-22 RX ADMIN — INSULIN LISPRO 3 UNITS: 100 INJECTION, SOLUTION INTRAVENOUS; SUBCUTANEOUS at 12:03

## 2017-11-22 RX ADMIN — LEVALBUTEROL HYDROCHLORIDE 1.25 MG: 1.25 SOLUTION, CONCENTRATE RESPIRATORY (INHALATION) at 07:41

## 2017-11-22 RX ADMIN — FUROSEMIDE 40 MG: 40 TABLET ORAL at 08:51

## 2017-11-22 RX ADMIN — OXYCODONE HYDROCHLORIDE 5 MG: 5 TABLET ORAL at 01:56

## 2017-11-22 RX ADMIN — DOCUSATE SODIUM 100 MG: 100 CAPSULE, LIQUID FILLED ORAL at 08:49

## 2017-11-22 RX ADMIN — IPRATROPIUM BROMIDE 0.5 MG: 0.5 SOLUTION RESPIRATORY (INHALATION) at 07:41

## 2017-11-22 RX ADMIN — IPRATROPIUM BROMIDE 0.5 MG: 0.5 SOLUTION RESPIRATORY (INHALATION) at 13:29

## 2017-11-22 RX ADMIN — FUROSEMIDE 40 MG: 40 TABLET ORAL at 17:10

## 2017-11-22 RX ADMIN — SENNOSIDES 17.2 MG: 8.6 TABLET, FILM COATED ORAL at 21:18

## 2017-11-22 RX ADMIN — POTASSIUM CHLORIDE 20 MEQ: 1500 TABLET, EXTENDED RELEASE ORAL at 17:10

## 2017-11-22 RX ADMIN — APIXABAN 5 MG: 5 TABLET, FILM COATED ORAL at 17:10

## 2017-11-22 RX ADMIN — ATORVASTATIN CALCIUM 40 MG: 40 TABLET, FILM COATED ORAL at 08:49

## 2017-11-22 RX ADMIN — POLYETHYLENE GLYCOL 3350 17 G: 17 POWDER, FOR SOLUTION ORAL at 08:52

## 2017-11-22 RX ADMIN — APIXABAN 5 MG: 5 TABLET, FILM COATED ORAL at 08:51

## 2017-11-22 RX ADMIN — INSULIN LISPRO 1 UNITS: 100 INJECTION, SOLUTION INTRAVENOUS; SUBCUTANEOUS at 21:19

## 2017-11-22 RX ADMIN — OXYCODONE HYDROCHLORIDE 5 MG: 5 TABLET ORAL at 09:11

## 2017-11-22 RX ADMIN — METOPROLOL TARTRATE 37.5 MG: 25 TABLET ORAL at 21:15

## 2017-11-22 RX ADMIN — LIDOCAINE 1 PATCH: 50 PATCH TOPICAL at 12:06

## 2017-11-22 RX ADMIN — MELATONIN TAB 3 MG 6 MG: 3 TAB at 21:15

## 2017-11-22 RX ADMIN — METOPROLOL TARTRATE 37.5 MG: 25 TABLET ORAL at 08:49

## 2017-11-22 RX ADMIN — PANTOPRAZOLE SODIUM 40 MG: 40 TABLET, DELAYED RELEASE ORAL at 05:24

## 2017-11-22 RX ADMIN — ASPIRIN 81 MG 81 MG: 81 TABLET ORAL at 08:49

## 2017-11-22 RX ADMIN — ALPRAZOLAM 0.5 MG: 0.5 TABLET ORAL at 21:15

## 2017-11-22 RX ADMIN — IPRATROPIUM BROMIDE 0.5 MG: 0.5 SOLUTION RESPIRATORY (INHALATION) at 19:42

## 2017-11-22 RX ADMIN — LACTULOSE 10 G: 20 SOLUTION ORAL at 08:52

## 2017-11-22 RX ADMIN — LEVALBUTEROL HYDROCHLORIDE 1.25 MG: 1.25 SOLUTION, CONCENTRATE RESPIRATORY (INHALATION) at 19:42

## 2017-11-22 RX ADMIN — NYSTATIN: 100000 POWDER TOPICAL at 08:56

## 2017-11-22 RX ADMIN — VITAMIN D, TAB 1000IU (100/BT) 1000 UNITS: 25 TAB at 08:54

## 2017-11-22 RX ADMIN — INSULIN GLARGINE 43 UNITS: 100 INJECTION, SOLUTION SUBCUTANEOUS at 08:52

## 2017-11-22 NOTE — OCCUPATIONAL THERAPY NOTE
11/22/17 1451   Restrictions/Precautions   Weight Bearing Precautions Per Order No   Other Precautions Fall Risk;Aspiration;Cardiac/sternal;Telemetry  (LUE post ICD; bed alarm on post session)   Pain Assessment   Pain Assessment No/denies pain   Pain Score No Pain   ADL   Where Assessed Commode   Toileting Assistance  4  Minimal Assistance   Toileting Deficit Steadying; Increased time to complete; Bedside commode   Toileting Comments bladder management   Bed Mobility   Supine to Sit 4  Minimal assistance   Additional items Assist x 1;HOB elevated; Increased time required;Verbal cues   Sit to Supine 4  Minimal assistance   Additional items Assist x 1;HOB elevated;Verbal cues;LE management   Functional Mobility   Functional Mobility 4  Minimal assistance   Additional items Rolling walker   Toilet Transfers   Toilet Transfer From Bed   Toilet Transfer Type To and from   Toilet Transfer to Standard bedside commode   Toilet Transfer Technique Ambulating   Toilet Transfers Contact guard   Cognition   Overall Cognitive Status [facility]   Arousal/Participation Responsive   Attention Attends with cues to redirect   Orientation Level Oriented to person;Oriented to place;Oriented to situation   Memory Decreased recall of precautions   Following Commands Follows one step commands without difficulty   Activity Tolerance   Activity Tolerance Patient limited by fatigue   Assessment   Assessment Pt participates in OT session with focus on toileting, bed mobility, and transfers to increase I for d/c  Pt min A supine to sit EOB with HOB elevated and extra time required 2* rest breaks needed to catch breath  Pt CGA functional mobility with RW to toilet  Pt CGA toileting during stance for perineal hygiene  Pt requires rest breaks t/o session to catch breath  Pt will continue to benefit from activity tolerance, transfers, and adls  Plan   Treatment Interventions ADL retraining;Functional transfer training; Endurance training; Activityengagement   Goal Expiration Date 11/29/17   Treatment Day 2   OT Frequency 1-2x/wk   Recommendation   OT Discharge Recommendation Short Term Rehab  (2* barriers with home setup)

## 2017-11-22 NOTE — PROGRESS NOTES
Cardiology Progress Note - Yue Ortiz 79 y o  female MRN: [id number]    Unit/Bed#: [facility] 526-01 Encounter: [id number]      Assessment:  Principal Problem:    Respiratory failure  Active Problems:    Cardiac arrest    Acute hypercapnic respiratory failure    Torsades de pointes    Prolonged QT interval    NSTEMI (non-ST elevated myocardial infarction)    Ischemic cardiomyopathy    LBBB (left bundle branch block)    CAD (coronary artery disease)    h/o Vocal cord paralysis    HTN (hypertension)    Hyperlipemia    Encephalopathy    Obesity, Class III, BMI 40-49 9 (morbid obesity) (HCC)    Delirium    Anemia    Hyponatremia    Leukocytosis    Hypoglycemia    Hallucinations      Plan:  Patient is comfortable this morning  She has no chest pain or significant dyspnea  Her telemetry is stable  Her device was interrogated early in the week and adjusted in reference to prior issues with diaphragmatic pacing  She continues on oral diuretic  I will check a BMP in the morning in reference to her potassium and renal function  Subjective:   Patient seen and examined  No significant events overnight   negative  Objective:     Vitals: Blood pressure 161/70, pulse 60, temperature 97 8 °F (36 6 °C), temperature source Oral, resp  rate 18, height 5' 11" (1 803 m), weight 116 kg (255 lb 8 2 oz), SpO2 94 %  , Body mass index is 35 64 kg/m² , Orthostatic Blood Pressures    Flowsheet Row Most Recent Value   Blood Pressure  161/70 filed at 11/22/2017 5268   Patient Position - Orthostatic VS  Lying filed at 11/22/2017 0721      ,      Intake/Output Summary (Last 24 hours) at 11/22/17 0744  Last data filed at 11/22/17 0504   Gross per 24 hour   Intake             1125 ml   Output             2111 ml   Net             -986 ml       No significant arrhythmias seen on telemetry review         Physical Exam:    GEN: Yue Ortiz appears well, alert and oriented x 3, pleasant and cooperative   NECK: supple, no carotid bruits, no JVD or HJR  HEART: normal rate, regular rhythm, normal S1 and S2, no murmurs, clicks, gallops or rubs   LUNGS: clear to auscultation bilaterally; no wheezes, rales, or rhonchi   ABDOMEN: normal bowel sounds, soft, no tenderness, no distention  EXTREMITIES: edema  SKIN: warm and well perfused, no suspicious lesions on exposed skin    Labs & Results:    Admission on 11/02/2017   No results displayed because visit has over 200 results  Xr Chest Portable    Result Date: 11/19/2017  Narrative: CHEST INDICATION:  Shortness of breath  COMPARISON:  11/18/2017 VIEWS:   AP frontal IMAGES:  1 FINDINGS:     Cardiomediastinal silhouette appears stable  Left chest wall AICD device  Probable bilateral pleural effusions with left retrocardiac atelectasis or infiltrate  Lungs are otherwise clear  Normal pulmonary vasculature  No pneumothorax  Visualized osseous structures appear within normal limits for the patient's age  Impression: Probable bilateral pleural effusions with left retrocardiac atelectasis or infiltrate  Workstation performed: [id number]     Xr Chest Portable    Result Date: 11/19/2017  Narrative: CHEST INDICATION:  Status post pacemaker insertion  COMPARISON:  11/11/2017 VIEWS:   AP frontal IMAGES:  1 FINDINGS:  The patient is rotated to the left  Cardiomediastinal silhouette appears stable  Biventricular left-sided AICD device is evident without pneumothorax  Probable bilateral pleural effusions and left retrocardiac volume loss  Stable bony thorax  Impression: No pneumothorax following pacemaker/AICD insertion  Bilateral pleural effusions and left basilar volume loss  Workstation performed: [id number]     Xr Chest Portable    Result Date: 11/11/2017  Narrative: CHEST INDICATION:  Status post thoracentesis  COMPARISON:  November 10, 2017 VIEWS:   AP frontal IMAGES:  1 FINDINGS:     The heart is enlarged  Atherosclerotic changes in the aorta  Lung volumes diminished    Kelsie and pulmonary vessels diffusely enlarged  Bilateral pleural effusions, left side greater than right which have progressed  Right internal jugular central line with tip at cavoatrial junction  Visualized osseous structures appear within normal limits for the patient's age  Impression: 1  Worsening pulmonary vascular congestion  2   Enlarging bilateral pleural effusions  Workstation performed: [id number]     Xr Chest Portable    Result Date: 11/10/2017  Narrative: CHEST INDICATION:  thoracentesis  History taken directly from the electronic ordering system  Right-sided thoracentesis  COMPARISON:  Chest x-ray of 11/10/2017 VIEWS:   AP frontal IMAGES:  2 FINDINGS:  Right IJ line tip overlies the SVC  Heart size is not well evaluated on this single portable AP view  There is no appreciable right-sided pleural effusion  No pneumothorax  Small left pleural effusion is suggested, probably stable  Visualized osseous structures appear within normal limits for the patient's age  Impression: 1  No right pleural effusion  No evidence for pneumothorax  Small left pleural effusion appears stable  Workstation performed: [id number]     Xr Chest Portable    Result Date: 11/10/2017  Narrative: CHEST INDICATION:  Volume status COMPARISON:  11/8/2017 VIEWS:   AP frontal IMAGES:  1 FINDINGS:  Right internal jugular central venous catheter tip remains within the superior vena cava  Mild cardiomegaly is stable  Interstitial pulmonary edema is again seen which appears slightly increased within the right lung  No pneumothorax identified  Visualized osseous structures appear within normal limits for the patient's age  Impression: Worsening pulmonary edema  Unchanged cardiomegaly  Workstation performed: [id number]     Xr Chest Portable    Result Date: 11/3/2017  Narrative: CHEST INDICATION:  Central line placement  COMPARISON:  November 3, 2017, earlier in the day  Amanda Ahuja  VIEWS:  AP portable semierect view in the conventional and line placement techniques  FINDINGS: Right internal jugular central line is identified  Tip in superior vena cava  There is no pneumothorax  The cardiomediastinal silhouette is unremarkable  Lung volumes diminished  Kelsie and pulmonary vessels diffusely enlarged  Developing alveolar infiltrates in the right upper lobe and right lower lobe, likely cardiogenic in nature  Moderate-sized left pleural effusion  Endotracheal tube with tip approximately 4 cm above diony  Orogastric tube coursing beneath the left hemidiaphragm  Tip not seen  External pacing leads    Bony thorax is unremarkable  Impression: 1  Right internal jugular central line in satisfactory position  2   Worsening congestive heart failure  Workstation performed: [id number]     Xr Chest Portable    Result Date: 11/3/2017  Narrative: CHEST INDICATION:  Respiratory failure COMPARISON:  1 view chest 11-17 at 15:49 VIEWS:   AP frontal IMAGES:  1 FINDINGS:     Cardiac silhouette is partially obscured  Aortic calcification is present  Left basilar and lingular airspace consolidation unchanged  Small bilateral pleural effusions left greater than right unchanged  Distended central pulmonary vasculature slightly worse  No pneumothorax  Widened right acromioclavicular joint attributed to prior surgery  Impression: Mild progression of central pulmonary vascular congestion  Left basilar and lingular airspace consolidation and bilateral pleural effusions left greater than right unchanged  Workstation performed: [id number]     Xr Chest 1 View Portable    Result Date: 11/2/2017  Narrative: CHEST  PORTABLE INDICATION: Severe shortness of breath COMPARISON:  None VIEWS:   AP semierect; IMAGES:  2 FINDINGS: The cardiomediastinal silhouette is partially obscured on the left  Moderate left and small right pleural effusions  Bony thorax unremarkable  Electrode (EKG) monitoring devices overlie the chest      Impression: Bilateral pleural effusions, left greater than right  Underlying process of the left lung base not excludable    Workstation performed: [id number]     Xr Chest 2 Views    Result Date: 11/19/2017  Narrative: CHEST - DUAL ENERGY INDICATION:  Status post AICD insertion  COMPARISON:  11/17/2017 VIEWS:  PA (including soft tissue/bone algorithms) and lateral projections IMAGES:  4 FINDINGS:     Cardiomediastinal silhouette appears stable  Left chest wall AICD device again evident without pneumothorax  Bilateral pleural effusions, left greater than right, and left retrocardiac volume loss as before  Postoperative changes distal right clavicle again noted  Impression: No pneumothorax  Bilateral pleural effusions and left basilar volume loss as before  Workstation performed: [id number]     Xr Abdomen 1 View Kub    Result Date: 11/8/2017  Narrative: ABDOMEN INDICATION:  Vomiting  Abdominal pain  COMPARISON:  None  VIEWS:  AP supine IMAGES:  2 FINDINGS: Study limited due to incomplete visualization of the entire abdomen  There is a nonobstructive bowel gas pattern  Orogastric tube is present with tip in the left upper quadrant  Dilated, air-filled loops of large and small bowel throughout the abdomen  No discernible free air on this supine study  Upright or left lateral decubitus imaging is more sensitive to detect subtle free air in the appropriate setting  No pathologic calcifications or soft tissue masses  Visualized lung bases are clear  Visualized osseous structures are unremarkable for the patient's age  Impression: 1  Limited examination  Incomplete visualization of the entire abdomen  2   Nonobstructive bowel gas pattern  Workstation performed: [id number]     Ct Head Wo Contrast    Result Date: 11/6/2017  Narrative: CT BRAIN - WITHOUT CONTRAST INDICATION:  Altered mental status COMPARISON:  CT head 11-17 TECHNIQUE:  CT examination of the brain was performed    In addition to axial images, coronal reformatted images were created and submitted for interpretation  Radiation dose length product (DLP) for this visit:  1066 73 mGy-cm   This examination, like all CT scans performed in the [facility], was performed utilizing techniques to minimize radiation dose exposure, including the use of iterative reconstruction and automated exposure control  IMAGE QUALITY:  Diagnostic  FINDINGS:  PARENCHYMA:  Decreased attenuation is noted in the supratentorial white matter demonstrating an appearance most consistent with mild microangiopathic change  Chronic lacunar infarct right basal ganglia  Subacute to chronic lacunar infarct left external  capsule unchanged  Calcification bilateral cavernous internal carotid and distal vertebral arteries  No intracranial mass, mass effect or midline shift  No CT signs of acute infarction  There is no parenchymal hemorrhage  VENTRICLES AND EXTRA-AXIAL SPACES:  Mild prominence of CSF spaces diffusely compatible with generalized volume loss  No hydrocephalus  VISUALIZED ORBITS AND PARANASAL SINUSES:  Unremarkable  CALVARIUM AND EXTRACRANIAL SOFT TISSUES:   Normal      Impression: No CT evidence of acute intracranial process or significant interval change since November 2, 2017  Chronic microangiopathy  Workstation performed: [id number]     Ct Head Without Contrast    Result Date: 11/2/2017  Narrative: CT BRAIN - WITHOUT CONTRAST INDICATION:  80-year-old woman with history of hypertension and diabetes presenting with change in mental status and respiratory distress  COMPARISON:  None  TECHNIQUE:  CT examination of the brain was performed  In addition to axial images, coronal reformatted images were created and submitted for interpretation  Radiation dose length product (DLP) for this visit:  1078 43 mGy-cm     This examination, like all CT scans performed in the [facility], was performed utilizing techniques to minimize radiation dose exposure, including the use of iterative reconstruction and automated exposure control  IMAGE QUALITY:  Diagnostic  FINDINGS:  PARENCHYMA:  No intracranial mass, mass effect or midline shift  No CT signs of acute infarction  There is no parenchymal hemorrhage  Chronic lacunar infarct involving the right internal capsule and corona radiata  Diffusely decreased white matter attenuation, typical of chronic microvascular ischemic change  VENTRICLES AND EXTRA-AXIAL SPACES:  Normal for patient's age  No extra-axial blood  VISUALIZED ORBITS AND PARANASAL SINUSES:  Unremarkable  CALVARIUM AND EXTRACRANIAL SOFT TISSUES:   Normal      Impression: No acute intracranial abnormality  Workstation performed: [id number]     Ct Cervical Spine Without Contrast    Result Date: 11/2/2017  Narrative: CT CERVICAL SPINE - WITHOUT CONTRAST INDICATION: Found on floor COMPARISON: None  TECHNIQUE:  CT examination of the cervical spine was performed without intravenous contrast   Contiguous axial images were obtained  Sagittal and coronal reconstructions were performed  Radiation dose length product (DLP) for this visit:  1298 71 mGy-cm   This examination, like all CT scans performed in the [facility], was performed utilizing techniques to minimize radiation dose exposure, including the use of iterative reconstruction and automated exposure control  IMAGE QUALITY:  Diagnostic  FINDINGS: ALIGNMENT:  There is straightening of normal cervical lordosis  No subluxation or compression deformity  VERTEBRAL BODIES:  No fracture  DEGENERATIVE CHANGES:  Mild multilevel cervical degenerative changes are noted without critical central canal stenosis  PREVERTEBRAL AND PARASPINAL SOFT TISSUES: Nonspecific foci of air are identified in the region of the right vertebral artery and right internal carotid artery which is nonspecific finding  CTA neck can be considered for further evaluation  Foci of air are  also identified in the right submandibular gland region, possibly within a vessel  THORACIC INLET:  Moderate partially visualized bilateral pleural effusions  Please proceed with CT chest with contrast      Impression: Nonspecific foci of air are identified in the region of the right vertebral artery and right internal carotid artery which is nonspecific finding  CTA neck can be considered for further evaluation  No acute fracture or dislocation identified  If there is focal neurologic deficit, cervical spine MRI can be considered  Moderate partially visualized bilateral pleural effusions  Please proceed with CT chest with contrast  Workstation performed: [id number]     Vas Upper Limb Venous Duplex Scan, Unilateral/limited    Result Date: 11/11/2017  Narrative:  [facility] REPORT CLINICAL: Indications:  Patient presents to determine propagation vs resolution of previously noted SVT in the left cephalic vein while doing a bedside ultrasound to asses for line access  Risk Factors: The patient has history of obesity and immobilization  Clinical: Left Upper Limb There is edema  CONCLUSION: Impression RIGHT UPPER LIMB LIMITED: Evaluation shows no evidence of thrombus in the internal jugular vein, and the subclavian vein  The brachiocephalic vein could not be identified due to a line and bandage  LEFT UPPER LIMB: Evaluation shows evidence of acute, occlusive, superficial vein thrombus in the cephalic vein from the distal upper arm to the proximal forearm  No evidence of acute or chronic deep vein thrombosis  Tutu Randall RN notified of preliminary results  SIGNATURE: Electronically Signed by: Unruly Pendleton MD, [address] on 2017-11-11 04:40:16 PM    Xr Chest Portable Icu    Result Date: 11/9/2017  Narrative: CHEST INDICATION:  Shortness of breath  COMPARISON:  11/8/2017 VIEWS:   AP frontal IMAGES:  1 FINDINGS:  Central line extends above the cavoatrial junction  ET tube and NG tube have been removed  Stable mild cardiomegaly  Moderate pulmonary edema   Visualized osseous structures appear within normal limits for the patient's age  Impression: Moderate pulmonary edema, unchanged  Workstation performed: [id number]     Xr Chest Portable Icu    Result Date: 11/8/2017  Narrative: CHEST INDICATION:  Hypoxemia COMPARISON:  11/7/2017 VIEWS:   AP frontal IMAGES:  1 FINDINGS:     Heart shadow is enlarged but unchanged from prior exam  Persistent bilateral effusions and persistent pulmonary congestion noted similar to prior study  Endotracheal tube positioning is appropriate  Central line tip at cavoatrial junction  Nasogastric tube extends below left hemidiaphragm  Visualized osseous structures appear within normal limits for the patient's age  Impression: There is no significant interval change since 11/7/2017 Workstation performed: [id number]     Xr Chest Portable Icu    Result Date: 11/7/2017  Narrative: CHEST INDICATION:  Hypoxemia  COMPARISON:  11/6/2017 VIEWS:   AP frontal IMAGES:  1 FINDINGS:  Lines and tubes are unchanged  Cardiomediastinal silhouette appears stable  Slight improvement in vascular congestion with persistent bilateral pleural effusions and basilar volume loss, more so on the left  No pneumothorax  Stable bony thorax  Impression: Slight improvement in central vascular congestion with persistent bibasilar pleural effusions and atelectasis  Workstation performed: [id number]     Xr Chest Portable Icu    Result Date: 11/7/2017  Narrative: CHEST INDICATION:  Hypoxemia  COMPARISON:  11/6/2017 VIEWS:   AP frontal IMAGES:  1 FINDINGS:  Lines and tubes appear in satisfactory position  Cardiomediastinal silhouette appears stable  Pulmonary edema with bilateral pleural effusions and bibasilar volume loss again seen  No pneumothorax  Post surgical changes right distal clavicle  Impression: Pulmonary edema with bilateral pleural effusions and basilar volume loss   Workstation performed: [id number]     Xr Chest Portable Icu    Result Date: 11/6/2017  Narrative: CHEST INDICATION: Hypoxia  Patient is ventilated  COMPARISON:  November 4, 2017 VIEWS:   AP frontal IMAGES:  1 FINDINGS:     Heart shadow appears stable in size, probably at least mildly enlarged  It is partly obscured  Atherosclerotic vascular calcifications are noted  There are pleural effusions which are moderate size  There is probably bibasilar edema or infiltrate  Upper lung fields are clear although there is some vascular prominence noted  Right IJ central line is stable  Endotracheal and endogastric tubes appear stable  Osseous structures unremarkable  Impression: Pleural effusions and bibasilar opacities which are probably edema or atelectasis  Stable line and tube positioning  Cardiomegaly  Vascular congestion Workstation performed: [id number]     Xr Chest Portable Icu    Result Date: 11/4/2017  Narrative: CHEST INDICATION:  Respiratory failure  COMPARISON:  Chest x-ray from 11/3/2017  VIEWS:   AP frontal IMAGES:  1 FINDINGS:  Endotracheal tube, nasogastric tube, and right IJ central lines unchanged  Cardiomediastinal silhouette appears unremarkable  There is unchanged pulmonary edema and moderate left pleural effusion  No pneumothorax  Visualized osseous structures appear within normal limits for the patient's age  Impression: There is unchanged pulmonary edema and moderate left pleural effusion  Workstation performed: [id number]     Xr Chest Portable Icu    Result Date: 11/3/2017  Narrative: CHEST INDICATION:  Status post intubation  COMPARISON:  Earlier today VIEWS:   AP frontal IMAGES:  1 FINDINGS:  Monitoring wires and leads project over the chest  ET tube projects over the tracheal air column near the level of the lower clavicles  Enteric tube courses to the stomach  Cardiomediastinal silhouette appears stable  Moderate central vascular congestion with bilateral pleural effusions and left basilar volume loss  Visualized osseous structures appear within normal limits for the patient's age

## 2017-11-22 NOTE — SOCIAL WORK
Call from Genevieve Clemente with [facility]  She has submitted clinical records to insurance and is awaiting call with auth  They will accept the patient for admission on Thursday 11/23/17  Called son, Frankey Bolder  [phone], and he will plan to transport patient for discharge Thursday at [address] at [facility], patient and RN

## 2017-11-22 NOTE — PROCEDURES
Video Swallow Study      Patient Name: Denisse FLORESLFLETTY Date: 2017        Past Medical History  Past Medical History:   Diagnosis Date    Anxiety     Diabetes mellitus ([address] )     Hypertension     Pancreatitis         Past Surgical History  Past Surgical History:   Procedure Laterality Date     SECTION      3    PEG (HISTORICAL)      TRACHEOSTOMY           General Information:   Pt is being followed by ST for dysphagia, after being admitted for cardiac arrest and respiratory failure  Pt has been tolerating a regular diet and NTL, but continues to have clinical s/s of aspiration with thin liquids  Pt has a hx of vf paralysis, but she is not sure if R or L  Voice is slightly quiet  Pt was on NTL previously, but reports that after getting treatment she has been on thin liquids for several months  Consistencies assessed: Thin liquids, nectar thick liquids, puree, soft solids, regular solids      Oral Stage:   Adequate bolus retrieval from spoon, cup, straw, adequate lip seal  Mastication and bolus formation was mildly prolonged, transfer was adequate, mild-mod base of tongue residue after regular solid cleared with liquid wash  Premature spill over the base of tongue was noted, mostly with thin and NT liquids  Pharyngeal Stage:   Premature spill into pyriform sinuses noted with thin and NT liquids  Swallows were timely to minimally delayed; there was  reduced hyolaryngeal elevation and anterior motion, and minimal to no pharyngeal residue  During episodes of premature spill, thin liquids penetrated into the laryngeal vestibule, followed by deep penetration to the vocal folds and possible trace aspiration, during the swallow  Pt responded by coughing  There was occasional slight flash penetration of NTL into the laryngeal vestibule; this did not reach the vocal folds   Chin tuck, head turn R and head turn L were attempted, but did not prevent deep laryngeal penetration/aspiration of thin liquids  No other penetration/aspiration with any other items trialed  Esophageal Stage:   No reports of esophageal difficulty; esophageal screen was not completed  Assessment Summary:   Pt presents with mild oropharyngeal dysphagia, characterized by weak tongue/tongue base, reduced hyolaryngeal elevation/anterior motion, and reduced airway protection  Penetration with possible trace aspiration of thin liquids were consistent  Pt did have a consistent responsive cough to the penetration/aspiration  Intermittent slight flash penetration of nectar thick  liquids was noted, but no aspiration  No aspiration with other consistencies trialed  Diagnosis/Prognosis:   Prognosis to improve mild oropharyngeal is good  Recommendations:   Regular diet, nectar thick liquids (cup or straw)  Sit up at 90 degrees, small bites/sips, alternate solids/liquids, sit upright 30 minutes after meals  Intermittent supervision  Crush meds (pt's request, but she should be able to handle whole in applesauce or with NTL)  Speech therapy recommended after discharge, and repeat VBS in 4-6 weeks

## 2017-11-22 NOTE — PHYSICAL THERAPY NOTE
PHYSICAL THERAPY NOTE          Patient Name: Denisse Del Rosario  WOIIM'U Date: 11/22/2017 11/22/17 3310   Pain Assessment   Pain Assessment No/denies pain   Restrictions/Precautions   Braces or Orthoses (denies at baseline)   Other Precautions Fall Risk;Aspiration;Cardiac/sternal;Telemetry  ((L) UE post ICD; bed alarm on post session)   General   Chart Reviewed Yes   Additional Pertinent History cleared for Tx session (spoke to nsg)   Response to Previous Treatment Patient with no complaints from previous session  Cognition   Overall Cognitive Status WFL   Arousal/Participation Arousable   Attention Attends with cues to redirect   Orientation Level Oriented to person;Oriented to place;Oriented to situation   Memory Decreased recall of precautions   Following Commands Follows one step commands without difficulty   Subjective   Subjective Pt is resting in bed; arousable; reports being tired; agreeable to perform therex and mobilize but does not wish to stay in the chair;   Bed Mobility   Supine to Sit 3  Moderate assistance   Additional items Assist x 1;HOB elevated; Increased time required;Verbal cues;LE management   Sit to Supine 3  Moderate assistance   Additional items Assist x 2;Verbal cues;LE management  (repositioned higher in bed w/ (A)x2)   Transfers   Sit to Stand 3  Moderate assistance   Additional items Assist x 1;Verbal cues   Stand to Sit 3  Moderate assistance   Additional items Assist x 1;Verbal cues   Ambulation/Elevation   Gait pattern Excessively slow; Short stride; Inconsistent kalia;Narrow JOHNSON   Gait Assistance 3  Moderate assist   Additional items Assist x 1   Assistive Device Rolling walker   Distance 20 ft   Balance   Static Sitting Fair -   Static Standing Poor +   Ambulatory Poor   Activity Tolerance   Activity Tolerance Patient limited by fatigue   Nurse Made Aware spoke to [facility], RN   Exercises   [place] Supine;10 reps;AAROM; Bilateral  (2 sets)   Hip Abduction Supine;10 reps;AAROM; Bilateral  (2 sets)   Knee AROM Short Arc Quad Supine;10 reps;AAROM; Bilateral  (2 sets)   Ankle Pumps Supine;10 reps;AAROM;AROM; Bilateral  (2 sets)   Equipment Use   Comments Pillows placed for (L) UE and LE support; call bell w/in reach   Assessment   Prognosis Good   Problem List Decreased strength;Decreased endurance; Impaired balance;Decreased mobility;Obesity   Assessment Functional mobility re-assessment performed; pt demonstrated mod improvement in overall mobility status progressing to OOB mobilization incl amb trial w/ rw; mod (A)x1 was required for the most part to assure safety and to facilitate comfort; pt remains to be generally weak and deconditioned w/ decreased functional endurance, balance impairment, and gait dysfunction --> will require ongoing PT services to address; also recommend rehab upon D/C when medically cleared;   Barriers to Discharge Inaccessible home environment;Decreased caregiver support   Goals   Patient Goals to get better   LTG Expiration Date 12/06/17   Long Term Goal #1 7-10 days  Pt will amb 150 ft w/ rw, (S)x1 and chair follow PRN  Pt will negotiate 3 steps w/ hand rail and SPC PRN, min (A)x1  Pt will achieve (I) level w/ bed mob  Pt will perform transfers w/ mod (I)  Treatment Day 2   Plan   Treatment/Interventions Functional transfer training;LE strengthening/ROM; Elevations; Therapeutic exercise; Endurance training;Bed mobility;Gait training;Spoke to nursing;Spoke to case management   Progress Progressing toward goals   PT Frequency 5x/wk   Recommendation   Recommendation Post acute IP rehab   Equipment Recommended Conan Boxer  (w/ (A))       Yolette Chavez, PT

## 2017-11-22 NOTE — SOCIAL WORK
Call received from [facility]  They received auth # [id number] for 7 days and can admit patient Thursday 11/23  Called son and he will transport at [place] [id number]  RN and MD are aware

## 2017-11-22 NOTE — CASE MANAGEMENT
PLEASE UPDATE AUTHORIZATION WITH NEXT REVIEW DATE IN [place] ASAP - THANKS ! [address] [facility] in the [facility] by Sukh Belcher for 2017  Network Utilization Review Department  Phone: [phone]; Fax [phone]  ATTENTION: The Network Utilization Review Department is now centralized for our 7 Facilities  Make a note that we have a new phone and fax numbers for our Department  Please call with any questions or concerns to [phone] and carefully follow the prompts so that you are directed to the right person  All voicemails are confidential  Fax any determinations, approvals, denials, and requests for initial or continue stay review clinical to [phone]  Due to HIGH CALL volume, it would be easier if you could please send faxed requests to expedite your requests and in part, help us provide discharge notifications faster  Continued Stay Review    Date: 17 ACUTE MED SURG LEVEL OF CARE    Vital Signs: /65   Pulse 60   Temp 98 6 °F (37 °C) (Oral)   Resp 18   Ht 5' 11" (1 803 m)   Wt 116 kg (255 lb 8 2 oz)   SpO2 97% Comment: ra  BMI 35 64 kg/m²     Vitals:   Temp (24hrs), Av 6 °F (36 4 °C), Min:97 °F (36 1 °C), Max:98 3 °F (36 8 °C)   HR:  [60-65] 60  Resp:  [18-24] 18  BP: (112-161)/(54-70) 161/70  SpO2:  [90 %-100 %] 94 %  Body mass index is 35 64 kg/m²       Input and Output Summary (last 24 hours):   Last data filed at 17 0911    Gross per 24 hour   Intake              885 ml   Output             2111 ml   Net            -1226 ml       Diet Mateo/CHO Controlled; Consistent Carbohydrate Diet Level 2 (5 carb servings/75 grams CHO/meal);  Nectar Thick Liquid, Cardiac TLC 2 3 GM NA    Dietary nutrition supplements Ensure Pudding BID    IV ACCESS    Medications:   Scheduled Meds:   amiodarone 200 mg Oral BID With Meals   apixaban 5 mg Oral BID   aspirin 81 mg Oral Daily   atorvastatin 40 mg Oral Daily cholecalciferol 1,000 Units Oral Daily   clopidogrel 75 mg Oral Daily   docusate sodium 100 mg Oral BID   furosemide 40 mg Oral BID (diuretic)   insulin glargine 43 Units Subcutaneous Q12H [address]   insulin lispro 1-5 Units Subcutaneous HS   insulin lispro 1-6 Units Subcutaneous TID AC   insulin lispro 10 Units Subcutaneous TID With Meals   ipratropium 0 5 mg Nebulization TID   lactulose 10 g Oral BID   levalbuterol 1 25 mg Nebulization TID   levothyroxine 125 mcg Oral Early Morning   lidocaine 1 patch Transdermal Daily   melatonin 6 mg Oral HS   metoprolol tartrate 37 5 mg Oral Q12H JUAN LUIS   nystatin  Topical BID   pantoprazole 40 mg Oral Early Morning   polyethylene glycol 17 g Oral Daily   potassium chloride 20 mEq Oral BID With Meals   senna 2 tablet Oral HS     PRN Meds:     acetaminophen    ALPRAZolam    ipratropium    levalbuterol    metoprolol    nitroglycerin    oxyCODONE 5mg q4hrs prn given x 2/ 24 hrs    simethicone    trimethobenzamide    LABS/Diagnostic Results:  CBC  Results from last 7 days  Lab Units 11/21/17  0346   WBC Thousand/uL 12 93*   HEMOGLOBIN g/dL 10 1*   HEMATOCRIT % 31 1*   PLATELETS Thousands/uL 339      CMP Results from last 7 days  Lab Units 11/22/17  0947   SODIUM mmol/L 132*   POTASSIUM mmol/L 3 7   CHLORIDE mmol/L 93*   CO2 mmol/L 31   BUN mg/dL 7   CREATININE mg/dL 0 76   CALCIUM mg/dL 9 1   GLUCOSE RANDOM mg/dL 222*       Age/Sex: 79 y o  female     Assessment/Plan:   Assessment:   Principal Problem:    Respiratory failure  Active Problems:    Cardiac arrest    Acute hypercapnic respiratory failure    Torsades de pointes    Prolonged QT interval    NSTEMI (non-ST elevated myocardial infarction)    Ischemic cardiomyopathy    LBBB (left bundle branch block)    CAD (coronary artery disease)    h/o Vocal cord paralysis    HTN (hypertension)    Hyperlipemia    Encephalopathy    Obesity, Class III, BMI 40-49 9 (morbid obesity) ([facility] [address] )    Delirium    Anemia    Hyponatremia Leukocytosis    Hypoglycemia    Hallucinations      Plan:   1   Coronary artery disease, with multivessel disease status post cardiac catheterization, 11/15; status post PCI with LAD drug-eluting stent ; status post the VF/VT cardiac arrest:  Continue cardiovascular medications   Continue amiodarone    Cardiology on board  Patient is high risk for CABG per CTSx      2   Atrial fibrillation:   Continue antiarrhythmic medications   Patient now on apixaban as per Cardiology       3   Acute hypoxic respiratory failure, resolved:  Continue oxygen p r n     Continue nebulizers p r n        4   History of vocal cord paralysis with dysphagia:  Continue modified diet        5   Diabetes mellitus type 2 presently still with significant hyperglycemia  Lantus increased  Blood glucose improved and monitor     6   Hyponatremia, likely due to patient's ischemic cardiomyopathy, hyperglycemia and previous D5 containing IV fluids when patient was having hypoglycemia:  Improving     7   Hypertension:  Continue blood pressure medications      8   Anemia, presently stable:  Monitor patient's CBC   For further workup and management if this worsens significantly      9   Leukocytosis, likely reactive, secondary to the above problems, particularly problem number 1 above:  Observe off antibiotics   No signs and symptoms of an active infection       10   Encephalopathy, previously due to delirium--  Psych input appreciated   Grievance due to recent loss of loved one      VTE Pharmacologic Prophylaxis:   Pharmacologic: Apixaban (Eliquis)  Mechanical VTE Prophylaxis in Place:   Current Length of Stay: 20 day(s)     Current Patient Status: Inpatient   Certification Statement: The patient will continue to require additional inpatient hospital stay due to above        Discharge Plan:   [address]    PER OT 11/21/17   Assessment   Assessment Pt participates in OT session with focus on cognitive reorientation, activity tolerance, and LB dressing to increase I with adls  Pt scored 22/30 on MOCA, indicating minor cognitive deficits  Please see below for details  Pt setup LB dressing to don socks and pants with LHAE  Pt requires extra time to complete activity 2* rest breaks needed  Pt min A supine to sit EOB 2* decreased strength  Pt will continue to benefit from OT services to address activity tolerance, transfers, and adls  Plan   Treatment Interventions ADL retraining; Endurance training;Cognitive reorientation; Activityengagement   Goal Expiration Date 11/29/17   Treatment Day 1   OT Frequency 1-2x/wk   Recommendation   OT Discharge Recommendation Short Term Rehab  (2* barriers with home setup     CASE MANAGEMENT FOLLOWING CLOSELY FOR ALL DISCHARGE NEEDS

## 2017-11-22 NOTE — SPEECH THERAPY NOTE
VBS completed earlier today  Pt seen for follow up treatment/education regarding results and recommendations (regular diet/nectar thick liquids)  Pt was educated verbally and with anatomy diagrams and written sign  Pt verbalized understanding, and was able to repeat back the recommendations/instructions for safe swallowing  She is in agreement with POC, to continue with ST to address dysphagia, and repeat VBS in 4-6 weeks  Pt will benefit from ST after discharge  Sign posted in room, and reviewed results with RN

## 2017-11-22 NOTE — PROGRESS NOTES
[facility] Progress Note  Patient: Perez Howe 79 y o  female   MRN: [id number]  PCP: No primary care provider on file  [facility]: [id number]  Date Of Visit: 11/22/17    Assessment:    Principal Problem:    Respiratory failure  Active Problems:    Cardiac arrest    Acute hypercapnic respiratory failure    Torsades de pointes    Prolonged QT interval    NSTEMI (non-ST elevated myocardial infarction)    Ischemic cardiomyopathy    LBBB (left bundle branch block)    CAD (coronary artery disease)    h/o Vocal cord paralysis    HTN (hypertension)    Hyperlipemia    Encephalopathy    Obesity, Class III, BMI 40-49 9 (morbid obesity) ([address] )    Delirium    Anemia    Hyponatremia    Leukocytosis    Hypoglycemia    Hallucinations      Plan:    1   Coronary artery disease, with multivessel disease status post cardiac catheterization, 11/15; status post PCI with LAD drug-eluting stent ; status post the VF/VT cardiac arrest:  Continue cardiovascular medications   Continue amiodarone    Cardiology on board  Patient is high risk for CABG per CTSx      2   Atrial fibrillation:   Continue antiarrhythmic medications   Patient now on apixaban as per Cardiology       3   Acute hypoxic respiratory failure, resolved:  Continue oxygen p r n     Continue nebulizers p r n        4   History of vocal cord paralysis with dysphagia:  Continue modified diet        5   Diabetes mellitus type 2 presently still with significant hyperglycemia  Lantus increased    Blood glucose improved and monitor     6   Hyponatremia, likely due to patient's ischemic cardiomyopathy, hyperglycemia and previous D5 containing IV fluids when patient was having hypoglycemia:  Improving     7   Hypertension:  Continue blood pressure medications      8   Anemia, presently stable:  Monitor patient's CBC   For further workup and management if this worsens significantly      9   Leukocytosis, likely reactive, secondary to the above problems, particularly problem number 1 above:  Observe off antibiotics   No signs and symptoms of an active infection       10   Encephalopathy, previously due to delirium--  Psych input appreciated  Grievance due to recent loss of loved one         VTE Pharmacologic Prophylaxis:   Pharmacologic: Apixaban (Eliquis)  Mechanical VTE Prophylaxis in Place:    Patient Centered Rounds:     Discussions with Specialists or Other Care Team Provider:     Education and Discussions with Family / Patient:     Time Spent for Care: 20 minutes  More than 50% of total time spent on counseling and coordination of care as described above  Current Length of Stay: 20 day(s)    Current Patient Status: Inpatient   Certification Statement: The patient will continue to require additional inpatient hospital stay due to above    Discharge Plan / Estimated Discharge Date:  Rehab when bed available    Code Status: Level 3 - DNAR and DNI      Subjective:   No new complaints  Bowel movement yesterday  Objective:     Vitals:   Temp (24hrs), Av 6 °F (36 4 °C), Min:97 °F (36 1 °C), Max:98 3 °F (36 8 °C)    HR:  [60-65] 60  Resp:  [18-24] 18  BP: (112-161)/(54-70) 161/70  SpO2:  [90 %-100 %] 94 %  Body mass index is 35 64 kg/m²  Input and Output Summary (last 24 hours): Intake/Output Summary (Last 24 hours) at 17 1047  Last data filed at 17 0911   Gross per 24 hour   Intake              885 ml   Output             2111 ml   Net            -1226 ml       Physical Exam:     Physical Exam   Constitutional: She is oriented to person, place, and time  No distress  Eyes: Conjunctivae and EOM are normal  Pupils are equal, round, and reactive to light  Neck: Normal range of motion  Neck supple  No JVD present  Cardiovascular: Normal rate  Pulmonary/Chest: Effort normal and breath sounds normal  No respiratory distress  She has no wheezes  Abdominal: Soft  She exhibits no distension  There is no tenderness  Musculoskeletal: Normal range of motion  She exhibits no edema  Neurological: She is alert and oriented to person, place, and time  Skin: Skin is warm and dry  She is not diaphoretic  Psychiatric: She has a normal mood and affect  Additional Data:     Labs:      Results from last 7 days  Lab Units 11/21/17  0346   WBC Thousand/uL 12 93*   HEMOGLOBIN g/dL 10 1*   HEMATOCRIT % 31 1*   PLATELETS Thousands/uL 339       Results from last 7 days  Lab Units 11/22/17  0947   SODIUM mmol/L 132*   POTASSIUM mmol/L 3 7   CHLORIDE mmol/L 93*   CO2 mmol/L 31   BUN mg/dL 7   CREATININE mg/dL 0 76   CALCIUM mg/dL 9 1   GLUCOSE RANDOM mg/dL 222*           * I Have Reviewed All Lab Data Listed Above  Imaging:    Recent Cultures (last 7 days):           Last 24 Hours Medication List:     amiodarone 200 mg Oral BID With Meals   apixaban 5 mg Oral BID   aspirin 81 mg Oral Daily   atorvastatin 40 mg Oral Daily   cholecalciferol 1,000 Units Oral Daily   clopidogrel 75 mg Oral Daily   docusate sodium 100 mg Oral BID   furosemide 40 mg Oral BID (diuretic)   insulin glargine 43 Units Subcutaneous Q12H [facility]   insulin lispro 1-5 Units Subcutaneous HS   insulin lispro 1-6 Units Subcutaneous TID AC   insulin lispro 10 Units Subcutaneous TID With Meals   ipratropium 0 5 mg Nebulization TID   lactulose 10 g Oral BID   levalbuterol 1 25 mg Nebulization TID   levothyroxine 125 mcg Oral Early Morning   lidocaine 1 patch Transdermal Daily   melatonin 6 mg Oral HS   metoprolol tartrate 37 5 mg Oral Q12H JUAN LUIS   nystatin  Topical BID   pantoprazole 40 mg Oral Early Morning   polyethylene glycol 17 g Oral Daily   potassium chloride 20 mEq Oral BID With Meals   senna 2 tablet Oral HS        Today, Patient Was Seen By: Shad Andrade MD    ** Please Note: This note has been constructed using a voice recognition system   **

## 2017-11-22 NOTE — PLAN OF CARE
Problem: PHYSICAL THERAPY ADULT  Goal: Performs mobility at highest level of function for planned discharge setting  See evaluation for individualized goals  Treatment/Interventions: Functional transfer training, LE strengthening/ROM, Therapeutic exercise, Endurance training, Bed mobility, Gait training, elevations, Spoke to nursing, Equipment eval/education  Equipment Recommended: rw     See flowsheet documentation for full assessment, interventions and recommendations  Outcome: Progressing  Prognosis: Good  Problem List: Decreased strength, Decreased endurance, Impaired balance, Decreased mobility, Obesity  Assessment: Functional mobility re-assessment performed; pt demonstrated mod improvement in overall mobility status progressing to OOB mobilization incl amb trial w/ rw; mod (A)x1 was required for the most part to assure safety and to facilitate comfort; pt remains to be generally weak and deconditioned w/ decreased functional endurance, balance impairment, and gait dysfunction --> will require ongoing PT services to address; also recommend rehab upon D/C when medically cleared;  Barriers to Discharge: Inaccessible home environment, Decreased caregiver support  Barriers to Discharge Comments: lives alone, stairs  Recommendation: Post acute IP rehab     PT - OK to Discharge: (S)  ([facility] )    See flowsheet documentation for full assessment

## 2017-11-22 NOTE — PLAN OF CARE
Problem: OCCUPATIONAL THERAPY ADULT  Goal: Performs self-care activities at highest level of function for planned discharge setting  See evaluation for individualized goals  Treatment Interventions: ADL retraining, Functional transfer training, UE strengthening/ROM, Endurance training, Patient/family training, Equipment evaluation/education, Energy conservation  Equipment Recommended: Bedside commode       See flowsheet documentation for full assessment, interventions and recommendations  Outcome: Progressing  Limitation: Decreased ADL status, Decreased UE strength, Decreased Safe judgement during ADL, Decreased endurance, Decreased self-care trans, Decreased high-level ADLs  Prognosis: Good  Assessment: Pt participates in OT session with focus on toileting, bed mobility, and transfers to increase I for d/c  Pt min A supine to sit EOB with HOB elevated and extra time required 2* rest breaks needed to catch breath  Pt CGA functional mobility with RW to toilet  Pt CGA toileting during stance for perineal hygiene  Pt requires rest breaks t/o session to catch breath  Pt will continue to benefit from activity tolerance, transfers, and adls       OT Discharge Recommendation: Short Term Rehab (2* barriers with home setup)

## 2017-11-23 VITALS
WEIGHT: 253.53 LBS | RESPIRATION RATE: 18 BRPM | TEMPERATURE: 97.5 F | HEART RATE: 59 BPM | BODY MASS INDEX: 35.49 KG/M2 | SYSTOLIC BLOOD PRESSURE: 131 MMHG | OXYGEN SATURATION: 95 % | DIASTOLIC BLOOD PRESSURE: 60 MMHG | HEIGHT: 71 IN

## 2017-11-23 PROBLEM — J96.02 ACUTE HYPERCAPNIC RESPIRATORY FAILURE (HCC): Status: RESOLVED | Noted: 2017-11-03 | Resolved: 2017-11-23

## 2017-11-23 PROBLEM — J96.90 RESPIRATORY FAILURE (HCC): Status: RESOLVED | Noted: 2017-11-02 | Resolved: 2017-11-23

## 2017-11-23 PROBLEM — I46.9 CARDIAC ARREST (HCC): Status: RESOLVED | Noted: 2017-11-03 | Resolved: 2017-11-23

## 2017-11-23 PROBLEM — I21.4 NSTEMI (NON-ST ELEVATED MYOCARDIAL INFARCTION) (HCC): Status: RESOLVED | Noted: 2017-11-03 | Resolved: 2017-11-23

## 2017-11-23 LAB
GLUCOSE SERPL-MCNC: 157 MG/DL (ref 65–140)
GLUCOSE SERPL-MCNC: 277 MG/DL (ref 65–140)

## 2017-11-23 PROCEDURE — 94640 AIRWAY INHALATION TREATMENT: CPT

## 2017-11-23 PROCEDURE — G0008 ADMIN INFLUENZA VIRUS VAC: HCPCS | Performed by: EMERGENCY MEDICINE

## 2017-11-23 PROCEDURE — 90686 IIV4 VACC NO PRSV 0.5 ML IM: CPT | Performed by: EMERGENCY MEDICINE

## 2017-11-23 PROCEDURE — 94760 N-INVAS EAR/PLS OXIMETRY 1: CPT

## 2017-11-23 PROCEDURE — 82948 REAGENT STRIP/BLOOD GLUCOSE: CPT

## 2017-11-23 RX ORDER — POTASSIUM CHLORIDE 20 MEQ/1
20 TABLET, EXTENDED RELEASE ORAL 2 TIMES DAILY
Refills: 0
Start: 2017-11-23 | End: 2018-09-18 | Stop reason: SDUPTHER

## 2017-11-23 RX ORDER — AMIODARONE HYDROCHLORIDE 200 MG/1
200 TABLET ORAL DAILY
Refills: 0
Start: 2017-12-07 | End: 2018-09-18 | Stop reason: CLARIF

## 2017-11-23 RX ORDER — ASPIRIN 81 MG/1
81 TABLET, CHEWABLE ORAL DAILY
Refills: 0
Start: 2017-11-24 | End: 2018-09-18

## 2017-11-23 RX ORDER — POLYETHYLENE GLYCOL 3350 17 G/17G
17 POWDER, FOR SOLUTION ORAL DAILY
Qty: 14 EACH | Refills: 0 | Status: SHIPPED | OUTPATIENT
Start: 2017-11-24

## 2017-11-23 RX ORDER — LIDOCAINE 50 MG/G
1 PATCH TOPICAL DAILY
Qty: 30 PATCH | Refills: 0
Start: 2017-11-24 | End: 2018-09-18 | Stop reason: CLARIF

## 2017-11-23 RX ORDER — GABAPENTIN 600 MG/1
300 TABLET ORAL
Refills: 0
Start: 2017-11-23

## 2017-11-23 RX ORDER — METOPROLOL TARTRATE 37.5 MG/1
37.5 TABLET, FILM COATED ORAL EVERY 12 HOURS SCHEDULED
Refills: 0
Start: 2017-11-23 | End: 2018-09-18 | Stop reason: SDUPTHER

## 2017-11-23 RX ORDER — ALBUTEROL SULFATE 90 UG/1
2 AEROSOL, METERED RESPIRATORY (INHALATION) EVERY 4 HOURS PRN
Status: DISCONTINUED | OUTPATIENT
Start: 2017-11-23 | End: 2017-11-23 | Stop reason: HOSPADM

## 2017-11-23 RX ORDER — OXYCODONE HYDROCHLORIDE 5 MG/1
5 TABLET ORAL EVERY 4 HOURS PRN
Qty: 25 TABLET | Refills: 0 | Status: SHIPPED | OUTPATIENT
Start: 2017-11-23 | End: 2017-12-03

## 2017-11-23 RX ORDER — CLOPIDOGREL BISULFATE 75 MG/1
75 TABLET ORAL DAILY
Refills: 0
Start: 2017-11-24 | End: 2018-09-18

## 2017-11-23 RX ORDER — NITROGLYCERIN 0.4 MG/1
0.4 TABLET SUBLINGUAL
Qty: 90 TABLET | Refills: 0 | Status: SHIPPED | OUTPATIENT
Start: 2017-11-23

## 2017-11-23 RX ORDER — AMIODARONE HYDROCHLORIDE 200 MG/1
200 TABLET ORAL 2 TIMES DAILY WITH MEALS
Refills: 0
Start: 2017-11-23 | End: 2018-09-18 | Stop reason: CLARIF

## 2017-11-23 RX ADMIN — LEVALBUTEROL HYDROCHLORIDE 1.25 MG: 1.25 SOLUTION, CONCENTRATE RESPIRATORY (INHALATION) at 07:33

## 2017-11-23 RX ADMIN — AMIODARONE HYDROCHLORIDE 200 MG: 200 TABLET ORAL at 08:31

## 2017-11-23 RX ADMIN — ASPIRIN 81 MG 81 MG: 81 TABLET ORAL at 08:26

## 2017-11-23 RX ADMIN — ATORVASTATIN CALCIUM 40 MG: 40 TABLET, FILM COATED ORAL at 08:26

## 2017-11-23 RX ADMIN — FUROSEMIDE 40 MG: 40 TABLET ORAL at 08:26

## 2017-11-23 RX ADMIN — POTASSIUM CHLORIDE 20 MEQ: 1500 TABLET, EXTENDED RELEASE ORAL at 08:26

## 2017-11-23 RX ADMIN — IPRATROPIUM BROMIDE 0.5 MG: 0.5 SOLUTION RESPIRATORY (INHALATION) at 07:33

## 2017-11-23 RX ADMIN — PANTOPRAZOLE SODIUM 40 MG: 40 TABLET, DELAYED RELEASE ORAL at 05:14

## 2017-11-23 RX ADMIN — METOPROLOL TARTRATE 37.5 MG: 25 TABLET ORAL at 08:26

## 2017-11-23 RX ADMIN — INSULIN GLARGINE 43 UNITS: 100 INJECTION, SOLUTION SUBCUTANEOUS at 08:43

## 2017-11-23 RX ADMIN — NYSTATIN 1 APPLICATION: 100000 POWDER TOPICAL at 08:28

## 2017-11-23 RX ADMIN — INFLUENZA VIRUS VACCINE 0.5 ML: 15; 15; 15; 15 SUSPENSION INTRAMUSCULAR at 08:45

## 2017-11-23 RX ADMIN — APIXABAN 5 MG: 5 TABLET, FILM COATED ORAL at 08:26

## 2017-11-23 RX ADMIN — VITAMIN D, TAB 1000IU (100/BT) 1000 UNITS: 25 TAB at 08:26

## 2017-11-23 RX ADMIN — CLOPIDOGREL BISULFATE 75 MG: 75 TABLET ORAL at 08:26

## 2017-11-23 RX ADMIN — INSULIN LISPRO 4 UNITS: 100 INJECTION, SOLUTION INTRAVENOUS; SUBCUTANEOUS at 11:40

## 2017-11-23 RX ADMIN — LIDOCAINE 1 PATCH: 50 PATCH TOPICAL at 08:27

## 2017-11-23 RX ADMIN — DOCUSATE SODIUM 100 MG: 100 CAPSULE, LIQUID FILLED ORAL at 08:26

## 2017-11-23 RX ADMIN — INSULIN LISPRO 1 UNITS: 100 INJECTION, SOLUTION INTRAVENOUS; SUBCUTANEOUS at 08:28

## 2017-11-23 RX ADMIN — LEVOTHYROXINE SODIUM 125 MCG: 125 TABLET ORAL at 05:14

## 2017-11-23 NOTE — PROGRESS NOTES
Cardiology Progress Note - Watson Estes 79 y o  female MRN: [id number]    Unit/Bed#: [facility] 526-01 Encounter: [id number]      Assessment:  Principal Problem:    Respiratory failure  Active Problems:    Cardiac arrest    Acute hypercapnic respiratory failure    Torsades de pointes    Prolonged QT interval    NSTEMI (non-ST elevated myocardial infarction)    Ischemic cardiomyopathy    LBBB (left bundle branch block)    CAD (coronary artery disease)    h/o Vocal cord paralysis    HTN (hypertension)    Hyperlipemia    Encephalopathy    Obesity, Class III, BMI 40-49 9 (morbid obesity) (HCC)    Delirium    Anemia    Hyponatremia    Leukocytosis    Hypoglycemia    Hallucinations      Plan:  Patient looks good this morning  She has no chest pain or significant dyspnea  She is in sinus rhythm on telemetry  She is being prepared for discharge planning  Her BMP yesterday looked good  I agree with her current medical regimen and she will follow up in our office as an outpatient  Subjective:   Patient seen and examined  No significant events overnight   negative  Objective:     Vitals: Blood pressure 140/61, pulse 65, temperature 97 5 °F (36 4 °C), resp  rate 18, height 5' 11" (1 803 m), weight 115 kg (253 lb 8 5 oz), SpO2 96 %  , Body mass index is 35 36 kg/m² , Orthostatic Blood Pressures    Flowsheet Row Most Recent Value   Blood Pressure  140/61 filed at 11/23/2017 0724   Patient Position - Orthostatic VS  Lying filed at 11/22/2017 1130      ,      Intake/Output Summary (Last 24 hours) at 11/23/17 0741  Last data filed at 11/23/17 0405   Gross per 24 hour   Intake              835 ml   Output             1900 ml   Net            -1065 ml       No significant arrhythmias seen on telemetry review         Physical Exam:    GEN: Watson Estes appears well, alert and oriented x 3, pleasant and cooperative   NECK: supple, no carotid bruits, no JVD or HJR  HEART: normal rate, regular rhythm, normal S1 and S2, no murmurs, clicks, gallops or rubs   LUNGS: clear to auscultation bilaterally; no wheezes, rales, or rhonchi   ABDOMEN: normal bowel sounds, soft, no tenderness, no distention  EXTREMITIES: edema  SKIN: warm and well perfused, no suspicious lesions on exposed skin    Labs & Results:    Admission on 11/02/2017   No results displayed because visit has over 200 results  Xr Chest Portable    Result Date: 11/19/2017  Narrative: CHEST INDICATION:  Shortness of breath  COMPARISON:  11/18/2017 VIEWS:   AP frontal IMAGES:  1 FINDINGS:     Cardiomediastinal silhouette appears stable  Left chest wall AICD device  Probable bilateral pleural effusions with left retrocardiac atelectasis or infiltrate  Lungs are otherwise clear  Normal pulmonary vasculature  No pneumothorax  Visualized osseous structures appear within normal limits for the patient's age  Impression: Probable bilateral pleural effusions with left retrocardiac atelectasis or infiltrate  Workstation performed: [id number]     Xr Chest Portable    Result Date: 11/19/2017  Narrative: CHEST INDICATION:  Status post pacemaker insertion  COMPARISON:  11/11/2017 VIEWS:   AP frontal IMAGES:  1 FINDINGS:  The patient is rotated to the left  Cardiomediastinal silhouette appears stable  Biventricular left-sided AICD device is evident without pneumothorax  Probable bilateral pleural effusions and left retrocardiac volume loss  Stable bony thorax  Impression: No pneumothorax following pacemaker/AICD insertion  Bilateral pleural effusions and left basilar volume loss  Workstation performed: [id number]     Xr Chest Portable    Result Date: 11/11/2017  Narrative: CHEST INDICATION:  Status post thoracentesis  COMPARISON:  November 10, 2017 VIEWS:   AP frontal IMAGES:  1 FINDINGS:     The heart is enlarged  Atherosclerotic changes in the aorta  Lung volumes diminished  Kelsie and pulmonary vessels diffusely enlarged    Bilateral pleural effusions, left side greater than right which have progressed  Right internal jugular central line with tip at cavoatrial junction  Visualized osseous structures appear within normal limits for the patient's age  Impression: 1  Worsening pulmonary vascular congestion  2   Enlarging bilateral pleural effusions  Workstation performed: [id number]     Xr Chest Portable    Result Date: 11/10/2017  Narrative: CHEST INDICATION:  thoracentesis  History taken directly from the electronic ordering system  Right-sided thoracentesis  COMPARISON:  Chest x-ray of 11/10/2017 VIEWS:   AP frontal IMAGES:  2 FINDINGS:  Right IJ line tip overlies the SVC  Heart size is not well evaluated on this single portable AP view  There is no appreciable right-sided pleural effusion  No pneumothorax  Small left pleural effusion is suggested, probably stable  Visualized osseous structures appear within normal limits for the patient's age  Impression: 1  No right pleural effusion  No evidence for pneumothorax  Small left pleural effusion appears stable  Workstation performed: [id number]     Xr Chest Portable    Result Date: 11/10/2017  Narrative: CHEST INDICATION:  Volume status COMPARISON:  11/8/2017 VIEWS:   AP frontal IMAGES:  1 FINDINGS:  Right internal jugular central venous catheter tip remains within the superior vena cava  Mild cardiomegaly is stable  Interstitial pulmonary edema is again seen which appears slightly increased within the right lung  No pneumothorax identified  Visualized osseous structures appear within normal limits for the patient's age  Impression: Worsening pulmonary edema  Unchanged cardiomegaly  Workstation performed: [id number]     Xr Chest Portable    Result Date: 11/3/2017  Narrative: CHEST INDICATION:  Central line placement  COMPARISON:  November 3, 2017, earlier in the day  Jody Sole VIEWS:  AP portable semierect view in the conventional and line placement techniques  FINDINGS: Right internal jugular central line is identified   Tip in superior vena cava  There is no pneumothorax  The cardiomediastinal silhouette is unremarkable  Lung volumes diminished  Kelsie and pulmonary vessels diffusely enlarged  Developing alveolar infiltrates in the right upper lobe and right lower lobe, likely cardiogenic in nature  Moderate-sized left pleural effusion  Endotracheal tube with tip approximately 4 cm above diony  Orogastric tube coursing beneath the left hemidiaphragm  Tip not seen  External pacing leads    Bony thorax is unremarkable  Impression: 1  Right internal jugular central line in satisfactory position  2   Worsening congestive heart failure  Workstation performed: [id number]     Xr Chest Portable    Result Date: 11/3/2017  Narrative: CHEST INDICATION:  Respiratory failure COMPARISON:  1 view chest 11-17 at 15:49 VIEWS:   AP frontal IMAGES:  1 FINDINGS:     Cardiac silhouette is partially obscured  Aortic calcification is present  Left basilar and lingular airspace consolidation unchanged  Small bilateral pleural effusions left greater than right unchanged  Distended central pulmonary vasculature slightly worse  No pneumothorax  Widened right acromioclavicular joint attributed to prior surgery  Impression: Mild progression of central pulmonary vascular congestion  Left basilar and lingular airspace consolidation and bilateral pleural effusions left greater than right unchanged  Workstation performed: [id number]     Xr Chest 1 View Portable    Result Date: 11/2/2017  Narrative: CHEST  PORTABLE INDICATION: Severe shortness of breath COMPARISON:  None VIEWS:   AP semierect; IMAGES:  2 FINDINGS: The cardiomediastinal silhouette is partially obscured on the left  Moderate left and small right pleural effusions  Bony thorax unremarkable  Electrode (EKG) monitoring devices overlie the chest      Impression: Bilateral pleural effusions, left greater than right  Underlying process of the left lung base not excludable        Workstation performed: [id number]     Xr Chest 2 Views    Result Date: 11/19/2017  Narrative: CHEST - DUAL ENERGY INDICATION:  Status post AICD insertion  COMPARISON:  11/17/2017 VIEWS:  PA (including soft tissue/bone algorithms) and lateral projections IMAGES:  4 FINDINGS:     Cardiomediastinal silhouette appears stable  Left chest wall AICD device again evident without pneumothorax  Bilateral pleural effusions, left greater than right, and left retrocardiac volume loss as before  Postoperative changes distal right clavicle again noted  Impression: No pneumothorax  Bilateral pleural effusions and left basilar volume loss as before  Workstation performed: [id number]     Xr Abdomen 1 View Kub    Result Date: 11/8/2017  Narrative: ABDOMEN INDICATION:  Vomiting  Abdominal pain  COMPARISON:  None  VIEWS:  AP supine IMAGES:  2 FINDINGS: Study limited due to incomplete visualization of the entire abdomen  There is a nonobstructive bowel gas pattern  Orogastric tube is present with tip in the left upper quadrant  Dilated, air-filled loops of large and small bowel throughout the abdomen  No discernible free air on this supine study  Upright or left lateral decubitus imaging is more sensitive to detect subtle free air in the appropriate setting  No pathologic calcifications or soft tissue masses  Visualized lung bases are clear  Visualized osseous structures are unremarkable for the patient's age  Impression: 1  Limited examination  Incomplete visualization of the entire abdomen  2   Nonobstructive bowel gas pattern  Workstation performed: [id number]     Ct Head Wo Contrast    Result Date: 11/6/2017  Narrative: CT BRAIN - WITHOUT CONTRAST INDICATION:  Altered mental status COMPARISON:  CT head 11-17 TECHNIQUE:  CT examination of the brain was performed  In addition to axial images, coronal reformatted images were created and submitted for interpretation    Radiation dose length product (DLP) for this visit:  1066 73 mGy-cm   This examination, like all CT scans performed in the [facility], was performed utilizing techniques to minimize radiation dose exposure, including the use of iterative reconstruction and automated exposure control  IMAGE QUALITY:  Diagnostic  FINDINGS:  PARENCHYMA:  Decreased attenuation is noted in the supratentorial white matter demonstrating an appearance most consistent with mild microangiopathic change  Chronic lacunar infarct right basal ganglia  Subacute to chronic lacunar infarct left external  capsule unchanged  Calcification bilateral cavernous internal carotid and distal vertebral arteries  No intracranial mass, mass effect or midline shift  No CT signs of acute infarction  There is no parenchymal hemorrhage  VENTRICLES AND EXTRA-AXIAL SPACES:  Mild prominence of CSF spaces diffusely compatible with generalized volume loss  No hydrocephalus  VISUALIZED ORBITS AND PARANASAL SINUSES:  Unremarkable  CALVARIUM AND EXTRACRANIAL SOFT TISSUES:   Normal      Impression: No CT evidence of acute intracranial process or significant interval change since November 2, 2017  Chronic microangiopathy  Workstation performed: [id number]     Ct Head Without Contrast    Result Date: 11/2/2017  Narrative: CT BRAIN - WITHOUT CONTRAST INDICATION:  49-year-old woman with history of hypertension and diabetes presenting with change in mental status and respiratory distress  COMPARISON:  None  TECHNIQUE:  CT examination of the brain was performed  In addition to axial images, coronal reformatted images were created and submitted for interpretation  Radiation dose length product (DLP) for this visit:  1078 43 mGy-cm   This examination, like all CT scans performed in the [facility], was performed utilizing techniques to minimize radiation dose exposure, including the use of iterative reconstruction and automated exposure control  IMAGE QUALITY:  Diagnostic   FINDINGS: PARENCHYMA:  No intracranial mass, mass effect or midline shift  No CT signs of acute infarction  There is no parenchymal hemorrhage  Chronic lacunar infarct involving the right internal capsule and corona radiata  Diffusely decreased white matter attenuation, typical of chronic microvascular ischemic change  VENTRICLES AND EXTRA-AXIAL SPACES:  Normal for patient's age  No extra-axial blood  VISUALIZED ORBITS AND PARANASAL SINUSES:  Unremarkable  CALVARIUM AND EXTRACRANIAL SOFT TISSUES:   Normal      Impression: No acute intracranial abnormality  Workstation performed: [id number]     Ct Cervical Spine Without Contrast    Result Date: 11/2/2017  Narrative: CT CERVICAL SPINE - WITHOUT CONTRAST INDICATION: Found on floor COMPARISON: None  TECHNIQUE:  CT examination of the cervical spine was performed without intravenous contrast   Contiguous axial images were obtained  Sagittal and coronal reconstructions were performed  Radiation dose length product (DLP) for this visit:  1298 71 mGy-cm   This examination, like all CT scans performed in the [facility], was performed utilizing techniques to minimize radiation dose exposure, including the use of iterative reconstruction and automated exposure control  IMAGE QUALITY:  Diagnostic  FINDINGS: ALIGNMENT:  There is straightening of normal cervical lordosis  No subluxation or compression deformity  VERTEBRAL BODIES:  No fracture  DEGENERATIVE CHANGES:  Mild multilevel cervical degenerative changes are noted without critical central canal stenosis  PREVERTEBRAL AND PARASPINAL SOFT TISSUES: Nonspecific foci of air are identified in the region of the right vertebral artery and right internal carotid artery which is nonspecific finding  CTA neck can be considered for further evaluation  Foci of air are  also identified in the right submandibular gland region, possibly within a vessel   THORACIC INLET:  Moderate partially visualized bilateral pleural effusions  Please proceed with CT chest with contrast      Impression: Nonspecific foci of air are identified in the region of the right vertebral artery and right internal carotid artery which is nonspecific finding  CTA neck can be considered for further evaluation  No acute fracture or dislocation identified  If there is focal neurologic deficit, cervical spine MRI can be considered  Moderate partially visualized bilateral pleural effusions  Please proceed with CT chest with contrast  Workstation performed: [id number]     Fl Barium Swallow Video W Speech    Result Date: 11/22/2017  Narrative: A video barium swallow study was performed by the Department of Speech Pathology  Please refer to the report for the official interpretation  The images are stored in  PACS for archival purposes only  Study images were not formally reviewed by the  Radiology Department  Vas Upper Limb Venous Duplex Scan, Unilateral/limited    Result Date: 11/11/2017  Narrative:  [facility] REPORT CLINICAL: Indications:  Patient presents to determine propagation vs resolution of previously noted SVT in the left cephalic vein while doing a bedside ultrasound to asses for line access  Risk Factors: The patient has history of obesity and immobilization  Clinical: Left Upper Limb There is edema  CONCLUSION: Impression RIGHT UPPER LIMB LIMITED: Evaluation shows no evidence of thrombus in the internal jugular vein, and the subclavian vein  The brachiocephalic vein could not be identified due to a line and bandage  LEFT UPPER LIMB: Evaluation shows evidence of acute, occlusive, superficial vein thrombus in the cephalic vein from the distal upper arm to the proximal forearm  No evidence of acute or chronic deep vein thrombosis  Sulma Oleary RN notified of preliminary results    SIGNATURE: Electronically Signed by: Zuri Nolasco MD, [address] on 2017-11-11 04:40:16 PM    Xr Chest Portable Icu    Result Date: 11/9/2017  Narrative: CHEST INDICATION:  Shortness of breath  COMPARISON:  11/8/2017 VIEWS:   AP frontal IMAGES:  1 FINDINGS:  Central line extends above the cavoatrial junction  ET tube and NG tube have been removed  Stable mild cardiomegaly  Moderate pulmonary edema  Visualized osseous structures appear within normal limits for the patient's age  Impression: Moderate pulmonary edema, unchanged  Workstation performed: [id number]     Xr Chest Portable Icu    Result Date: 11/8/2017  Narrative: CHEST INDICATION:  Hypoxemia COMPARISON:  11/7/2017 VIEWS:   AP frontal IMAGES:  1 FINDINGS:     Heart shadow is enlarged but unchanged from prior exam  Persistent bilateral effusions and persistent pulmonary congestion noted similar to prior study  Endotracheal tube positioning is appropriate  Central line tip at cavoatrial junction  Nasogastric tube extends below left hemidiaphragm  Visualized osseous structures appear within normal limits for the patient's age  Impression: There is no significant interval change since 11/7/2017 Workstation performed: [id number]     Xr Chest Portable Icu    Result Date: 11/7/2017  Narrative: CHEST INDICATION:  Hypoxemia  COMPARISON:  11/6/2017 VIEWS:   AP frontal IMAGES:  1 FINDINGS:  Lines and tubes are unchanged  Cardiomediastinal silhouette appears stable  Slight improvement in vascular congestion with persistent bilateral pleural effusions and basilar volume loss, more so on the left  No pneumothorax  Stable bony thorax  Impression: Slight improvement in central vascular congestion with persistent bibasilar pleural effusions and atelectasis  Workstation performed: [id number]     Xr Chest Portable Icu    Result Date: 11/7/2017  Narrative: CHEST INDICATION:  Hypoxemia  COMPARISON:  11/6/2017 VIEWS:   AP frontal IMAGES:  1 FINDINGS:  Lines and tubes appear in satisfactory position  Cardiomediastinal silhouette appears stable   Pulmonary edema with bilateral pleural effusions and bibasilar volume loss again seen  No pneumothorax  Post surgical changes right distal clavicle  Impression: Pulmonary edema with bilateral pleural effusions and basilar volume loss  Workstation performed: [id number]     Xr Chest Portable Icu    Result Date: 11/6/2017  Narrative: CHEST INDICATION:  Hypoxia  Patient is ventilated  COMPARISON:  November 4, 2017 VIEWS:   AP frontal IMAGES:  1 FINDINGS:     Heart shadow appears stable in size, probably at least mildly enlarged  It is partly obscured  Atherosclerotic vascular calcifications are noted  There are pleural effusions which are moderate size  There is probably bibasilar edema or infiltrate  Upper lung fields are clear although there is some vascular prominence noted  Right IJ central line is stable  Endotracheal and endogastric tubes appear stable  Osseous structures unremarkable  Impression: Pleural effusions and bibasilar opacities which are probably edema or atelectasis  Stable line and tube positioning  Cardiomegaly  Vascular congestion Workstation performed: [id number]     Xr Chest Portable Icu    Result Date: 11/4/2017  Narrative: CHEST INDICATION:  Respiratory failure  COMPARISON:  Chest x-ray from 11/3/2017  VIEWS:   AP frontal IMAGES:  1 FINDINGS:  Endotracheal tube, nasogastric tube, and right IJ central lines unchanged  Cardiomediastinal silhouette appears unremarkable  There is unchanged pulmonary edema and moderate left pleural effusion  No pneumothorax  Visualized osseous structures appear within normal limits for the patient's age  Impression: There is unchanged pulmonary edema and moderate left pleural effusion  Workstation performed: [id number]     Xr Chest Portable Icu    Result Date: 11/3/2017  Narrative: CHEST INDICATION:  Status post intubation   COMPARISON:  Earlier today VIEWS:   AP frontal IMAGES:  1 FINDINGS:  Monitoring wires and leads project over the chest  ET tube projects over the tracheal air column near the level of the lower clavicles  Enteric tube courses to the stomach  Cardiomediastinal silhouette appears stable  Moderate central vascular congestion with bilateral pleural effusions and left basilar volume loss  Visualized osseous structures appear within normal limits for the patient's age  Impression: ET tube projects above the diony  Pulmonary edema with bilateral pleural effusions and left basilar volume loss  Workstation performed: [id number]     Xr Chest Picc Line Portable    Result Date: 11/11/2017  Narrative: CHEST  INDICATION: PICC line placement  COMPARISON:  11/11/2017  VIEWS:   AP frontal; 1 image FINDINGS: Left PICC line has been placed, tip projects over the cavoatrial junction  Right jugular central venous catheter is unchanged in position  The cardiomediastinal silhouette is stable  Improving pulmonary vascular congestion is noted  Stable bilateral pleural effusions are present  Osseous structures are age appropriate  Impression: Improving pulmonary vascular congestion  Stable bilateral pleural effusions  Workstation performed: [id number]       EKG personally reviewed by Rangel Salazar MD      Counseling / Coordination of Care  Total floor / unit time spent today 30 minutes  Greater than 50% of total time was spent with the patient and / or family counseling and / or coordination of care

## 2017-11-23 NOTE — DISCHARGE SUMMARY
Discharge Summary - Randy Zamudio [facility] Internal Medicine    Patient Information: June Hernandez 79 y o  female MRN: [id number]  Unit/Bed#: [facility] 526-01 Encounter: [id number]    Discharging Physician / Practitioner: Peter Cano MD  PCP: No primary care provider on file  Admission Date: 11/2/2017  Discharge Date: 11/23/17    Reason for Admission: Altered mental status    Discharge Diagnoses:     Principal Problem:    Respiratory failure  Active Problems:    Cardiac arrest    Acute hypercapnic respiratory failure    Torsades de pointes    Prolonged QT interval    NSTEMI (non-ST elevated myocardial infarction)    Ischemic cardiomyopathy    LBBB (left bundle branch block)    CAD (coronary artery disease)    h/o Vocal cord paralysis    HTN (hypertension)    Hyperlipemia    Encephalopathy    Obesity, Class III, BMI 40-49 9 (morbid obesity) ([address] )    Delirium    Anemia    Hyponatremia    Leukocytosis    Hypoglycemia    Hallucinations  Resolved Problems:    * No resolved hospital problems  *      Consultations During Hospital Stay:  · Cardiology  · EP  · CTSx  · Neurology  · Psychiatry    Procedures Performed:     · Biventricular ICD  · Cardiac catheterization-- Multivessel CAD  Moderately depressed LV function, EF about 35% with severe apical hypokinesis  Moderately elevated LVEDP  Since the catheterization at [address] [place] [address] [address] in 6/17, coronary angiography is unchanged  However, LV function appears significantly worse than the wall motion on echo 12 days ago during this hospitalization  There also appears to be significant MR and significantly elevated filling pressures  A surgical consultation will be obtained  Following OCT, a Xience Alpine Rx 3 0 x 23mm drug-eluting stent was placed across the 90% lesion           in the proximal LAD and deployed at a maximum inflation pressure of 16 romeo  repeat OCT disclosed        very good apposition   After post-dilation         with 3 25 and 3 5NC balloons, there was a 20% residual stenosis, and distal runoff was normal     Significant Findings / Test Results:     · Echocardiogram showed EF of 40 percent  Restrictive physiology with elevated ventricular end-diastolic filling pressure  Mild aortic regurgitation  Moderate mitral regurgitation      Hospital Course:     Mario Arcos is a 79 y o  female patient who originally transfer from [address] [place] [address] on 11/2/2017 due to concern for hypoxia and MI  Apparently she was found down at her home last seen well 2 days prior  She was noted hypoxic with O2 sat 70 percent and was placed on BiPAP in the [place] ED  Troponin was elevated at 7 57 she had lactic acidosis  She was recently hospitalized in June for possible STEMI at [facility]   After arrival in [facility] [place], She was noted to have an episode of polymorphic ventricular tachycardia while in the MICU  Patient underwent cardiac catheterization on November 3rd with findings stated above  She was seen by CT surgery for evaluation multi vessel CAD  It was felt that she is high risk for surgical intervention  Pt underwent PCI and drug eluting stent placed to LAD  She underwent placement of biventricular ICD and tolerated procedure well  For Afib she is recommended Eliquis for [facility]  She was also seen by neurology service due to intermittent episodes of mental status change and hallucinations since June  Her workup including negative EEG for seizure activity and negative CT head  It was felt that the this hallucination may be related to her loss of her  2 years ago  She has not been able to grieve for her loss secondary to her frequent hospital stays  Patient was seen by PT and OT and will be discharged to [facility] acute rehab          Discharge Day Visit / Exam:     Subjective:  No event overnight  Vitals: Blood Pressure: 151/79 (11/23/17 0826)  Pulse: 60 (11/23/17 0826)  Temperature: 97 5 °F (36 4 °C) (11/23/17 0724)  Temp Source: Oral (11/22/17 7723)  Respirations: 18 (11/23/17 0724)  Height: 5' 11" (180 3 cm) (11/03/17 1510)  Weight - Scale: 115 kg (253 lb 8 5 oz) (11/23/17 0500)  SpO2: 96 % (11/23/17 0735)  Exam:   Physical Exam   Constitutional: No distress  Eyes: Conjunctivae and EOM are normal  Pupils are equal, round, and reactive to light  Neck: Normal range of motion  Neck supple  No JVD present  Cardiovascular: Normal rate  Pulmonary/Chest: Effort normal and breath sounds normal  No respiratory distress  She has no wheezes  Abdominal: Soft  Bowel sounds are normal  She exhibits no distension  There is no tenderness  Neurological: She is alert  Skin: Skin is warm and dry  She is not diaphoretic  Psychiatric: She has a normal mood and affect  Discharge instructions/Information to patient and family:   See after visit summary for information provided to patient and family  Provisions for Follow-Up Care:  See after visit summary for information related to follow-up care and any pertinent home health orders  Disposition:     Other: [facility] acute rehab    For Discharges to [facility] SNF:   · Not Applicable to this Patient - Not Applicable to this Patient    Planned Readmission: No     Discharge Statement:  I spent 30 minutes discharging the patient  This time was spent on the day of discharge  I had direct contact with the patient on the day of discharge  Greater than 50% of the total time was spent examining patient, answering all patient questions, arranging and discussing plan of care with patient as well as directly providing post-discharge instructions  Additional time then spent on discharge activities  Discharge Medications:  See after visit summary for reconciled discharge medications provided to patient and family        ** Please Note: This note has been constructed using a voice recognition system **

## 2017-11-24 NOTE — CASE MANAGEMENT
Notification of Discharge  This is a Notification of Discharge from our facility [address]  Please be advised that this patient has been discharge from our facility  Below you will find the admission and discharge date and time including the patients disposition  PRESENTATION DATE: 11/2/2017  6:47 PM  IP ADMISSION DATE: 11/2/17 1847  DISCHARGE DATE: 11/23/2017  1:25 PM  DISPOSITION: Non [facility] Acute Rehab      [facility] in the [facility] by Sukh Belcher for 2017  Network Utilization Review Department  Phone: [phone]; Fax [phone]  ATTENTION: The Network Utilization Review Department is now centralized for our 7 Facilities  Make a note that we have a new phone and fax numbers for our Department  Please call with any questions or concerns to [phone] and carefully follow the prompts so that you are directed to the right person  All voicemails are confidential  Fax any determinations, approvals, denials, and requests for initial or continue stay review clinical to [phone]  Due to HIGH CALL volume, it would be easier if you could please send faxed requests to expedite your requests and in part, help us provide discharge notifications faster

## 2017-11-27 LAB — GLUCOSE SERPL-MCNC: 237 MG/DL (ref 65–140)

## 2017-12-05 ENCOUNTER — GENERIC CONVERSION - ENCOUNTER (OUTPATIENT)
Dept: OTHER | Facility: OTHER | Age: 67
End: 2017-12-05

## 2018-01-15 ENCOUNTER — GENERIC CONVERSION - ENCOUNTER (OUTPATIENT)
Dept: OTHER | Facility: OTHER | Age: 68
End: 2018-01-15

## 2018-01-15 ENCOUNTER — ALLSCRIPTS OFFICE VISIT (OUTPATIENT)
Dept: OTHER | Facility: OTHER | Age: 68
End: 2018-01-15

## 2018-01-15 NOTE — PROCEDURES
Procedures by Brandee Penn MD at  2017  1:33 PM      Author:  Brandee Penn MD Service:  Neurology Author Type:  Physician    Filed:  2017  1:40 PM Date of Service:  2017  1:33 PM Status:  Signed    :  Brandee Penn MD (Physician)        Procedure Orders:       1  EEG Routine and awake [[id number]] ordered by Miracle Yeh PA-C at [facility]                  ELECTROENCEPHALOGRAM    Patient Name:  Tiffanie Kessler  MRN: [id number]   :  1950 File #: Ehsan Mccabe [id number]   Age: 79 y o  Encounter #: [id number]   Date performed: 2017 Referring Provider: Bernadine Fischer DO          Report date: 2017          Study type: awake and drowsy EEG    ICD 10 diagnosis: Spells/Fit NOS R56 9 and Transient alteration of awareness R40 4    Patient History:  EEG is requested to assess for seizures and/or classification of epilepsy  Patient is 79 y o  female with intermittent encephalopathy and bizarre hallucination  The patient has history of hypertension, diabetes, pancreatitis, anxiety,  sepsis, respiratory distress, history of ventricular tachycardia cardiac arrest status post resuscitation, and placement of biventricular ICD pacemaker      Current AEDs:  Medications include:   amiodarone 200 mg Oral BID With Meals   apixaban 5 mg Oral BID   aspirin 81 mg Oral Daily   atorvastatin 40 mg Oral Daily   cholecalciferol 1,000 Units Oral Daily   clopidogrel 75 mg Oral Daily   furosemide 40 mg Oral BID (diuretic)   insulin glargine 40 Units Subcutaneous Q12H [facility]   insulin lispro 1-5 Units Subcutaneous HS   insulin lispro 1-6 Units Subcutaneous TID AC   insulin lispro 10 Units Subcutaneous TID With Meals   ipratropium 0 5 mg Nebulization TID   levalbuterol 1 25 mg Nebulization TID   levothyroxine 125 mcg Oral Early Morning   melatonin 6 mg Oral HS   metoprolol tartrate 37 5 mg Oral Q12H JUAN LUIS   nystatin  Topical BID   pantoprazole 40 mg Oral Early Morning   polyethylene glycol 17 g Oral Daily potassium chloride 20 mEq Oral BID With Meals   senna 2 tablet Oral HS       Description of Procedure: The EEG was performed with electrodes applied using the International 10-20 System  Additional electrodes used included EOG, ECG and T1/T2 electrodes  A single lead ECG channel is also  present  At least 16 channels are reviewed at a time  and formatted into longitudinal bipolar, transverse bipolar, and referential (to common reference or calculated common reference) montages  The EEG was recorded  with the patient awake, drowsy, and asleep  The recording was technically satisfactory  EEG was recorded from 10:07 to 10:33  Findings:   Background Activity: The background is grossly symmetric with respect to voltages and activities  There is an excess of stage 2 sleep and drowsiness and very brief periods of wakefulness before she falls asleep again  During very brief periods of wakefulness, the background consists of low voltage mixture of alpha,  theta, and beta activity (the patient was only able to interact with the EEG technician for 20 seconds before she falls sleep)  Drowsiness is characterized by attentuation of the alpha rhythm, prominent anterior beta activity, central theta activity, roving eye movements and positive occipital sharp transients of sleep (POSTS)   Stage 2 sleep is characterized by symmetric sleep spindles and K-complexes  Activation Procedures:   Hyperventilation was not performed  Stepped photic stimulation from 1 to 30 fps was performed and produced no abnormality  Other findings: The single lead ECG shows a regular cardiac rhythm with a widened QRS complex and occasional premature ventricular complexes (PVCs)  Interpretation: This 26 minutes EEG shows excessive stage 2 sleep, which may indicate diffuse cerebral dysfunction that impairs her ability to stay awake, possibly related to medications, but it is etiologically nonspecific          Pepe Santillan, MD [facility]  Tara BUCK    Nov 21 2017  1:40PM [facility] Standard Time

## 2018-01-16 NOTE — PROCEDURES
Procedures by Paul Arguelles DO at 11/3/2017   3:26 PM      Author:  Paul Arguelles DO Service:  Critical Care/ICU Author Type:  Resident    Filed:  11/3/2017  3:38 PM Date of Service:  11/3/2017  3:26 PM Status:  Attested    :  Paul Arguelles DO (Resident)  Cosigner:  Pamela Fishman DO at 11/3/2017  3:46 PM      Procedure Orders:       1  CENTRAL LINE [[id number]] ordered by Paul Arguelles DO at 11/03/17 1526                 Post-procedure Diagnoses:       1  Cardiac arrest with ventricular fibrillation ([facility] [address] ) [I46 9, I49 01]              Attestation signed by Pamela Fishman DO at 11/3/2017  3:46 PM             Teaching Physician Statement  I was present and available for all key portions of procedure                     Central Line  Insertion  Date/Time: 11/3/2017 3:26 PM  Performed by: Radha Parra by: Yaakov Hameed     Patient location:  Bedside (MICU)  Other Assisting  Provider: Yes (comment) Devante Meredith MD)    Consent:     Consent obtained:  Verbal and emergent situation    Consent given by: pretty  Risks discussed:  Nerve damage, incorrect placement, arterial puncture, bleeding, infection and pneumothorax    Alternatives discussed:  Alternative treatment and delayed treatment  Universal protocol:     Procedure explained and questions answered to patient or proxy's satisfaction: yes      Relevant documents present and verified: yes      Test results available and properly labeled: yes       Imaging studies available: yes      Required blood products, implants, devices, and special equipment available: yes      Site/side marked: yes      Immediately prior to procedure, a time out was called: yes      Patient identity confirmed:  Arm band, provided demographic data and anonymous protocol, patient vented/unresponsive  Pre-procedure details:     Hand hygiene: Hand hygiene performed prior to insertion      Sterile barrier technique:  All elements of maximal sterile technique followed      Skin preparation:  2% chlorhexidine    Skin preparation agent: Skin preparation agent completely dried prior to procedure    Indications:     Central line indications: medications requiring central line    Sedation:     Sedation type: Moderate (conscious) sedation (1g Versed and 50mcg Fentanyl )  Anesthesia (see MAR for exact dosages): Anesthesia method:  Local infiltration    Local anesthetic:  Lidocaine 1% w/o epi  Procedure details:     Location:  Right internal jugular    Vessel type: vein      Laterality:  Right    Approach: percutaneous technique used      Patient position:  Flat    Catheter type:  Triple lumen 16cm    Catheter size:  7 Fr    Landmarks identified: yes      Ultrasound guidance: yes      Sterile ultrasound techniques: Sterile gel and sterile probe covers were used      Number of attempts:  1    Successful placement: yes      Vessel of catheter tip end:  Superior vena cava  Post-procedure details:     Post-procedure:  Dressing applied and line sutured    Assessment:  Blood return through all ports, no pneumothorax on x-ray, placement verified by x-ray and free fluid flow    Post-procedure complications: none      Patient tolerance of procedure:   Tolerated well, no immediate complications    Observer: Yes (medical student and nurse)                       Received for:Immanuel Tavares DO  Nov  3 2017  3:46PM [facility] Standard Time

## 2018-01-16 NOTE — PROCEDURES
Procedures by Flavia Schumacher RN at 11/11/2017 11:24 AM      Author:  Flavia Schumacher RN Service:  (none) Author Type:  Registered Nurse    Filed:  11/11/2017 11:26 AM Date of Service:  11/11/2017 11:24 AM Status:  Signed    :  Flavia Schumacher RN (Registered Nurse)        Procedure Orders:       1  Insert PICC line [id number] ordered by Mary Mills MD at 11/11/17 1027                  Insert PICC  line  Date/Time: 11/11/2017 11:25 AM  Performed by: Brannon Reeves by: Geovanni Royal     Patient location:  Bedside  Other Assisting Provider:  Yes (comment) (Jessica Terry inf tech)    Consent:     Consent obtained:  Written    Consent given by:  Patient (consent obtained by physician)  Universal protocol:     Procedure explained and questions answered to patient or proxy's satisfaction: yes      Relevant documents present and verified: yes      Test results available and properly labeled: yes       Imaging studies available: yes      Required blood products, implants, devices, and special equipment available: yes      Site/side marked: yes      Immediately prior to procedure, a time out was called: yes      Patient identity confirmed:  Verbally with patient  Pre-procedure details:     Hand hygiene: Hand hygiene performed prior to insertion      Sterile barrier technique: All elements of maximal sterile technique followed      Skin preparation:  ChloraPrep  Indications:     PICC line indications: no peripheral vascular access    Anesthesia (see MAR for exact dosages):      Anesthesia method:  Local infiltration    Local anesthetic:  Lidocaine 1% w/o epi (4 ml)  Procedure details:     Vessel type: vein      Laterality:  Left    Approach: percutaneous technique used      Patient position:  Flat    Procedural supplies:  Triple lumen    Catheter size:  5 Fr    Landmarks identified: yes      Ultrasound guidance: yes      Sterile ultrasound techniques: Sterile gel and sterile probe covers were used      Number of attempts:  1    Successful placement: yes      Vessel of catheter tip end:  Chest Xray needed to confirm placement    Total catheter length (cm):  44    Catheter out on skin (cm):  0    Max flow rate:  999    Arm circumference:  36  Post-procedure details:     Post-procedure:  Dressing applied and securement device placed    Assessment:  Blood return through all ports and free fluid flow    Post-procedure complications: none      Patient tolerance of procedure:   Tolerated well, no immediate complications                     Received for:Provider  EPIC   Nov 11 2017 11:27AM [facility] Standard Time

## 2018-01-17 NOTE — PROCEDURES
Procedures by Taryn Archibald DO at 11/10/2017   3:00 PM      Author:  Taryn Archibald DO Service:  (none) Author Type:  Physician    Filed:  11/10/2017  3:02 PM Date of Service:  11/10/2017  3:00 PM Status:  Signed    :  Taryn Archibald DO (Physician)        Procedure Orders:       1  Thoracentesis [[id number]] ordered by Taryn Archibald DO at 11/10/17 1500                 Post-procedure Diagnoses:       1  Acute on chronic respiratory failure with hypoxia and hypercapnia (HCC) [Q68 73, J96 22]                 Thoracentesis  Date/Time: 11/10/2017 3:00 PM  Performed by: Jaqui Aguirre by: Angel Jesus     Patient location:  Bedside  Other Assisting Provider:  No    Consent:     Consent obtained:  Written    Consent given by:  Patient    Risks discussed:  Bleeding and pneumothorax    Alternatives discussed:  No treatment  Universal protocol:     Procedure explained and questions answered to patient or proxy's satisfaction: yes      Relevant documents present and verified: yes      Test results available and properly labeled: yes       Imaging studies available: yes      Required blood products, implants, devices and special equipment available: yes      Site/side marked: yes      Immediately prior to procedure a time out was called: yes      Patient identity confirmed:  Verbally with patient and arm band  Indications:     Procedure Purpose: diagnostic and therapeutic      Indications: pleural effusion    Anesthesia (see MAR for exact dosages): Anesthesia method:  Local infiltration    Local anesthetic:  Lidocaine 1% w/o epi  Procedure details:     Preparation: Patient was prepped and draped in usual sterile fashion      Standard thoracentesis cath kit used: Yes      Patient position:  Sitting    Laterality:  Right    Location:  Midscapular line    Intercostal space:  8th    Ultrasound guidance: yes      Reason for ultrasound: Identify fluid collection and guide cathetar placement        Indwelling catheter placed: yes      Number of attempts:  1    Drainage color:  Jana    Drainage characteristics:  Clear  Post-procedure details:     Chest x-ray performed: yes    Comments:      1000ml serous fluid removed without difficulty                     Received for:Abby Abdullahi DO  Nov 10 2017  3:02PM [facility] Standard Time

## 2018-02-13 NOTE — PROCEDURES
Procedures by Eugenia Veras PA-C  at 11/3/2017  8:28 AM      Author:  Eugenia Veras PA-C Service:  Critical Care/ICU Author Type:  Physician Assistant    Filed:  11/3/2017  8:31 AM Date of Service:  11/3/2017  8:28 AM Status:  Attested    :  Eugenia Veras PA-C (Physician Assistant)  Cosigner:  Tricia Doherty DO at 11/3/2017  3:34 PM      Procedure Orders:       1  INTUBATION [[id number]] ordered by Eugenia Veras PA-C at 11/03/17 1061                 Post-procedure Diagnoses:       1  Respiratory failure ([facility] [address] ) [J96 90]              Attestation signed by Tricia Doherty DO at 11/3/2017  3:34 PM       I was at the bedside through the entire intubation procedure                   Intubation  Date/Time: 11/3/2017 8:28 AM  Performed by: Carmina Howell  Authorized by: Carmina Howell     Patient location:  Bedside  Consent:     Consent obtained:  Emergent situation  Pre-procedure details:     Mallampati score:  4    Pretreatment medications:  Etomidate    Paralytics:  None  Indications:     Indications for intubation: respiratory distress    Procedure details:     Preoxygenation:  BiPAP    Intubation method:  Oral    Laryngoscope blade: Mac 4    Tube size (mm):  8 0    Tube type:  Cuffed    Number of attempts:  1    Ventilation between attempts: no      Cricoid pressure: no      Tube visualized through cords: yes    Placement assessment:     ETT to lip:  21cm    Tube secured with:  ETT renee    Breath sounds:  Equal    Placement verification: chest rise, condensation, direct visualization, equal breath sounds and tube exhalation    Post-procedure details:     Patient tolerance of procedure:   Tolerated well, no immediate complications                     Received for:Provider  [facility]   Nov  3 2017  3:35PM [facility] Standard Time

## 2018-02-13 NOTE — PROCEDURES
Procedures by ANUP Diaz at 2017  12:48 PM      Author:  ANUP Diaz Service:  (none) Author Type:  Speech and Language Pathologist    Filed:  2017  1:24 PM Date of Service:  2017 12:48 PM Status:  Addendum    :  ANUP Diaz (Speech and Language Pathologist)      Related Notes:  Original Note by ANUP Diaz (Speech and Language Pathologist) filed at 2017  1:10 PM                                            Video Swallow Study      Patient Name: Zoila Escalera  Today's Date: 2017  par  par  Past Medical History  Past Medical History:     Diagnosis  Date    Anxiety     Diabetes mellitus ([address] )     Hypertension     Pancreatitis      par  Past Surgical History  Past Surgical History:      Procedure  Laterality Date     SECTION      3      PEG (HISTORICAL)      TRACHEOSTOMY           General Information:   Pt is being followed by ST for dysphagia, after being admitted for cardiac arrest and respiratory failure  Pt has been tolerating a regular diet and NTL, but continues to have clinical s/s of aspiration with thin liquids  Pt has a hx of vf  paralysis, but she is not sure if R or L  Voice is slightly quiet  Pt was on NTL previously, but reports that after getting treatment she has been on thin liquids for several months  Consistencies assessed: Thin liquids, nectar thick liquids, puree, soft solids, regular solids      Oral Stage:   Adequate bolus retrieval from spoon, cup, straw, adequate lip seal  Mastication and bolus formation was mildly prolonged, transfer was adequate, mild-mod base of tongue residue after regular solid cleared with liquid wash  Premature spill over  the base of tongue was noted, mostly with thin and NT liquids  Pharyngeal Stage:   Premature spill into pyriform sinuses noted with thin and NT liquids   Swallows were timely to minimally delayed; there was  reduced hyolaryngeal elevation and anterior motion, and minimal to no pharyngeal residue  During episodes of premature  spill, thin liquids penetrated into the laryngeal vestibule, followed by penetration and possible trace aspiration to the vocal folds during the swallow  Pt responded by coughing  There was occasional slight flash penetration of NTL into the laryngeal  vestibule; this did not reach the vocal folds  Chin tuck, head turn R and head turn L were attempted, but did not prevent deep laryngeal penetration/aspiration of thin liquids  No other penetration/aspiration with any other items trialed  Esophageal Stage:   No reports of esophageal difficulty; esophageal screen was not completed  Assessment Summary:   Pt presents with mild oropharyngeal dysphagia, characterized by weak tongue/tongue base, reduced hyolaryngeal elevation/anterior motion, and reduced airway protection  Penetration with possible trace aspiration of thin liquids were consistent  Pt did have a consistent responsive cough to the penetration/aspiration  Intermittent slight flash penetration of thin liquids was noted, but no aspiration  No aspiration with other consistencies trialed  Diagnosis/Prognosis:   Prognosis to improve mild oropharyngeal is good  Recommendations:   Regular diet, thin liquids (cup or straw)  Sit up at 90 degrees, small bites/sips, alternate solids/liquids, sit upright 30 minutes after meals  Intermittent supervision  Crush meds (pt's request, but she should be able to handle whole in applesauce or with NTL)  Speech therapy recommended after discharge, and repeat VBS in 4-6 weeks                                Received for:Provider  EPIC   Nov 22 2017  1:25PM [facility] Standard Time

## 2018-03-07 NOTE — PROGRESS NOTES
"  Discussion/Summary  Normal device function      Results/Data  Cardiac Device In Clinic [id number] 08:39PM [employer]     Test Name Result Flag Reference   MISCELLANEOUS COMMENT (Report)     WOUND CHECK: INCISION CLEAN AND DRY WITH EDGES APPROXIMATED; WOUND CARE AND RESTRICTIONS REVIEWED WITH PATIENT  DEVICE INTERROGATED IN THE [place] OFFICE: BATTERY VOLTAGE ADEQUATE (3 2 YRS)  AP 79% BVP 99 8% VSRP 2 2%  ALL LEAD PARAMETERS & TRENDS WITHIN NORMAL LIMITS  NO SIGNIFICANT HIGH RATE EPISODES  OPTI-VOL FLUID THRESHOLD INITIALIZING  NO PROGRAMMING CHANGES MADE TO DEVICE PARAMETERS  NORMAL DEVICE FUNCTION  NC   Cardiac Electrophysiology Report      [id number]   DEVICE TYPE CRT-D       Cardiac Electrophysiology Report [id number] 08:39PM [facility]     Test Name Result Flag Reference   Cardiac Electrophysiology Report      [id number]  pdf     Signatures   Electronically signed by : Radha Matthews, ; Jan 16 2018  7:59AM EST                       (Author)    Electronically signed by : Johnnie Coleman DO; Jan 21 2018 10:11AM EST                       (Author)    "

## 2018-07-16 ENCOUNTER — IN-CLINIC DEVICE VISIT (OUTPATIENT)
Dept: CARDIOLOGY CLINIC | Facility: CLINIC | Age: 68
End: 2018-07-16
Payer: COMMERCIAL

## 2018-07-16 DIAGNOSIS — I25.5 ISCHEMIC CARDIOMYOPATHY: Primary | ICD-10-CM

## 2018-07-16 DIAGNOSIS — Z95.810 PRESENCE OF AUTOMATIC CARDIOVERTER/DEFIBRILLATOR (AICD): ICD-10-CM

## 2018-07-16 PROCEDURE — 93281 PM DEVICE PROGR EVAL MULTI: CPT | Performed by: INTERNAL MEDICINE

## 2018-08-02 PROBLEM — F17.200 NICOTINE DEPENDENCE, UNSPECIFIED, UNCOMPLICATED: Status: ACTIVE | Noted: 2018-08-02

## 2018-08-02 PROBLEM — K21.9 GERD (GASTROESOPHAGEAL REFLUX DISEASE): Status: ACTIVE | Noted: 2018-08-02

## 2018-08-02 PROBLEM — E78.5 DYSLIPIDEMIA: Status: ACTIVE | Noted: 2018-08-02

## 2018-08-02 PROBLEM — I50.9 CHF (CONGESTIVE HEART FAILURE) (HCC): Status: ACTIVE | Noted: 2018-08-02

## 2018-08-02 PROBLEM — I21.9 MYOCARDIAL INFARCTION (HCC): Status: ACTIVE | Noted: 2018-08-02

## 2018-08-02 PROBLEM — E11.9 DIABETES (HCC): Status: ACTIVE | Noted: 2018-08-02

## 2018-08-02 RX ORDER — LORATADINE 10 MG/1
10 TABLET ORAL DAILY
COMMUNITY

## 2018-08-02 RX ORDER — ZOLPIDEM TARTRATE 5 MG/1
10 TABLET ORAL
COMMUNITY

## 2018-08-02 RX ORDER — DIVALPROEX SODIUM 250 MG/1
250 TABLET, DELAYED RELEASE ORAL DAILY
COMMUNITY

## 2018-08-02 RX ORDER — ALPRAZOLAM 0.25 MG/1
TABLET ORAL 3 TIMES DAILY
COMMUNITY

## 2018-08-02 RX ORDER — OXYCODONE HYDROCHLORIDE 5 MG/1
5 CAPSULE ORAL EVERY 6 HOURS PRN
COMMUNITY

## 2018-09-18 ENCOUNTER — OFFICE VISIT (OUTPATIENT)
Dept: CARDIOLOGY CLINIC | Facility: CLINIC | Age: 68
End: 2018-09-18
Payer: COMMERCIAL

## 2018-09-18 VITALS
BODY MASS INDEX: 28 KG/M2 | HEART RATE: 70 BPM | SYSTOLIC BLOOD PRESSURE: 100 MMHG | WEIGHT: 200 LBS | DIASTOLIC BLOOD PRESSURE: 58 MMHG | HEIGHT: 71 IN

## 2018-09-18 DIAGNOSIS — I47.2 TORSADES DE POINTES (HCC): ICD-10-CM

## 2018-09-18 DIAGNOSIS — I48.0 PAROXYSMAL ATRIAL FIBRILLATION (HCC): ICD-10-CM

## 2018-09-18 DIAGNOSIS — I10 ESSENTIAL HYPERTENSION: ICD-10-CM

## 2018-09-18 DIAGNOSIS — E87.6 HYPOKALEMIA: ICD-10-CM

## 2018-09-18 DIAGNOSIS — I25.10 CORONARY ARTERY DISEASE INVOLVING NATIVE CORONARY ARTERY OF NATIVE HEART WITHOUT ANGINA PECTORIS: Primary | ICD-10-CM

## 2018-09-18 DIAGNOSIS — I25.5 ISCHEMIC CARDIOMYOPATHY: ICD-10-CM

## 2018-09-18 PROCEDURE — 93000 ELECTROCARDIOGRAM COMPLETE: CPT | Performed by: INTERNAL MEDICINE

## 2018-09-18 PROCEDURE — 99214 OFFICE O/P EST MOD 30 MIN: CPT | Performed by: INTERNAL MEDICINE

## 2018-09-18 RX ORDER — AMIODARONE HYDROCHLORIDE 200 MG/1
200 TABLET ORAL DAILY
Qty: 90 TABLET | Refills: 5
Start: 2018-09-18 | End: 2018-09-25 | Stop reason: SDUPTHER

## 2018-09-18 RX ORDER — OXYCODONE HYDROCHLORIDE AND ACETAMINOPHEN 5; 325 MG/1; MG/1
1 TABLET ORAL EVERY 4 HOURS PRN
COMMUNITY

## 2018-09-18 RX ORDER — POTASSIUM CHLORIDE 20 MEQ/1
20 TABLET, EXTENDED RELEASE ORAL DAILY
Start: 2018-09-18 | End: 2019-05-30

## 2018-09-18 NOTE — ASSESSMENT & PLAN NOTE
No symptoms  Although she is less than 12 months post stenting of the LAD, she is now on 3 anti coagulants and has lots of bruising  It is getting close to 12 months anyway  I am going to stop the clopidogrel and aspirin but continue Eliquis

## 2018-09-18 NOTE — ASSESSMENT & PLAN NOTE
No recurrence  At some point it would seem worth considering stopping magnesium and amiodarone    Will discuss with EP after the next visit

## 2018-09-18 NOTE — ASSESSMENT & PLAN NOTE
Two well controlled  Will hold diuretic and potassium for weight less than 200  She has lost substantive weight since her coronary event last year

## 2018-09-18 NOTE — PATIENT INSTRUCTIONS
Stop clopidogrel  Stop aspirin  Only take furosemide and potassium for morning weight more than 200

## 2018-09-18 NOTE — PROGRESS NOTES
Patient ID: Christine Coronel is a 76 y o  female  Plan:      CAD (coronary artery disease)  No symptoms  Although she is less than 12 months post stenting of the LAD, she is now on 3 anti coagulants and has lots of bruising  It is getting close to 12 months anyway  I am going to stop the clopidogrel and aspirin but continue Eliquis  HTN (hypertension)  Two well controlled  Will hold diuretic and potassium for weight less than 200  She has lost substantive weight since her coronary event last year  Ischemic cardiomyopathy  ICD in place  Echo to be recheck prior to return in 6 months  Paroxysmal atrial fibrillation (HCC)  On Eliquis and low-dose amiodarone  Torsades de pointes  No recurrence  At some point it would seem worth considering stopping magnesium and amiodarone  Will discuss with EP after the next visit      Follow up Plan:  Six-month return visit and echocardiogram       HPI:  The patient is seen in follow-up today regarding ischemic cardiomyopathy, prior paroxysmal atrial fibrillation, prior ventricular tachycardia  Since the last visit she has eaten much more healthfully  Smoking is very limited  Hoarse voice continues  There has been no chest pain or chest pressure  She has not required nitroglycerin  There has been no syncope or near syncope  No shocks  Results for orders placed or performed in visit on 18   POCT ECG    Impression    Low atrial focus with intact AV conduction  LBBB  Past Surgical History:   Procedure Laterality Date    CARDIAC CATHETERIZATION  2017    EF 0 30 (30%) severe 3-vesssel CAD (LAD, RCA and CX)   Medical therapy    CARDIAC DEFIBRILLATOR PLACEMENT      dual-chamber Medtronic ICD     SECTION      3    PEG (HISTORICAL)      TRACHEOSTOMY       CMP:   Lab Results   Component Value Date     (L) 2017    K 3 7 2017    CL 93 (L) 2017    CO2 31 2017    BUN 7 2017    CREATININE 0 76 11/22/2017    GLUCOSE 382 (H) 11/02/2017    EGFR 81 11/22/2017    EGFR 90 11/02/2017       Lipid Profile: No results found for: CHOL, TRIG, HDL, LDL      Review of Systems   10  point ROS  was otherwise non pertinent or negative except as per HPI or as below  Gait:  She uses a cane  She has a hoarse voice  Walking is limited  There has been substantive weight loss  Occasional mild lightheadedness  Objective:     /58   Pulse 70   Ht 5' 11" (1 803 m)   Wt 90 7 kg (200 lb)   BMI 27 89 kg/m²     PHYSICAL EXAM:    General:  Normal appearance in no distress  Eyes:  Anicteric  Oral mucosa:  Moist   Neck:  No JVD  Carotid upstrokes are brisk without bruits  No masses  Chest:   Basal crackles  Cardiac:  Normal PMI  Normal S1 and S2  No murmur gallop or rub  ICD in place left subclavian region  Abdomen:  Soft and nontender  No palpable organomegaly or aortic enlargement  Extremities:  No peripheral edema  Musculoskeletal:  Symmetric  Vascular:  Femoral pulses are brisk without bruits  Popliteal pulses are intact bilaterally  Pedal pulses are diminished  Neuro:  Grossly symmetric  Psych:  Alert and oriented x3          Current Outpatient Prescriptions:     albuterol (PROVENTIL HFA,VENTOLIN HFA) 90 mcg/act inhaler, Inhale 2 puffs every 4 (four) hours as needed for wheezing or shortness of breath, Disp: , Rfl:     ALPRAZolam (XANAX) 0 25 mg tablet, Take by mouth 3 (three) times a day, Disp: , Rfl:     apixaban (ELIQUIS) 5 mg, Take 1 tablet by mouth 2 (two) times a day, Disp: , Rfl: 0    atorvastatin (LIPITOR) 40 mg tablet, Take 40 mg by mouth daily, Disp: , Rfl:     cholecalciferol (VITAMIN D3) 1,000 units tablet, Take 1,000 Units by mouth daily, Disp: , Rfl:     Cyanocobalamin 1000 MCG/ML KIT, Inject 1 mL as directed every 30 (thirty) days, Disp: , Rfl:     escitalopram (LEXAPRO) 10 mg tablet, Take 10 mg by mouth daily, Disp: , Rfl:     furosemide (LASIX) 40 mg tablet, Take 40 mg by mouth daily as needed (SOB) Only take for weight more than 200  , Disp: , Rfl:     gabapentin (NEURONTIN) 600 MG tablet, Take 0 5 tablets by mouth daily at bedtime, Disp: , Rfl: 0    Insulin Glargine (TOUJEO) 300 units/mL CONCETRATED U-300 injection pen, Inject under the skin daily, Disp: , Rfl:     insulin lispro (HumaLOG) 100 units/mL injection, Inject 10 Units under the skin 3 (three) times a day with meals, Disp: , Rfl: 0    levothyroxine 125 mcg tablet, Take 125 mcg by mouth daily, Disp: , Rfl:     lisinopril (ZESTRIL) 5 mg tablet, Take 5 mg by mouth daily, Disp: , Rfl:     loratadine (CLARITIN) 10 mg tablet, Take 10 mg by mouth daily, Disp: , Rfl:     magnesium oxide (MAG-OX) 400 mg, Take 400 mg by mouth daily, Disp: , Rfl:     metFORMIN (GLUMETZA) 1000 MG (MOD) 24 hr tablet, Take 1,000 mg by mouth 2 (two) times a day with meals, Disp: , Rfl:     metoprolol tartrate (LOPRESSOR) 25 mg tablet, Take 25 mg by mouth every 12 (twelve) hours, Disp: , Rfl:     nitroglycerin (NITROSTAT) 0 4 mg SL tablet, Place 1 tablet under the tongue every 5 (five) minutes as needed for chest pain, Disp: 90 tablet, Rfl: 0    oxyCODONE-acetaminophen (PERCOCET) 5-325 mg per tablet, Take 1 tablet by mouth every 4 (four) hours as needed for moderate pain, Disp: , Rfl:     potassium chloride (K-DUR,KLOR-CON) 20 mEq tablet, Take 1 tablet (20 mEq total) by mouth daily Only take for weight more than 200 (when taking water pill)  , Disp: , Rfl:     zolpidem (AMBIEN) 5 mg tablet, Take 10 mg by mouth daily at bedtime, Disp: , Rfl:     amiodarone 200 mg tablet, Take 1 tablet (200 mg total) by mouth daily, Disp: 90 tablet, Rfl: 5    cetirizine (ZyrTEC) 10 mg tablet, Take 10 mg by mouth daily, Disp: , Rfl:     divalproex sodium (DEPAKOTE) 250 mg EC tablet, Take 250 mg by mouth daily, Disp: , Rfl:     insulin detemir (LEVEMIR FLEXTOUCH) 100 Units/mL injection pen, Inject under the skin Inject per prescriber's instructions  Insulin dosing requires individualization  , Disp: , Rfl:     insulin glargine (LANTUS) 100 units/mL subcutaneous injection, Inject 42 Units under the skin daily at bedtime, Disp: , Rfl:     oxyCODONE (OXY-IR) 5 MG capsule, Take 5 mg by mouth every 6 (six) hours as needed for moderate pain, Disp: , Rfl:     polyethylene glycol (MIRALAX) 17 g packet, Take 17 g by mouth daily (Patient not taking: Reported on 9/18/2018 ), Disp: 14 each, Rfl: 0  Allergies   Allergen Reactions    Prochlorperazine      Prochlorperazine Edisylate, and Maleate     Past Medical History:   Diagnosis Date    Anxiety     Athscl heart disease of native coronary artery w/o ang pctrs     Medical therapy on cath 6/11/2017 3-vessel CAD (LAD, RCA & CX)    [facility]estive [facility] failure) [address] [address]     Diabetes mellitus [address] [address] )     Type 2 without complications    Hx of echocardiogram 09/18/2017    EF0 35 (35%) mild aortic regurgitation  Mild-moderate mitral regurgitation  / 2-D w/ CFD EF0 50 (50%) mild LVH  Mild mitral regurgitation   5/14/18     Hypertension     benign essential primary    Ischemic cardiomyopathy     s/p Medtronic dual-chamber ICD    Mixed hyperlipidemia     Nicotine dependence, unspecified, uncomplicated     Other obesity     Pancreatitis            History   Smoking Status    Current Every Day Smoker    Packs/day: 1 00    Years: 40 00    Types: Cigarettes   Smokeless Tobacco    Never Used

## 2018-09-25 DIAGNOSIS — I48.0 PAROXYSMAL ATRIAL FIBRILLATION (HCC): ICD-10-CM

## 2018-09-25 DIAGNOSIS — I25.10 CORONARY ARTERY DISEASE INVOLVING NATIVE CORONARY ARTERY OF NATIVE HEART WITHOUT ANGINA PECTORIS: ICD-10-CM

## 2018-09-25 RX ORDER — AMIODARONE HYDROCHLORIDE 200 MG/1
200 TABLET ORAL DAILY
Qty: 90 TABLET | Refills: 3 | Status: SHIPPED | OUTPATIENT
Start: 2018-09-25 | End: 2019-08-25 | Stop reason: SDUPTHER

## 2018-10-16 ENCOUNTER — REMOTE DEVICE CLINIC VISIT (OUTPATIENT)
Dept: CARDIOLOGY CLINIC | Facility: CLINIC | Age: 68
End: 2018-10-16
Payer: COMMERCIAL

## 2018-10-16 DIAGNOSIS — I25.5 ISCHEMIC CARDIOMYOPATHY: Primary | ICD-10-CM

## 2018-10-16 DIAGNOSIS — Z45.02 ENCOUNTER FOR IMPLANTABLE DEFIBRILLATOR REPROGRAMMING OR CHECK: ICD-10-CM

## 2018-10-16 PROCEDURE — 93295 DEV INTERROG REMOTE 1/2/MLT: CPT | Performed by: INTERNAL MEDICINE

## 2018-10-16 PROCEDURE — 93296 REM INTERROG EVL PM/IDS: CPT | Performed by: INTERNAL MEDICINE

## 2018-10-16 NOTE — PROGRESS NOTES
[facility] remote check ICD A-fib on Eliquis  Current Outpatient Prescriptions:     albuterol (PROVENTIL HFA,VENTOLIN HFA) 90 mcg/act inhaler, Inhale 2 puffs every 4 (four) hours as needed for wheezing or shortness of breath, Disp: , Rfl:     ALPRAZolam (XANAX) 0 25 mg tablet, Take by mouth 3 (three) times a day, Disp: , Rfl:     amiodarone 200 mg tablet, Take 1 tablet (200 mg total) by mouth daily, Disp: 90 tablet, Rfl: 3    apixaban (ELIQUIS) 5 mg, Take 1 tablet by mouth 2 (two) times a day, Disp: , Rfl: 0    atorvastatin (LIPITOR) 40 mg tablet, Take 40 mg by mouth daily, Disp: , Rfl:     cetirizine (ZyrTEC) 10 mg tablet, Take 10 mg by mouth daily, Disp: , Rfl:     cholecalciferol (VITAMIN D3) 1,000 units tablet, Take 1,000 Units by mouth daily, Disp: , Rfl:     Cyanocobalamin 1000 MCG/ML KIT, Inject 1 mL as directed every 30 (thirty) days, Disp: , Rfl:     divalproex sodium (DEPAKOTE) 250 mg EC tablet, Take 250 mg by mouth daily, Disp: , Rfl:     escitalopram (LEXAPRO) 10 mg tablet, Take 10 mg by mouth daily, Disp: , Rfl:     furosemide (LASIX) 40 mg tablet, Take 40 mg by mouth daily as needed (SOB) Only take for weight more than 200  , Disp: , Rfl:     gabapentin (NEURONTIN) 600 MG tablet, Take 0 5 tablets by mouth daily at bedtime, Disp: , Rfl: 0    insulin detemir (LEVEMIR FLEXTOUCH) 100 Units/mL injection pen, Inject under the skin Inject per prescriber's instructions  Insulin dosing requires individualization  , Disp: , Rfl:     insulin glargine (LANTUS) 100 units/mL subcutaneous injection, Inject 42 Units under the skin daily at bedtime, Disp: , Rfl:     Insulin Glargine (TOUJEO) 300 units/mL CONCETRATED U-300 injection pen, Inject under the skin daily, Disp: , Rfl:     insulin lispro (HumaLOG) 100 units/mL injection, Inject 10 Units under the skin 3 (three) times a day with meals, Disp: , Rfl: 0    levothyroxine 125 mcg tablet, Take 125 mcg by mouth daily, Disp: , Rfl:     lisinopril (ZESTRIL) 5 mg tablet, Take 5 mg by mouth daily, Disp: , Rfl:     loratadine (CLARITIN) 10 mg tablet, Take 10 mg by mouth daily, Disp: , Rfl:     magnesium oxide (MAG-OX) 400 mg, Take 400 mg by mouth daily, Disp: , Rfl:     metFORMIN (GLUMETZA) 1000 MG (MOD) 24 hr tablet, Take 1,000 mg by mouth 2 (two) times a day with meals, Disp: , Rfl:     metoprolol tartrate (LOPRESSOR) 25 mg tablet, Take 25 mg by mouth every 12 (twelve) hours, Disp: , Rfl:     nitroglycerin (NITROSTAT) 0 4 mg SL tablet, Place 1 tablet under the tongue every 5 (five) minutes as needed for chest pain, Disp: 90 tablet, Rfl: 0    oxyCODONE (OXY-IR) 5 MG capsule, Take 5 mg by mouth every 6 (six) hours as needed for moderate pain, Disp: , Rfl:     oxyCODONE-acetaminophen (PERCOCET) 5-325 mg per tablet, Take 1 tablet by mouth every 4 (four) hours as needed for moderate pain, Disp: , Rfl:     polyethylene glycol (MIRALAX) 17 g packet, Take 17 g by mouth daily (Patient not taking: Reported on 9/18/2018 ), Disp: 14 each, Rfl: 0    potassium chloride (K-DUR,KLOR-CON) 20 mEq tablet, Take 1 tablet (20 mEq total) by mouth daily Only take for weight more than 200 (when taking water pill)  , Disp: , Rfl:     zolpidem (AMBIEN) 5 mg tablet, Take 10 mg by mouth daily at bedtime, Disp: , Rfl:

## 2019-02-04 ENCOUNTER — HOSPITAL ENCOUNTER (OUTPATIENT)
Dept: NON INVASIVE DIAGNOSTICS | Facility: CLINIC | Age: 69
Discharge: HOME | End: 2019-02-04
Payer: COMMERCIAL

## 2019-02-04 DIAGNOSIS — I25.5 ISCHEMIC CARDIOMYOPATHY: ICD-10-CM

## 2019-02-04 DIAGNOSIS — I25.10 CORONARY ARTERY DISEASE INVOLVING NATIVE CORONARY ARTERY OF NATIVE HEART WITHOUT ANGINA PECTORIS: ICD-10-CM

## 2019-02-04 PROCEDURE — 93306 TTE W/DOPPLER COMPLETE: CPT | Performed by: INTERNAL MEDICINE

## 2019-02-04 PROCEDURE — 93306 TTE W/DOPPLER COMPLETE: CPT

## 2019-02-07 ENCOUNTER — APPOINTMENT (OUTPATIENT)
Dept: LAB | Facility: HOSPITAL | Age: 69
End: 2019-02-07
Payer: COMMERCIAL

## 2019-02-07 ENCOUNTER — TRANSCRIBE ORDERS (OUTPATIENT)
Dept: ADMINISTRATIVE | Facility: HOSPITAL | Age: 69
End: 2019-02-07

## 2019-02-07 DIAGNOSIS — R60.9 DEPENDENT EDEMA: ICD-10-CM

## 2019-02-07 DIAGNOSIS — G47.00 INSOMNIA, UNSPECIFIED TYPE: ICD-10-CM

## 2019-02-07 DIAGNOSIS — E03.9 HYPOTHYROIDISM, UNSPECIFIED TYPE: ICD-10-CM

## 2019-02-07 DIAGNOSIS — F11.20 OPIOID TYPE DEPENDENCE, CONTINUOUS (HCC): ICD-10-CM

## 2019-02-07 DIAGNOSIS — I10 ESSENTIAL HYPERTENSION, MALIGNANT: ICD-10-CM

## 2019-02-07 DIAGNOSIS — G89.29 OTHER CHRONIC PAIN: ICD-10-CM

## 2019-02-07 DIAGNOSIS — Z79.899 DRUG THERAPY: ICD-10-CM

## 2019-02-07 DIAGNOSIS — E78.5 HYPERLIPIDEMIA, UNSPECIFIED HYPERLIPIDEMIA TYPE: ICD-10-CM

## 2019-02-07 DIAGNOSIS — F41.1 GENERALIZED ANXIETY DISORDER: ICD-10-CM

## 2019-02-07 DIAGNOSIS — M54.9 BACK PAIN, UNSPECIFIED BACK LOCATION, UNSPECIFIED BACK PAIN LATERALITY, UNSPECIFIED CHRONICITY: ICD-10-CM

## 2019-02-07 DIAGNOSIS — E13.21 DIABETIC NEPHROPATHY ASSOCIATED WITH OTHER SPECIFIED DIABETES MELLITUS (HCC): ICD-10-CM

## 2019-02-07 DIAGNOSIS — M54.9 BACK PAIN, UNSPECIFIED BACK LOCATION, UNSPECIFIED BACK PAIN LATERALITY, UNSPECIFIED CHRONICITY: Primary | ICD-10-CM

## 2019-02-07 LAB
ALBUMIN SERPL BCP-MCNC: 4.1 G/DL (ref 3.5–5.7)
ALP SERPL-CCNC: 33 U/L (ref 55–165)
ALT SERPL W P-5'-P-CCNC: 15 U/L (ref 7–52)
ANION GAP SERPL CALCULATED.3IONS-SCNC: 8 MMOL/L (ref 4–13)
AST SERPL W P-5'-P-CCNC: 13 U/L (ref 13–39)
BILIRUB SERPL-MCNC: 0.3 MG/DL (ref 0.2–1)
BUN SERPL-MCNC: 17 MG/DL (ref 7–25)
CALCIUM SERPL-MCNC: 9.4 MG/DL (ref 8.6–10.5)
CHLORIDE SERPL-SCNC: 98 MMOL/L (ref 98–107)
CO2 SERPL-SCNC: 31 MMOL/L (ref 21–31)
CREAT SERPL-MCNC: 0.83 MG/DL (ref 0.6–1.2)
EST. AVERAGE GLUCOSE BLD GHB EST-MCNC: 255 MG/DL
GFR SERPL CREATININE-BSD FRML MDRD: 73 ML/MIN/1.73SQ M
GLUCOSE P FAST SERPL-MCNC: 354 MG/DL (ref 65–99)
HBA1C MFR BLD: 10.5 % (ref 4.2–6.3)
POTASSIUM SERPL-SCNC: 3.6 MMOL/L (ref 3.5–5.5)
PROT SERPL-MCNC: 6.8 G/DL (ref 6.4–8.9)
SODIUM SERPL-SCNC: 137 MMOL/L (ref 134–143)

## 2019-02-07 PROCEDURE — 36415 COLL VENOUS BLD VENIPUNCTURE: CPT

## 2019-02-07 PROCEDURE — 80053 COMPREHEN METABOLIC PANEL: CPT

## 2019-02-07 PROCEDURE — 83036 HEMOGLOBIN GLYCOSYLATED A1C: CPT

## 2019-02-14 ENCOUNTER — REMOTE DEVICE CLINIC VISIT (OUTPATIENT)
Dept: CARDIOLOGY CLINIC | Facility: CLINIC | Age: 69
End: 2019-02-14
Payer: COMMERCIAL

## 2019-02-14 DIAGNOSIS — Z45.02 ENCOUNTER FOR CHECKING OF AUTOMATIC IMPLANTABLE CARDIOVERTER-DEFIBRILLATOR (AICD): ICD-10-CM

## 2019-02-14 DIAGNOSIS — I25.5 ISCHEMIC CARDIOMYOPATHY: Primary | ICD-10-CM

## 2019-02-14 PROCEDURE — 93295 DEV INTERROG REMOTE 1/2/MLT: CPT | Performed by: INTERNAL MEDICINE

## 2019-02-14 PROCEDURE — 93296 REM INTERROG EVL PM/IDS: CPT | Performed by: INTERNAL MEDICINE

## 2019-02-14 NOTE — PROGRESS NOTES
[employer] remote check BiV ICD there were some alerts, I checked with Tyshawn Brown @ [employer]  States device is working properly  Normal battery function  Current Outpatient Medications:     albuterol (PROVENTIL HFA,VENTOLIN HFA) 90 mcg/act inhaler, Inhale 2 puffs every 4 (four) hours as needed for wheezing or shortness of breath, Disp: , Rfl:     ALPRAZolam (XANAX) 0 25 mg tablet, Take by mouth 3 (three) times a day, Disp: , Rfl:     amiodarone 200 mg tablet, Take 1 tablet (200 mg total) by mouth daily, Disp: 90 tablet, Rfl: 3    apixaban (ELIQUIS) 5 mg, Take 1 tablet by mouth 2 (two) times a day, Disp: , Rfl: 0    atorvastatin (LIPITOR) 40 mg tablet, Take 40 mg by mouth daily, Disp: , Rfl:     cetirizine (ZyrTEC) 10 mg tablet, Take 10 mg by mouth daily, Disp: , Rfl:     cholecalciferol (VITAMIN D3) 1,000 units tablet, Take 1,000 Units by mouth daily, Disp: , Rfl:     Cyanocobalamin 1000 MCG/ML KIT, Inject 1 mL as directed every 30 (thirty) days, Disp: , Rfl:     divalproex sodium (DEPAKOTE) 250 mg EC tablet, Take 250 mg by mouth daily, Disp: , Rfl:     escitalopram (LEXAPRO) 10 mg tablet, Take 10 mg by mouth daily, Disp: , Rfl:     furosemide (LASIX) 40 mg tablet, Take 40 mg by mouth daily as needed (SOB) Only take for weight more than 200  , Disp: , Rfl:     gabapentin (NEURONTIN) 600 MG tablet, Take 0 5 tablets by mouth daily at bedtime, Disp: , Rfl: 0    insulin detemir (LEVEMIR FLEXTOUCH) 100 Units/mL injection pen, Inject under the skin Inject per prescriber's instructions  Insulin dosing requires individualization  , Disp: , Rfl:     insulin glargine (LANTUS) 100 units/mL subcutaneous injection, Inject 42 Units under the skin daily at bedtime, Disp: , Rfl:     Insulin Glargine (TOUJEO) 300 units/mL CONCETRATED U-300 injection pen, Inject under the skin daily, Disp: , Rfl:     insulin lispro (HumaLOG) 100 units/mL injection, Inject 10 Units under the skin 3 (three) times a day with meals, Disp: , Rfl: 0    levothyroxine 125 mcg tablet, Take 125 mcg by mouth daily, Disp: , Rfl:     lisinopril (ZESTRIL) 5 mg tablet, Take 5 mg by mouth daily, Disp: , Rfl:     loratadine (CLARITIN) 10 mg tablet, Take 10 mg by mouth daily, Disp: , Rfl:     magnesium oxide (MAG-OX) 400 mg, Take 400 mg by mouth daily, Disp: , Rfl:     metFORMIN (GLUMETZA) 1000 MG (MOD) 24 hr tablet, Take 1,000 mg by mouth 2 (two) times a day with meals, Disp: , Rfl:     metoprolol tartrate (LOPRESSOR) 25 mg tablet, Take 25 mg by mouth every 12 (twelve) hours, Disp: , Rfl:     nitroglycerin (NITROSTAT) 0 4 mg SL tablet, Place 1 tablet under the tongue every 5 (five) minutes as needed for chest pain, Disp: 90 tablet, Rfl: 0    oxyCODONE (OXY-IR) 5 MG capsule, Take 5 mg by mouth every 6 (six) hours as needed for moderate pain, Disp: , Rfl:     oxyCODONE-acetaminophen (PERCOCET) 5-325 mg per tablet, Take 1 tablet by mouth every 4 (four) hours as needed for moderate pain, Disp: , Rfl:     polyethylene glycol (MIRALAX) 17 g packet, Take 17 g by mouth daily (Patient not taking: Reported on 9/18/2018 ), Disp: 14 each, Rfl: 0    potassium chloride (K-DUR,KLOR-CON) 20 mEq tablet, Take 1 tablet (20 mEq total) by mouth daily Only take for weight more than 200 (when taking water pill)  , Disp: , Rfl:     zolpidem (AMBIEN) 5 mg tablet, Take 10 mg by mouth daily at bedtime, Disp: , Rfl:

## 2019-05-30 ENCOUNTER — OFFICE VISIT (OUTPATIENT)
Dept: CARDIOLOGY CLINIC | Facility: CLINIC | Age: 69
End: 2019-05-30
Payer: COMMERCIAL

## 2019-05-30 VITALS
BODY MASS INDEX: 28.28 KG/M2 | DIASTOLIC BLOOD PRESSURE: 60 MMHG | SYSTOLIC BLOOD PRESSURE: 124 MMHG | WEIGHT: 202 LBS | HEART RATE: 68 BPM | HEIGHT: 71 IN

## 2019-05-30 DIAGNOSIS — F17.210 CIGARETTE NICOTINE DEPENDENCE WITHOUT COMPLICATION: ICD-10-CM

## 2019-05-30 DIAGNOSIS — I25.5 ISCHEMIC CARDIOMYOPATHY: Primary | ICD-10-CM

## 2019-05-30 DIAGNOSIS — I25.10 CORONARY ARTERY DISEASE INVOLVING NATIVE CORONARY ARTERY OF NATIVE HEART WITHOUT ANGINA PECTORIS: ICD-10-CM

## 2019-05-30 DIAGNOSIS — E78.2 MIXED HYPERLIPIDEMIA: ICD-10-CM

## 2019-05-30 DIAGNOSIS — E87.6 HYPOKALEMIA: ICD-10-CM

## 2019-05-30 DIAGNOSIS — I48.0 PAROXYSMAL ATRIAL FIBRILLATION (HCC): ICD-10-CM

## 2019-05-30 DIAGNOSIS — I47.2 TORSADES DE POINTES (HCC): ICD-10-CM

## 2019-05-30 PROCEDURE — 99214 OFFICE O/P EST MOD 30 MIN: CPT | Performed by: INTERNAL MEDICINE

## 2019-05-30 RX ORDER — POTASSIUM CHLORIDE 20 MEQ/1
20 TABLET, EXTENDED RELEASE ORAL DAILY
Start: 2019-05-30

## 2019-06-17 ENCOUNTER — IN-CLINIC DEVICE VISIT (OUTPATIENT)
Dept: CARDIOLOGY CLINIC | Facility: CLINIC | Age: 69
End: 2019-06-17
Payer: COMMERCIAL

## 2019-06-17 DIAGNOSIS — I25.5 ISCHEMIC CARDIOMYOPATHY: Primary | ICD-10-CM

## 2019-06-17 DIAGNOSIS — Z45.02 ENCOUNTER FOR IMPLANTABLE DEFIBRILLATOR REPROGRAMMING OR CHECK: ICD-10-CM

## 2019-06-17 PROCEDURE — 93284 PRGRMG EVAL IMPLANTABLE DFB: CPT | Performed by: INTERNAL MEDICINE

## 2019-07-09 DIAGNOSIS — I48.0 PAROXYSMAL ATRIAL FIBRILLATION (HCC): Primary | ICD-10-CM

## 2019-07-16 DIAGNOSIS — I48.0 PAROXYSMAL ATRIAL FIBRILLATION (HCC): ICD-10-CM

## 2019-08-25 DIAGNOSIS — I48.0 PAROXYSMAL ATRIAL FIBRILLATION (HCC): ICD-10-CM

## 2019-08-25 DIAGNOSIS — I25.10 CORONARY ARTERY DISEASE INVOLVING NATIVE CORONARY ARTERY OF NATIVE HEART WITHOUT ANGINA PECTORIS: ICD-10-CM

## 2019-08-25 RX ORDER — AMIODARONE HYDROCHLORIDE 200 MG/1
TABLET ORAL
Qty: 90 TABLET | Refills: 3 | Status: SHIPPED | OUTPATIENT
Start: 2019-08-25 | End: 2020-05-04 | Stop reason: DRUGHIGH

## 2019-09-17 ENCOUNTER — REMOTE DEVICE CLINIC VISIT (OUTPATIENT)
Dept: CARDIOLOGY CLINIC | Facility: CLINIC | Age: 69
End: 2019-09-17
Payer: COMMERCIAL

## 2019-09-17 DIAGNOSIS — Z45.02 ENCOUNTER FOR IMPLANTABLE DEFIBRILLATOR REPROGRAMMING OR CHECK: ICD-10-CM

## 2019-09-17 DIAGNOSIS — I25.5 ISCHEMIC CARDIOMYOPATHY: Primary | ICD-10-CM

## 2019-09-17 PROCEDURE — 93296 REM INTERROG EVL PM/IDS: CPT | Performed by: INTERNAL MEDICINE

## 2019-09-17 PROCEDURE — 93295 DEV INTERROG REMOTE 1/2/MLT: CPT | Performed by: INTERNAL MEDICINE

## 2019-09-17 NOTE — PROGRESS NOTES
[employer] remote check ICD  Battery @ 9 months will check more closely  Check detects some sensing issue  Spoke with rep states it is fine  Current Outpatient Medications:     albuterol (PROVENTIL HFA,VENTOLIN HFA) 90 mcg/act inhaler, Inhale 2 puffs every 4 (four) hours as needed for wheezing or shortness of breath, Disp: , Rfl:     ALPRAZolam (XANAX) 0 25 mg tablet, Take by mouth 3 (three) times a day, Disp: , Rfl:     amiodarone 200 mg tablet, take 1 tablet by mouth once daily, Disp: 90 tablet, Rfl: 3    apixaban (ELIQUIS) 5 mg, Take 1 tablet (5 mg total) by mouth 2 (two) times a day, Disp: 60 tablet, Rfl: 5    atorvastatin (LIPITOR) 40 mg tablet, Take 40 mg by mouth daily, Disp: , Rfl:     cetirizine (ZyrTEC) 10 mg tablet, Take 10 mg by mouth daily, Disp: , Rfl:     cholecalciferol (VITAMIN D3) 1,000 units tablet, Take 1,000 Units by mouth daily, Disp: , Rfl:     Cyanocobalamin 1000 MCG/ML KIT, Inject 1 mL as directed every 30 (thirty) days, Disp: , Rfl:     divalproex sodium (DEPAKOTE) 250 mg EC tablet, Take 250 mg by mouth daily, Disp: , Rfl:     escitalopram (LEXAPRO) 10 mg tablet, Take 10 mg by mouth daily, Disp: , Rfl:     furosemide (LASIX) 40 mg tablet, Take 40 mg by mouth daily as needed (SOB) Only take for weight more than 200 , Disp: , Rfl:     gabapentin (NEURONTIN) 600 MG tablet, Take 0 5 tablets by mouth daily at bedtime, Disp: , Rfl: 0    insulin detemir (LEVEMIR FLEXTOUCH) 100 Units/mL injection pen, Inject under the skin Inject per prescriber's instructions  Insulin dosing requires individualization  , Disp: , Rfl:     insulin glargine (LANTUS) 100 units/mL subcutaneous injection, Inject 42 Units under the skin daily at bedtime, Disp: , Rfl:     Insulin Glargine (TOUJEO) 300 units/mL CONCETRATED U-300 injection pen, Inject under the skin daily, Disp: , Rfl:     insulin lispro (HumaLOG) 100 units/mL injection, Inject 10 Units under the skin 3 (three) times a day with meals (Patient not taking: Reported on 5/30/2019), Disp: , Rfl: 0    levothyroxine 125 mcg tablet, Take 125 mcg by mouth daily, Disp: , Rfl:     lisinopril (ZESTRIL) 5 mg tablet, Take 5 mg by mouth daily, Disp: , Rfl:     loratadine (CLARITIN) 10 mg tablet, Take 10 mg by mouth daily, Disp: , Rfl:     magnesium oxide (MAG-OX) 400 mg, Take 400 mg by mouth daily, Disp: , Rfl:     metFORMIN (GLUMETZA) 1000 MG (MOD) 24 hr tablet, Take 1,000 mg by mouth 2 (two) times a day with meals, Disp: , Rfl:     metoprolol tartrate (LOPRESSOR) 25 mg tablet, Take 25 mg by mouth every 12 (twelve) hours, Disp: , Rfl:     nitroglycerin (NITROSTAT) 0 4 mg SL tablet, Place 1 tablet under the tongue every 5 (five) minutes as needed for chest pain, Disp: 90 tablet, Rfl: 0    oxyCODONE (OXY-IR) 5 MG capsule, Take 5 mg by mouth every 6 (six) hours as needed for moderate pain, Disp: , Rfl:     oxyCODONE-acetaminophen (PERCOCET) 5-325 mg per tablet, Take 1 tablet by mouth every 4 (four) hours as needed for moderate pain, Disp: , Rfl:     polyethylene glycol (MIRALAX) 17 g packet, Take 17 g by mouth daily (Patient not taking: Reported on 9/18/2018 ), Disp: 14 each, Rfl: 0    potassium chloride (K-DUR,KLOR-CON) 20 mEq tablet, Take 1 tablet (20 mEq total) by mouth daily Only take when taking lasix , Disp: , Rfl:     zolpidem (AMBIEN) 5 mg tablet, Take 10 mg by mouth daily at bedtime, Disp: , Rfl:

## 2019-12-17 ENCOUNTER — REMOTE DEVICE CLINIC VISIT (OUTPATIENT)
Dept: CARDIOLOGY CLINIC | Facility: CLINIC | Age: 69
End: 2019-12-17
Payer: COMMERCIAL

## 2019-12-17 DIAGNOSIS — Z45.02 ENCOUNTER FOR IMPLANTABLE DEFIBRILLATOR REPROGRAMMING OR CHECK: ICD-10-CM

## 2019-12-17 DIAGNOSIS — I25.5 ISCHEMIC CARDIOMYOPATHY: Primary | ICD-10-CM

## 2019-12-17 PROCEDURE — 93295 DEV INTERROG REMOTE 1/2/MLT: CPT | Performed by: INTERNAL MEDICINE

## 2019-12-17 PROCEDURE — 93296 REM INTERROG EVL PM/IDS: CPT | Performed by: INTERNAL MEDICINE

## 2020-01-12 DIAGNOSIS — I48.0 PAROXYSMAL ATRIAL FIBRILLATION (HCC): ICD-10-CM

## 2020-01-12 RX ORDER — APIXABAN 5 MG/1
TABLET, FILM COATED ORAL
Qty: 60 TABLET | Refills: 0 | Status: SHIPPED | OUTPATIENT
Start: 2020-01-12 | End: 2020-02-23

## 2020-01-13 NOTE — PROGRESS NOTES
[employer] remote check ICD  No events  Battery 8 months will check monthly  Current Outpatient Medications:     albuterol (PROVENTIL HFA,VENTOLIN HFA) 90 mcg/act inhaler, Inhale 2 puffs every 4 (four) hours as needed for wheezing or shortness of breath, Disp: , Rfl:     ALPRAZolam (XANAX) 0 25 mg tablet, Take by mouth 3 (three) times a day, Disp: , Rfl:     amiodarone 200 mg tablet, take 1 tablet by mouth once daily, Disp: 90 tablet, Rfl: 3    atorvastatin (LIPITOR) 40 mg tablet, Take 40 mg by mouth daily, Disp: , Rfl:     cetirizine (ZyrTEC) 10 mg tablet, Take 10 mg by mouth daily, Disp: , Rfl:     cholecalciferol (VITAMIN D3) 1,000 units tablet, Take 1,000 Units by mouth daily, Disp: , Rfl:     Cyanocobalamin 1000 MCG/ML KIT, Inject 1 mL as directed every 30 (thirty) days, Disp: , Rfl:     divalproex sodium (DEPAKOTE) 250 mg EC tablet, Take 250 mg by mouth daily, Disp: , Rfl:     ELIQUIS 5 MG, take 1 tablet by mouth twice a day, Disp: 60 tablet, Rfl: 0    escitalopram (LEXAPRO) 10 mg tablet, Take 10 mg by mouth daily, Disp: , Rfl:     furosemide (LASIX) 40 mg tablet, Take 40 mg by mouth daily as needed (SOB) Only take for weight more than 200 , Disp: , Rfl:     gabapentin (NEURONTIN) 600 MG tablet, Take 0 5 tablets by mouth daily at bedtime, Disp: , Rfl: 0    insulin detemir (LEVEMIR FLEXTOUCH) 100 Units/mL injection pen, Inject under the skin Inject per prescriber's instructions  Insulin dosing requires individualization  , Disp: , Rfl:     insulin glargine (LANTUS) 100 units/mL subcutaneous injection, Inject 42 Units under the skin daily at bedtime, Disp: , Rfl:     Insulin Glargine (TOUJEO) 300 units/mL CONCETRATED U-300 injection pen, Inject under the skin daily, Disp: , Rfl:     insulin lispro (HumaLOG) 100 units/mL injection, Inject 10 Units under the skin 3 (three) times a day with meals (Patient not taking: Reported on 5/30/2019), Disp: , Rfl: 0    levothyroxine 125 mcg tablet, Take 125 mcg by mouth daily, Disp: , Rfl:     lisinopril (ZESTRIL) 5 mg tablet, Take 5 mg by mouth daily, Disp: , Rfl:     loratadine (CLARITIN) 10 mg tablet, Take 10 mg by mouth daily, Disp: , Rfl:     magnesium oxide (MAG-OX) 400 mg, Take 400 mg by mouth daily, Disp: , Rfl:     metFORMIN (GLUMETZA) 1000 MG (MOD) 24 hr tablet, Take 1,000 mg by mouth 2 (two) times a day with meals, Disp: , Rfl:     metoprolol tartrate (LOPRESSOR) 25 mg tablet, Take 25 mg by mouth every 12 (twelve) hours, Disp: , Rfl:     nitroglycerin (NITROSTAT) 0 4 mg SL tablet, Place 1 tablet under the tongue every 5 (five) minutes as needed for chest pain, Disp: 90 tablet, Rfl: 0    oxyCODONE (OXY-IR) 5 MG capsule, Take 5 mg by mouth every 6 (six) hours as needed for moderate pain, Disp: , Rfl:     oxyCODONE-acetaminophen (PERCOCET) 5-325 mg per tablet, Take 1 tablet by mouth every 4 (four) hours as needed for moderate pain, Disp: , Rfl:     polyethylene glycol (MIRALAX) 17 g packet, Take 17 g by mouth daily (Patient not taking: Reported on 9/18/2018 ), Disp: 14 each, Rfl: 0    potassium chloride (K-DUR,KLOR-CON) 20 mEq tablet, Take 1 tablet (20 mEq total) by mouth daily Only take when taking lasix , Disp: , Rfl:     zolpidem (AMBIEN) 5 mg tablet, Take 10 mg by mouth daily at bedtime, Disp: , Rfl:

## 2020-02-23 DIAGNOSIS — I48.0 PAROXYSMAL ATRIAL FIBRILLATION (HCC): ICD-10-CM

## 2020-02-23 RX ORDER — APIXABAN 5 MG/1
TABLET, FILM COATED ORAL
Qty: 60 TABLET | Refills: 0 | Status: SHIPPED | OUTPATIENT
Start: 2020-02-23 | End: 2020-03-26

## 2020-03-17 ENCOUNTER — REMOTE DEVICE CLINIC VISIT (OUTPATIENT)
Dept: CARDIOLOGY CLINIC | Facility: CLINIC | Age: 70
End: 2020-03-17
Payer: MEDICARE

## 2020-03-17 DIAGNOSIS — I25.5 ISCHEMIC CARDIOMYOPATHY: Primary | ICD-10-CM

## 2020-03-17 DIAGNOSIS — Z45.02 ENCOUNTER FOR IMPLANTABLE DEFIBRILLATOR REPROGRAMMING OR CHECK: ICD-10-CM

## 2020-03-17 PROCEDURE — 93295 DEV INTERROG REMOTE 1/2/MLT: CPT | Performed by: INTERNAL MEDICINE

## 2020-03-17 PROCEDURE — 93296 REM INTERROG EVL PM/IDS: CPT | Performed by: INTERNAL MEDICINE

## 2020-03-26 DIAGNOSIS — I48.0 PAROXYSMAL ATRIAL FIBRILLATION (HCC): ICD-10-CM

## 2020-03-26 RX ORDER — APIXABAN 5 MG/1
TABLET, FILM COATED ORAL
Qty: 60 TABLET | Refills: 0 | Status: SHIPPED | OUTPATIENT
Start: 2020-03-26 | End: 2020-04-27

## 2020-03-26 NOTE — PROGRESS NOTES
[employer] remote check ICD  No events  8 months left on battery will check monthly         Current Outpatient Medications:     albuterol (PROVENTIL HFA,VENTOLIN HFA) 90 mcg/act inhaler, Inhale 2 puffs every 4 (four) hours as needed for wheezing or shortness of breath, Disp: , Rfl:     ALPRAZolam (XANAX) 0 25 mg tablet, Take by mouth 3 (three) times a day, Disp: , Rfl:     amiodarone 200 mg tablet, take 1 tablet by mouth once daily, Disp: 90 tablet, Rfl: 3    atorvastatin (LIPITOR) 40 mg tablet, Take 40 mg by mouth daily, Disp: , Rfl:     cetirizine (ZyrTEC) 10 mg tablet, Take 10 mg by mouth daily, Disp: , Rfl:     cholecalciferol (VITAMIN D3) 1,000 units tablet, Take 1,000 Units by mouth daily, Disp: , Rfl:     Cyanocobalamin 1000 MCG/ML KIT, Inject 1 mL as directed every 30 (thirty) days, Disp: , Rfl:     divalproex sodium (DEPAKOTE) 250 mg EC tablet, Take 250 mg by mouth daily, Disp: , Rfl:     ELIQUIS 5 MG, take 1 tablet by mouth twice a day, Disp: 60 tablet, Rfl: 0    escitalopram (LEXAPRO) 10 mg tablet, Take 10 mg by mouth daily, Disp: , Rfl:     furosemide (LASIX) 40 mg tablet, Take 40 mg by mouth daily as needed (SOB) Only take for weight more than 200 , Disp: , Rfl:     gabapentin (NEURONTIN) 600 MG tablet, Take 0 5 tablets by mouth daily at bedtime, Disp: , Rfl: 0    insulin detemir (LEVEMIR FLEXTOUCH) 100 Units/mL injection pen, Inject under the skin Inject per prescriber's instructions  Insulin dosing requires individualization  , Disp: , Rfl:     insulin glargine (LANTUS) 100 units/mL subcutaneous injection, Inject 42 Units under the skin daily at bedtime, Disp: , Rfl:     Insulin Glargine (TOUJEO) 300 units/mL CONCETRATED U-300 injection pen, Inject under the skin daily, Disp: , Rfl:     insulin lispro (HumaLOG) 100 units/mL injection, Inject 10 Units under the skin 3 (three) times a day with meals (Patient not taking: Reported on 5/30/2019), Disp: , Rfl: 0    levothyroxine 125 mcg tablet, Take 125 mcg by mouth daily, Disp: , Rfl:     lisinopril (ZESTRIL) 5 mg tablet, Take 5 mg by mouth daily, Disp: , Rfl:     loratadine (CLARITIN) 10 mg tablet, Take 10 mg by mouth daily, Disp: , Rfl:     magnesium oxide (MAG-OX) 400 mg, Take 400 mg by mouth daily, Disp: , Rfl:     metFORMIN (GLUMETZA) 1000 MG (MOD) 24 hr tablet, Take 1,000 mg by mouth 2 (two) times a day with meals, Disp: , Rfl:     metoprolol tartrate (LOPRESSOR) 25 mg tablet, Take 25 mg by mouth every 12 (twelve) hours, Disp: , Rfl:     nitroglycerin (NITROSTAT) 0 4 mg SL tablet, Place 1 tablet under the tongue every 5 (five) minutes as needed for chest pain, Disp: 90 tablet, Rfl: 0    oxyCODONE (OXY-IR) 5 MG capsule, Take 5 mg by mouth every 6 (six) hours as needed for moderate pain, Disp: , Rfl:     oxyCODONE-acetaminophen (PERCOCET) 5-325 mg per tablet, Take 1 tablet by mouth every 4 (four) hours as needed for moderate pain, Disp: , Rfl:     polyethylene glycol (MIRALAX) 17 g packet, Take 17 g by mouth daily (Patient not taking: Reported on 9/18/2018 ), Disp: 14 each, Rfl: 0    potassium chloride (K-DUR,KLOR-CON) 20 mEq tablet, Take 1 tablet (20 mEq total) by mouth daily Only take when taking lasix , Disp: , Rfl:     zolpidem (AMBIEN) 5 mg tablet, Take 10 mg by mouth daily at bedtime, Disp: , Rfl:

## 2020-04-26 DIAGNOSIS — I48.0 PAROXYSMAL ATRIAL FIBRILLATION (HCC): ICD-10-CM

## 2020-04-27 RX ORDER — APIXABAN 5 MG/1
TABLET, FILM COATED ORAL
Qty: 60 TABLET | Refills: 0 | Status: SHIPPED | OUTPATIENT
Start: 2020-04-27 | End: 2020-05-25

## 2020-05-04 ENCOUNTER — OFFICE VISIT (OUTPATIENT)
Dept: CARDIOLOGY CLINIC | Facility: CLINIC | Age: 70
End: 2020-05-04
Payer: MEDICARE

## 2020-05-04 VITALS
WEIGHT: 186 LBS | HEIGHT: 71 IN | BODY MASS INDEX: 26.04 KG/M2 | HEART RATE: 66 BPM | DIASTOLIC BLOOD PRESSURE: 66 MMHG | SYSTOLIC BLOOD PRESSURE: 126 MMHG

## 2020-05-04 DIAGNOSIS — I48.0 PAROXYSMAL ATRIAL FIBRILLATION (HCC): ICD-10-CM

## 2020-05-04 DIAGNOSIS — I25.5 ISCHEMIC CARDIOMYOPATHY: Primary | ICD-10-CM

## 2020-05-04 DIAGNOSIS — I25.10 CORONARY ARTERY DISEASE INVOLVING NATIVE CORONARY ARTERY OF NATIVE HEART WITHOUT ANGINA PECTORIS: ICD-10-CM

## 2020-05-04 PROCEDURE — 93000 ELECTROCARDIOGRAM COMPLETE: CPT | Performed by: INTERNAL MEDICINE

## 2020-05-04 PROCEDURE — 99214 OFFICE O/P EST MOD 30 MIN: CPT | Performed by: INTERNAL MEDICINE

## 2020-05-04 RX ORDER — EMPAGLIFLOZIN 10 MG/1
10 TABLET, FILM COATED ORAL EVERY MORNING
COMMUNITY
Start: 2020-04-06

## 2020-05-04 RX ORDER — SYRINGE-NEEDLE,INSULIN,0.5 ML 30 GX5/16"
SYRINGE, EMPTY DISPOSABLE MISCELLANEOUS
COMMUNITY
Start: 2020-04-01

## 2020-05-23 DIAGNOSIS — I48.0 PAROXYSMAL ATRIAL FIBRILLATION (HCC): ICD-10-CM

## 2020-05-25 RX ORDER — APIXABAN 5 MG/1
TABLET, FILM COATED ORAL
Qty: 60 TABLET | Refills: 0 | Status: SHIPPED | OUTPATIENT
Start: 2020-05-25 | End: 2020-08-02

## 2020-07-31 DIAGNOSIS — I48.0 PAROXYSMAL ATRIAL FIBRILLATION (HCC): ICD-10-CM

## 2020-08-02 RX ORDER — APIXABAN 5 MG/1
TABLET, FILM COATED ORAL
Qty: 60 TABLET | Refills: 0 | Status: SHIPPED | OUTPATIENT
Start: 2020-08-02

## 2021-10-21 NOTE — PROCEDURES
Thoracentesis  Date/Time: 11/10/2017 3:00 PM  Performed by: Dimitri Vivas by: Aracelis Unger     Patient location:  Bedside  Other Assisting Provider: No    Consent:     Consent obtained:  Written    Consent given by:  Patient    Risks discussed:  Bleeding and pneumothorax    Alternatives discussed:  No treatment  Universal protocol:     Procedure explained and questions answered to patient or proxy's satisfaction: yes      Relevant documents present and verified: yes      Test results available and properly labeled: yes      Imaging studies available: yes      Required blood products, implants, devices and special equipment available: yes      Site/side marked: yes      Immediately prior to procedure a time out was called: yes      Patient identity confirmed:  Verbally with patient and arm band  Indications:     Procedure Purpose: diagnostic and therapeutic      Indications: pleural effusion    Anesthesia (see MAR for exact dosages): Anesthesia method:  Local infiltration    Local anesthetic:  Lidocaine 1% w/o epi  Procedure details:     Preparation: Patient was prepped and draped in usual sterile fashion      Standard thoracentesis cath kit used: Yes      Patient position:  Sitting    Laterality:  Right    Location:  Midscapular line    Intercostal space:  8th    Ultrasound guidance: yes      Reason for ultrasound: Identify fluid collection and guide cathetar placement        Indwelling catheter placed: yes      Number of attempts:  1    Drainage color:  Jana    Drainage characteristics:  Clear  Post-procedure details:     Chest x-ray performed: yes    Comments:      1000ml serous fluid removed without difficulty Bi-Rhombic Flap Text: The defect edges were debeveled with a #15 scalpel blade.  Given the location of the defect and the proximity to free margins a bi-rhombic flap was deemed most appropriate.  Using a sterile surgical marker, an appropriate rhombic flap was drawn incorporating the defect. The area thus outlined was incised deep to adipose tissue with a #15 scalpel blade.  The skin margins were undermined to an appropriate distance in all directions utilizing iris scissors.

## 2025-01-31 NOTE — PROGRESS NOTES
Patient's FSGs are controlled on current medication regimen.  Last A1c reviewed-   Lab Results   Component Value Date    HGBA1C 5.9 08/06/2023     Most recent fingerstick glucose reviewed-   Recent Labs   Lab 01/30/25  1604 01/31/25  0104 01/31/25  0616   POCTGLUCOSE 182* 121* 94       Current correctional scale  Medium  Maintain anti-hyperglycemic dose as follows-   Antihyperglycemics (From admission, onward)    Start     Stop Route Frequency Ordered    01/29/25 1620  insulin aspart U-100 pen 0-10 Units         -- SubQ Before meals & nightly PRN 01/29/25 1521        Hold Oral hypoglycemics while patient is in the hospital.     Progress Note - Cardiology   Sade Ruby 79 y o  female MRN: [id number]  Unit/Bed#: MICU 10 Encounter: [id number]  11/09/17  1:36 PM        Assessment/Plan:    1  NSTEMI type II  On aspirin, statin, beta blocker  2  Chronic multivessel CAD  On aspirin, statin, beta blocker  Has been medically managed per report, cath done at [facility]  Unable to see actual report of cath from 6/17  Will need repeat cath, initially planned for today, but she is unable to lie flat, continue diuresis and will defer until 11/10  3  Chronic LBBB  Still present on telemetry  4  ICM with EF 35%  By echo here, EF improved to 50%  Currently on Lasix 40 IV tid to help with volume overload/respiratory status  5  HTN  Started on low dose beta blocker, thus far tolerating  6  HL  On statin  7  DM  On insulin  8  PMVT  Lidocaine drip stopped 11/8  No further episodes since 11/13  Keep K>4, Mg>2      9  PNA  On nafcillin  Last fever 11/5  Subjective/Objective   Chief Complaint: No chief complaint on file  Subjective:79 year old woman with a history of multivessel disease managed medically, ICM with EF 35%, chronic LBBB, DM, 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, admitted after being found to at home, found on telemetry here to have an episode of PVC induced PMVT with baseline prolonged QT on 11/3 that resolved spontaneously per report  She was started on IV amiodarone, but had a second episode of torsades on 11/3, and was switched from Amiodarone to Lidocaine drip at that time, and was kept on dopamine to keep HR elevated as well as for inotropic effect  She was extubated 11/8  She reports she is feeling more relaxed today  No chest pain or shortness of breath  No fevers       Patient Active Problem List   Diagnosis    Respiratory failure [facility]    Cardiac arrest ([facility] )    Acute hypercapnic/hypoxic respiratory failure (HCC)    Torsades de pointes (HCC)    Prolonged QT interval    NSTEMI (non-ST elevated myocardial infarction) ([place] [address] )    Ischemic cardiomyopathy    LBBB (left bundle branch block)    CAD (coronary artery disease)    h/o Vocal cord paralysis    HTN (hypertension)    Hyperlipemia    Encephalopathy    Obesity, Class III, BMI 40-49 9 (morbid obesity) ([place] [address] )    Delirium     Past Medical History:   Diagnosis Date    Anxiety     Diabetes mellitus ([place] [address] )     Hypertension     Pancreatitis        No Known Allergies    Current Facility-Administered Medications   Medication Dose Route Frequency Provider Last Rate Last Dose    acetaminophen (TYLENOL) rectal suppository 650 mg  650 mg Rectal Q4H PRN Fatou Sanders MD        aspirin chewable tablet 324 mg  324 mg Oral Daily [place], PA-C        atorvastatin (LIPITOR) tablet 40 mg  40 mg Oral Daily ARLENE Obando   40 mg at 11/08/17 1123    cholecalciferol (VITAMIN D3) tablet 1,000 Units  1,000 Units Oral Daily [place], PA-C        fentanyl citrate (PF) 100 MCG/2ML 25 mcg  25 mcg Intravenous Q2H PRN [place]y Plane, PA-C        furosemide (LASIX) injection 40 mg  40 mg Intravenous TID (diuretic) Abby Abdullahi, DO   40 mg at 11/09/17 1207    heparin (ACS LOW)   Intravenous Once Jaime Pablo DO        influenza inactivated quadrivalent vaccine (FLULAVAL) IM injection 0 5 mL  0 5 mL Intramuscular Prior to discharge Zella Gosselin, MD        ipratropium (ATROVENT) 0 02 % inhalation solution 0 5 mg  0 5 mg Nebulization Q6H [place], PA-C        levalbuterol Mandy Kand) inhalation solution 1 25 mg  1 25 mg Nebulization Q6H Zella Gosselin, MD        levothyroxine tablet 125 mcg  125 mcg Oral Early Morning Woody Plane PA-C        lidocaine (PF) (XYLOCAINE-MPF) 100 mg/5 mL injection    Code/Trauma/Sedation Med Yeimy Vides, DO   100 mg at 11/03/17 1218    melatonin tablet 3 mg  3 mg Oral HS ARLENE Birch   3 mg at 11/07/17 2104    metoprolol (LOPRESSOR) injection 2 5 mg  2 5 mg Intravenous Q6H PRN Vanessa Iniguez Bratis, DO   2 5 mg at 11/09/17 1207    metoprolol tartrate (LOPRESSOR) partial tablet 12 5 mg  12 5 mg Oral Q12H [address] EMRE Stiles        nystatin (MYCOSTATIN) powder   Topical BID [place] EMRE MARCELINO        oxyCODONE (ROXICODONE) IR tablet 2 5 mg  2 5 mg Oral Q4H PRN Alan Stiles PA-C        Or    oxyCODONE (ROXICODONE) IR tablet 5 mg  5 mg Oral Q4H PRN Alan Stiles PA-C        polyethylene glycol (MIRALAX) packet 17 g  17 g Oral Daily ARLENE Morales   17 g at 11/08/17 1124    senna (SENOKOT) tablet 17 2 mg  2 tablet Oral HS ARLENE Morales   17 2 mg at 11/07/17 2104    sodium bicarbonate 50 mEq/50 mL injection    Code/Trauma/Sedation Med Sonny Maldonado, DO   100 mEq at 11/03/17 1225    sodium chloride 0 9 % bolus    Code/Trauma/Sedation Med Sonny Maldonado, DO   1,000 mL at 11/03/17 1221    trimethobenzamide (TIGAN) IM injection 200 mg  200 mg Intramuscular Q6H PRN [place] Mote, DO   200 mg at 11/07/17 2350       Vitals: /61   Pulse 100   Temp 99 °F (37 2 °C)   Resp (!) 28   Ht 5' 11" (1 803 m)   Wt 126 kg (278 lb)   SpO2 95%   BMI 18 57 kg/m²     Systolic (69JUT), XCC:629 , Min:71 , UWR:574     Diastolic (27ZSJ), HWX:04, Min:33, Max:89      Vitals:    11/07/17 0600 11/08/17 0527   Weight: 130 kg (287 lb 7 7 oz) 126 kg (278 lb)     Orthostatic Blood Pressures    Flowsheet Row Most Recent Value   Blood Pressure  147/61 filed at 11/09/2017 2520   Patient Position - Orthostatic VS  Lying filed at 11/08/2017 1847            Intake/Output Summary (Last 24 hours) at 11/09/17 1336  Last data filed at 11/09/17 1200   Gross per 24 hour   Intake             1715 ml   Output             2910 ml   Net            -1195 ml       Invasive Devices     Central Venous Catheter Line            CVC Central Lines 11/03/17 Triple 16cm 5 days          Drain            Urethral Catheter Temperature probe 6 days                  Physical Exam:     GEN: Awake, no acute distress    HEENT: Sclera anicteric, conjunctivae pink, mucous membranes moist   NECK: Supple, no carotid bruits, no significant JVD  HEART: Regular rhythm, normal S1 and S2, no murmurs, clicks, gallops or rubs  LUNGS: Clear anteriorly bilaterally; no wheezes, rales   ABDOMEN: Obese, soft, nontender, nondistended, normoactive bowel sounds  EXTREMITIES: Skin warm and well perfused, no clubbing, cyanosis, 1+ pedal edema  Skin erythematous over feet  NEURO: No focal findings  SKIN: Normal without suspicious lesions on exposed skin  Lab Results:     Troponins:     Results from last 7 days  Lab Units 11/09/17  1157 11/04/17  0557 11/03/17  1430   CK TOTAL U/L  --   --  277*   TROPONIN I ng/mL 3 22* 6 10* 6 27*   CK MB INDEX %  --   --  4 3*       CBC with diff:     Results from last 7 days  Lab Units 11/09/17  0427 11/08/17  0448 11/07/17  0438 11/06/17  0437 11/05/17  0439 11/04/17  0438 11/03/17  1430  11/02/17  2004   WBC Thousand/uL 10 88* 11 08* 10 89* 14 55* 14 86* 16 10* 16 85*  < > 15 12*   HEMOGLOBIN g/dL 9 5* 10 2* 9 8* 10 5* 10 7* 10 3* 11 0*  < > 11 9   HEMATOCRIT % 30 4* 32 5* 31 4* 34 1* 33 8* 32 3* 34 6*  < > 37 5   MCV fL 92 91 91 92 91 91 92  < > 92   PLATELETS Thousands/uL 267 230 211 239 288 279 324  < > 324   MCH pg 28 6 28 4 28 4 28 4 28 8 28 9 29 1  < > 29 1   MCHC g/dL 31 3* 31 4 31 2* 30 8* 31 7 31 9 31 8  < > 31 7   RDW % 14 8 14 6 14 6 14 3 14 6 14 4 14 5  < > 14 2   MPV fL 10 6 10 6 10 4 10 5 10 8 10 4 10 3  < > 10 4   NRBC AUTO /100 WBCs  --   --   --   --   --  0 0  --  0   < > = values in this interval not displayed        CMP:  Results from last 7 days  Lab Units 11/09/17  1157 11/09/17  0427 11/08/17  0448 11/08/17  0036 11/07/17  1822 11/07/17  0438 11/07/17  0053  11/04/17  0438  11/03/17  1430  11/02/17  2004  11/02/17  1554   SODIUM mmol/L 136 136 135* 139 138 136 135*  < > 145  < > 143  < > 141  --  144   POTASSIUM mmol/L 4 2 3 4* 4 3 3 8 3 6 4 5 3 7  < > 3 9  < > 2 9*  < > 4 0  --  3 5 CHLORIDE mmol/L 96* 95* 95* 95* 95* 97* 96*  < > 108  < > 104  < > 100  --  98*   CO2 mmol/L 32 36* 36* 36* 37* 35* 35*  < > 33*  < > 34*  < > 31  --  29   ANION GAP mmol/L 8 5 4 8 6 4 4  < > 4  < > 5  < > 10  --  17*   BUN mg/dL 15 14 15 15 16 18 19  < > 27*  < > 26*  < > 16  --  14   CREATININE mg/dL 0 80 0 75 0 83 0 75 0 73 0 81 0 77  < > 1 01  < > 1 00  < > 0 96  --  1 05   GLUCOSE RANDOM mg/dL 109 106 142* 98 100 162* 145*  < > 168*  < > 270*  < > 372*  --  385*   GLUCOSE, ISTAT   --   --   --   --   --   --   --   --   --   --   --   --   --   < >  --    CALCIUM mg/dL 9 6 9 0 9 1 9 1 9 0 8 9 8 7  < > 9 1  < > 9 2  < > 9 6  --  10 2*   AST U/L  --   --   --   --   --  38  --   --  97*  --  119*  --  148*  --  140*   ALT U/L  --   --   --   --   --  51  --   --  75  --  73  --  61  --  69   ALK PHOS U/L  --   --   --   --   --  30*  --   --  28*  --  31*  --  37*  --  44*   TOTAL PROTEIN g/dL  --   --   --   --   --  6 1*  --   --  6 1*  --  6 1*  --  7 2  --  8 1   ALBUMIN g/dL  --   --   --   --   --  2 5*  --   --  3 1*  --  2 9*  --  3 3*  --  3 9   BILIRUBIN TOTAL mg/dL  --   --   --   --   --  0 83  --   --  0 92  --  0 60  --  0 75  --  0 90   EGFR ml/min/1 73sq m 77 83 73 83 85 75 80  < > 58  < > 58  < > 61  < > 55   < > = values in this interval not displayed      Magnesium:     Results from last 7 days  Lab Units 11/09/17  0427 11/08/17  0036 11/07/17  1822 11/07/17  0438 11/07/17  0053 11/06/17  0437 11/05/17  2207   MAGNESIUM mg/dL 2 2 2 6 2 2 2 1 2 1 2 4 2 4       Coags:     Results from last 7 days  Lab Units 11/07/17  0438 11/06/17  0437 11/05/17  0439 11/04/17  1026 11/04/17  0438 11/03/17  2032 11/03/17  1111 11/03/17  0511 11/02/17  2004 11/02/17  1554   PTT seconds 38* 75* 77* 68* 76* 46* 60* 61* 36* 35   INR   --   --   --   --  1 36*  --   --  1 32* 1 36* 1 26*       Cardiac testing:   Results for orders placed during the hospital encounter of 11/02/17   Echo complete with contrast if indicated    Narrative [facility]  [facility], [address] [facility]  [phone]    Transthoracic Echocardiogram  2D, M-mode, Doppler, and Color Doppler    Study date:  2017    Patient: Keya Wilson  MR number: [id number]  Account number: [de-identified]  : 1950  Age: 79 years  Gender: Female  Status: Inpatient  Location: Bedside  Height: 71 in  Weight: 279 lb  BP: 92/ 53 mmHg    Indications: Heart failure  Diagnoses: I50 9 - Heart failure, unspecified    Sonographer:  Sj Ham RDCS  Referring Physician:  Santi Max PA-C  Group:  [facility]  Interpreting Physician:  Andrae Márquez MD    SUMMARY    LEFT VENTRICLE:  Systolic function was at the lower limits of normal  Ejection fraction was estimated to be 50 %  There was hypokinesis of the basal inferior wall(s)  There was mild concentric hypertrophy  Features were consistent with a pseudonormal left ventricular filling pattern, with concomitant abnormal relaxation and increased filling pressure (grade 2 diastolic dysfunction)  MITRAL VALVE:  There was mild to moderate regurgitation  AORTIC VALVE:  There was mild regurgitation  PERICARDIUM:  A small pericardial effusion was identified posterior to the heart  The fluid had no internal echoes  There was no evidence of hemodynamic compromise  There was a large left pleural effusion  HISTORY: PRIOR HISTORY: NSTEMI, LBBB, Ischemic cardiomyopathy, Coronary artery disease, Diabetes, Hypertension, Hyperlipidemia, Tracheostomy, Smoker  PROCEDURE: The procedure was performed at the bedside  This was a routine study  The transthoracic approach was used  The study included complete 2D imaging, M-mode, complete spectral Doppler, and color Doppler  The heart rate was 73 bpm,  at the start of the study  Images were obtained from the parasternal, apical, subcostal, and suprasternal notch acoustic windows   Echocardiographic views were limited due to decreased penetration and lung interference  This was a  technically difficult study  LEFT VENTRICLE: Size was normal  Systolic function was at the lower limits of normal  Ejection fraction was estimated to be 50 %  There was hypokinesis of the basal inferior wall(s)  Wall thickness was mildly increased  There was mild  concentric hypertrophy  DOPPLER: The transmitral flow pattern was normal  Features were consistent with a pseudonormal left ventricular filling pattern, with concomitant abnormal relaxation and increased filling pressure (grade 2 diastolic  dysfunction)  RIGHT VENTRICLE: The size was normal  Systolic function was normal  Wall thickness was normal     LEFT ATRIUM: Size was normal     RIGHT ATRIUM: Size was normal     MITRAL VALVE: Valve structure was normal  There was normal leaflet separation  DOPPLER: The transmitral velocity was within the normal range  There was no evidence for stenosis  There was mild to moderate regurgitation  AORTIC VALVE: The valve was trileaflet  Leaflets exhibited mild calcification, normal cuspal separation, and sclerosis  DOPPLER: Transaortic velocity was within the normal range  There was no evidence for stenosis  There was mild  regurgitation  TRICUSPID VALVE: The valve structure was normal  There was normal leaflet separation  DOPPLER: The transtricuspid velocity was within the normal range  There was no evidence for stenosis  There was trace regurgitation  The tricuspid jet  envelope definition was inadequate for estimation of RV systolic pressure  PULMONIC VALVE: Leaflets exhibited normal thickness, no calcification, and normal cuspal separation  DOPPLER: The transpulmonic velocity was within the normal range  There was trace regurgitation  PERICARDIUM: A small pericardial effusion was identified posterior to the heart  The fluid had no internal echoes  There was no evidence of hemodynamic compromise   There was a large left pleural effusion  The pericardium was normal in  appearance  AORTA: The root exhibited normal size  SYSTEMIC VEINS: IVC: The inferior vena cava was dilated  Respirophasic changes in dimension were absent  SYSTEM MEASUREMENT TABLES    2D  %FS: 17 89 %  Ao Diam: 2 84 cm  EDV(Teich): 142 7 ml  EF Biplane: 35 65 %  EF(Teich): 36 81 %  ESV(Teich): 90 18 ml  IVSd: 1 3 cm  LA Area: 20 16 cm2  LA Diam: 4 05 cm  LVEDV MOD A2C: 129 88 ml  LVEDV MOD A4C: 112 39 ml  LVEDV MOD BP: 125 34 ml  LVEF MOD A2C: 34 61 %  LVEF MOD A4C: 32 37 %  LVESV MOD A2C: 84 92 ml  LVESV MOD A4C: 76 01 ml  LVESV MOD BP: 80 65 ml  LVIDd: 5 42 cm  LVIDs: 4 45 cm  LVLd A2C: 7 55 cm  LVLd A4C: 8 17 cm  LVLs A2C: 6 77 cm  LVLs A4C: 6 52 cm  LVPWd: 1 28 cm  RA Area: 17 87 cm2  RVIDd: 4 23 cm  SV MOD A2C: 44 96 ml  SV MOD A4C: 36 38 ml  SV(Teich): 52 53 ml    CW  AR Dec [place]: 1 64 m/s2  AR Dec Time: 1614 77 ms  AR PHT: 468 28 ms  AR Vmax: 2 65 m/s  AR maxP 04 mmHg    MM  TAPSE: 2 18 cm    PW  E': 0 03 m/s  E/E': 35 79  MV A [place]: 0 75 m/s  MV Dec [place]: 4 83 m/s2  MV DecT: 211 74 ms  MV E [place]nel: 1 02 m/s  MV E/A Ratio: 1 37  MV PHT: 61 4 ms  MVA By PHT: 3 58 cm2    [employer] Accredited Echocardiography Laboratory    Prepared and electronically signed by    Martha Bautista MD  Signed [id number] 12:14:05         Imaging: I have personally reviewed pertinent reports  Telemetry: personally reviewed, SR with LBBB, isolated PACs